# Patient Record
Sex: MALE | Race: WHITE | Employment: OTHER | URBAN - METROPOLITAN AREA
[De-identification: names, ages, dates, MRNs, and addresses within clinical notes are randomized per-mention and may not be internally consistent; named-entity substitution may affect disease eponyms.]

---

## 2017-01-09 RX ORDER — ATENOLOL 25 MG/1
TABLET ORAL
Qty: 90 TABLET | Refills: 3 | Status: SHIPPED | OUTPATIENT
Start: 2017-01-09 | End: 2020-01-15 | Stop reason: SDUPTHER

## 2017-01-12 ENCOUNTER — TELEPHONE (OUTPATIENT)
Dept: PHARMACY | Facility: CLINIC | Age: 57
End: 2017-01-12

## 2017-02-14 ENCOUNTER — OFFICE VISIT (OUTPATIENT)
Dept: FAMILY MEDICINE CLINIC | Age: 57
End: 2017-02-14

## 2017-02-14 ENCOUNTER — HOSPITAL ENCOUNTER (OUTPATIENT)
Dept: OTHER | Age: 57
Discharge: HOME | End: 2017-02-14
Attending: FAMILY MEDICINE | Admitting: FAMILY MEDICINE

## 2017-02-14 VITALS
OXYGEN SATURATION: 97 % | HEART RATE: 84 BPM | SYSTOLIC BLOOD PRESSURE: 130 MMHG | DIASTOLIC BLOOD PRESSURE: 72 MMHG | BODY MASS INDEX: 32.07 KG/M2 | RESPIRATION RATE: 14 BRPM | WEIGHT: 224 LBS | HEIGHT: 70 IN

## 2017-02-14 DIAGNOSIS — Z12.11 SCREEN FOR COLON CANCER: ICD-10-CM

## 2017-02-14 DIAGNOSIS — M54.42 CHRONIC BILATERAL LOW BACK PAIN WITH BILATERAL SCIATICA: ICD-10-CM

## 2017-02-14 DIAGNOSIS — G89.29 CHRONIC BILATERAL LOW BACK PAIN WITH BILATERAL SCIATICA: ICD-10-CM

## 2017-02-14 DIAGNOSIS — M54.41 CHRONIC BILATERAL LOW BACK PAIN WITH BILATERAL SCIATICA: ICD-10-CM

## 2017-02-14 DIAGNOSIS — E11.42 DIABETIC POLYNEUROPATHY ASSOCIATED WITH TYPE 2 DIABETES MELLITUS (HCC): Primary | ICD-10-CM

## 2017-02-14 DIAGNOSIS — I10 ESSENTIAL HYPERTENSION: ICD-10-CM

## 2017-02-14 DIAGNOSIS — Z12.5 PROSTATE CANCER SCREENING: ICD-10-CM

## 2017-02-14 LAB
A/G RATIO: 1.3 (ref 1.1–2.2)
ALBUMIN SERPL-MCNC: 4.3 G/DL (ref 3.4–5)
ALP BLD-CCNC: 96 U/L (ref 40–129)
ALT SERPL-CCNC: 64 U/L (ref 10–40)
ANION GAP SERPL CALCULATED.3IONS-SCNC: 24 MMOL/L (ref 3–16)
AST SERPL-CCNC: 46 U/L (ref 15–37)
BASOPHILS ABSOLUTE: 0.2 K/UL (ref 0–0.2)
BASOPHILS RELATIVE PERCENT: 1.8 %
BILIRUB SERPL-MCNC: 0.4 MG/DL (ref 0–1)
BUN BLDV-MCNC: 14 MG/DL (ref 7–20)
CALCIUM SERPL-MCNC: 9.6 MG/DL (ref 8.3–10.6)
CHLORIDE BLD-SCNC: 93 MMOL/L (ref 99–110)
CHOLESTEROL, TOTAL: 293 MG/DL (ref 0–199)
CO2: 20 MMOL/L (ref 21–32)
CREAT SERPL-MCNC: 0.7 MG/DL (ref 0.9–1.3)
CREATININE URINE POCT: ABNORMAL
EOSINOPHILS ABSOLUTE: 0.4 K/UL (ref 0–0.6)
EOSINOPHILS RELATIVE PERCENT: 4.1 %
GFR AFRICAN AMERICAN: >60
GFR NON-AFRICAN AMERICAN: >60
GLOBULIN: 3.2 G/DL
GLUCOSE BLD-MCNC: 220 MG/DL (ref 70–99)
HCT VFR BLD CALC: 44 % (ref 40.5–52.5)
HDLC SERPL-MCNC: 36 MG/DL (ref 40–60)
HEMOGLOBIN: 15 G/DL (ref 13.5–17.5)
LDL CHOLESTEROL CALCULATED: ABNORMAL MG/DL
LDL CHOLESTEROL DIRECT: 59 MG/DL
LYMPHOCYTES ABSOLUTE: 2 K/UL (ref 1–5.1)
LYMPHOCYTES RELATIVE PERCENT: 22.7 %
MCH RBC QN AUTO: 30.5 PG (ref 26–34)
MCHC RBC AUTO-ENTMCNC: 34.2 G/DL (ref 31–36)
MCV RBC AUTO: 89.2 FL (ref 80–100)
MICROALBUMIN/CREAT 24H UR: ABNORMAL MG/G{CREAT}
MONOCYTES ABSOLUTE: 0.8 K/UL (ref 0–1.3)
MONOCYTES RELATIVE PERCENT: 9.4 %
NEUTROPHILS ABSOLUTE: 5.5 K/UL (ref 1.7–7.7)
NEUTROPHILS RELATIVE PERCENT: 62 %
PDW BLD-RTO: 14 % (ref 12.4–15.4)
PLATELET # BLD: 255 K/UL (ref 135–450)
PMV BLD AUTO: 8.6 FL (ref 5–10.5)
POTASSIUM SERPL-SCNC: 3.9 MMOL/L (ref 3.5–5.1)
PROSTATE SPECIFIC ANTIGEN: 0.76 NG/ML (ref 0–4)
RBC # BLD: 4.93 M/UL (ref 4.2–5.9)
SODIUM BLD-SCNC: 137 MMOL/L (ref 136–145)
TOTAL PROTEIN: 7.5 G/DL (ref 6.4–8.2)
TRIGL SERPL-MCNC: 1418 MG/DL (ref 0–150)
TSH REFLEX: 1.53 UIU/ML (ref 0.27–4.2)
VLDLC SERPL CALC-MCNC: ABNORMAL MG/DL
WBC # BLD: 8.9 K/UL (ref 4–11)

## 2017-02-14 PROCEDURE — G8417 CALC BMI ABV UP PARAM F/U: HCPCS | Performed by: FAMILY MEDICINE

## 2017-02-14 PROCEDURE — G8484 FLU IMMUNIZE NO ADMIN: HCPCS | Performed by: FAMILY MEDICINE

## 2017-02-14 PROCEDURE — 1036F TOBACCO NON-USER: CPT | Performed by: FAMILY MEDICINE

## 2017-02-14 PROCEDURE — 3017F COLORECTAL CA SCREEN DOC REV: CPT | Performed by: FAMILY MEDICINE

## 2017-02-14 PROCEDURE — 3045F PR MOST RECENT HEMOGLOBIN A1C LEVEL 7.0-9.0%: CPT | Performed by: FAMILY MEDICINE

## 2017-02-14 PROCEDURE — 99215 OFFICE O/P EST HI 40 MIN: CPT | Performed by: FAMILY MEDICINE

## 2017-02-14 PROCEDURE — 82044 UR ALBUMIN SEMIQUANTITATIVE: CPT | Performed by: FAMILY MEDICINE

## 2017-02-14 PROCEDURE — G8427 DOCREV CUR MEDS BY ELIG CLIN: HCPCS | Performed by: FAMILY MEDICINE

## 2017-02-14 PROCEDURE — G8598 ASA/ANTIPLAT THER USED: HCPCS | Performed by: FAMILY MEDICINE

## 2017-02-14 ASSESSMENT — PATIENT HEALTH QUESTIONNAIRE - PHQ9
2. FEELING DOWN, DEPRESSED OR HOPELESS: 0
SUM OF ALL RESPONSES TO PHQ QUESTIONS 1-9: 0
SUM OF ALL RESPONSES TO PHQ9 QUESTIONS 1 & 2: 0
1. LITTLE INTEREST OR PLEASURE IN DOING THINGS: 0

## 2017-02-14 ASSESSMENT — ENCOUNTER SYMPTOMS
SHORTNESS OF BREATH: 0
BACK PAIN: 1
BLURRED VISION: 1
VISUAL CHANGE: 1
ORTHOPNEA: 0

## 2017-02-15 LAB
ESTIMATED AVERAGE GLUCOSE: 254.7 MG/DL
HBA1C MFR BLD: 10.5 %

## 2017-02-27 ENCOUNTER — OFFICE VISIT (OUTPATIENT)
Dept: FAMILY MEDICINE CLINIC | Age: 57
End: 2017-02-27

## 2017-02-27 VITALS
SYSTOLIC BLOOD PRESSURE: 110 MMHG | DIASTOLIC BLOOD PRESSURE: 72 MMHG | HEART RATE: 101 BPM | OXYGEN SATURATION: 98 % | HEIGHT: 70 IN | WEIGHT: 236 LBS | BODY MASS INDEX: 33.79 KG/M2

## 2017-02-27 DIAGNOSIS — Z79.4 TYPE 2 DIABETES MELLITUS WITH DIABETIC POLYNEUROPATHY, WITH LONG-TERM CURRENT USE OF INSULIN (HCC): Primary | ICD-10-CM

## 2017-02-27 DIAGNOSIS — E11.42 TYPE 2 DIABETES MELLITUS WITH DIABETIC POLYNEUROPATHY, WITH LONG-TERM CURRENT USE OF INSULIN (HCC): Primary | ICD-10-CM

## 2017-02-27 PROCEDURE — 1036F TOBACCO NON-USER: CPT | Performed by: FAMILY MEDICINE

## 2017-02-27 PROCEDURE — 99999 PR OFFICE/OUTPT VISIT,PROCEDURE ONLY: CPT | Performed by: FAMILY MEDICINE

## 2017-03-06 RX ORDER — LISINOPRIL AND HYDROCHLOROTHIAZIDE 20; 12.5 MG/1; MG/1
TABLET ORAL
Qty: 90 TABLET | Refills: 0 | Status: SHIPPED | OUTPATIENT
Start: 2017-03-06 | End: 2018-12-03 | Stop reason: SDUPTHER

## 2017-04-11 ENCOUNTER — OFFICE VISIT (OUTPATIENT)
Dept: ORTHOPEDIC SURGERY | Age: 57
End: 2017-04-11

## 2017-04-11 VITALS
HEIGHT: 71 IN | BODY MASS INDEX: 31.5 KG/M2 | SYSTOLIC BLOOD PRESSURE: 135 MMHG | DIASTOLIC BLOOD PRESSURE: 82 MMHG | WEIGHT: 225 LBS

## 2017-04-11 DIAGNOSIS — M51.26 DEGENERATION OF LUMBAR INTERVERTEBRAL DISC WITH ACUTE HERNIATION: Primary | ICD-10-CM

## 2017-04-11 DIAGNOSIS — M51.36 DEGENERATION OF LUMBAR INTERVERTEBRAL DISC WITH ACUTE HERNIATION: Primary | ICD-10-CM

## 2017-04-11 DIAGNOSIS — M48.02 CERVICAL SPINAL STENOSIS: ICD-10-CM

## 2017-04-11 DIAGNOSIS — G95.9 LUMBAR MYELOPATHY (HCC): ICD-10-CM

## 2017-04-11 DIAGNOSIS — M54.50 PAIN OF LUMBAR SPINE: ICD-10-CM

## 2017-04-11 PROCEDURE — 3017F COLORECTAL CA SCREEN DOC REV: CPT | Performed by: PHYSICAL MEDICINE & REHABILITATION

## 2017-04-11 PROCEDURE — G8427 DOCREV CUR MEDS BY ELIG CLIN: HCPCS | Performed by: PHYSICAL MEDICINE & REHABILITATION

## 2017-04-11 PROCEDURE — G8599 NO ASA/ANTIPLAT THER USE RNG: HCPCS | Performed by: PHYSICAL MEDICINE & REHABILITATION

## 2017-04-11 PROCEDURE — G8417 CALC BMI ABV UP PARAM F/U: HCPCS | Performed by: PHYSICAL MEDICINE & REHABILITATION

## 2017-04-11 PROCEDURE — 99244 OFF/OP CNSLTJ NEW/EST MOD 40: CPT | Performed by: PHYSICAL MEDICINE & REHABILITATION

## 2017-04-11 PROCEDURE — 1036F TOBACCO NON-USER: CPT | Performed by: PHYSICAL MEDICINE & REHABILITATION

## 2017-04-11 PROCEDURE — 72100 X-RAY EXAM L-S SPINE 2/3 VWS: CPT | Performed by: PHYSICAL MEDICINE & REHABILITATION

## 2017-04-12 ENCOUNTER — CARE COORDINATION (OUTPATIENT)
Dept: FAMILY MEDICINE CLINIC | Age: 57
End: 2017-04-12

## 2017-05-24 LAB
CHOLESTEROL, TOTAL: 289 MG/DL (ref 0–199)
GLUCOSE BLD-MCNC: 360 MG/DL (ref 70–99)
HDLC SERPL-MCNC: 28 MG/DL (ref 40–60)
LDL CHOLESTEROL CALCULATED: ABNORMAL MG/DL
TRIGL SERPL-MCNC: 2496 MG/DL (ref 0–150)

## 2017-05-25 ENCOUNTER — EMPLOYEE WELLNESS (OUTPATIENT)
Dept: OTHER | Age: 57
End: 2017-05-25

## 2017-05-25 LAB
ESTIMATED AVERAGE GLUCOSE: 280.5 MG/DL
HBA1C MFR BLD: 11.4 %
LDL CHOLESTEROL DIRECT: 45 MG/DL

## 2017-07-18 ENCOUNTER — OFFICE VISIT (OUTPATIENT)
Dept: ORTHOPEDIC SURGERY | Age: 57
End: 2017-07-18

## 2017-07-18 VITALS
SYSTOLIC BLOOD PRESSURE: 151 MMHG | HEIGHT: 71 IN | WEIGHT: 225.09 LBS | DIASTOLIC BLOOD PRESSURE: 92 MMHG | HEART RATE: 113 BPM | BODY MASS INDEX: 31.51 KG/M2

## 2017-07-18 DIAGNOSIS — M48.061 LUMBAR STENOSIS: ICD-10-CM

## 2017-07-18 DIAGNOSIS — M48.02 CERVICAL SPINAL STENOSIS: Primary | ICD-10-CM

## 2017-07-18 DIAGNOSIS — M50.30 DDD (DEGENERATIVE DISC DISEASE), CERVICAL: ICD-10-CM

## 2017-07-18 PROCEDURE — G8598 ASA/ANTIPLAT THER USED: HCPCS | Performed by: PHYSICAL MEDICINE & REHABILITATION

## 2017-07-18 PROCEDURE — G8417 CALC BMI ABV UP PARAM F/U: HCPCS | Performed by: PHYSICAL MEDICINE & REHABILITATION

## 2017-07-18 PROCEDURE — G8427 DOCREV CUR MEDS BY ELIG CLIN: HCPCS | Performed by: PHYSICAL MEDICINE & REHABILITATION

## 2017-07-18 PROCEDURE — 1036F TOBACCO NON-USER: CPT | Performed by: PHYSICAL MEDICINE & REHABILITATION

## 2017-07-18 PROCEDURE — 3017F COLORECTAL CA SCREEN DOC REV: CPT | Performed by: PHYSICAL MEDICINE & REHABILITATION

## 2017-07-18 PROCEDURE — 99213 OFFICE O/P EST LOW 20 MIN: CPT | Performed by: PHYSICAL MEDICINE & REHABILITATION

## 2017-07-24 ENCOUNTER — OFFICE VISIT (OUTPATIENT)
Dept: ORTHOPEDIC SURGERY | Age: 57
End: 2017-07-24

## 2017-07-24 VITALS
WEIGHT: 225.09 LBS | BODY MASS INDEX: 31.51 KG/M2 | HEIGHT: 71 IN | SYSTOLIC BLOOD PRESSURE: 156 MMHG | HEART RATE: 118 BPM | DIASTOLIC BLOOD PRESSURE: 91 MMHG

## 2017-07-24 DIAGNOSIS — M47.12 CERVICAL SPONDYLOSIS WITH MYELOPATHY: Primary | ICD-10-CM

## 2017-07-24 PROCEDURE — 1036F TOBACCO NON-USER: CPT | Performed by: ORTHOPAEDIC SURGERY

## 2017-07-24 PROCEDURE — G8427 DOCREV CUR MEDS BY ELIG CLIN: HCPCS | Performed by: ORTHOPAEDIC SURGERY

## 2017-07-24 PROCEDURE — G8598 ASA/ANTIPLAT THER USED: HCPCS | Performed by: ORTHOPAEDIC SURGERY

## 2017-07-24 PROCEDURE — 3017F COLORECTAL CA SCREEN DOC REV: CPT | Performed by: ORTHOPAEDIC SURGERY

## 2017-07-24 PROCEDURE — 99213 OFFICE O/P EST LOW 20 MIN: CPT | Performed by: ORTHOPAEDIC SURGERY

## 2017-07-24 PROCEDURE — G8417 CALC BMI ABV UP PARAM F/U: HCPCS | Performed by: ORTHOPAEDIC SURGERY

## 2017-07-24 RX ORDER — METHYLPREDNISOLONE 16 MG/1
TABLET ORAL
Qty: 12 TABLET | Refills: 0 | Status: ON HOLD | OUTPATIENT
Start: 2017-07-24 | End: 2017-08-02 | Stop reason: HOSPADM

## 2017-07-26 ENCOUNTER — TELEPHONE (OUTPATIENT)
Dept: ORTHOPEDIC SURGERY | Age: 57
End: 2017-07-26

## 2017-07-26 LAB
AVERAGE GLUCOSE: NORMAL
HBA1C MFR BLD: 11 %

## 2017-07-28 ENCOUNTER — TELEPHONE (OUTPATIENT)
Dept: ORTHOPEDIC SURGERY | Age: 57
End: 2017-07-28

## 2017-07-28 ENCOUNTER — HOSPITAL ENCOUNTER (OUTPATIENT)
Dept: CT IMAGING | Age: 57
Discharge: HOME | End: 2017-07-28
Attending: ORTHOPAEDIC SURGERY | Admitting: ORTHOPAEDIC SURGERY

## 2017-07-28 DIAGNOSIS — M47.12 CERVICAL ARTHRITIS WITH MYELOPATHY: ICD-10-CM

## 2017-07-31 ENCOUNTER — TELEPHONE (OUTPATIENT)
Dept: ORTHOPEDIC SURGERY | Age: 57
End: 2017-07-31

## 2017-08-10 ENCOUNTER — TELEPHONE (OUTPATIENT)
Dept: ORTHOPEDIC SURGERY | Age: 57
End: 2017-08-10

## 2017-08-17 ENCOUNTER — OFFICE VISIT (OUTPATIENT)
Dept: ORTHOPEDIC SURGERY | Age: 57
End: 2017-08-17

## 2017-08-17 VITALS
WEIGHT: 220.02 LBS | HEIGHT: 71 IN | SYSTOLIC BLOOD PRESSURE: 128 MMHG | BODY MASS INDEX: 30.8 KG/M2 | DIASTOLIC BLOOD PRESSURE: 77 MMHG | HEART RATE: 88 BPM

## 2017-08-17 DIAGNOSIS — Z98.1 S/P CERVICAL SPINAL FUSION: Primary | ICD-10-CM

## 2017-08-17 PROCEDURE — 72040 X-RAY EXAM NECK SPINE 2-3 VW: CPT | Performed by: ORTHOPAEDIC SURGERY

## 2017-08-17 PROCEDURE — 99024 POSTOP FOLLOW-UP VISIT: CPT | Performed by: ORTHOPAEDIC SURGERY

## 2017-10-11 ENCOUNTER — OFFICE VISIT (OUTPATIENT)
Dept: ORTHOPEDIC SURGERY | Age: 57
End: 2017-10-11

## 2017-10-11 VITALS
WEIGHT: 220.02 LBS | HEART RATE: 89 BPM | SYSTOLIC BLOOD PRESSURE: 145 MMHG | DIASTOLIC BLOOD PRESSURE: 88 MMHG | HEIGHT: 71 IN | BODY MASS INDEX: 30.8 KG/M2

## 2017-10-11 DIAGNOSIS — Z98.1 S/P CERVICAL SPINAL FUSION: Primary | ICD-10-CM

## 2017-10-11 PROCEDURE — 99024 POSTOP FOLLOW-UP VISIT: CPT | Performed by: ORTHOPAEDIC SURGERY

## 2017-10-11 PROCEDURE — 72040 X-RAY EXAM NECK SPINE 2-3 VW: CPT | Performed by: ORTHOPAEDIC SURGERY

## 2017-10-11 NOTE — PROGRESS NOTES
Mr. Maribell Cintron returns today 2.5 months s/p C5-6 and C6/C7 ACDF. He reports marked improvement of his arms symptoms. He no longer has burning in his hands, he has regained the strength of his upper extremity and is fine motor control. His walking has improved but has been much less improvement than with his upper extremities. His incisions show no signs of infection. He has marked improvement of his gait. He has a normal gait and 5/5 strength of his wrist DFs/VFs and biceps/triceps bilaterally. His sensation is intact from C5 to C8 bilaterally. He has dramatically decreased clonus and hyperreflexia    I reviewed AP and LAT x-rays of his cervical spine that were obtained in the office today. They show good position of his plate, screws and bone graft. He will return in 4 weeks for repeat x-rays. We discussed lumbar epidural injection and repeating the MRI of the cervical spine.

## 2017-10-16 ENCOUNTER — TELEPHONE (OUTPATIENT)
Dept: ORTHOPEDIC SURGERY | Age: 57
End: 2017-10-16

## 2017-10-16 DIAGNOSIS — M47.12 CERVICAL SPONDYLOSIS WITH MYELOPATHY: Primary | ICD-10-CM

## 2017-10-16 DIAGNOSIS — Z98.1 S/P CERVICAL SPINAL FUSION: ICD-10-CM

## 2017-10-19 ENCOUNTER — OFFICE VISIT (OUTPATIENT)
Dept: DERMATOLOGY | Age: 57
End: 2017-10-19

## 2017-10-19 DIAGNOSIS — L57.0 ACTINIC KERATOSES: Primary | ICD-10-CM

## 2017-10-19 DIAGNOSIS — K13.0 ANGULAR CHEILITIS: ICD-10-CM

## 2017-10-19 DIAGNOSIS — Z12.83 SCREENING EXAM FOR SKIN CANCER: ICD-10-CM

## 2017-10-19 PROCEDURE — G8417 CALC BMI ABV UP PARAM F/U: HCPCS | Performed by: DERMATOLOGY

## 2017-10-19 PROCEDURE — 1036F TOBACCO NON-USER: CPT | Performed by: DERMATOLOGY

## 2017-10-19 PROCEDURE — G8427 DOCREV CUR MEDS BY ELIG CLIN: HCPCS | Performed by: DERMATOLOGY

## 2017-10-19 PROCEDURE — 3017F COLORECTAL CA SCREEN DOC REV: CPT | Performed by: DERMATOLOGY

## 2017-10-19 PROCEDURE — G8598 ASA/ANTIPLAT THER USED: HCPCS | Performed by: DERMATOLOGY

## 2017-10-19 PROCEDURE — 17003 DESTRUCT PREMALG LES 2-14: CPT | Performed by: DERMATOLOGY

## 2017-10-19 PROCEDURE — G8484 FLU IMMUNIZE NO ADMIN: HCPCS | Performed by: DERMATOLOGY

## 2017-10-19 PROCEDURE — 99214 OFFICE O/P EST MOD 30 MIN: CPT | Performed by: DERMATOLOGY

## 2017-10-19 PROCEDURE — 17000 DESTRUCT PREMALG LESION: CPT | Performed by: DERMATOLOGY

## 2017-10-19 RX ORDER — NYSTATIN 100000 U/G
OINTMENT TOPICAL
Qty: 30 G | Refills: 1 | Status: SHIPPED | OUTPATIENT
Start: 2017-10-19 | End: 2019-03-15 | Stop reason: SDUPTHER

## 2017-10-19 NOTE — PROGRESS NOTES
[facility]) Dermatology  Lyric Bolton  1960    61 y.o. male     Date of Visit: 10/19/2017    Last Visit: 1yr    Chief Complaint: Skin check    History of Present Illness:  1. Here for skin check. History of actinic keratoses s/p cryotherapy. Reports rough lesions on scalp and L cheek  -Avoids sun as much as possible. Wears hat and SPF 30 sunscreen if spending extended time outdoors. 2. New issue. Few week hx mildly tender eruption of corners of mouth. Derm history:  -ET/PP rosacea - pt declined treatment    Review of Systems:  Constitutional: Reports general sense of well-being. Skin: No interval severe sunburns. Allergies: Reviewed and updated. Past Medical History, Surgical History, Medications and Allergies reviewed.      Past Medical History:   Diagnosis Date    Cerebral artery occlusion with cerebral infarction [facility]) 2013     X2 PERSONALITY CHANGE, ANGER OUTBURSTS    Cerebrovascular disease     ED (erectile dysfunction) 1/23/2014    Hypertension     Irritable bowel syndrome     Obstructive sleep apnea (adult) (pediatric) 07/01/2013    NO CPAP, HAD PROBLEMS WITH INSURANCE AND STOPPED USING    Occlusion and stenosis of carotid artery without mention of cerebral infarction 01/13/2014    MINOR    Polyneuropathy in diabetes(357.2) 7/1/2013    PONV (postoperative nausea and vomiting)     Skin abnormality     PRE-CANCEROUS LESIONS REMOVED FROM ARMS AND EARS    Type II or unspecified type diabetes mellitus without mention of complication, not stated as uncontrolled      Past Surgical History:   Procedure Laterality Date    CARPAL TUNNEL RELEASE Left 2008    CERVICAL DISCECTOMY  08/02/2017    ANTERIOR CERVICAL DISCECTOMY AND FUSION C5-6, C6-7 WITH MEDTRONIC PLATES, SCREWS, ALLOGRAFT, WITH EVOKES PARTIAL C6 CORPECTOMY    CHOLECYSTECTOMY  1995    ELBOW SURGERY  2008    ULNAR NERVE TRANSPOSITION, AT SAME TIME AS CTR    HERNIA REPAIR  8020    umbilical    nitrogen x 2 cycles - Vertex scalp 2, L 3 and R 3 helices, nasal root 1, L preauricular 1. Edu re: risk of blister formation, discomfort, scar, hypopigmentation. Discussed wound care. -Reviewed sun protective behavior -- sun avoidance during the peak hours of the day, sun-protective clothing (including hat and sunglasses), sunscreen use (water resistant, broad spectrum, SPF at least 30, need for reapplication every 2 to 3 hours). -Return for full skin exam in 1 year (sooner if indicated)      2.  Angular cheilitis  -Nystatin oint bid

## 2017-10-19 NOTE — PATIENT INSTRUCTIONS
Protecting Yourself From the Sun    · Apply broad spectrum water resistant sunscreen with an SPF of at least 30 to exposed areas of the skin. Dont forget the ears and lips! Remember to reapply sunscreen about every 2 hours and after swimming or sweating. · Wear sun protective clothing. Swim shirts (aka. rash guards) are a great idea and negates the need to reapply sunscreen in those areas. · Seek the shade whenever possible especially between the hours of 10 am and 4 pm when the suns rays are the strongest.     · Avoid tanning beds       Cryosurgery (Freezing) Wound Care Instructions    AFTER THE PROCEDURE:    You will notice swelling and redness around the site. This is normal.    You may experience a sharp or sore feeling for the next several days. For this discomfort, you may take acetaminophen (Tylenol©).  A blister may develop at the treated area, sometimes as soon as by the end of the day. After several days, the blister will subside and a scab will form.  If the area is bumped or traumatized during the first few days following freezing, you may develop bleeding into the blister, forming a blood blister. This is nothing to be alarmed about.  If the blister is tense, uncomfortable, or much larger than the site that was frozen, you may pop the blister along its edge with a sterile needle (boiled, heated under a flame, or cleaned with alcohol) to allow the fluid to drain out. If the blister does not bother you, no treatment is needed.  Do NOT peel off the top of the blister roof. It will act as a dressing on top of your wound. WOUND CARE:    You may shower or bathe as usual, but avoid scrubbing the areas that have been frozen.  Cleanse the site twice a day with mild soapy water, and then apply a thin film of white petrolatum (Vaseline©).  You do not need to cover the area, but can if you prefer.     Do NOT allow the site to become dry or crusted, or attempt to dry it out with

## 2017-10-30 ENCOUNTER — HOSPITAL ENCOUNTER (OUTPATIENT)
Dept: MRI IMAGING | Age: 57
Discharge: HOME | End: 2017-10-30
Attending: ORTHOPAEDIC SURGERY | Admitting: ORTHOPAEDIC SURGERY

## 2017-10-30 ENCOUNTER — TELEPHONE (OUTPATIENT)
Dept: ORTHOPEDIC SURGERY | Age: 57
End: 2017-10-30

## 2017-10-30 DIAGNOSIS — M47.12 OTHER SPONDYLOSIS WITH MYELOPATHY, CERVICAL REGION: ICD-10-CM

## 2017-10-30 DIAGNOSIS — M47.12 CERVICAL SPONDYLOSIS WITH MYELOPATHY: Primary | ICD-10-CM

## 2017-10-30 DIAGNOSIS — Z98.1 S/P CERVICAL SPINAL FUSION: ICD-10-CM

## 2017-10-30 DIAGNOSIS — M47.12 CERVICAL SPONDYLOSIS WITH MYELOPATHY: ICD-10-CM

## 2017-10-30 LAB
CREAT SERPL-MCNC: 0.7 MG/DL (ref 0.9–1.3)
GFR AFRICAN AMERICAN: >60
GFR NON-AFRICAN AMERICAN: >60

## 2017-10-30 NOTE — TELEPHONE ENCOUNTER
----- Message from Marvin Ybarra MD sent at 10/30/2017  3:24 PM EDT -----  Mri significantly better. However some residual congenital stenosis. Could try time or come in to discuss posterior surgery.

## 2017-10-31 ENCOUNTER — TELEPHONE (OUTPATIENT)
Dept: ORTHOPEDIC SURGERY | Age: 57
End: 2017-10-31

## 2017-11-08 ENCOUNTER — OFFICE VISIT (OUTPATIENT)
Dept: ORTHOPEDIC SURGERY | Age: 57
End: 2017-11-08

## 2017-11-08 ENCOUNTER — TELEPHONE (OUTPATIENT)
Dept: ORTHOPEDIC SURGERY | Age: 57
End: 2017-11-08

## 2017-11-08 VITALS
BODY MASS INDEX: 30.8 KG/M2 | HEART RATE: 98 BPM | DIASTOLIC BLOOD PRESSURE: 92 MMHG | WEIGHT: 220.02 LBS | HEIGHT: 71 IN | SYSTOLIC BLOOD PRESSURE: 152 MMHG

## 2017-11-08 DIAGNOSIS — M47.12 CERVICAL SPONDYLOSIS WITH MYELOPATHY: Primary | ICD-10-CM

## 2017-11-08 PROCEDURE — L0172 CERV COL SR FOAM 2PC PRE OTS: HCPCS | Performed by: ORTHOPAEDIC SURGERY

## 2017-11-08 PROCEDURE — 99024 POSTOP FOLLOW-UP VISIT: CPT | Performed by: ORTHOPAEDIC SURGERY

## 2017-11-08 NOTE — PROGRESS NOTES
Mr. Rafael Krishnamurthy returns today 3.5 months s/p C5-6 and C6/C7 ACDF. He reports marked improvement of his arms symptoms. He no longer has burning in his hands, he has regained the strength of his upper extremity and is fine motor control. His walking has not improved over the last month. He continues to note gait disturbance and balance disorder. His incisions show no signs of infection. He walks slowly with a cane  He has a normal gait and 5/5 strength of his wrist DFs/VFs and biceps/triceps bilaterally. His sensation is intact from C5 to C8 bilaterally. He has a few beats of clonus and mild hyperreflexia in his lower extremity    I reviewed MRI images of his cervical spine in the office today. They show moderate to severe central and foraminal stenosis C5 to C7. He has cord changes at C5-C6. We discussed treatment options including observation and cervical laminectomy and fusion. We discussed the risks, benefits, and alternatives to surgery including the risks of nerve, spinal cord, vessel injury, paralysis, spinal blood clot, spinal fluid leak, death, infection, need for additional surgery to remove or reposition rods or screws, adjacent level arthritis failure of surgery to alleviate his symptoms and worse symptoms following surger. He understands these risks and wishes to proceed with surgery. His questions were answered.

## 2017-11-17 ENCOUNTER — TELEPHONE (OUTPATIENT)
Dept: ORTHOPEDIC SURGERY | Age: 57
End: 2017-11-17

## 2017-11-17 NOTE — TELEPHONE ENCOUNTER
CPT   60532.51  [id number]  [id number]  [id number]  [id number]  [id number]  [id number] [facility] TO PRECERT  CERVICAL LAMINECTOMY AND POSTERIOR FUSION  C5 - C7  WITH MEDTRONIC PLATES, [address]

## 2017-11-29 ENCOUNTER — HOSPITAL ENCOUNTER (OUTPATIENT)
Dept: OTHER | Age: 57
Discharge: HOME | End: 2017-11-29
Attending: ORTHOPAEDIC SURGERY | Admitting: ORTHOPAEDIC SURGERY

## 2017-11-29 LAB
ANION GAP SERPL CALCULATED.3IONS-SCNC: 21 MMOL/L (ref 3–16)
BUN BLDV-MCNC: 9 MG/DL (ref 7–20)
CALCIUM SERPL-MCNC: 9.1 MG/DL (ref 8.3–10.6)
CHLORIDE BLD-SCNC: 96 MMOL/L (ref 99–110)
CO2: 23 MMOL/L (ref 21–32)
CREAT SERPL-MCNC: 0.7 MG/DL (ref 0.9–1.3)
EKG ATRIAL RATE: 80 BPM
EKG DIAGNOSIS: NORMAL
EKG P AXIS: 46 DEGREES
EKG P-R INTERVAL: 134 MS
EKG Q-T INTERVAL: 370 MS
EKG QRS DURATION: 82 MS
EKG QTC CALCULATION (BAZETT): 426 MS
EKG R AXIS: 36 DEGREES
EKG T AXIS: 57 DEGREES
EKG VENTRICULAR RATE: 80 BPM
GFR AFRICAN AMERICAN: >60
GFR NON-AFRICAN AMERICAN: >60
GLUCOSE BLD-MCNC: 243 MG/DL (ref 70–99)
POTASSIUM SERPL-SCNC: 4.2 MMOL/L (ref 3.5–5.1)
SODIUM BLD-SCNC: 140 MMOL/L (ref 136–145)

## 2017-11-29 PROCEDURE — 93010 ELECTROCARDIOGRAM REPORT: CPT | Performed by: INTERNAL MEDICINE

## 2017-12-06 PROBLEM — M47.12 OSTEOARTHRITIS OF CERVICAL SPINE WITH MYELOPATHY: Status: ACTIVE | Noted: 2017-12-06

## 2017-12-07 ENCOUNTER — TELEPHONE (OUTPATIENT)
Dept: ORTHOPEDIC SURGERY | Age: 57
End: 2017-12-07

## 2017-12-11 ENCOUNTER — TELEPHONE (OUTPATIENT)
Dept: ORTHOPEDIC SURGERY | Age: 57
End: 2017-12-11

## 2017-12-19 ENCOUNTER — OFFICE VISIT (OUTPATIENT)
Dept: ORTHOPEDIC SURGERY | Age: 57
End: 2017-12-19

## 2017-12-19 VITALS
BODY MASS INDEX: 31.36 KG/M2 | DIASTOLIC BLOOD PRESSURE: 75 MMHG | WEIGHT: 223.99 LBS | HEIGHT: 71 IN | HEART RATE: 80 BPM | SYSTOLIC BLOOD PRESSURE: 123 MMHG

## 2017-12-19 DIAGNOSIS — Z98.1 S/P CERVICAL SPINAL FUSION: Primary | ICD-10-CM

## 2017-12-19 PROCEDURE — 99024 POSTOP FOLLOW-UP VISIT: CPT | Performed by: ORTHOPAEDIC SURGERY

## 2017-12-19 NOTE — PROGRESS NOTES
Mr. Maribell Cintron returns today 2 weeks  s/p cervical laminectomy and fusion. He reports marked improvement of his arm symptoms. He has     His incisions show no signs of infection. He has a normal gait and 5/5 strength of his wrist DFs/VFs and biceps/triceps bilaterally. His sensation is intact from C5 to C8 bilaterally. I reviewed AP and LAT x-rays of his cervical spine that were obtained in the office today. They show good position of his rods, screws and bone graft. He will return in 6 weeks for repeat x-rays.

## 2017-12-20 ENCOUNTER — CLINICAL DOCUMENTATION (OUTPATIENT)
Dept: PHARMACY | Facility: CLINIC | Age: 57
End: 2017-12-20

## 2017-12-20 ENCOUNTER — ENROLLMENT (OUTPATIENT)
Dept: PHARMACY | Facility: CLINIC | Age: 57
End: 2017-12-20

## 2017-12-20 NOTE — PROGRESS NOTES
Pharmacy Pop Care Documentation:      The application for Kojo Salazar for enrollment into the diabetes management program has been reviewed and accepted on 12/20/17.     Lynne La

## 2017-12-22 ENCOUNTER — TELEPHONE (OUTPATIENT)
Dept: ORTHOPEDIC SURGERY | Age: 57
End: 2017-12-22

## 2017-12-26 NOTE — TELEPHONE ENCOUNTER
Spoke with pt's spouse. She states pt is feeling better. She gave him a muscle relaxer and used heat.

## 2018-01-29 ENCOUNTER — OFFICE VISIT (OUTPATIENT)
Dept: ORTHOPEDIC SURGERY | Age: 58
End: 2018-01-29

## 2018-01-29 VITALS
DIASTOLIC BLOOD PRESSURE: 78 MMHG | SYSTOLIC BLOOD PRESSURE: 135 MMHG | HEIGHT: 71 IN | WEIGHT: 223.99 LBS | HEART RATE: 104 BPM | BODY MASS INDEX: 31.36 KG/M2

## 2018-01-29 DIAGNOSIS — Z98.1 S/P CERVICAL SPINAL FUSION: Primary | ICD-10-CM

## 2018-01-29 PROCEDURE — 99024 POSTOP FOLLOW-UP VISIT: CPT | Performed by: ORTHOPAEDIC SURGERY

## 2018-01-29 NOTE — PROGRESS NOTES
Mr. Nesha Bunch returns today 8 weeks  s/p cervical laminectomy and fusion. He reports improvement of his arm and leg symptoms. He is walking much better and using a cane    His incisions show no signs of infection. He has a normal gait and 5/5 strength of his wrist DFs/VFs and biceps/triceps bilaterally. His sensation is intact from C5 to C8 bilaterally. I reviewed AP and LAT x-rays of his cervical spine that were obtained in the office today. They show good position of his rods, screws and bone graft. He will return in 8 weeks for repeat x-rays.

## 2018-01-30 ENCOUNTER — TELEPHONE (OUTPATIENT)
Dept: PHARMACY | Facility: CLINIC | Age: 58
End: 2018-01-30

## 2018-02-20 ENCOUNTER — TELEPHONE (OUTPATIENT)
Dept: PHARMACY | Facility: CLINIC | Age: 58
End: 2018-02-20

## 2018-02-20 NOTE — TELEPHONE ENCOUNTER
Called patient to schedule pharmacist appointment to discuss medications for Diabetes Management Program.   No answer. Left VM on home/cell TAD: Please call back at [phone] Option #7 to retrieve the above message.      **Spouse - Paula Underwood - also enrolled in the DM Program**    Catalina Eric, [id number]   Department, toll free: [phone], option 7

## 2018-02-20 NOTE — TELEPHONE ENCOUNTER
[facility]) Employee Diabetes Program    Two attempts made to reach patient by telephone for pharmacist appointment. Left voice message for patient to return clinician's phone call to [phone].     Jasson Best PharmD, [facility] Specialist  O: [phone]  C: [address] [place] [address]  [phone], Option 7    =======================================================    For Pharmacy Admin Tracking Only  PHSO: Yes  Time Spent (min): 5

## 2018-03-20 VITALS — BODY MASS INDEX: 30.96 KG/M2 | WEIGHT: 222 LBS

## 2018-03-26 ENCOUNTER — OFFICE VISIT (OUTPATIENT)
Dept: ORTHOPEDIC SURGERY | Age: 58
End: 2018-03-26

## 2018-03-26 VITALS — BODY MASS INDEX: 31.36 KG/M2 | WEIGHT: 223.99 LBS | HEIGHT: 71 IN

## 2018-03-26 DIAGNOSIS — Z98.1 STATUS POST CERVICAL SPINAL FUSION: Primary | ICD-10-CM

## 2018-03-26 PROCEDURE — 99212 OFFICE O/P EST SF 10 MIN: CPT | Performed by: ORTHOPAEDIC SURGERY

## 2018-03-26 PROCEDURE — G8427 DOCREV CUR MEDS BY ELIG CLIN: HCPCS | Performed by: ORTHOPAEDIC SURGERY

## 2018-03-26 PROCEDURE — 3017F COLORECTAL CA SCREEN DOC REV: CPT | Performed by: ORTHOPAEDIC SURGERY

## 2018-03-26 PROCEDURE — G8484 FLU IMMUNIZE NO ADMIN: HCPCS | Performed by: ORTHOPAEDIC SURGERY

## 2018-03-26 PROCEDURE — G8417 CALC BMI ABV UP PARAM F/U: HCPCS | Performed by: ORTHOPAEDIC SURGERY

## 2018-03-26 PROCEDURE — 1036F TOBACCO NON-USER: CPT | Performed by: ORTHOPAEDIC SURGERY

## 2018-03-26 PROCEDURE — G8599 NO ASA/ANTIPLAT THER USE RNG: HCPCS | Performed by: ORTHOPAEDIC SURGERY

## 2018-04-02 ENCOUNTER — TELEPHONE (OUTPATIENT)
Dept: INTERNAL MEDICINE CLINIC | Age: 58
End: 2018-04-02

## 2018-04-02 ENCOUNTER — EMPLOYEE WELLNESS (OUTPATIENT)
Dept: OTHER | Age: 58
End: 2018-04-02

## 2018-04-02 LAB
CHOLESTEROL, TOTAL: 291 MG/DL (ref 0–199)
GLUCOSE BLD-MCNC: 465 MG/DL (ref 70–99)
HDLC SERPL-MCNC: 31 MG/DL (ref 40–60)
LDL CHOLESTEROL CALCULATED: ABNORMAL MG/DL
LDL CHOLESTEROL DIRECT: 67 MG/DL
TRIGL SERPL-MCNC: 1600 MG/DL (ref 0–150)

## 2018-04-03 LAB
ESTIMATED AVERAGE GLUCOSE: 286.2 MG/DL
HBA1C MFR BLD: 11.6 %

## 2018-04-10 VITALS — WEIGHT: 220 LBS | BODY MASS INDEX: 30.7 KG/M2

## 2018-06-20 ENCOUNTER — TELEPHONE (OUTPATIENT)
Dept: PHARMACY | Facility: CLINIC | Age: 58
End: 2018-06-20

## 2018-06-21 ENCOUNTER — TELEPHONE (OUTPATIENT)
Dept: PHARMACY | Facility: CLINIC | Age: 58
End: 2018-06-21

## 2018-07-09 ENCOUNTER — TELEPHONE (OUTPATIENT)
Dept: FAMILY MEDICINE CLINIC | Age: 58
End: 2018-07-09

## 2018-07-09 ENCOUNTER — OFFICE VISIT (OUTPATIENT)
Dept: FAMILY MEDICINE CLINIC | Age: 58
End: 2018-07-09

## 2018-07-09 ENCOUNTER — HOSPITAL ENCOUNTER (OUTPATIENT)
Dept: OTHER | Age: 58
Discharge: HOME | End: 2018-07-09
Attending: NURSE PRACTITIONER | Admitting: NURSE PRACTITIONER

## 2018-07-09 VITALS
BODY MASS INDEX: 31.5 KG/M2 | HEIGHT: 70 IN | OXYGEN SATURATION: 95 % | SYSTOLIC BLOOD PRESSURE: 128 MMHG | HEART RATE: 89 BPM | WEIGHT: 220 LBS | DIASTOLIC BLOOD PRESSURE: 66 MMHG

## 2018-07-09 DIAGNOSIS — E78.2 MIXED HYPERLIPIDEMIA: ICD-10-CM

## 2018-07-09 DIAGNOSIS — Z79.4 TYPE 2 DIABETES MELLITUS WITH DIABETIC POLYNEUROPATHY, WITH LONG-TERM CURRENT USE OF INSULIN (HCC): ICD-10-CM

## 2018-07-09 DIAGNOSIS — I10 ESSENTIAL HYPERTENSION: ICD-10-CM

## 2018-07-09 DIAGNOSIS — Z79.4 TYPE 2 DIABETES MELLITUS WITH DIABETIC POLYNEUROPATHY, WITH LONG-TERM CURRENT USE OF INSULIN (HCC): Primary | ICD-10-CM

## 2018-07-09 DIAGNOSIS — E11.42 TYPE 2 DIABETES MELLITUS WITH DIABETIC POLYNEUROPATHY, WITH LONG-TERM CURRENT USE OF INSULIN (HCC): Primary | ICD-10-CM

## 2018-07-09 DIAGNOSIS — Z12.5 PROSTATE CANCER SCREENING: ICD-10-CM

## 2018-07-09 DIAGNOSIS — G47.33 OBSTRUCTIVE SLEEP APNEA: ICD-10-CM

## 2018-07-09 DIAGNOSIS — Z23 IMMUNIZATION DUE: ICD-10-CM

## 2018-07-09 DIAGNOSIS — Z12.11 COLON CANCER SCREENING: ICD-10-CM

## 2018-07-09 DIAGNOSIS — E11.42 TYPE 2 DIABETES MELLITUS WITH DIABETIC POLYNEUROPATHY, WITH LONG-TERM CURRENT USE OF INSULIN (HCC): ICD-10-CM

## 2018-07-09 DIAGNOSIS — Z23 NEED FOR PROPHYLACTIC VACCINATION AGAINST STREPTOCOCCUS PNEUMONIAE (PNEUMOCOCCUS): ICD-10-CM

## 2018-07-09 LAB
A/G RATIO: 1.5 (ref 1.1–2.2)
ALBUMIN SERPL-MCNC: 4.3 G/DL (ref 3.4–5)
ALP BLD-CCNC: 138 U/L (ref 40–129)
ALT SERPL-CCNC: 47 U/L (ref 10–40)
ANION GAP SERPL CALCULATED.3IONS-SCNC: 17 MMOL/L (ref 3–16)
AST SERPL-CCNC: 35 U/L (ref 15–37)
BASOPHILS ABSOLUTE: 0.1 K/UL (ref 0–0.2)
BASOPHILS RELATIVE PERCENT: 1.3 %
BILIRUB SERPL-MCNC: 0.3 MG/DL (ref 0–1)
BUN BLDV-MCNC: 18 MG/DL (ref 7–20)
CALCIUM SERPL-MCNC: 9.5 MG/DL (ref 8.3–10.6)
CHLORIDE BLD-SCNC: 97 MMOL/L (ref 99–110)
CHOLESTEROL, TOTAL: 296 MG/DL (ref 0–199)
CO2: 23 MMOL/L (ref 21–32)
CREAT SERPL-MCNC: 0.8 MG/DL (ref 0.9–1.3)
CREATININE URINE POCT: 100
EOSINOPHILS ABSOLUTE: 0.3 K/UL (ref 0–0.6)
EOSINOPHILS RELATIVE PERCENT: 3.8 %
GFR AFRICAN AMERICAN: >60
GFR NON-AFRICAN AMERICAN: >60
GLOBULIN: 2.9 G/DL
GLUCOSE BLD-MCNC: 262 MG/DL (ref 70–99)
HBA1C MFR BLD: 12.2 %
HCT VFR BLD CALC: 41 % (ref 40.5–52.5)
HDLC SERPL-MCNC: 31 MG/DL (ref 40–60)
HEMOGLOBIN: 14.5 G/DL (ref 13.5–17.5)
LDL CHOLESTEROL CALCULATED: ABNORMAL MG/DL
LDL CHOLESTEROL DIRECT: 70 MG/DL
LYMPHOCYTES ABSOLUTE: 1.7 K/UL (ref 1–5.1)
LYMPHOCYTES RELATIVE PERCENT: 19.4 %
MCH RBC QN AUTO: 31.1 PG (ref 26–34)
MCHC RBC AUTO-ENTMCNC: 35.4 G/DL (ref 31–36)
MCV RBC AUTO: 87.8 FL (ref 80–100)
MICROALBUMIN/CREAT 24H UR: 30 MG/G{CREAT}
MICROALBUMIN/CREAT UR-RTO: <30
MONOCYTES ABSOLUTE: 0.8 K/UL (ref 0–1.3)
MONOCYTES RELATIVE PERCENT: 9.1 %
NEUTROPHILS ABSOLUTE: 5.7 K/UL (ref 1.7–7.7)
NEUTROPHILS RELATIVE PERCENT: 66.4 %
PDW BLD-RTO: 13.8 % (ref 12.4–15.4)
PLATELET # BLD: 266 K/UL (ref 135–450)
PMV BLD AUTO: 8.8 FL (ref 5–10.5)
POTASSIUM SERPL-SCNC: 5 MMOL/L (ref 3.5–5.1)
PROSTATE SPECIFIC ANTIGEN: 0.5 NG/ML (ref 0–4)
RBC # BLD: 4.66 M/UL (ref 4.2–5.9)
SODIUM BLD-SCNC: 137 MMOL/L (ref 136–145)
TOTAL PROTEIN: 7.2 G/DL (ref 6.4–8.2)
TRIGL SERPL-MCNC: 1560 MG/DL (ref 0–150)
VITAMIN D 25-HYDROXY: 13 NG/ML
VLDLC SERPL CALC-MCNC: ABNORMAL MG/DL
WBC # BLD: 8.6 K/UL (ref 4–11)

## 2018-07-09 PROCEDURE — 2022F DILAT RTA XM EVC RTNOPTHY: CPT | Performed by: NURSE PRACTITIONER

## 2018-07-09 PROCEDURE — G8417 CALC BMI ABV UP PARAM F/U: HCPCS | Performed by: NURSE PRACTITIONER

## 2018-07-09 PROCEDURE — 1036F TOBACCO NON-USER: CPT | Performed by: NURSE PRACTITIONER

## 2018-07-09 PROCEDURE — 83036 HEMOGLOBIN GLYCOSYLATED A1C: CPT | Performed by: NURSE PRACTITIONER

## 2018-07-09 PROCEDURE — 82044 UR ALBUMIN SEMIQUANTITATIVE: CPT | Performed by: NURSE PRACTITIONER

## 2018-07-09 PROCEDURE — 99213 OFFICE O/P EST LOW 20 MIN: CPT | Performed by: NURSE PRACTITIONER

## 2018-07-09 PROCEDURE — G8427 DOCREV CUR MEDS BY ELIG CLIN: HCPCS | Performed by: NURSE PRACTITIONER

## 2018-07-09 PROCEDURE — 3046F HEMOGLOBIN A1C LEVEL >9.0%: CPT | Performed by: NURSE PRACTITIONER

## 2018-07-09 PROCEDURE — 3017F COLORECTAL CA SCREEN DOC REV: CPT | Performed by: NURSE PRACTITIONER

## 2018-07-09 PROCEDURE — G8598 ASA/ANTIPLAT THER USED: HCPCS | Performed by: NURSE PRACTITIONER

## 2018-07-09 RX ORDER — ASPIRIN 81 MG/1
81 TABLET ORAL DAILY
Qty: 90 TABLET | Refills: 3 | Status: SHIPPED | OUTPATIENT
Start: 2018-07-09 | End: 2019-07-07 | Stop reason: SDUPTHER

## 2018-07-09 ASSESSMENT — ENCOUNTER SYMPTOMS
NAUSEA: 0
SHORTNESS OF BREATH: 0
DIARRHEA: 0
VOMITING: 0
BACK PAIN: 1
WHEEZING: 0
COUGH: 0
STRIDOR: 0

## 2018-07-09 ASSESSMENT — PATIENT HEALTH QUESTIONNAIRE - PHQ9
SUM OF ALL RESPONSES TO PHQ9 QUESTIONS 1 & 2: 2
2. FEELING DOWN, DEPRESSED OR HOPELESS: 0
SUM OF ALL RESPONSES TO PHQ QUESTIONS 1-9: 2
1. LITTLE INTEREST OR PLEASURE IN DOING THINGS: 2

## 2018-07-09 NOTE — PROGRESS NOTES
HPI: Chris Corrales is a 60 y.o. male who presents for new patient visit to establish care, he was a previous patient of Dr. Meri You. Diabetes mellitus is uncontrolled, hemoglobin A1C is 12.2 today. It was 11.6 in April 2018. Taking humalog 20 units BID and Lantus 30 units twice dialy. Also on metformin 1,000 mg BID. Has never seen an endocrinologist. He has not been checking sugar recently because he needs batteries in his machine. Poor compliance to diet and poor exercise due to recent surgeries. HTN is controlled he is taking Atenolol 25 mg BID and Lisinopril-HCTZ in the mornings. Has had CVA x2 in the past, no physical residual side effects but does not trouble with memory he reports. He is due for pneumonia vaccination, tetanus booster and shingles vaccine which he is refusing. He is also due for colonoscopy.  Lipids were very high in April 2018     Past Medical History:   Diagnosis Date    Cerebral artery occlusion with cerebral infarction [facility]) 2013     X2 PERSONALITY CHANGE, ANGER OUTBURSTS    Cerebrovascular disease     ED (erectile dysfunction) 1/23/2014    Hypertension     Irritable bowel syndrome     Obstructive sleep apnea (adult) (pediatric) 07/01/2013    NO CPAP, HAD PROBLEMS WITH INSURANCE AND STOPPED USING    Occlusion and stenosis of carotid artery without mention of cerebral infarction 01/13/2014    MINOR    Polyneuropathy in diabetes(357.2) 7/1/2013    PONV (postoperative nausea and vomiting)     Skin abnormality     PRE-CANCEROUS LESIONS REMOVED FROM ARMS AND EARS    Type II or unspecified type diabetes mellitus without mention of complication, not stated as uncontrolled        Past Surgical History:   Procedure Laterality Date    CARPAL TUNNEL RELEASE Left 2008    CERVICAL DISCECTOMY  08/02/2017    ANTERIOR CERVICAL DISCECTOMY AND FUSION C5-6, C6-7 WITH MEDTRONIC PLATES, SCREWS, ALLOGRAFT, WITH EVOKES PARTIAL C6 CORPECTOMY    CERVICAL FUSION  12/06/2017    CERVICAL LAMINECTOMY AND visit. Your blood pressure should be below 130/80, and at least below 140/90  To achieve this level, make sure you take your medicine everyday. Cut down on using salt in your diet every mealtime, and we will recheck your blood pressure at the next visit. Exercising more regularly helps with your blood pressure control. Exercising 3 times a week for 15 minutes is a good start. If you smoke: quit smoking: Your immediate action is to try to cut down your number of cigarettes per week in half before your next visit. Go to  [employer] on the internet for more assistence in your effort to quit smoking. Call 1-322-QUIT NOW for a phone resources to help you get started on quitting. Take a baby chewable aspirin a day, 81 mg. Your confidence of starting the above changes is:(select one)    Medium confidence to begin the changes    Monitor your blood sugar at home and record the date with the level. You can bring this in with you on your next diabetic recheck. For more information on diabetes including diet and prevention, go to www. diabetes. org      If you have not had Diabetic Education, call [facility] at [phone] to set up your education. It is covered by insurance for diabetics. Outpatient Encounter Prescriptions as of 7/9/2018   Medication Sig Dispense Refill    aspirin EC 81 MG EC tablet Take 1 tablet by mouth daily 90 tablet 3    insulin glargine (LANTUS SOLOSTAR) 100 UNIT/ML injection pen Inject 30 Units into the skin 2 times daily Diagnosis E11.42 5 pen 2    insulin lispro (HUMALOG KWIKPEN) 200 UNIT/ML SOPN pen Inject 20 Units into the skin 2 times daily (before meals)       nystatin (MYCOSTATIN) 127989 UNIT/GM ointment Apply topically 2 times daily to corners of mouth.  30 g 1    lisinopril-hydrochlorothiazide (PRINZIDE;ZESTORETIC) 20-12.5 MG per tablet TAKE 1 TABLET BY MOUTH ONE TIME DAILY 90 tablet 0    metFORMIN (GLUCOPHAGE) 1000 MG tablet TAKE ONE TABLET BY MOUTH

## 2018-07-09 NOTE — PATIENT INSTRUCTIONS
medicine everyday. Try to cut down on fried foods by half before your next visit. Your blood pressure should be below 130/80, and at least below 140/90  To achieve this level, make sure you take your medicine everyday. Cut down on using salt in your diet every mealtime, and we will recheck your blood pressure at the next visit. Exercising more regularly helps with your blood pressure control. Exercising 3 times a week for 15 minutes is a good start. If you smoke: quit smoking: Your immediate action is to try to cut down your number of cigarettes per week in half before your next visit. Go to  [employer] on the internet for more assistence in your effort to quit smoking. Call 5-800QUIT NOW for a phone resources to help you get started on quitting. Take a baby chewable aspirin a day, 81 mg. Your confidence of starting the above changes is:(select one)    Medium confidence to begin the changes    Monitor your blood sugar at home and record the date with the level. You can bring this in with you on your next diabetic recheck. For more information on diabetes including diet and prevention, go to www. diabetes. org      If you have not had Diabetic Education, call [facility] at [phone] to set up your education. It is covered by insurance for diabetics.

## 2018-07-11 DIAGNOSIS — E55.9 VITAMIN D DEFICIENCY: Primary | ICD-10-CM

## 2018-07-11 DIAGNOSIS — R79.89 ELEVATED LFTS: ICD-10-CM

## 2018-07-11 RX ORDER — MELATONIN
1 DAILY
Qty: 90 TABLET | Refills: 0 | Status: SHIPPED | OUTPATIENT
Start: 2018-07-11 | End: 2018-09-30 | Stop reason: SDUPTHER

## 2018-07-17 DIAGNOSIS — Z12.11 ENCOUNTER FOR SCREENING COLONOSCOPY: Primary | ICD-10-CM

## 2018-07-17 RX ORDER — POLYETHYLENE GLYCOL 3350 17 G/17G
POWDER, FOR SOLUTION ORAL
Qty: 255 G | Refills: 0 | Status: ON HOLD | OUTPATIENT
Start: 2018-07-17 | End: 2018-08-14 | Stop reason: HOSPADM

## 2018-08-02 ENCOUNTER — TELEPHONE (OUTPATIENT)
Dept: GASTROENTEROLOGY | Age: 58
End: 2018-08-02

## 2018-08-10 ENCOUNTER — CARE COORDINATION (OUTPATIENT)
Dept: CARE COORDINATION | Age: 58
End: 2018-08-10

## 2018-08-13 ENCOUNTER — TELEPHONE (OUTPATIENT)
Dept: GASTROENTEROLOGY | Age: 58
End: 2018-08-13

## 2018-08-14 ENCOUNTER — HOSPITAL ENCOUNTER (OUTPATIENT)
Age: 58
Setting detail: OUTPATIENT SURGERY
Discharge: HOME | End: 2018-08-14
Attending: INTERNAL MEDICINE | Admitting: INTERNAL MEDICINE
Payer: COMMERCIAL

## 2018-08-14 VITALS
HEART RATE: 81 BPM | SYSTOLIC BLOOD PRESSURE: 101 MMHG | OXYGEN SATURATION: 92 % | WEIGHT: 220 LBS | DIASTOLIC BLOOD PRESSURE: 57 MMHG | BODY MASS INDEX: 30.8 KG/M2 | TEMPERATURE: 97.3 F | HEIGHT: 71 IN | RESPIRATION RATE: 16 BRPM

## 2018-08-14 PROBLEM — Z12.11 SCREEN FOR COLON CANCER: Status: ACTIVE | Noted: 2018-08-14

## 2018-08-14 PROBLEM — K62.1 POLYP OF RECTUM: Status: ACTIVE | Noted: 2018-08-14

## 2018-08-14 LAB
GLUCOSE BLD-MCNC: 251 MG/DL (ref 70–99)
GLUCOSE BLD-MCNC: 295 MG/DL (ref 70–99)
PERFORMED ON: ABNORMAL
PERFORMED ON: ABNORMAL

## 2018-08-14 PROCEDURE — 3609010600 HC COLONOSCOPY POLYPECTOMY SNARE/COLD BIOPSY: Performed by: INTERNAL MEDICINE

## 2018-08-14 PROCEDURE — 88305 TISSUE EXAM BY PATHOLOGIST: CPT

## 2018-08-14 PROCEDURE — 99153 MOD SED SAME PHYS/QHP EA: CPT | Performed by: INTERNAL MEDICINE

## 2018-08-14 PROCEDURE — 7100000011 HC PHASE II RECOVERY - ADDTL 15 MIN: Performed by: INTERNAL MEDICINE

## 2018-08-14 PROCEDURE — 2580000003 HC RX 258: Performed by: INTERNAL MEDICINE

## 2018-08-14 PROCEDURE — 99152 MOD SED SAME PHYS/QHP 5/>YRS: CPT | Performed by: INTERNAL MEDICINE

## 2018-08-14 PROCEDURE — 2709999900 HC NON-CHARGEABLE SUPPLY: Performed by: INTERNAL MEDICINE

## 2018-08-14 PROCEDURE — 2780000010 HC IMPLANT OTHER: Performed by: INTERNAL MEDICINE

## 2018-08-14 PROCEDURE — 7100000010 HC PHASE II RECOVERY - FIRST 15 MIN: Performed by: INTERNAL MEDICINE

## 2018-08-14 PROCEDURE — 6360000002 HC RX W HCPCS: Performed by: INTERNAL MEDICINE

## 2018-08-14 PROCEDURE — C1773 RET DEV, INSERTABLE: HCPCS | Performed by: INTERNAL MEDICINE

## 2018-08-14 PROCEDURE — 45385 COLONOSCOPY W/LESION REMOVAL: CPT | Performed by: INTERNAL MEDICINE

## 2018-08-14 PROCEDURE — 2720000010 HC SURG SUPPLY STERILE: Performed by: INTERNAL MEDICINE

## 2018-08-14 DEVICE — CLIP LIG L235CM RESOL 360 BX/20: Type: IMPLANTABLE DEVICE | Status: FUNCTIONAL

## 2018-08-14 RX ORDER — SODIUM CHLORIDE 9 MG/ML
INJECTION, SOLUTION INTRAVENOUS CONTINUOUS
Status: DISCONTINUED | OUTPATIENT
Start: 2018-08-14 | End: 2018-08-14 | Stop reason: HOSPADM

## 2018-08-14 RX ORDER — FENTANYL CITRATE 50 UG/ML
INJECTION, SOLUTION INTRAMUSCULAR; INTRAVENOUS PRN
Status: DISCONTINUED | OUTPATIENT
Start: 2018-08-14 | End: 2018-08-14 | Stop reason: HOSPADM

## 2018-08-14 RX ORDER — MIDAZOLAM HYDROCHLORIDE 5 MG/ML
INJECTION INTRAMUSCULAR; INTRAVENOUS PRN
Status: DISCONTINUED | OUTPATIENT
Start: 2018-08-14 | End: 2018-08-14 | Stop reason: HOSPADM

## 2018-08-14 RX ADMIN — SODIUM CHLORIDE: 9 INJECTION, SOLUTION INTRAVENOUS at 07:34

## 2018-08-14 ASSESSMENT — PAIN SCALES - GENERAL
PAINLEVEL_OUTOF10: 0
PAINLEVEL_OUTOF10: 0

## 2018-08-14 ASSESSMENT — PAIN - FUNCTIONAL ASSESSMENT: PAIN_FUNCTIONAL_ASSESSMENT: 0-10

## 2018-09-10 ENCOUNTER — CARE COORDINATION (OUTPATIENT)
Dept: CARE COORDINATION | Age: 58
End: 2018-09-10

## 2018-09-10 ENCOUNTER — CARE COORDINATION (OUTPATIENT)
Dept: FAMILY MEDICINE CLINIC | Age: 58
End: 2018-09-10

## 2018-09-10 NOTE — CARE COORDINATION
Jennifer Filiberto  September 10, 2018    Initial Employee Beneficiary Diabetes Program Assessment    Patient Care Team:  MADISON Bruno - CNP as PCP - [facility] AlexaMADISON Ramos CNP as PCP - [facility] Attributed Provider  George Paige RD, LD as Dietitian  Ismael Oliver, ERIN as Care Coordinator    Patient Active Problem List   Diagnosis    Hypertriglyceridemia    Diabetes mellitus ([facility]    Hyperlipidemia    Hypertension    Testosterone deficiency    Acute, but ill-defined, cerebrovascular disease    Essential hypertension    Type II or unspecified type diabetes mellitus without mention of complication, uncontrolled    Other and unspecified hyperlipidemia    Diabetic polyneuropathy ([facility]    Type II or unspecified type diabetes mellitus with neurological manifestations, uncontrolled(250.62)    Obstructive sleep apnea    Diplopia    Occlusion and stenosis of carotid artery    ED (erectile dysfunction)    Carotid stenosis    Pain in joint, upper arm    Sprain and strain of other specified sites of shoulder and upper arm    Outbursts of anger    Elbow pain, chronic    Osteoarthritis of cervical spine with myelopathy    Vitamin D deficiency    Screen for colon cancer    Polyp of rectum       Current Outpatient Prescriptions   Medication Sig Dispense Refill    Cholecalciferol (VITAMIN D3) 1000 units TABS Take 1 tablet by mouth daily 90 tablet 0    aspirin EC 81 MG EC tablet Take 1 tablet by mouth daily 90 tablet 3    insulin glargine (LANTUS SOLOSTAR) 100 UNIT/ML injection pen Inject 30 Units into the skin 2 times daily Diagnosis E11.42 5 pen 2    insulin lispro (HUMALOG KWIKPEN) 200 UNIT/ML SOPN pen Inject 20 Units into the skin 2 times daily (before meals)       nystatin (MYCOSTATIN) 373686 UNIT/GM ointment Apply topically 2 times daily to corners of mouth.  30 g 1    lisinopril-hydrochlorothiazide (PRINZIDE;ZESTORETIC) 20-12.5 MG per tablet TAKE 1 TABLET BY MOUTH ONE TIME DAILY 90 tablet 0    metFORMIN (GLUCOPHAGE) 1000 MG tablet TAKE ONE TABLET BY MOUTH TWICE A  tablet 3    atenolol (TENORMIN) 25 MG tablet TAKE 1 TABLET BY MOUTH EVERY NIGHT AT BEDTIME 90 tablet 3    AGAMATRIX PRESTO TEST strip USE TWICE A  strip 6    citalopram (CELEXA) 20 MG tablet Take 1 tablet by mouth daily 90 tablet 2    Insulin Pen Needle (EASY TOUCH PEN NEEDLES) 29G X 12MM MISC USE AS DIRECTED E11.9 100 each 6    Blood Glucose Monitoring Suppl (AGAMATRIX PRESTO PRO METER) KYLAH Use BID 1 Device 0    AGAMATRIX ULTRA-THIN LANCETS MISC Use  each 3     No current facility-administered medications for this visit. Nutrition Assessment    Biochemical Data, Medical Tests and Procedures:    Lab Results   Component Value Date    LABA1C 12.2 07/09/2018     Lab Results   Component Value Date    .2 04/02/2018       Lab Results   Component Value Date    CHOL 296 (H) 07/09/2018    CHOL 291 (H) 04/02/2018    CHOL 289 (H) 05/24/2017     Lab Results   Component Value Date    TRIG 1,560 (H) 07/09/2018    TRIG 1,600 (H) 04/02/2018    TRIG 2,496 (H) 05/24/2017     Lab Results   Component Value Date    HDL 31 (L) 07/09/2018    HDL 31 (L) 04/02/2018    HDL 28 (L) 05/24/2017     Lab Results   Component Value Date    LDLCALC see below 07/09/2018    [facility] see below 04/02/2018    [facility] see below 05/24/2017     Lab Results   Component Value Date    LABVLDL see below 07/09/2018    LABVLDL see below 02/14/2017    LABVLDL see below 01/27/2015     No results found for: [facility]    Lab Results   Component Value Date    WBC 8.6 07/09/2018    HGB 14.5 07/09/2018    HCT 41.0 07/09/2018    MCV 87.8 07/09/2018     07/09/2018       Lab Results   Component Value Date    CREATININE 0.8 (L) 07/09/2018    BUN 18 07/09/2018     07/09/2018    K 5.0 07/09/2018    CL 97 (L) 07/09/2018    CO2 23 07/09/2018         Anthropometric Measurements:    Height: 5'11\" (71 in.)  Weight: 220 lbs. discussed. Notes for Future Nutrition Education/Counseling: food recall/bs readings    Nutrition Diagnosis    #1 Problem Altered nutrition-related lab values: A1C       Etiology related to endocrine dysfunction       Signs/Symptoms as evidenced by A1C: 12.1% (7/9/18)    #2 Problem Overweight/Obesity       Etiology related to excess energy intake/decrease energy expenditure        Signs/Symptoms as evidenced by pt report/sedentary/BMI: 30.68    #3 Problem Food and nutrition-related knowledge deficit       Etiology related to lack of prior nutrition related education       Signs/Symptoms as evidenced by pt report/no previous DM nutrition education    Nutrition Intervention    Nutrition Prescription:    diabetic diet providing [phone],[phone] kcals to promote wt reduction ([place]). Estimated daily CHO Needs: 191 g (based on 45% total calorie intake)  Estimated daily Protein Needs: 80 g (based on 0.8 g/kg)  Estimated daily Fluid Needs: 2,500 mL based on 25 mL/kg (83 oz.)    Intervention #1    Nutrition Counseling: Used open-ended questions to assess patients perceived susceptibility and severity of disease state. Discussed potential impact of health threat on patient's lifestyle. Used open-ended questions to assess patient's perception regarding benefits of and barriers to implementation of nutrition therapy. Goal(s): Patient will apply constructs discussed during nutrition counseling to initiate and encourage behavior change to positively affect disease state/nutrition status. Intervention #2    Nutrition Education: Clearly defined the benefits of nutrition therapy. Summarized and affirmed positive aspects of current nutrition patterns. Provided education regarding value of adherence to controlled CHO intake. Discussed ways to establish application of Plate Method meal planning efforts and discussed concepts of alternatives and choices regarding implementation of consistent CHO diet. Explored ideas for small, incremental goals to initiate behavior change. Goal(s) Patient will apply education to define small goals that will help to implement consistent CHO nutrition therapy. Nutrition Monitoring and Evaluation    Indicator/Goal Criteria   #1 CHO Count #1 45-50 g CHO per meal/15 g CHO per snack   #2 Protein Intake #2 Always pair CHO with protein at each meal and snack   #3 PA #3 Increase exercise to 3x weekly for 30 minutes       Follow Up: will call in one month.       Ameya Jo RDN, LD  Registered Dietitian  [phone]

## 2018-09-11 ENCOUNTER — OFFICE VISIT (OUTPATIENT)
Dept: ENDOCRINOLOGY | Age: 58
End: 2018-09-11

## 2018-09-11 VITALS
TEMPERATURE: 98.1 F | SYSTOLIC BLOOD PRESSURE: 138 MMHG | OXYGEN SATURATION: 97 % | WEIGHT: 221.1 LBS | BODY MASS INDEX: 31.65 KG/M2 | HEART RATE: 83 BPM | HEIGHT: 70 IN | DIASTOLIC BLOOD PRESSURE: 84 MMHG | RESPIRATION RATE: 16 BRPM

## 2018-09-11 DIAGNOSIS — E11.65 TYPE 2 DIABETES MELLITUS WITH HYPERGLYCEMIA, WITH LONG-TERM CURRENT USE OF INSULIN (HCC): Primary | ICD-10-CM

## 2018-09-11 DIAGNOSIS — E11.42 TYPE 2 DIABETES MELLITUS WITH DIABETIC POLYNEUROPATHY, WITH LONG-TERM CURRENT USE OF INSULIN (HCC): ICD-10-CM

## 2018-09-11 DIAGNOSIS — Z79.4 TYPE 2 DIABETES MELLITUS WITH HYPERGLYCEMIA, WITH LONG-TERM CURRENT USE OF INSULIN (HCC): Primary | ICD-10-CM

## 2018-09-11 DIAGNOSIS — E78.1 HYPERTRIGLYCERIDEMIA: ICD-10-CM

## 2018-09-11 DIAGNOSIS — Z79.4 TYPE 2 DIABETES MELLITUS WITH DIABETIC POLYNEUROPATHY, WITH LONG-TERM CURRENT USE OF INSULIN (HCC): ICD-10-CM

## 2018-09-11 DIAGNOSIS — I10 ESSENTIAL HYPERTENSION: ICD-10-CM

## 2018-09-11 PROCEDURE — 99244 OFF/OP CNSLTJ NEW/EST MOD 40: CPT | Performed by: INTERNAL MEDICINE

## 2018-09-11 PROCEDURE — 2022F DILAT RTA XM EVC RTNOPTHY: CPT | Performed by: INTERNAL MEDICINE

## 2018-09-11 PROCEDURE — G8427 DOCREV CUR MEDS BY ELIG CLIN: HCPCS | Performed by: INTERNAL MEDICINE

## 2018-09-11 PROCEDURE — 3017F COLORECTAL CA SCREEN DOC REV: CPT | Performed by: INTERNAL MEDICINE

## 2018-09-11 PROCEDURE — G8417 CALC BMI ABV UP PARAM F/U: HCPCS | Performed by: INTERNAL MEDICINE

## 2018-09-11 RX ORDER — OMEGA-3 ACID ETHYL ESTERS 1 G
2 CAPSULE ORAL 2 TIMES DAILY
Qty: 180 CAPSULE | Refills: 3 | Status: SHIPPED | OUTPATIENT
Start: 2018-09-11 | End: 2019-01-01 | Stop reason: SDUPTHER

## 2018-09-11 RX ORDER — FENOFIBRATE 145 MG/1
145 TABLET, COATED ORAL DAILY
Qty: 90 TABLET | Refills: 3 | Status: SHIPPED | OUTPATIENT
Start: 2018-09-11 | End: 2019-09-08 | Stop reason: SDUPTHER

## 2018-09-11 RX ORDER — FLASH GLUCOSE SENSOR
KIT MISCELLANEOUS
Qty: 1 DEVICE | Refills: 0 | Status: SHIPPED | OUTPATIENT
Start: 2018-09-11 | End: 2019-03-15

## 2018-09-11 RX ORDER — FLASH GLUCOSE SENSOR
KIT MISCELLANEOUS
Qty: 9 EACH | Refills: 2 | Status: SHIPPED | OUTPATIENT
Start: 2018-09-11 | End: 2019-03-15

## 2018-09-11 NOTE — PROGRESS NOTES
EGFR, calc. for ages 25 and older using the  MDRD formula (not corrected for weight), is valid for stable  renal function.  GFR  07/09/2018 >60  >60 Final    Comment: Chronic Kidney Disease: less than 60 ml/min/1.73 sq.m. Kidney Failure: less than 15 ml/min/1.73 sq.m. Results valid for patients 18 years and older.  Calcium 07/09/2018 9.5  8.3 - 10.6 mg/dL Final    Total Protein 07/09/2018 7.2  6.4 - 8.2 g/dL Final    Alb 07/09/2018 4.3  3.4 - 5.0 g/dL Final    Albumin/Globulin Ratio 07/09/2018 1.5  1.1 - 2.2 Final    Total Bilirubin 07/09/2018 0.3  0.0 - 1.0 mg/dL Final    Alkaline Phosphatase 07/09/2018 138* 40 - 129 U/L Final    ALT 07/09/2018 47* 10 - 40 U/L Final    AST 07/09/2018 35  15 - 37 U/L Final    Globulin 07/09/2018 2.9  g/dL Final    PSA 07/09/2018 0.50  0.00 - 4.00 ng/mL Final    Vit D, 25-Hydroxy 07/09/2018 13.0* >=30 ng/mL Final    Comment: <=20 ng/mL. ........... [place] Deficient  21-29 ng/mL. ......... [place] Insufficient  >=30 ng/mL. ........ [place] Sufficient      Cholesterol, Total 07/09/2018 296* 0 - 199 mg/dL Final    Triglycerides 07/09/2018 1560* 0 - 150 mg/dL Final    HDL 07/09/2018 31* 40 - 60 mg/dL Final    LDL Calculated 07/09/2018 see below  <100 mg/dL Final    Comment: When the triglyceride is <30 mg/dL or >300 mg/dL the  calculated LDL and  VLDL are not valid and a measured  LDL is performed.  VLDL Cholesterol Calculated 07/09/2018 see below  Not Established mg/dL Final    Comment: When the triglyceride is <30 mg/dL or >300 mg/dL the  calculated LDL and  VLDL are not valid and a measured  LDL is performed.  LDL Direct 07/09/2018 70  <100 mg/dL Final   Office Visit on 07/09/2018   Component Date Value Ref Range Status    Hemoglobin A1C 07/09/2018 12.2  % Final    Microalb, Ur 07/09/2018 30   Final    Creatinine Ur POCT 07/09/2018 100   Final    Microalbumin Creatinine Ratio 07/09/2018 <30   Final           Assessment/Plan      1.  Type 2 DM Mary Kate Doll is a 60 y.o. male has Type 2 DM with obesity and insulin resistance. Uncontrolled. A1c 12.2 %   High risk of complications. Complicated by neuropathy.     -Continue metformin .   -Increase Lantus 40 units BID  -Take humalog 20 units with breakfast, lunch  Take 25 units with dinner.   -Check sugars 3 times a day    -Will also prescribe free style ham sensor system      -Will recommend low carb diet ( 40-45 grams with each meal)      Advised follow-up with the ophthalmologist once BS better. Urine microalbumin/cr ratio normal in 071/8. On ace-inhibitor. Discussed foot care. Has neuropathy on exam.       Pt on ASA. 2. Hypertension. BP at goal    3. Hyperlipidemia. TGD very high  Has h/o pancreatitis  TGD 1560  LDL 71  HDL 31    Will start him on fenofibrate 145 mg daily, fish oil 2 gram BID. Also needs better glycemic control which would help with TGD. 4. Obesity. Advised life style changes.

## 2018-09-13 PROBLEM — Z12.11 SCREEN FOR COLON CANCER: Status: RESOLVED | Noted: 2018-08-14 | Resolved: 2018-09-13

## 2018-09-17 ENCOUNTER — CARE COORDINATION (OUTPATIENT)
Dept: FAMILY MEDICINE CLINIC | Age: 58
End: 2018-09-17

## 2018-09-17 NOTE — CARE COORDINATION
Ambulatory Care Coordination Note  9/17/2018  CM Risk Score: 5  German Mortality Risk Score:      ACC: Tonya Reveles RN    Summary Note: Contacted patient for enrollment into Care Coordination; he is interested in the extra support for his Diabetes; \"I know I need to do better. \" He has already been in contact with ALYSHA Osorio over the phone through the [address] His spouse is an employee. He is retired; had 2 neck surgeries last year per Dr. Savannah Miller for herniated disks. This has severely limited his ability to exercise. He saw Dr. Mickey Manzano as a new patient on 9/11; instructions as follows:  -Continue metformin .   -Increase Lantus 40 units BID  -Take humalog 20 units with breakfast, lunch  Take 25 units with dinner.   -Check sugars 3 times a day   -Will also prescribe free style ham sensor system    -Will recommend low carb diet ( 40-45 grams with each meal)    He states that his FSBS today was 251. He checks his sugars 3x/day but does not record them. Discussed importance of logging his blood sugars in order to identify patterns, etc and to review with providers. Also discussed importance of checking sugars at consistent times in relation to meals. He verbalized understanding and states that he will start writing them down. Discussed CHO intake and importance of limiting, but still having CHO with insulin regimen. Denies any s/s of hypoglycemia. Does endorse s/s of hyperglycemia, such as increased urination. States that it improves \"when I eat what I should. \"  He states that he likes to snack in the evening, which he is trying to work on. Explained that he can still have a snack, but just has to make smarter food choices. \"I know what I need to do, I just don't do it. \" States that he's lost some family members to Diabetes, which has motivated him to improve his self-management behaviors. He is seeing LUIS A Gonzales on 10/9. Will meet in the office at that time.       Lab Results   Component Value Date    LABA1C 12.2 07/09/2018    LABA1C 11.6 04/02/2018    LABA1C 11 07/26/2017     Lab Results   Component Value Date    LABMICR YES 04/11/2017    [facility] see below 07/09/2018    CREATININE 0.8 (L) 07/09/2018       Ambulatory Care Coordination Assessment    Care Coordination Protocol  Program Enrollment:  Rising Risk  Referral from Primary Care Provider:  No  Week 1 - Initial Assessment     Do you have all of your prescriptions and are they filled?:  Yes  Barriers to medication adherence:  None  Are you able to afford your medications?:  Yes  How often do you have trouble taking your medications the way you have been told to take them?:  I always take them as prescribed. Do you have Home O2 Therapy?:  No      Is patient able to live independently?:  Yes     Current Housing:  Private Residence        Per the Fall Risk Screening, did the patient have 2 or more falls or 1 fall with injury in the past year?:  No           Suggested Interventions and Community Resources   Other Services or Interventions:  [facility]   Registered Dietician:  Completed   Specialty Service Referral:  Completed   Zone Management Tools: In Process                  Prior to Admission medications    Medication Sig Start Date End Date Taking? Authorizing Provider   fenofibrate (TRICOR) 145 MG tablet Take 1 tablet by mouth daily 9/11/18   Liberty Carballo MD   Insulin Pen Needle 32G X 4 MM MISC Inject insulin 5 times a day.  9/11/18   Liberty Carballo MD   insulin glargine (LANTUS SOLOSTAR) 100 UNIT/ML injection pen Inject 40 Units into the skin 2 times daily Diagnosis E11.42 9/11/18   Liberty Carballo MD   insulin lispro (HUMALOG KWIKPEN) 200 UNIT/ML SOPN pen Inject 20-30 Units into the skin 3 times daily (before meals) 9/11/18   Liberty Carballo MD   LOVAZA 1 g capsule Take 2 capsules by mouth 2 times daily 9/11/18   Liberty Carballo MD   blood glucose test strips (AGAMATRIX PRESTO TEST) strip Test 3-4

## 2018-10-04 NOTE — PATIENT INSTRUCTIONS
Follow up January or February     Ideally your blood pressure goal should be below 130/80. I usually add or adjust medication if the blood pressure is consistently elevated above 140/90. You can learn more about blood pressure and ways to incorporate a healthy lifestlye to help treat it from the American Heart Association website: www.heart. org under the  Getting Healthy link, and the [address] website: www.nhlbi.nih.gov/hbp    A for Activity  It helps your blood pressure when you exercise regularly. You should try to exercise at least 3 times a week for 20 minutes at a pace that you can comfortably carry on a conversation without being out of breath. B for BMI  The Body Mass Index should be below 30. This is your weight and height measurement. A BMI below 30 is preferred to help lower the risk of Type 2 diabetes, high cholesterol, heart disease, high blood pressure, sleep apnea, gallbladder disease and strokes. C for Control your Salt Intake  Avoid adding salt to your food. Avoid processed food, fast food, and junk food which all has high levels of sodium added. Read labels and get no more than 1500-2300mg of sodium a day. There are 5 goals to be considered under ideal   control as a diabetic:  1 Hemoglobin A1C under 7  2 LDL bad cholesterol under 70 or on a statin  3 Blood Pressure under 130/80  4 Take a baby aspirin a day  5 NOT smoking. Notes for you because of your diabetes: Your goal for your hemoglobin A1C should be below 7. Because you have diabetes you should have a yearly eye exam.  You should check your feet monthly for new sores. Your cholesterol LDL should be less than 100, ideally below 70. To achieve this level, make sure you take your medicine everyday. Try to cut down on fried foods by half before your next visit. Your blood pressure should be below 130/80, and at least below 140/90  To achieve this level, make sure you take your medicine everyday.   Cut

## 2018-10-08 ENCOUNTER — CARE COORDINATION (OUTPATIENT)
Dept: CARE COORDINATION | Age: 58
End: 2018-10-08

## 2018-10-08 NOTE — CARE COORDINATION
Catrina Levy  10/8/2018    Registered Dietitian Progress Note for Employee Beneficiary Diabetes Program    Assessment: Sylvain Gonzales is a 60 y.o. male. This is the 2nd outreach assessment for the patient to meet requirements for the DM program.      Barriers to meeting goals: none    Action:  - Spoke with patient over the phone to accomplish final f/u for DM program outreach. Patient stated that he has been doing really well since I last spoke to him last month. Patient has been watching serving sizes and starting to CHO count. He stated that his blood sugars have been in the 100's mg/dL. He stated that he had a couple 200's mg/dL but he has seen an improvement day to day. He hasn't been exercising other than housework. Discussed ways to incorporate more activity throughout the day. He has an appointment tomorrow where he will meet with the NP and RNCC. Patient expressed no concerns/questions at this time but will contact RD if questions arise. Patient verbalized understanding of all information presented. Nutrition Monitoring and Evaluation  Indicator/Goal Criteria Progress   #1 CHO Count  #1 45-50 g CHO per meal/15 g CHO per snack #1 starting to CHO count but for now just watching portion control    #2  Protein Intake #2 Pair CHO with protein at each meal and snack #2 on track, following MyPlate guidelines   #3  PA #3 Increase exercise to 3x weekly for 30 minutes #3 Not doing, only activity is from housework       Plan of Care:  - incorporate more steps daily to increase activity  - CHO Count   - Increase water intake    Follow Up:    -Follow up is complete. Patient achieved this portion of the requirements for the year.        Janice Avalos, [address] [place] [address]  [phone]

## 2018-10-09 ENCOUNTER — CARE COORDINATION (OUTPATIENT)
Dept: FAMILY MEDICINE CLINIC | Age: 58
End: 2018-10-09

## 2018-10-09 ENCOUNTER — OFFICE VISIT (OUTPATIENT)
Dept: FAMILY MEDICINE CLINIC | Age: 58
End: 2018-10-09
Payer: COMMERCIAL

## 2018-10-09 VITALS
WEIGHT: 218 LBS | OXYGEN SATURATION: 96 % | HEART RATE: 76 BPM | DIASTOLIC BLOOD PRESSURE: 62 MMHG | BODY MASS INDEX: 31.21 KG/M2 | HEIGHT: 70 IN | SYSTOLIC BLOOD PRESSURE: 128 MMHG

## 2018-10-09 DIAGNOSIS — E55.9 VITAMIN D DEFICIENCY: ICD-10-CM

## 2018-10-09 DIAGNOSIS — Z79.4 TYPE 2 DIABETES MELLITUS WITH DIABETIC POLYNEUROPATHY, WITH LONG-TERM CURRENT USE OF INSULIN (HCC): Primary | ICD-10-CM

## 2018-10-09 DIAGNOSIS — E78.1 HYPERTRIGLYCERIDEMIA: ICD-10-CM

## 2018-10-09 DIAGNOSIS — I10 ESSENTIAL HYPERTENSION: ICD-10-CM

## 2018-10-09 DIAGNOSIS — E11.42 DIABETIC POLYNEUROPATHY ASSOCIATED WITH TYPE 2 DIABETES MELLITUS (HCC): ICD-10-CM

## 2018-10-09 DIAGNOSIS — E11.42 TYPE 2 DIABETES MELLITUS WITH DIABETIC POLYNEUROPATHY, WITH LONG-TERM CURRENT USE OF INSULIN (HCC): Primary | ICD-10-CM

## 2018-10-09 LAB — HBA1C MFR BLD: 9.6 %

## 2018-10-09 PROCEDURE — 2022F DILAT RTA XM EVC RTNOPTHY: CPT | Performed by: NURSE PRACTITIONER

## 2018-10-09 PROCEDURE — 3046F HEMOGLOBIN A1C LEVEL >9.0%: CPT | Performed by: NURSE PRACTITIONER

## 2018-10-09 PROCEDURE — G8598 ASA/ANTIPLAT THER USED: HCPCS | Performed by: NURSE PRACTITIONER

## 2018-10-09 PROCEDURE — G8417 CALC BMI ABV UP PARAM F/U: HCPCS | Performed by: NURSE PRACTITIONER

## 2018-10-09 PROCEDURE — 1036F TOBACCO NON-USER: CPT | Performed by: NURSE PRACTITIONER

## 2018-10-09 PROCEDURE — G8427 DOCREV CUR MEDS BY ELIG CLIN: HCPCS | Performed by: NURSE PRACTITIONER

## 2018-10-09 PROCEDURE — 83036 HEMOGLOBIN GLYCOSYLATED A1C: CPT | Performed by: NURSE PRACTITIONER

## 2018-10-09 PROCEDURE — 3017F COLORECTAL CA SCREEN DOC REV: CPT | Performed by: NURSE PRACTITIONER

## 2018-10-09 PROCEDURE — G8484 FLU IMMUNIZE NO ADMIN: HCPCS | Performed by: NURSE PRACTITIONER

## 2018-10-09 PROCEDURE — 99213 OFFICE O/P EST LOW 20 MIN: CPT | Performed by: NURSE PRACTITIONER

## 2018-10-09 ASSESSMENT — ENCOUNTER SYMPTOMS
WHEEZING: 0
DIARRHEA: 0
VOMITING: 0
NAUSEA: 0
COUGH: 0
SHORTNESS OF BREATH: 0
BACK PAIN: 1
STRIDOR: 0

## 2018-10-09 NOTE — CARE COORDINATION
Ambulatory Care Coordination Note  10/9/2018  CM Risk Score: 5  German Mortality Risk Score:      ACC: Ginger Peck RN    Summary Note: RN ACC was unable to meet with patient in the office this morning d/t conflicting meeting. Called to apologize for missing him and to provide positive encouragement on his improved A1c. Discussed how small changes can make a big impact; \"Thank you. I'm pretty proud of myself. \" He is seeing Dr. Wayne Lai on 10/30 and wants to see if he can get his A1c down even further. No needs identified during call; will f/u in 1 month to ensure continued success with DM goals. Lab Results   Component Value Date    LABA1C 9.6 10/09/2018    LABA1C 12.2 07/09/2018    LABA1C 11.6 04/02/2018     Lab Results   Component Value Date    LABMICR YES 04/11/2017    LDLCALC see below 07/09/2018    CREATININE 0.8 (L) 07/09/2018       Care Coordination Interventions    Program Enrollment:  Rising Risk  Referral from Primary Care Provider:  No  Suggested Interventions and Community Resources  Registered Dietician:  Completed  Specialty Services Referral:  Completed (Comment: Dr. Daja Goldberg)  Zone Management Tools: In Process (Comment: DMII)  Other Services or Interventions:  [facility]         Goals Addressed             Most Recent     Self Monitoring   On track (10/9/2018)             Self-Monitored Blood Glucose - I will check my blood sugar Fasting blood sugar and blood sugars ac & HS  I will notify my provider of any trends of increasing or decreasing blood sugars over a 1 month period. I will notify my provider if I have any blood sugar readings less than 70 more than 2 times a month. Patient Reported Blood Glucose No flowsheet data found.     Barriers: lack of motivation  Plan for overcoming my barriers: Writing down in a log book; reviewing with RN [address]  Confidence: 9/10  Anticipated Goal Completion Date: on-going              Prior to Admission medications Medication Sig Start Date End Date Taking? Authorizing Provider   vitamin D (CHOLECALCIFEROL) 1000 UNIT TABS tablet TAKE 1 TABLET BY MOUTH ONE TIME A DAY 10/1/18   MADISON Gaona CNP   fenofibrate (TRICOR) 145 MG tablet Take 1 tablet by mouth daily 9/11/18   Efraín Melgar MD   Insulin Pen Needle 32G X 4 MM MISC Inject insulin 5 times a day. 9/11/18   Efraín Melgar MD   insulin glargine (LANTUS SOLOSTAR) 100 UNIT/ML injection pen Inject 40 Units into the skin 2 times daily Diagnosis E11.42 9/11/18   Efraín Melgar MD   insulin lispro (HUMALOG KWIKPEN) 200 UNIT/ML SOPN pen Inject 20-30 Units into the skin 3 times daily (before meals) 9/11/18   Efraín Melgar MD   LOVAZA 1 g capsule Take 2 capsules by mouth 2 times daily 9/11/18   Efraín Melgar MD   blood glucose test strips (AGAMATRIX PRESTO TEST) strip Test 3-4 times a day.  Uncontrolled DM. 9/11/18   Efraín Melgar MD   Continuous Blood Gluc  (FREESTYLE JOSEPH READER) KYLAH Use to monitor BS. 9/11/18   Efraín Melgar MD   Continuous Blood Gluc Sensor ([address] [place] [address]) MISC Use to monitor BS. 9/11/18   Efraín Melgar MD   aspirin EC 81 MG EC tablet Take 1 tablet by mouth daily 7/9/18   MADISON Gaona CNP   nystatin (MYCOSTATIN) 189904 UNIT/GM ointment Apply topically 2 times daily to corners of mouth. 10/19/17   Rachel Terry MD   lisinopril-hydrochlorothiazide (PRINZIDE;ZESTORETIC) 20-12.5 MG per tablet TAKE 1 TABLET BY MOUTH ONE TIME DAILY 3/6/17   Adrianna Blanchard MD   metFORMIN (GLUCOPHAGE) 1000 MG tablet TAKE ONE TABLET BY MOUTH TWICE A DAY 1/9/17   Adrianna Blanchard MD   atenolol (TENORMIN) 25 MG tablet TAKE 1 TABLET BY MOUTH EVERY NIGHT AT BEDTIME 1/9/17   Adrianna Blanchard MD   citalopram (CELEXA) 20 MG tablet Take 1 tablet by mouth daily 11/14/16   Adrianna Blanchard MD   Blood Glucose Monitoring Suppl (AGAMATRIX PRESTO PRO METER) KYLAH Use BID 4/1/14   MD Oneil Mackenzie ULTRA-THIN LANCETS

## 2018-10-09 NOTE — PROGRESS NOTES
Substance Use Topics    Smoking status: Former Smoker     Packs/day: 1.00     Years: 25.00     Types: Cigarettes     Quit date: 1/11/2000    Smokeless tobacco: Never Used    Alcohol use No       Family History   Problem Relation Age of Onset    Cancer Father         melanoma, face    Cancer Paternal Uncle         melanoma, face    Cancer Brother         skin, NMSC    Depression Maternal Cousin        Review of Systems   Constitutional: Negative for fatigue and fever. Respiratory: Negative for cough, shortness of breath, wheezing and stridor. Cardiovascular: Negative for chest pain and leg swelling. Gastrointestinal: Negative for diarrhea, nausea and vomiting. Genitourinary: Negative for dysuria. Musculoskeletal: Positive for back pain (chronic) and neck pain (chronic). Negative for gait problem, joint swelling and myalgias. Neurological: Negative for dizziness, tremors, syncope, facial asymmetry, weakness and headaches. Neuropathy bilateral feet: described as \"very sensitive\" and numbness is present   Hematological: Negative for adenopathy. Does not bruise/bleed easily. Physical Exam   Constitutional: He is oriented to person, place, and time. He appears well-developed and well-nourished. No distress. HENT:   Head: Normocephalic and atraumatic. Right Ear: External ear normal.   Left Ear: External ear normal.   Mouth/Throat: No oropharyngeal exudate. Eyes: Pupils are equal, round, and reactive to light. Conjunctivae and EOM are normal. Right eye exhibits no discharge. Left eye exhibits no discharge. No scleral icterus. Neck: Normal range of motion. Neck supple. Cardiovascular: Normal rate, regular rhythm and normal heart sounds. Exam reveals no gallop and no friction rub. No murmur heard. Pulmonary/Chest: Effort normal and breath sounds normal. No respiratory distress. He has no wheezes. He has no rales. He exhibits no tenderness.    Lymphadenopathy:     He has no cervical adenopathy. Neurological: He is alert and oriented to person, place, and time. No cranial nerve deficit. Skin: Skin is warm and dry. He is not diaphoretic. Nursing note and vitals reviewed. Vitals:    10/04/18 1644   BP: 128/62   Site: Left Upper Arm   Position: Sitting   Pulse: 76   SpO2: 96%   Weight: 218 lb (98.9 kg)   Height: 5' 10\" (1.778 m)       Assessment/Plan:  1. Type 2 diabetes mellitus with diabetic polyneuropathy, with long-term current use of insulin ([facility]-  Improving, A1C is 9.6. Compliant with medications all of the time. Normal microalbumin. Needs eye exam he  goes to Dr. Randolph Craven. - POCT glycosylated hemoglobin (Hb A1C)    2. Diabetic polyneuropathy associated with type 2 diabetes mellitus ([facility]- mild symptoms, declines  medication at this time. May consider switching Celexa to Cymbalta for symptom control in future? 3. Essential hypertension- Controlled on ACE-I, thiazide and BB    4. Hypertriglyceridemia- Started on Fish oil and Tricor    5. Vitamin D deficiency- supplementing. Patient Instructions   Follow up January or February     Outpatient Encounter Prescriptions as of 10/9/2018   Medication Sig Dispense Refill    vitamin D (CHOLECALCIFEROL) 1000 UNIT TABS tablet TAKE 1 TABLET BY MOUTH ONE TIME A DAY 90 tablet 0    fenofibrate (TRICOR) 145 MG tablet Take 1 tablet by mouth daily 90 tablet 3    Insulin Pen Needle 32G X 4 MM MISC Inject insulin 5 times a day. 450 each 3    insulin glargine (LANTUS SOLOSTAR) 100 UNIT/ML injection pen Inject 40 Units into the skin 2 times daily Diagnosis E11.42 10 pen 3    insulin lispro (HUMALOG KWIKPEN) 200 UNIT/ML SOPN pen Inject 20-30 Units into the skin 3 times daily (before meals) 10 pen 3    LOVAZA 1 g capsule Take 2 capsules by mouth 2 times daily 180 capsule 3    blood glucose test strips (AGAMATRIX PRESTO TEST) strip Test 3-4 times a day. Uncontrolled DM.  400 strip 2    Continuous Blood Gluc  ([facility]

## 2018-10-25 ENCOUNTER — OFFICE VISIT (OUTPATIENT)
Dept: DERMATOLOGY | Age: 58
End: 2018-10-25
Payer: COMMERCIAL

## 2018-10-25 DIAGNOSIS — L57.0 ACTINIC KERATOSES: Primary | ICD-10-CM

## 2018-10-25 DIAGNOSIS — L71.9 ROSACEA: ICD-10-CM

## 2018-10-25 DIAGNOSIS — Z12.83 SCREENING EXAM FOR SKIN CANCER: ICD-10-CM

## 2018-10-25 PROCEDURE — G8484 FLU IMMUNIZE NO ADMIN: HCPCS | Performed by: DERMATOLOGY

## 2018-10-25 PROCEDURE — 17003 DESTRUCT PREMALG LES 2-14: CPT | Performed by: DERMATOLOGY

## 2018-10-25 PROCEDURE — 3017F COLORECTAL CA SCREEN DOC REV: CPT | Performed by: DERMATOLOGY

## 2018-10-25 PROCEDURE — G8427 DOCREV CUR MEDS BY ELIG CLIN: HCPCS | Performed by: DERMATOLOGY

## 2018-10-25 PROCEDURE — G8598 ASA/ANTIPLAT THER USED: HCPCS | Performed by: DERMATOLOGY

## 2018-10-25 PROCEDURE — 17000 DESTRUCT PREMALG LESION: CPT | Performed by: DERMATOLOGY

## 2018-10-25 PROCEDURE — 1036F TOBACCO NON-USER: CPT | Performed by: DERMATOLOGY

## 2018-10-25 PROCEDURE — 99213 OFFICE O/P EST LOW 20 MIN: CPT | Performed by: DERMATOLOGY

## 2018-10-25 PROCEDURE — G8417 CALC BMI ABV UP PARAM F/U: HCPCS | Performed by: DERMATOLOGY

## 2018-10-25 RX ORDER — METRONIDAZOLE 10 MG/G
GEL TOPICAL
Qty: 60 G | Refills: 3 | Status: SHIPPED | OUTPATIENT
Start: 2018-10-25 | End: 2020-07-06 | Stop reason: SDUPTHER

## 2018-10-25 NOTE — PROGRESS NOTES
[facility]) Dermatology  Lyric Sandhu  1960    60 y.o. male     Date of Visit: 10/25/2018    Last Visit: 1yr    Chief Complaint: Skin check    History of Present Illness:  1. Here for skin check. History of actinic keratoses s/p cryotherapy. Unsure of new lesions.   -Avoids sun as much as possible. Wears hat and SPF 30 sunscreen if spending extended time outdoors. 2. New issue. Complains of recurrent pimple-type lesions on nose. Worse w/ heat. Derm history:  -Angular cheilitis -Nystatin oint     Review of Systems:  Constitutional: Reports general sense of well-being. Skin: No interval severe sunburns. Allergies: Reviewed and updated. Past Medical History, Surgical History, Medications and Allergies reviewed.      Past Medical History:   Diagnosis Date    Cerebral artery occlusion with cerebral infarction [facility]) 2013     X2 PERSONALITY CHANGE, ANGER OUTBURSTS    Cerebrovascular disease     ED (erectile dysfunction) 1/23/2014    Hypertension     Irritable bowel syndrome     Obstructive sleep apnea (adult) (pediatric) 07/01/2013    NO CPAP, HAD PROBLEMS WITH INSURANCE AND STOPPED USING    Occlusion and stenosis of carotid artery without mention of cerebral infarction 01/13/2014    MINOR    Polyneuropathy in diabetes(357.2) 7/1/2013    PONV (postoperative nausea and vomiting)     Skin abnormality     PRE-CANCEROUS LESIONS REMOVED FROM ARMS AND EARS    Type II or unspecified type diabetes mellitus without mention of complication, not stated as uncontrolled      Past Surgical History:   Procedure Laterality Date    CARPAL TUNNEL RELEASE Left 2008    CERVICAL DISCECTOMY  08/02/2017    ANTERIOR CERVICAL DISCECTOMY AND FUSION C5-6, C6-7 WITH MEDTRONIC PLATES, SCREWS, ALLOGRAFT, WITH EVOKES PARTIAL C6 CORPECTOMY    CERVICAL FUSION  12/06/2017    CERVICAL LAMINECTOMY AND POSTERIOR FUSION C5-T1 WITH MEDTRONIC RODS AND SCREWS WITH EVOKES AND O-ARM    daily 90 tablet 2    Blood Glucose Monitoring Suppl (AGAMATRIX PRESTO PRO METER) KYLAH Use BID 1 Device 0    AGAMATRIX ULTRA-THIN LANCETS MISC Use  each 3       Physical Examination     The following were examined and determined to be normal: Psych/Neuro, Scalp/hair, Conjunctivae/eyelids, Gums/teeth/lips, Neck, Breast/axilla/chest, Abdomen, Back, RUE, LUE, RLE, LLE, Nails/digits and Genitalia/groin/buttocks. The following were examined and determined to be abnormal: Head/face. -General: Well-appearing, NAD  1. R 1 and L 1 helices, L temple 2 - ill-defined irregularly-shaped roughly-scaling thin pink macule(s)/papule(s)   2. Nose and medial cheeks - bkgd mild erythema w/ scattered telangiectasias and erythematous papulopustules     Assessment and Plan     1. Actinic keratosis(es)  -Edu re: relationship with chronic cumulative sun exposure, low premalignant potential.   -4 lesion(s) treated w/ liquid nitrogen x 2 cycles - R 1 and L 1 helices, L temple 2 . Edu re: risk of blister formation, discomfort, scar, hypopigmentation. Discussed wound care. -Reviewed sun protective behavior -- sun avoidance during the peak hours of the day, sun-protective clothing (including hat and sunglasses), sunscreen use (water resistant, broad spectrum, SPF at least 30, need for reapplication every 2 to 3 hours). -Return for full skin exam in 1 year (sooner if indicated)      2. Rosacea - erythematotelangiectatic + papulopustular - mild  -Metrogel qd.  Edu re: irritation   -Discussed laser, Rhofade, green-tinted makeup for vascular component of rosacea.   -Discussed potential triggers of rosacea - stress, sun, heat, wind, exercise, etoh, spicy foods; importance of sun protection

## 2018-10-26 ENCOUNTER — HOSPITAL ENCOUNTER (OUTPATIENT)
Age: 58
Discharge: HOME | End: 2018-10-26
Payer: COMMERCIAL

## 2018-10-26 DIAGNOSIS — R79.89 ELEVATED LFTS: ICD-10-CM

## 2018-10-26 DIAGNOSIS — E78.1 HYPERTRIGLYCERIDEMIA: ICD-10-CM

## 2018-10-26 DIAGNOSIS — Z79.4 TYPE 2 DIABETES MELLITUS WITH HYPERGLYCEMIA, WITH LONG-TERM CURRENT USE OF INSULIN (HCC): ICD-10-CM

## 2018-10-26 DIAGNOSIS — E55.9 VITAMIN D DEFICIENCY: ICD-10-CM

## 2018-10-26 DIAGNOSIS — E11.65 TYPE 2 DIABETES MELLITUS WITH HYPERGLYCEMIA, WITH LONG-TERM CURRENT USE OF INSULIN (HCC): ICD-10-CM

## 2018-10-26 LAB
A/G RATIO: 1.6 (ref 1.1–2.2)
ALBUMIN SERPL-MCNC: 4.4 G/DL (ref 3.4–5)
ALP BLD-CCNC: 93 U/L (ref 40–129)
ALT SERPL-CCNC: 43 U/L (ref 10–40)
ANION GAP SERPL CALCULATED.3IONS-SCNC: 14 MMOL/L (ref 3–16)
AST SERPL-CCNC: 32 U/L (ref 15–37)
BILIRUB SERPL-MCNC: <0.2 MG/DL (ref 0–1)
BILIRUBIN DIRECT: <0.2 MG/DL (ref 0–0.3)
BILIRUBIN, INDIRECT: ABNORMAL MG/DL (ref 0–1)
BUN BLDV-MCNC: 21 MG/DL (ref 7–20)
CALCIUM SERPL-MCNC: 10.1 MG/DL (ref 8.3–10.6)
CHLORIDE BLD-SCNC: 100 MMOL/L (ref 99–110)
CHOLESTEROL, TOTAL: 188 MG/DL (ref 0–199)
CO2: 26 MMOL/L (ref 21–32)
CREAT SERPL-MCNC: 0.7 MG/DL (ref 0.9–1.3)
ESTIMATED AVERAGE GLUCOSE: 214.5 MG/DL
GFR AFRICAN AMERICAN: >60
GFR NON-AFRICAN AMERICAN: >60
GLOBULIN: 2.8 G/DL
GLUCOSE BLD-MCNC: 226 MG/DL (ref 70–99)
HBA1C MFR BLD: 9.1 %
HDLC SERPL-MCNC: 31 MG/DL (ref 40–60)
LDL CHOLESTEROL CALCULATED: ABNORMAL MG/DL
LDL CHOLESTEROL DIRECT: 112 MG/DL
POTASSIUM SERPL-SCNC: 4.3 MMOL/L (ref 3.5–5.1)
SODIUM BLD-SCNC: 140 MMOL/L (ref 136–145)
TOTAL PROTEIN: 7.2 G/DL (ref 6.4–8.2)
TRIGL SERPL-MCNC: 506 MG/DL (ref 0–150)
VITAMIN D 25-HYDROXY: 28.8 NG/ML
VLDLC SERPL CALC-MCNC: ABNORMAL MG/DL

## 2018-10-26 PROCEDURE — 80053 COMPREHEN METABOLIC PANEL: CPT

## 2018-10-26 PROCEDURE — 83036 HEMOGLOBIN GLYCOSYLATED A1C: CPT

## 2018-10-26 PROCEDURE — 36415 COLL VENOUS BLD VENIPUNCTURE: CPT

## 2018-10-26 PROCEDURE — 82306 VITAMIN D 25 HYDROXY: CPT

## 2018-10-26 PROCEDURE — 82248 BILIRUBIN DIRECT: CPT

## 2018-10-26 PROCEDURE — 83721 ASSAY OF BLOOD LIPOPROTEIN: CPT

## 2018-10-26 PROCEDURE — 80061 LIPID PANEL: CPT

## 2018-10-30 ENCOUNTER — OFFICE VISIT (OUTPATIENT)
Dept: ENDOCRINOLOGY | Age: 58
End: 2018-10-30
Payer: COMMERCIAL

## 2018-10-30 VITALS
RESPIRATION RATE: 16 BRPM | HEART RATE: 88 BPM | DIASTOLIC BLOOD PRESSURE: 74 MMHG | BODY MASS INDEX: 32.13 KG/M2 | WEIGHT: 224.4 LBS | HEIGHT: 70 IN | SYSTOLIC BLOOD PRESSURE: 118 MMHG | OXYGEN SATURATION: 95 %

## 2018-10-30 DIAGNOSIS — E11.42 TYPE 2 DIABETES MELLITUS WITH DIABETIC POLYNEUROPATHY, WITH LONG-TERM CURRENT USE OF INSULIN (HCC): Primary | ICD-10-CM

## 2018-10-30 DIAGNOSIS — I10 ESSENTIAL HYPERTENSION: ICD-10-CM

## 2018-10-30 DIAGNOSIS — E78.2 MIXED HYPERLIPIDEMIA: ICD-10-CM

## 2018-10-30 DIAGNOSIS — E78.1 HYPERTRIGLYCERIDEMIA: ICD-10-CM

## 2018-10-30 DIAGNOSIS — Z79.4 TYPE 2 DIABETES MELLITUS WITH DIABETIC POLYNEUROPATHY, WITH LONG-TERM CURRENT USE OF INSULIN (HCC): Primary | ICD-10-CM

## 2018-10-30 PROCEDURE — 3017F COLORECTAL CA SCREEN DOC REV: CPT | Performed by: INTERNAL MEDICINE

## 2018-10-30 PROCEDURE — 99214 OFFICE O/P EST MOD 30 MIN: CPT | Performed by: INTERNAL MEDICINE

## 2018-10-30 PROCEDURE — 3046F HEMOGLOBIN A1C LEVEL >9.0%: CPT | Performed by: INTERNAL MEDICINE

## 2018-10-30 PROCEDURE — G8598 ASA/ANTIPLAT THER USED: HCPCS | Performed by: INTERNAL MEDICINE

## 2018-10-30 PROCEDURE — G8484 FLU IMMUNIZE NO ADMIN: HCPCS | Performed by: INTERNAL MEDICINE

## 2018-10-30 PROCEDURE — 2022F DILAT RTA XM EVC RTNOPTHY: CPT | Performed by: INTERNAL MEDICINE

## 2018-10-30 PROCEDURE — G8417 CALC BMI ABV UP PARAM F/U: HCPCS | Performed by: INTERNAL MEDICINE

## 2018-10-30 PROCEDURE — 1036F TOBACCO NON-USER: CPT | Performed by: INTERNAL MEDICINE

## 2018-10-30 PROCEDURE — G8427 DOCREV CUR MEDS BY ELIG CLIN: HCPCS | Performed by: INTERNAL MEDICINE

## 2018-10-30 NOTE — PROGRESS NOTES
10/30/18 224 lb 6.4 oz (101.8 kg)   10/04/18 218 lb (98.9 kg)   09/11/18 221 lb 1.6 oz (100.3 kg)     BP Readings from Last 3 Encounters:   10/30/18 118/74   10/04/18 128/62   09/11/18 138/84        Past Medical History:   Diagnosis Date    Cerebral artery occlusion with cerebral infarction [facility]) 2013     X2 PERSONALITY CHANGE, ANGER OUTBURSTS    Cerebrovascular disease     ED (erectile dysfunction) 1/23/2014    Hypertension     Irritable bowel syndrome     Obstructive sleep apnea (adult) (pediatric) 07/01/2013    NO CPAP, HAD PROBLEMS WITH INSURANCE AND STOPPED USING    Occlusion and stenosis of carotid artery without mention of cerebral infarction 01/13/2014    MINOR    Polyneuropathy in diabetes(357.2) 7/1/2013    PONV (postoperative nausea and vomiting)     Skin abnormality     PRE-CANCEROUS LESIONS REMOVED FROM ARMS AND EARS    Type II or unspecified type diabetes mellitus without mention of complication, not stated as uncontrolled      Past Surgical History:   Procedure Laterality Date    CARPAL TUNNEL RELEASE Left 2008    CERVICAL DISCECTOMY  08/02/2017    ANTERIOR CERVICAL DISCECTOMY AND FUSION C5-6, C6-7 WITH MEDTRONIC PLATES, SCREWS, ALLOGRAFT, WITH EVOKES PARTIAL C6 CORPECTOMY    CERVICAL FUSION  12/06/2017    CERVICAL LAMINECTOMY AND POSTERIOR FUSION C5-T1 WITH MEDTRON[employer] RODS AND SCREWS WITH EVOKES AND O-ARM    CHOLECYSTECTOMY  1995    COLONOSCOPY N/A 8/14/2018    COLONOSCOPY POLYPECTOMY SNARE/COLD BIOPSY performed by Silvana Miller MD at [address] [facility]  2008    ULNAR NERVE TRANSPOSITION, AT SAME TIME AS [place]  [address]07    umbilical    PYLOROPLASTY      PYLORIC STENOSIS AS INFANT     Family History   Problem Relation Age of Onset    Cancer Father         melanoma, face    Cancer Paternal Uncle         melanoma, face    Cancer Brother         skin, NMSC    Depression Maternal Cousin      History   Smoking Status    Former Smoker    21* 7 - 20 mg/dL Final    CREATININE 10/26/2018 0.7* 0.9 - 1.3 mg/dL Final    GFR Non- 10/26/2018 >60  >60 Final    Comment: >60 mL/min/1.73m2 EGFR, calc. for ages 25 and older using the  MDRD formula (not corrected for weight), is valid for stable  renal function.  GFR  10/26/2018 >60  >60 Final    Comment: Chronic Kidney Disease: less than 60 ml/min/1.73 sq.m. Kidney Failure: less than 15 ml/min/1.73 sq.m. Results valid for patients 18 years and older.       Calcium 10/26/2018 10.1  8.3 - 10.6 mg/dL Final    Albumin/Globulin Ratio 10/26/2018 1.6  1.1 - 2.2 Final    Globulin 10/26/2018 2.8  g/dL Final    Hemoglobin A1C 10/26/2018 9.1  See comment % Final    Comment: Comment:  Diagnosis of Diabetes: > or = 6.5%  Increased risk of diabetes (Prediabetes): 5.7-6.4%  Glycemic Control: Nonpregnant Adults: <7.0%                    Pregnant: <6.0%        eAG 10/26/2018 214.5  mg/dL Final    LDL Direct 10/26/2018 112* <100 mg/dL Final   Office Visit on 10/09/2018   Component Date Value Ref Range Status    Hemoglobin A1C 10/09/2018 9.6  % Final   Admission on 08/14/2018, Discharged on 08/14/2018   Component Date Value Ref Range Status    POC Glucose 08/14/2018 295* 70 - 99 mg/dl Final    Performed on 08/14/2018 ACCU-CHEK   Final    POC Glucose 08/14/2018 251* 70 - 99 mg/dl Final    Performed on 08/14/2018 [facility] Outpatient Visit on 07/09/2018   Component Date Value Ref Range Status    WBC 07/09/2018 8.6  4.0 - 11.0 K/uL Final    RBC 07/09/2018 4.66  4.20 - 5.90 M/uL Final    Hemoglobin 07/09/2018 14.5  13.5 - 17.5 g/dL Final    Hematocrit 07/09/2018 41.0  40.5 - 52.5 % Final    MCV 07/09/2018 87.8  80.0 - 100.0 fL Final    MCH 07/09/2018 31.1  26.0 - 34.0 pg Final    MCHC 07/09/2018 35.4  31.0 - 36.0 g/dL Final    RDW 07/09/2018 13.8  12.4 - 15.4 % Final    Platelets 01/11/4296 266  135 - 450 K/uL Final    MPV 07/09/2018 8.8  5.0 -

## 2018-11-12 ENCOUNTER — CARE COORDINATION (OUTPATIENT)
Dept: FAMILY MEDICINE CLINIC | Age: 58
End: 2018-11-12

## 2018-11-12 NOTE — CARE COORDINATION
MOUTH TWICE A DAY 1/9/17   Alejandro Joseph MD   atenolol (TENORMIN) 25 MG tablet TAKE 1 TABLET BY MOUTH EVERY NIGHT AT BEDTIME 1/9/17   Alejandro Joseph MD   citalopram (CELEXA) 20 MG tablet Take 1 tablet by mouth daily 11/14/16   Alejandro Joseph MD   Blood Glucose Monitoring Suppl (AGAMATRIX PRESTO PRO METER) KYLAH Use BID 4/1/14   MD Letitia Ireland ULTRA-THIN LANCETS MISC Use BID 4/1/14   Alejandro Joseph MD       Future Appointments  Date Time Provider Avelino Owens   1/15/2019 8:20 AM Becky Win APRN - CNP [facility]   1/30/2019 4:40 PM Helena Spann MD AND ENDO [facility]   10/24/2019 2:45 PM Debo Hernandez MD And Derm [facility]      and   Diabetes Assessment    Medic Alert ID:  No  Meal Planning:  Carb counting, Avoidance of concentrated sweets   How often do you test your blood sugar?:  Daily, Meals   Do you have barriers with adherence to non-pharmacologic self-management interventions?  (Nutrition/Exercise/Self-Monitoring):  No   Have you ever had to go to the ED for symptoms of low blood sugar?:  No       No patient-reported symptoms

## 2018-11-19 ENCOUNTER — TELEPHONE (OUTPATIENT)
Dept: PHARMACY | Facility: CLINIC | Age: 58
End: 2018-11-19

## 2018-11-19 NOTE — TELEPHONE ENCOUNTER
[facility]) Employee Diabetes Program    Rhonda Horner is a 60 y.o. male enrolled in the [facility] Diabetes Program. The goal of this voluntary program is to help [facility]) employees and covered dependents reach their health maintenance goals in regards to their diabetes diagnosis. According to our records, patient is missing the following requirement(s) that must be completed by December 31, 2018 to avoid discharge from the program:     Pneumococcal vaccination   Influenza vaccination for the 7701-0159 flu season    Patients with an A1c of 8% or higher are required to meet in person or via phone with an [address] [place] [address] Coordinator or Diabetes Educator. Patient has completed Diabetes Education requirement after speaking with El Krishnan RD. Attempt made to reach patient by telephone to review above. Left detailed message about the above requirements and asked the patient to return clinician's phone call to [phone], option 7. Will send [employer] message to patient today as well.     Karina Simpson, PharmD, [address]: [phone] C: [phone]  Department, toll free [phone], option 7

## 2018-11-29 ENCOUNTER — NURSE ONLY (OUTPATIENT)
Dept: FAMILY MEDICINE CLINIC | Age: 58
End: 2018-11-29
Payer: COMMERCIAL

## 2018-11-29 DIAGNOSIS — Z23 IMMUNIZATION DUE: Primary | ICD-10-CM

## 2018-11-29 PROCEDURE — 90686 IIV4 VACC NO PRSV 0.5 ML IM: CPT | Performed by: NURSE PRACTITIONER

## 2018-11-29 PROCEDURE — 90472 IMMUNIZATION ADMIN EACH ADD: CPT | Performed by: NURSE PRACTITIONER

## 2018-11-29 PROCEDURE — 90471 IMMUNIZATION ADMIN: CPT | Performed by: NURSE PRACTITIONER

## 2018-11-29 PROCEDURE — 90732 PPSV23 VACC 2 YRS+ SUBQ/IM: CPT | Performed by: NURSE PRACTITIONER

## 2018-12-03 RX ORDER — LISINOPRIL AND HYDROCHLOROTHIAZIDE 20; 12.5 MG/1; MG/1
1 TABLET ORAL DAILY
Qty: 90 TABLET | Refills: 1 | Status: SHIPPED | OUTPATIENT
Start: 2018-12-03 | End: 2019-05-26 | Stop reason: SDUPTHER

## 2018-12-12 ENCOUNTER — CARE COORDINATION (OUTPATIENT)
Dept: CARE COORDINATION | Age: 58
End: 2018-12-12

## 2018-12-12 NOTE — CARE COORDINATION
Medication Sig Start Date End Date Taking? Authorizing Provider   lisinopril-hydrochlorothiazide (PRINZIDE;ZESTORETIC) 20-12.5 MG per tablet Take 1 tablet by mouth daily 12/3/18   MADISON Marques CNP   metroNIDAZOLE (METROGEL) 1 % gel Apply to face daily. 10/25/18   Edy Sanches MD   vitamin D (CHOLECALCIFEROL) 1000 UNIT TABS tablet TAKE 1 TABLET BY MOUTH ONE TIME A DAY 10/1/18   MADISON Marques CNP   fenofibrate (TRICOR) 145 MG tablet Take 1 tablet by mouth daily 9/11/18   Sj Veliz MD   Insulin Pen Needle 32G X 4 MM MISC Inject insulin 5 times a day. 9/11/18   Sj Veliz MD   insulin glargine (LANTUS SOLOSTAR) 100 UNIT/ML injection pen Inject 40 Units into the skin 2 times daily Diagnosis E11.42 9/11/18   Sj Veliz MD   insulin lispro (HUMALOG KWIKPEN) 200 UNIT/ML SOPN pen Inject 20-30 Units into the skin 3 times daily (before meals) 9/11/18   Sj Veliz MD   LOVAZA 1 g capsule Take 2 capsules by mouth 2 times daily 9/11/18   Sj Veliz MD   blood glucose test strips (AGAMATRIX PRESTO TEST) strip Test 3-4 times a day.  Uncontrolled DM. 9/11/18   Sj Veliz MD   Continuous Blood Gluc  (FREESTYLE JOSEPH READER) KYLAH Use to monitor BS. 9/11/18   Sj Veliz MD   Continuous Blood Gluc Sensor ([address] [place] [address]) [facility] Use to monitor BS. 9/11/18   Sj Veliz MD   aspirin EC 81 MG EC tablet Take 1 tablet by mouth daily 7/9/18   MADISON Marques CNP   nystatin (MYCOSTATIN) 151005 UNIT/GM ointment Apply topically 2 times daily to corners of mouth. 10/19/17   Edy Sanches MD   metFORMIN (GLUCOPHAGE) 1000 MG tablet TAKE ONE TABLET BY MOUTH TWICE A DAY 1/9/17   Reddy Eduardo MD   atenolol (TENORMIN) 25 MG tablet TAKE 1 TABLET BY MOUTH EVERY NIGHT AT BEDTIME 1/9/17   Reddy Eduardo MD   citalopram (CELEXA) 20 MG tablet Take 1 tablet by mouth daily 11/14/16   Reddy Eduardo MD   Blood Glucose Monitoring Suppl

## 2019-01-02 RX ORDER — OMEGA-3-ACID ETHYL ESTERS 1 G/1
CAPSULE, LIQUID FILLED ORAL
Qty: 180 CAPSULE | Refills: 3 | Status: SHIPPED | OUTPATIENT
Start: 2019-01-02 | End: 2019-06-23 | Stop reason: SDUPTHER

## 2019-01-15 ENCOUNTER — CARE COORDINATION (OUTPATIENT)
Dept: CARE COORDINATION | Age: 59
End: 2019-01-15

## 2019-01-15 ENCOUNTER — OFFICE VISIT (OUTPATIENT)
Dept: FAMILY MEDICINE CLINIC | Age: 59
End: 2019-01-15
Payer: COMMERCIAL

## 2019-01-15 VITALS
OXYGEN SATURATION: 97 % | DIASTOLIC BLOOD PRESSURE: 78 MMHG | BODY MASS INDEX: 32.5 KG/M2 | HEIGHT: 70 IN | SYSTOLIC BLOOD PRESSURE: 138 MMHG | WEIGHT: 227 LBS | HEART RATE: 68 BPM

## 2019-01-15 DIAGNOSIS — E78.2 MIXED HYPERLIPIDEMIA: ICD-10-CM

## 2019-01-15 DIAGNOSIS — F33.0 MILD EPISODE OF RECURRENT MAJOR DEPRESSIVE DISORDER (HCC): ICD-10-CM

## 2019-01-15 DIAGNOSIS — N52.9 ERECTILE DYSFUNCTION, UNSPECIFIED ERECTILE DYSFUNCTION TYPE: ICD-10-CM

## 2019-01-15 DIAGNOSIS — I10 ESSENTIAL HYPERTENSION: ICD-10-CM

## 2019-01-15 DIAGNOSIS — M54.9 CHRONIC NECK AND BACK PAIN: ICD-10-CM

## 2019-01-15 DIAGNOSIS — M54.2 CHRONIC NECK AND BACK PAIN: ICD-10-CM

## 2019-01-15 DIAGNOSIS — G89.29 CHRONIC NECK AND BACK PAIN: ICD-10-CM

## 2019-01-15 DIAGNOSIS — E11.42 TYPE 2 DIABETES MELLITUS WITH DIABETIC POLYNEUROPATHY, WITH LONG-TERM CURRENT USE OF INSULIN (HCC): Primary | ICD-10-CM

## 2019-01-15 DIAGNOSIS — Z79.4 TYPE 2 DIABETES MELLITUS WITH DIABETIC POLYNEUROPATHY, WITH LONG-TERM CURRENT USE OF INSULIN (HCC): Primary | ICD-10-CM

## 2019-01-15 DIAGNOSIS — Z72.89 OTHER PROBLEMS RELATED TO LIFESTYLE: ICD-10-CM

## 2019-01-15 DIAGNOSIS — E66.09 CLASS 1 OBESITY DUE TO EXCESS CALORIES WITH SERIOUS COMORBIDITY AND BODY MASS INDEX (BMI) OF 32.0 TO 32.9 IN ADULT: ICD-10-CM

## 2019-01-15 DIAGNOSIS — Z11.4 SCREENING FOR HIV WITHOUT PRESENCE OF RISK FACTORS: ICD-10-CM

## 2019-01-15 PROBLEM — E66.811 CLASS 1 OBESITY DUE TO EXCESS CALORIES WITH SERIOUS COMORBIDITY AND BODY MASS INDEX (BMI) OF 32.0 TO 32.9 IN ADULT: Status: ACTIVE | Noted: 2019-01-15

## 2019-01-15 LAB — HBA1C MFR BLD: 9.3 %

## 2019-01-15 PROCEDURE — 2022F DILAT RTA XM EVC RTNOPTHY: CPT | Performed by: NURSE PRACTITIONER

## 2019-01-15 PROCEDURE — 83036 HEMOGLOBIN GLYCOSYLATED A1C: CPT | Performed by: NURSE PRACTITIONER

## 2019-01-15 PROCEDURE — 3017F COLORECTAL CA SCREEN DOC REV: CPT | Performed by: NURSE PRACTITIONER

## 2019-01-15 PROCEDURE — 3046F HEMOGLOBIN A1C LEVEL >9.0%: CPT | Performed by: NURSE PRACTITIONER

## 2019-01-15 PROCEDURE — 1036F TOBACCO NON-USER: CPT | Performed by: NURSE PRACTITIONER

## 2019-01-15 PROCEDURE — G8598 ASA/ANTIPLAT THER USED: HCPCS | Performed by: NURSE PRACTITIONER

## 2019-01-15 PROCEDURE — G8482 FLU IMMUNIZE ORDER/ADMIN: HCPCS | Performed by: NURSE PRACTITIONER

## 2019-01-15 PROCEDURE — 99214 OFFICE O/P EST MOD 30 MIN: CPT | Performed by: NURSE PRACTITIONER

## 2019-01-15 PROCEDURE — G8427 DOCREV CUR MEDS BY ELIG CLIN: HCPCS | Performed by: NURSE PRACTITIONER

## 2019-01-15 PROCEDURE — G8417 CALC BMI ABV UP PARAM F/U: HCPCS | Performed by: NURSE PRACTITIONER

## 2019-01-15 RX ORDER — SILDENAFIL CITRATE 20 MG/1
20 TABLET ORAL DAILY PRN
Qty: 8 TABLET | Refills: 0 | Status: SHIPPED | OUTPATIENT
Start: 2019-01-15 | End: 2019-08-07

## 2019-01-15 ASSESSMENT — ENCOUNTER SYMPTOMS
COUGH: 0
NAUSEA: 0
SHORTNESS OF BREATH: 0
VOMITING: 0
WHEEZING: 0
BACK PAIN: 1
DIARRHEA: 0
STRIDOR: 0

## 2019-01-21 DIAGNOSIS — E11.42 TYPE 2 DIABETES MELLITUS WITH DIABETIC POLYNEUROPATHY, WITH LONG-TERM CURRENT USE OF INSULIN (HCC): ICD-10-CM

## 2019-01-21 DIAGNOSIS — Z79.4 TYPE 2 DIABETES MELLITUS WITH DIABETIC POLYNEUROPATHY, WITH LONG-TERM CURRENT USE OF INSULIN (HCC): ICD-10-CM

## 2019-01-21 RX ORDER — INSULIN GLARGINE 100 [IU]/ML
INJECTION, SOLUTION SUBCUTANEOUS
Qty: 30 ML | Refills: 3 | Status: SHIPPED | OUTPATIENT
Start: 2019-01-21 | End: 2019-06-23 | Stop reason: SDUPTHER

## 2019-02-07 ENCOUNTER — CARE COORDINATION (OUTPATIENT)
Dept: CARE COORDINATION | Age: 59
End: 2019-02-07

## 2019-02-11 ENCOUNTER — CARE COORDINATION (OUTPATIENT)
Dept: CARE COORDINATION | Age: 59
End: 2019-02-11

## 2019-02-12 RX ORDER — LANCETS 33 GAUGE
EACH MISCELLANEOUS
Qty: 300 EACH | Refills: 3 | Status: SHIPPED | OUTPATIENT
Start: 2019-02-12 | End: 2019-03-15

## 2019-02-13 ENCOUNTER — TELEPHONE (OUTPATIENT)
Dept: ENDOCRINOLOGY | Age: 59
End: 2019-02-13

## 2019-02-18 ENCOUNTER — CARE COORDINATION (OUTPATIENT)
Dept: CARE COORDINATION | Age: 59
End: 2019-02-18

## 2019-02-18 ENCOUNTER — TELEPHONE (OUTPATIENT)
Dept: ENDOCRINOLOGY | Age: 59
End: 2019-02-18

## 2019-03-04 ENCOUNTER — CARE COORDINATION (OUTPATIENT)
Dept: CARE COORDINATION | Age: 59
End: 2019-03-04

## 2019-03-05 ENCOUNTER — CARE COORDINATION (OUTPATIENT)
Dept: CARE COORDINATION | Age: 59
End: 2019-03-05

## 2019-03-07 ENCOUNTER — TELEPHONE (OUTPATIENT)
Dept: PHARMACY | Facility: CLINIC | Age: 59
End: 2019-03-07

## 2019-03-14 ENCOUNTER — SCHEDULED TELEPHONE ENCOUNTER (OUTPATIENT)
Dept: PHARMACY | Facility: CLINIC | Age: 59
End: 2019-03-14

## 2019-03-15 ENCOUNTER — TELEPHONE (OUTPATIENT)
Dept: PHARMACY | Facility: CLINIC | Age: 59
End: 2019-03-15

## 2019-03-15 RX ORDER — NYSTATIN 100000 U/G
OINTMENT TOPICAL
Qty: 30 G | Refills: 1 | Status: SHIPPED | OUTPATIENT
Start: 2019-03-15 | End: 2020-10-29 | Stop reason: SDUPTHER

## 2019-03-15 RX ORDER — BLOOD-GLUCOSE CONTROL, LOW
EACH MISCELLANEOUS
Qty: 400 EACH | Refills: 3
Start: 2019-03-15

## 2019-03-19 ENCOUNTER — CARE COORDINATION (OUTPATIENT)
Dept: CARE COORDINATION | Age: 59
End: 2019-03-19

## 2019-03-25 NOTE — TELEPHONE ENCOUNTER
Last ov 01/15/2019   Future Appointments   Date Time Provider Avelino Owens   6/6/2019  8:20 AM [facility]AD[facility]chez - [facility]   10/24/2019  2:45 PM Corey Manuel MD And Jessica REYNOLDS

## 2019-03-27 ENCOUNTER — CARE COORDINATION (OUTPATIENT)
Dept: CARE COORDINATION | Age: 59
End: 2019-03-27

## 2019-04-03 ENCOUNTER — PATIENT MESSAGE (OUTPATIENT)
Dept: PHARMACY | Facility: CLINIC | Age: 59
End: 2019-04-03

## 2019-04-03 NOTE — LETTER
[address] [place] [id number]           07/31/19     Dear Chucho Florez,    Thanks so much for taking the first step towards better health. Congratulations! You have completed the requirements needed to date to maintain enrollment in the [facility]) Diabetes Management program for 2019. Please review the information below for further requirements that need to be completed for the remainder of the year. To ensure participants are taking steps to control their condition ongoing requirements need to be completed to continue to receive benefits, the following actions must be taken:     Requirements to be completed between July 1 and Dec. 31   Visit with your physician (Second yearly visit)   Second A1C   Flu vaccination (once yearly)   Take diabetes medication as prescribed as well as cholesterol (Statin) or high blood pressure (ACE/ARB) medications, if needed. o 70% adherence is required of diabetes medications   o Provide documentation if cholesterol and/or high blood pressure medications are not needed. Lipid panel (once yearly)   Urine albumin (once yearly)   Pneumonia Vaccination (once or as indicated by physician)     Requirements if A1C is greater than 8 percent:   Engage with a [facility]) diabetes educator at one of our hospital locations or an ambulatory care coordinator by phone. If requirements(s) are not met by the date listed above you will be disqualified from the program and the credit valued at $600 towards your diabetic medications and supplies will be revoked. You will be able to reapply the following calendar year. [address] [place] [address],[address] Team   [phone] Option #7   Email: [email or web address]   Fax Number: [phone]

## 2019-04-29 ENCOUNTER — CARE COORDINATION (OUTPATIENT)
Dept: CARE COORDINATION | Age: 59
End: 2019-04-29

## 2019-05-13 ENCOUNTER — EMPLOYEE WELLNESS (OUTPATIENT)
Dept: OTHER | Age: 59
End: 2019-05-13

## 2019-05-13 LAB
CHOLESTEROL, TOTAL: 208 MG/DL (ref 0–199)
GLUCOSE BLD-MCNC: 373 MG/DL (ref 70–99)
HDLC SERPL-MCNC: 31 MG/DL (ref 40–60)
LDL CHOLESTEROL CALCULATED: ABNORMAL MG/DL
LDL CHOLESTEROL DIRECT: 92 MG/DL
TRIGL SERPL-MCNC: 739 MG/DL (ref 0–150)

## 2019-05-14 LAB
ESTIMATED AVERAGE GLUCOSE: 263.3 MG/DL
HBA1C MFR BLD: 10.8 %

## 2019-05-17 ENCOUNTER — OFFICE VISIT (OUTPATIENT)
Dept: ORTHOPEDIC SURGERY | Age: 59
End: 2019-05-17
Payer: COMMERCIAL

## 2019-05-17 VITALS — BODY MASS INDEX: 32.51 KG/M2 | RESPIRATION RATE: 17 BRPM | HEIGHT: 70 IN | WEIGHT: 227.07 LBS

## 2019-05-17 DIAGNOSIS — M25.522 LEFT ELBOW PAIN: Primary | ICD-10-CM

## 2019-05-17 DIAGNOSIS — M65.342 ACQUIRED TRIGGER FINGER OF LEFT RING FINGER: ICD-10-CM

## 2019-05-17 DIAGNOSIS — M77.12 LEFT LATERAL EPICONDYLITIS: ICD-10-CM

## 2019-05-17 PROCEDURE — 99203 OFFICE O/P NEW LOW 30 MIN: CPT | Performed by: ORTHOPAEDIC SURGERY

## 2019-05-17 PROCEDURE — 20550 NJX 1 TENDON SHEATH/LIGAMENT: CPT | Performed by: ORTHOPAEDIC SURGERY

## 2019-05-17 PROCEDURE — 3017F COLORECTAL CA SCREEN DOC REV: CPT | Performed by: ORTHOPAEDIC SURGERY

## 2019-05-17 PROCEDURE — 1036F TOBACCO NON-USER: CPT | Performed by: ORTHOPAEDIC SURGERY

## 2019-05-17 PROCEDURE — G8417 CALC BMI ABV UP PARAM F/U: HCPCS | Performed by: ORTHOPAEDIC SURGERY

## 2019-05-17 PROCEDURE — G8427 DOCREV CUR MEDS BY ELIG CLIN: HCPCS | Performed by: ORTHOPAEDIC SURGERY

## 2019-05-17 PROCEDURE — MISCD86 TENNIS ELBOW STRAP-BREG: Performed by: ORTHOPAEDIC SURGERY

## 2019-05-17 PROCEDURE — G8598 ASA/ANTIPLAT THER USED: HCPCS | Performed by: ORTHOPAEDIC SURGERY

## 2019-05-17 NOTE — PROGRESS NOTES
HISTORY OF PRESENT ILLNESS:  The patient is a 65-year-old left-hand-dominant gentleman who presents today for a new hand surgery and elbow specialty evaluation regarding his left arm. I've not seen him in probably 4 years and in the last couple of years he has been focused on what sounds like cervical myelopathy with 2 separate neck surgeries. He has had some weakness residual into his right arm and also both legs. However in the last month or 6 weeks he has been having some left lateral elbow pain and also some triggering to the left ring finger. In the past we did a combination of left carpal tunnel and left ulnar nerve decompression and he did very well. PAST MEDICAL HISTORY: Patient's medications, allergies, past medical, surgical, social and family histories were reviewed and updated as appropriate. ROS: Pertinent items are noted in HPI. Review of systems reviewed from patient history form dated on 5/17/19 and available in the patient's chart under the media tab. Denies fever, chills, confusion, bowel/bladder active change. PHYSICAL EXAMINATION: Examination reveals a pleasant individual in no acute distress. There appears to be satisfactory pain-free range of motion, strength, and stability of the cervical spine, shoulders, wrists, and hands. Skin is intact without lymphadenopathy, discoloration, or abnormal temperature. There is intact, symmetric circulation in both upper extremities. At the left elbow, there is tenderness at the extremes of maximum flexion and extension and pain with firm pressure at the common extensor origin. There is no dramatic tenderness at the radial tunnel. Pain is reproduced with resisted wrist and long finger extension. There is no elbow instability or mechanical locking. There is good firing of the biceps and triceps. In the left hand his carpal tunnel incision is nicely healed but he does have grade 3 triggering to the left ring finger.   There is no flexion

## 2019-05-19 DIAGNOSIS — E11.42 TYPE 2 DIABETES MELLITUS WITH DIABETIC POLYNEUROPATHY, WITH LONG-TERM CURRENT USE OF INSULIN (HCC): ICD-10-CM

## 2019-05-19 DIAGNOSIS — Z79.4 TYPE 2 DIABETES MELLITUS WITH DIABETIC POLYNEUROPATHY, WITH LONG-TERM CURRENT USE OF INSULIN (HCC): ICD-10-CM

## 2019-05-20 ENCOUNTER — CARE COORDINATION (OUTPATIENT)
Dept: CARE COORDINATION | Age: 59
End: 2019-05-20

## 2019-05-20 RX ORDER — INSULIN GLARGINE 100 [IU]/ML
INJECTION, SOLUTION SUBCUTANEOUS
Qty: 30 ML | Refills: 3 | Status: SHIPPED | OUTPATIENT
Start: 2019-05-20 | End: 2019-12-11

## 2019-05-20 NOTE — CARE COORDINATION
Ambulatory Care Coordination Note  5/20/2019  CM Risk Score: 5  German Mortality Risk Score:      ACC: Jo Cobb RN    Summary Note: Patient states that he had his wellness testing last week and his A1c went back up to 10.8. He sounds very disappointed with himself and states that \"I know what I need to do, I just can't make myself do it. \" He met with ALYSHA Osorio a few months ago. RN ACC offered to have her contact him to schedule another 1:1 and he was agreeable. He states that he is [place] all of the time\" and has not been monitoring his CHO intake. RN ACC asked about aquatic therapy, which he had mentioned to [address] [place] [address]. He states that transportation is an issue during the day. He does like to get outside and walk his dog when the weather is nice. He states that he has a neighborhood that is amenable to walking. His FSBS have been averaging 200 mg/dL. He states that he had a cortisone injection in his left hand last week, and he's noticed a slight increase in his blood sugars. Discussed importance of getting DM under control to prevent long-term complications. He states \"I know, I need to do better. \" RN ACC asked what would motivate him, and he said \"I don't really know. \"  Plan     -RN SHRUTHI will send a staff message to [address] [place] [address], [address]  -Will continue to support patient with his DM self-management behaviors    Lab Results   Component Value Date    LABA1C 10.8 05/13/2019    LABA1C 9.3 01/15/2019    LABA1C 9.1 10/26/2018     Lab Results   Component Value Date    LABMICR YES 04/11/2017    [address] see below 05/13/2019    CREATININE 0.7 (L) 10/26/2018     Wt Readings from Last 3 Encounters:   05/17/19 227 lb 1.2 oz (103 kg)   01/15/19 227 lb (103 kg)   10/30/18 224 lb 6.4 oz (101.8 kg)       Care Coordination Interventions    Program Enrollment:  Rising Risk  Referral from Primary Care Provider:  No  Suggested Interventions and Community Resources  Diabetes Education:  Declined  Registered Dietician: Completed (Comment: referral placed 2/7/19)  Specialty Services Referral:  Completed (Comment: Dr. [place]- Maryruth Castleman)  Zone Management Tools:  Completed (Comment: DMII)  Other Services or Interventions:  [facility]         Goals Addressed                 This Visit's Progress     Nutrition Plan   Worsening     I will decrease CHO content with meals to lower A1c    Barriers: lack of motivation and overwhelmed by complexity of regimen  Plan for overcoming my barriers: Met with ALYSHA Osorio. Support by RN ACC. Confidence: 8/10  Anticipated Goal Completion Date: 5/27/19    3/27/19: \"I'm trying\"            Prior to Admission medications    Medication Sig Start Date End Date Taking? Authorizing Provider   LANTUS SOLOSTAR 100 UNIT/ML injection pen INJECT 40 UNITS UNDER THE SKIN TWICE DAILY 5/20/19   Niraj Dotson MD   vitamin D (CHOLECALCIFEROL) 1000 UNIT TABS tablet TAKE 1 TABLET BY MOUTH ONE TIME A DAY 4/2/19   Miguel Angel Ceja,    PRODIGY LANCETS 28G MISC Use as directed to test up to 3-4 times daily 3/15/19   Niraj Dotson MD   blood glucose test strips (PRODIGY NO CODING BLOOD GLUC) strip Use as directed to test up to 3-4 times daily 3/15/19   Niraj Dotson MD   nystatin (MYCOSTATIN) 326146 UNIT/GM ointment Apply topically 2 times daily to corners of mouth. 3/15/19   MD KESHIA CarrilloUS SOLOSTAR 100 UNIT/ML injection pen INJECT 40 UNITS UNDER THE SKIN TWICE DAILY 1/21/19   Niraj Dotson MD   sildenafil (REVATIO) 20 MG tablet Take 1 tablet by mouth daily as needed (erectile dysfunction) 1/15/19   MADISON Martines - CNP   omega-3 acid ethyl esters (LOVAZA) 1 g capsule TAKE TWO CAPSULES BY MOUTH TWICE A DAY 1/2/19   Niraj Dotson MD   lisinopril-hydrochlorothiazide (PRINZIDE;ZESTORETIC) 20-12.5 MG per tablet Take 1 tablet by mouth daily 12/3/18   MADISON Martines CNP   metroNIDAZOLE (METROGEL) 1 % gel Apply to face daily.  10/25/18   Itzel Tal Monk MD   fenofibrate (TRICOR) 145 MG tablet Take 1 tablet by mouth daily 9/11/18   Jaquelin Portillo MD   Insulin Pen Needle 32G X 4 MM MISC Inject insulin 5 times a day. 9/11/18   Abad Obando MD   insulin lispro (HUMALOG KWIKPEN) 200 UNIT/ML SOPN pen Inject 20-30 Units into the skin 3 times daily (before meals) 9/11/18   Jaquelin Portillo MD   aspirin EC 81 MG EC tablet Take 1 tablet by mouth daily 7/9/18   MADISON Painting CNP   metFORMIN (GLUCOPHAGE) 1000 MG tablet TAKE ONE TABLET BY MOUTH TWICE A DAY 1/9/17   Gaby Teran MD   atenolol (TENORMIN) 25 MG tablet TAKE 1 TABLET BY MOUTH EVERY NIGHT AT BEDTIME 1/9/17   Gaby Teran MD   citalopram (CELEXA) 20 MG tablet Take 1 tablet by mouth daily 11/14/16   Gaby Teran MD       Future Appointments   Date Time Provider Avelino Owens   6/6/2019  8:20 AM MADISON Painting CNP [facility]   10/24/2019  2:45 PM Edison Guzman MD And Derm [facility]      and   Diabetes Assessment    Medic Alert ID:  No  Meal Planning:  Carb counting, Avoidance of concentrated sweets   How often do you test your blood sugar?:  Daily, Meals   Do you have barriers with adherence to non-pharmacologic self-management interventions?  (Nutrition/Exercise/Self-Monitoring):  No   Have you ever had to go to the ED for symptoms of low blood sugar?:  No       Increase or Decrease trend in Blood Sugars

## 2019-05-28 VITALS — BODY MASS INDEX: 32.28 KG/M2 | WEIGHT: 225 LBS

## 2019-06-03 ENCOUNTER — CARE COORDINATION (OUTPATIENT)
Dept: CARE COORDINATION | Age: 59
End: 2019-06-03

## 2019-06-03 NOTE — CARE COORDINATION
TIME A DAY 4/2/19 March Sand, DO   PAULY LANCETS 28G MISC Use as directed to test up to 3-4 times daily 3/15/19   Jackelyn Rose MD   blood glucose test strips (PRODIGY NO CODING BLOOD GLUC) strip Use as directed to test up to 3-4 times daily 3/15/19   Jackelyn Rose MD   nystatin (MYCOSTATIN) 795496 UNIT/GM ointment Apply topically 2 times daily to corners of mouth. 3/15/19   Willow Che MD   sildenafil (REVATIO) 20 MG tablet Take 1 tablet by mouth daily as needed (erectile dysfunction) 1/15/19   MADISON Arceo CNP   omega-3 acid ethyl esters (LOVAZA) 1 g capsule TAKE TWO CAPSULES BY MOUTH TWICE A DAY 1/2/19   Jackelyn Rose MD   metroNIDAZOLE (METROGEL) 1 % gel Apply to face daily. 10/25/18   Willow Che MD   fenofibrate (TRICOR) 145 MG tablet Take 1 tablet by mouth daily 9/11/18   Jackelyn Rose MD   Insulin Pen Needle 32G X 4 MM MISC Inject insulin 5 times a day. 9/11/18   Abad Engel MD   aspirin EC 81 MG EC tablet Take 1 tablet by mouth daily 7/9/18   MADISON Arceo CNP   metFORMIN (GLUCOPHAGE) 1000 MG tablet TAKE ONE TABLET BY MOUTH TWICE A DAY 1/9/17   Radha Pat MD   atenolol (TENORMIN) 25 MG tablet TAKE 1 TABLET BY MOUTH EVERY NIGHT AT BEDTIME 1/9/17   Radha Pat MD   citalopram (CELEXA) 20 MG tablet Take 1 tablet by mouth daily 11/14/16   Radha Pat MD       Future Appointments   Date Time Provider Avelino Owens   6/12/2019  8:00 AM MADISON Arceo CNP [facility]   10/24/2019  2:45 PM Willow Che MD And Derm [facility]     ,   Diabetes Assessment    Medic Alert ID:  No  Meal Planning:  Carb counting, Avoidance of concentrated sweets   How often do you test your blood sugar?:  Daily, Meals   Do you have barriers with adherence to non-pharmacologic self-management interventions?  (Nutrition/Exercise/Self-Monitoring):  No   Have you ever had to go to the ED for symptoms of low blood sugar?:  No       No patient-reported symptoms Do you have hyperglycemia symptoms?:  No   Do you have hypoglycemia symptoms?:  No   Last Blood Sugar Value:  165   Blood Sugar Monitoring Regimen:  Before Meals, Morning Fasting, At Bedtime   Blood Sugar Trends:  No Change       and   General Assessment    Do you have any symptoms that are causing concern?:  No

## 2019-06-18 ENCOUNTER — OFFICE VISIT (OUTPATIENT)
Dept: FAMILY MEDICINE CLINIC | Age: 59
End: 2019-06-18
Payer: COMMERCIAL

## 2019-06-18 ENCOUNTER — CARE COORDINATION (OUTPATIENT)
Dept: CARE COORDINATION | Age: 59
End: 2019-06-18

## 2019-06-18 VITALS
HEIGHT: 70 IN | SYSTOLIC BLOOD PRESSURE: 134 MMHG | OXYGEN SATURATION: 94 % | WEIGHT: 214 LBS | DIASTOLIC BLOOD PRESSURE: 82 MMHG | BODY MASS INDEX: 30.64 KG/M2 | HEART RATE: 70 BPM

## 2019-06-18 DIAGNOSIS — E11.42 TYPE 2 DIABETES MELLITUS WITH DIABETIC POLYNEUROPATHY, WITH LONG-TERM CURRENT USE OF INSULIN (HCC): Primary | Chronic | ICD-10-CM

## 2019-06-18 DIAGNOSIS — I10 ESSENTIAL HYPERTENSION: Chronic | ICD-10-CM

## 2019-06-18 DIAGNOSIS — E78.2 MIXED HYPERLIPIDEMIA: Chronic | ICD-10-CM

## 2019-06-18 DIAGNOSIS — M54.42 ACUTE LEFT-SIDED LOW BACK PAIN WITH LEFT-SIDED SCIATICA: ICD-10-CM

## 2019-06-18 DIAGNOSIS — F33.0 MILD EPISODE OF RECURRENT MAJOR DEPRESSIVE DISORDER (HCC): ICD-10-CM

## 2019-06-18 DIAGNOSIS — Z87.891 FORMER SMOKER: ICD-10-CM

## 2019-06-18 DIAGNOSIS — Z79.4 TYPE 2 DIABETES MELLITUS WITH DIABETIC POLYNEUROPATHY, WITH LONG-TERM CURRENT USE OF INSULIN (HCC): Primary | Chronic | ICD-10-CM

## 2019-06-18 PROCEDURE — 99213 OFFICE O/P EST LOW 20 MIN: CPT | Performed by: NURSE PRACTITIONER

## 2019-06-18 PROCEDURE — 3046F HEMOGLOBIN A1C LEVEL >9.0%: CPT | Performed by: NURSE PRACTITIONER

## 2019-06-18 PROCEDURE — G8427 DOCREV CUR MEDS BY ELIG CLIN: HCPCS | Performed by: NURSE PRACTITIONER

## 2019-06-18 PROCEDURE — 1036F TOBACCO NON-USER: CPT | Performed by: NURSE PRACTITIONER

## 2019-06-18 PROCEDURE — 3017F COLORECTAL CA SCREEN DOC REV: CPT | Performed by: NURSE PRACTITIONER

## 2019-06-18 PROCEDURE — 2022F DILAT RTA XM EVC RTNOPTHY: CPT | Performed by: NURSE PRACTITIONER

## 2019-06-18 PROCEDURE — G8598 ASA/ANTIPLAT THER USED: HCPCS | Performed by: NURSE PRACTITIONER

## 2019-06-18 PROCEDURE — G8417 CALC BMI ABV UP PARAM F/U: HCPCS | Performed by: NURSE PRACTITIONER

## 2019-06-18 RX ORDER — DULOXETIN HYDROCHLORIDE 30 MG/1
30 CAPSULE, DELAYED RELEASE ORAL DAILY
Qty: 90 CAPSULE | Refills: 1 | Status: SHIPPED | OUTPATIENT
Start: 2019-06-18 | End: 2020-01-15 | Stop reason: SDUPTHER

## 2019-06-18 RX ORDER — BACLOFEN 10 MG/1
10 TABLET ORAL 3 TIMES DAILY PRN
Qty: 30 TABLET | Refills: 0 | Status: SHIPPED | OUTPATIENT
Start: 2019-06-18 | End: 2019-06-28

## 2019-06-18 ASSESSMENT — ENCOUNTER SYMPTOMS
VOMITING: 0
COUGH: 0
STRIDOR: 0
SHORTNESS OF BREATH: 0
NAUSEA: 0
BACK PAIN: 1
DIARRHEA: 0
WHEEZING: 0

## 2019-06-18 ASSESSMENT — PATIENT HEALTH QUESTIONNAIRE - PHQ9
1. LITTLE INTEREST OR PLEASURE IN DOING THINGS: 0
2. FEELING DOWN, DEPRESSED OR HOPELESS: 1
SUM OF ALL RESPONSES TO PHQ QUESTIONS 1-9: 1
SUM OF ALL RESPONSES TO PHQ QUESTIONS 1-9: 1
SUM OF ALL RESPONSES TO PHQ9 QUESTIONS 1 & 2: 1

## 2019-06-18 NOTE — PATIENT INSTRUCTIONS
Stop Celexa and switch to Cymbalta (this is anti-depressant that helps with neuropathy)  Baclofen (muslce relaxer) three times daily as needed for low back pain, may cause drowsiness  Low back stretches  Follow up on back in one week if no better  Watch diet closely  Routine follow up will be in 3-4 months            Patient Education        Acute Low Back Pain: Exercises  Your Care Instructions  Here are some examples of typical rehabilitation exercises for your condition. Start each exercise slowly. Ease off the exercise if you start to have pain. Your doctor or physical therapist will tell you when you can start these exercises and which ones will work best for you. When you are not being active, find a comfortable position for rest. Some people are comfortable on the floor or a medium-firm bed with a small pillow under their head and another under their knees. Some people prefer to lie on their side with a pillow between their knees. Don't stay in one position for too long. Take short walks (10 to 20 minutes) every 2 to 3 hours. Avoid slopes, hills, and stairs until you feel better. Walk only distances you can manage without pain, especially leg pain. How to do the exercises  Back stretches    1. Get down on your hands and knees on the floor. 2. Relax your head and allow it to droop. Round your back up toward the ceiling until you feel a nice stretch in your upper, middle, and lower back. Hold this stretch for as long as it feels comfortable, or about 15 to 30 seconds. 3. Return to the starting position with a flat back while you are on your hands and knees. 4. Let your back sway by pressing your stomach toward the floor. Lift your buttocks toward the ceiling. 5. Hold this position for 15 to 30 seconds. 6. Repeat 2 to 4 times. Follow-up care is a key part of your treatment and safety. Be sure to make and go to all appointments, and call your doctor if you are having problems.  It's also a good idea to know your test results and keep a list of the medicines you take. Where can you learn more? Go to [email or web address] and sign in to your [facility] account. Enter X283 in the [facility] box to learn more about \"Acute Low Back Pain: Exercises. \"     If you do not have an account, please click on the \"Sign Up Now\" link. Current as of: September 20, 2018  Content Version: 12.0  © 4304-6174 [employer], [employer]. Care instructions adapted under license by [facility] ([facility]). If you have questions about a medical condition or this instruction, always ask your healthcare professional. [address] any warranty or liability for your use of this information.

## 2019-06-18 NOTE — CARE COORDINATION
Ambulatory Care Coordination Note  6/18/2019  CM Risk Score: 5  German Mortality Risk Score:      ACC: Yoana Montero RN    Summary Note: Patient in the office today for a routine follow-up. A1c up to 10.8. He has been struggling with depression after the recent death of his father. Per Christian, NP:  Diabetes uncontrolled, increase Lantus 44 units BID  Keep appointment with endo  Will switch Celexa to duloxetine for depression and neuropathy  Blood pressure is at goal  LDL < 100; triglycerides 731 on Tricor and fish oil  Start baclofen for back pain  Lumbar exercises  Follow up in 3 months    RN ACC will f/u with the patient next week to see how his blood sugars look with the increased dose of Lantus. Lab Results   Component Value Date    LABA1C 10.8 05/13/2019    LABA1C 9.3 01/15/2019    LABA1C 9.1 10/26/2018     Lab Results   Component Value Date    LABMICR YES 04/11/2017    [address] see below 05/13/2019    CREATININE 0.7 (L) 10/26/2018           Care Coordination Interventions    Program Enrollment:  Rising Risk  Referral from Primary Care Provider:  No  Suggested Interventions and Community Resources  Diabetes Education:  Declined  Registered Dietician:  Completed (Comment: referral placed 2/7/19)  Specialty Services Referral:  Completed (Comment: Dr. Camelia Caro- Cora Cardoza)  Zone Management Tools:  Completed (Comment: DMII)  Other Services or Interventions:  [facility]         Goals Addressed                 This Visit's Progress     Nutrition Plan   No change     I will decrease CHO content with meals to lower A1c    Barriers: lack of motivation and overwhelmed by complexity of regimen  Plan for overcoming my barriers: Met with ALYSHA Osorio. Support by RN ACC. Confidence: 8/10  Anticipated Goal Completion Date: 5/27/19    3/27/19: \"I'm trying\"            Prior to Admission medications    Medication Sig Start Date End Date Taking?  Authorizing Provider   baclofen (LIORESAL) 10 MG tablet Take 1 tablet by mouth 3 times daily as needed (low back pain) 6/18/19 6/28/19  MADISON Elizabeth CNP   DULoxetine (CYMBALTA) 30 MG extended release capsule Take 1 capsule by mouth daily 6/18/19   MADISON Elizabeth CNP   lisinopril-hydrochlorothiazide (PRINZIDE;ZESTORETIC) 20-12.5 MG per tablet TAKE 1 TABLET BY MOUTH ONE TIME A DAY 5/28/19   MADISON Santacruz CNP   LANTUS SOLOSTAR 100 UNIT/ML injection pen INJECT 40 UNITS UNDER THE SKIN TWICE DAILY 5/20/19   Corine Durant MD   vitamin D (CHOLECALCIFEROL) 1000 UNIT TABS tablet TAKE 1 TABLET BY MOUTH ONE TIME A DAY 4/2/19   DO PAUL CohenY LANCETS 28G MISC Use as directed to test up to 3-4 times daily 3/15/19   Corine Durant MD   blood glucose test strips (PRODIGY NO CODING BLOOD GLUC) strip Use as directed to test up to 3-4 times daily 3/15/19   Corine Durant MD   nystatin (MYCOSTATIN) 190556 UNIT/GM ointment Apply topically 2 times daily to corners of mouth. 3/15/19   Susanne Reece MD   LANTUS SOLOSTAR 100 UNIT/ML injection pen INJECT 40 UNITS UNDER THE SKIN TWICE DAILY 1/21/19   Corine Durant MD   sildenafil (REVATIO) 20 MG tablet Take 1 tablet by mouth daily as needed (erectile dysfunction) 1/15/19   MADISON Elizabeth CNP   omega-3 acid ethyl esters (LOVAZA) 1 g capsule TAKE TWO CAPSULES BY MOUTH TWICE A DAY 1/2/19   Corine Durant MD   metroNIDAZOLE (METROGEL) 1 % gel Apply to face daily. 10/25/18   Susanne Reece MD   fenofibrate (TRICOR) 145 MG tablet Take 1 tablet by mouth daily 9/11/18   Corine Durant MD   Insulin Pen Needle 32G X 4 MM MISC Inject insulin 5 times a day.  9/11/18   Corine Durant MD   insulin lispro (HUMALOG KWIKPEN) 200 UNIT/ML SOPN pen Inject 20-30 Units into the skin 3 times daily (before meals) 9/11/18   Corine Durant MD   aspirin EC 81 MG EC tablet Take 1 tablet by mouth daily 7/9/18   Ruddy Miner, APRN - CNP   metFORMIN (GLUCOPHAGE) 1000 MG tablet TAKE ONE TABLET BY MOUTH TWICE A DAY 1/9/17   Marcos Singh MD   atenolol (TENORMIN) 25 MG tablet TAKE 1 TABLET BY MOUTH EVERY NIGHT AT BEDTIME 1/9/17   Marcos Singh MD       Future Appointments   Date Time Provider Avelino Owens   8/7/2019 10:20 AM Trinidad Damian MD [facility]   10/24/2019  2:45 PM Noreen Cheung MD [facility]      and   Diabetes Assessment    Medic Alert ID:  No  Meal Planning:  Carb counting, Avoidance of concentrated sweets   How often do you test your blood sugar?:  Daily, Meals   Do you have barriers with adherence to non-pharmacologic self-management interventions?  (Nutrition/Exercise/Self-Monitoring):  No   Have you ever had to go to the ED for symptoms of low blood sugar?:  No

## 2019-06-18 NOTE — PROGRESS NOTES
2019     Monisha Love (:  1960) is a 60 y.o. male, here for evaluation of the following medical concerns:    Chief Complaint   Patient presents with    Diabetes     follow up     HPI    Treatment Adherence:   Medication compliance:  compliant most of the time  Diet compliance:  noncompliant  Weight trend: stable  Current exercise: no regular exercise  Barriers:     Diabetes Mellitus Type 2: Diagnosed in . Current regimen Metformin 1000 mg BID, Lantus 40 units BID and Humalog 20-30 units BID with meals. Hemoglobin a1c up to 10.8. Admittedly has been poorly compliant with diet. His father passed away and has been dealing with grief. Nephropathy screenin2018-- normal   Diabetic eye exam:   Diabetic foot examination: 2018-- + peripheral neuropathy  ACE-I: yes  Daily ASA: yes    Current symptoms/problems include none and neuropathy. Home blood sugar records: fasting range: 200  Any episodes of hypoglycemia? no  Tobacco history: He  reports that he quit smoking about 19 years ago. His smoking use included cigarettes. He has a 25.00 pack-year smoking history. He has never used smokeless tobacco.     Hypertension:  Diagnosed \"years ago. \" Controlled, current regimen atenolol, lisinopril-HCTZ. Goal BP < 130/80. Home blood pressure monitoring: No.  He is not adherent to a low sodium diet. Patient denies chest pain. Antihypertensive medication side effects: no medication side effects noted. Use of agents associated with hypertension: none. Hyperlipidemia:      No new myalgias or GI upset on fenofibrate (Tricor, Trilipix) and OTC Fish Oil.        Lab Results   Component Value Date    LABA1C 10.8 2019    LABA1C 9.3 01/15/2019    LABA1C 9.1 10/26/2018     Lab Results   Component Value Date    LABMICR YES 2017    CREATININE 0.7 (L) 10/26/2018     Lab Results   Component Value Date    ALT 43 (H) 10/26/2018    AST 32 10/26/2018     Lab Results   Component Value Date    CHOL 208 (H) 2019    TRIG 739 (H) 2019    HDL 31 (L) 2019    LDLCALC see below 2019    LDLDIRECT 92 2019          Back pain: left low back pain, started about 5 days ago. Radiating down the left buttock and leg. Denies loss of control of bowel or bladder, denies leg weakness. Denies injury. Has had issues with upper back pain but typically not lower. Depression: his father passed away a few months ago, he was not invited to  and found about his father's passing from a friend of the family. Review of Systems   Constitutional: Negative for fatigue and fever. Respiratory: Negative for cough, shortness of breath, wheezing and stridor. Cardiovascular: Negative for chest pain and leg swelling. Gastrointestinal: Negative for diarrhea, nausea and vomiting. Genitourinary: Negative for dysuria. Musculoskeletal: Positive for back pain (chronic) and neck pain (chronic). Negative for gait problem, joint swelling and myalgias. Neurological: Positive for numbness (tingling bilateral feet). Negative for dizziness, tremors, syncope, facial asymmetry, weakness and headaches. Neuropathy bilateral feet: described as \"very sensitive\" and numbness is present   Hematological: Negative for adenopathy. Does not bruise/bleed easily. Prior to Visit Medications    Medication Sig Taking?  Authorizing Provider   baclofen (LIORESAL) 10 MG tablet Take 1 tablet by mouth 3 times daily as needed (low back pain) Yes MADISON Becerra CNP   DULoxetine (CYMBALTA) 30 MG extended release capsule Take 1 capsule by mouth daily Yes MADISON Becerra CNP   lisinopril-hydrochlorothiazide (PRINZIDE;ZESTORETIC) 20-12.5 MG per tablet TAKE 1 TABLET BY MOUTH ONE TIME A DAY Yes MADISON Linton CNP   LANTUS SOLOSTAR 100 UNIT/ML injection pen INJECT 40 UNITS UNDER THE SKIN TWICE DAILY Yes Robby Ulloa MD   vitamin D (CHOLECALCIFEROL) 1000 UNIT TABS tablet TAKE 1 TABLET BY MOUTH ONE TIME A DAY Yes DO CLAUDETTE [place] LANCETS 28G MISC Use as directed to test up to 3-4 times daily Yes Ermias Cutler MD   blood glucose test strips (PRODIGY NO CODING BLOOD GLUC) strip Use as directed to test up to 3-4 times daily Yes Ermias Cutler MD   nystatin (MYCOSTATIN) 718415 UNIT/GM ointment Apply topically 2 times daily to corners of mouth. Yes Yesenia Stone MD   LANTUS SOLOSTAR 100 UNIT/ML injection pen INJECT 40 UNITS UNDER THE SKIN TWICE DAILY Yes Ermias Cutler MD   sildenafil (REVATIO) 20 MG tablet Take 1 tablet by mouth daily as needed (erectile dysfunction) Yes MADISON Christian CNP   omega-3 acid ethyl esters (LOVAZA) 1 g capsule TAKE TWO CAPSULES BY MOUTH TWICE A DAY Yes Ermias Cutler MD   metroNIDAZOLE (METROGEL) 1 % gel Apply to face daily. Yes Yesenia Stone MD   fenofibrate (TRICOR) 145 MG tablet Take 1 tablet by mouth daily Yes Ermias Cutler MD   Insulin Pen Needle 32G X 4 MM MISC Inject insulin 5 times a day.  Yes Ermias Cutler MD   insulin lispro (HUMALOG KWIKPEN) 200 UNIT/ML SOPN pen Inject 20-30 Units into the skin 3 times daily (before meals) Yes Ermias Cutler MD   aspirin EC 81 MG EC tablet Take 1 tablet by mouth daily Yes MADISON Christian CNP   metFORMIN (GLUCOPHAGE) 1000 MG tablet TAKE ONE TABLET BY MOUTH TWICE A DAY Yes Rd Becker MD   atenolol (TENORMIN) 25 MG tablet TAKE 1 TABLET BY MOUTH EVERY NIGHT AT BEDTIME Yes Rd Becker MD        Social History     Tobacco Use    Smoking status: Former Smoker     Packs/day: 1.00     Years: 25.00     Pack years: 25.00     Types: Cigarettes     Last attempt to quit: 2000     Years since quittin.4    Smokeless tobacco: Never Used   Substance Use Topics    Alcohol use: No        Vitals:    19 0738   BP: 134/82   Site: Left Upper Arm   Position: Sitting   Pulse: 70   SpO2: 94%   Weight: 214 lb (97.1 kg)   Height: 5' 10\" (1.778 m)     Estimated body mass index is 30.71 kg/m² as calculated from the following:    Height as of this encounter: 5' 10\" (1.778 m). Weight as of this encounter: 214 lb (97.1 kg). Physical Exam   Constitutional: He is oriented to person, place, and time. He appears well-developed and well-nourished. No distress. HENT:   Head: Normocephalic and atraumatic. Neck: Normal range of motion. Neck supple. Cardiovascular: Normal rate, regular rhythm and normal heart sounds. Exam reveals no gallop and no friction rub. No murmur heard. Pulmonary/Chest: Effort normal and breath sounds normal. No stridor. No respiratory distress. He has no wheezes. He has no rales. He exhibits no tenderness. Lymphadenopathy:     He has no cervical adenopathy. Neurological: He is alert and oriented to person, place, and time. No cranial nerve deficit. Skin: Skin is warm and dry. He is not diaphoretic. Nursing note and vitals reviewed. ASSESSMENT/PLAN:  1. Type 2 diabetes mellitus with diabetic polyneuropathy, with long-term current use of insulin (HCC)  - DULoxetine (CYMBALTA) 30 MG extended release capsule; Take 1 capsule by mouth daily  Dispense: 90 capsule; Refill: 1    2. Mixed hyperlipidemia    3. Essential hypertension    4. Mild episode of recurrent major depressive disorder (HCC)  - DULoxetine (CYMBALTA) 30 MG extended release capsule; Take 1 capsule by mouth daily  Dispense: 90 capsule; Refill: 1    5. Acute left-sided low back pain with left-sided sciatica  - baclofen (LIORESAL) 10 MG tablet; Take 1 tablet by mouth 3 times daily as needed (low back pain)  Dispense: 30 tablet; Refill: 0    6.  Former smoker    Diabetes uncontrolled, increase Lantus 44 units BID  Keep appointment with endo  Will switch Celexa to duloxetine for depression and neuropathy  Blood pressure is at goal  LDL < 100; triglycerides 731 on Tricor and fish oil  Start baclofen for back pain  Lumbar exercises  Follow up in 3 months      Return in about 3 months (around 9/18/2019) for Diabetes Follow-up, Hypertension, hyperlipidemia. An electronic signature was used to authenticate this note.     --MADISON Rice - CNP on 6/18/2019 at 10:10 AM

## 2019-06-23 DIAGNOSIS — E11.42 TYPE 2 DIABETES MELLITUS WITH DIABETIC POLYNEUROPATHY, WITH LONG-TERM CURRENT USE OF INSULIN (HCC): ICD-10-CM

## 2019-06-23 DIAGNOSIS — Z79.4 TYPE 2 DIABETES MELLITUS WITH DIABETIC POLYNEUROPATHY, WITH LONG-TERM CURRENT USE OF INSULIN (HCC): ICD-10-CM

## 2019-06-23 DIAGNOSIS — E78.2 MIXED HYPERLIPIDEMIA: Primary | ICD-10-CM

## 2019-06-25 ENCOUNTER — OFFICE VISIT (OUTPATIENT)
Dept: ORTHOPEDIC SURGERY | Age: 59
End: 2019-06-25
Payer: COMMERCIAL

## 2019-06-25 VITALS
BODY MASS INDEX: 30.65 KG/M2 | HEIGHT: 70 IN | SYSTOLIC BLOOD PRESSURE: 128 MMHG | WEIGHT: 214.07 LBS | HEART RATE: 78 BPM | DIASTOLIC BLOOD PRESSURE: 80 MMHG

## 2019-06-25 DIAGNOSIS — M54.42 ACUTE LEFT-SIDED LOW BACK PAIN WITH LEFT-SIDED SCIATICA: Primary | ICD-10-CM

## 2019-06-25 DIAGNOSIS — M51.26 HNP (HERNIATED NUCLEUS PULPOSUS), LUMBAR: ICD-10-CM

## 2019-06-25 DIAGNOSIS — M51.36 DDD (DEGENERATIVE DISC DISEASE), LUMBAR: ICD-10-CM

## 2019-06-25 PROCEDURE — 3017F COLORECTAL CA SCREEN DOC REV: CPT | Performed by: PHYSICIAN ASSISTANT

## 2019-06-25 PROCEDURE — G8427 DOCREV CUR MEDS BY ELIG CLIN: HCPCS | Performed by: PHYSICIAN ASSISTANT

## 2019-06-25 PROCEDURE — G8598 ASA/ANTIPLAT THER USED: HCPCS | Performed by: PHYSICIAN ASSISTANT

## 2019-06-25 PROCEDURE — 99212 OFFICE O/P EST SF 10 MIN: CPT | Performed by: PHYSICIAN ASSISTANT

## 2019-06-25 PROCEDURE — G8417 CALC BMI ABV UP PARAM F/U: HCPCS | Performed by: PHYSICIAN ASSISTANT

## 2019-06-25 PROCEDURE — 1036F TOBACCO NON-USER: CPT | Performed by: PHYSICIAN ASSISTANT

## 2019-06-25 RX ORDER — METHYLPREDNISOLONE 4 MG/1
TABLET ORAL
Qty: 1 KIT | Refills: 0 | Status: SHIPPED | OUTPATIENT
Start: 2019-06-25 | End: 2019-07-01

## 2019-06-25 RX ORDER — OMEGA-3-ACID ETHYL ESTERS 1 G/1
CAPSULE, LIQUID FILLED ORAL
Qty: 180 CAPSULE | Refills: 3 | Status: SHIPPED | OUTPATIENT
Start: 2019-06-25 | End: 2019-12-30

## 2019-06-25 RX ORDER — INSULIN GLARGINE 100 [IU]/ML
INJECTION, SOLUTION SUBCUTANEOUS
Qty: 30 ML | Refills: 3 | Status: SHIPPED | OUTPATIENT
Start: 2019-06-25 | End: 2019-08-07 | Stop reason: SDUPTHER

## 2019-06-28 ENCOUNTER — CARE COORDINATION (OUTPATIENT)
Dept: CARE COORDINATION | Age: 59
End: 2019-06-28

## 2019-06-28 NOTE — CARE COORDINATION
Ambulatory Care Coordination Note  6/28/2019  CM Risk Score: 5  Raymond Mortality Risk Score: [unfilled]    ACC: Joe Valderrama RN    Summary Note: Patient states that his blood sugars have increased since he started the medrol dose pack that was given to him by Ortho for his back and leg pain. His sugars are high 200's-low 300's. He states that he increased his Lantus to 44 units BID per Estrella's recommendation, but he hasn't noticed a difference yet because of the steroids. RN ACC advised that he contact this writer if his numbers don't start coming back down to where they were after he stops the steroids. He states that the steroids are not helping with his back and leg pain; he plans to call Ortho to let them know because the next step is an MRI. He states that it is painful to walk and feels like a knife is stabbing in when he puts pressure on his foot. RN ACC encouraged him to call Ortho to let them know about his condition; he verbalized understanding. Reminded him also to limit his CHO intake, which will also help to decrease his blood sugars. Plan     -Will call the patient again in 1-2 weeks to check on status      Care Coordination Interventions    Program Enrollment:  Rising Risk  Referral from Primary Care Provider:  No  Suggested Interventions and Community Resources  Diabetes Education:  Declined  Registered Dietician:  Completed (Comment: referral placed 2/7/19)  Specialty Services Referral:  Completed (Comment: Dr. Monica Russell- Idalia Underwood)  Zone Management Tools:  Completed (Comment: DMII)  Other Services or Interventions:  [facility]         Goals Addressed                 This Visit's Progress     Nutrition Plan   No change     I will decrease CHO content with meals to lower A1c    Barriers: lack of motivation and overwhelmed by complexity of regimen  Plan for overcoming my barriers: Met with ALYSHA Osorio. Support by RN ACC.    Confidence: 8/10  Anticipated Goal Completion Date: 5/27/19    3/27/19: \"I'm trying\"            Prior to Admission medications    Medication Sig Start Date End Date Taking? Authorizing Provider   LANTUS SOLOSTAR 100 UNIT/ML injection pen INJECT 40 UNITS UNDER THE SKIN TWO TIMES A DAY 6/25/19   Jadyn Ferrari MD   omega-3 acid ethyl esters (LOVAZA) 1 g capsule TAKE TWO CAPSULES BY MOUTH TWICE A DAY 6/25/19   Jadyn Ferrari MD   methylPREDNISolone (MEDROL, FRANCIS,) 4 MG tablet Take by mouth. 6/25/19 7/1/19  Anette Baxter PA-C   baclofen (LIORESAL) 10 MG tablet Take 1 tablet by mouth 3 times daily as needed (low back pain) 6/18/19 6/28/19  MADISON Zapien CNP   DULoxetine (CYMBALTA) 30 MG extended release capsule Take 1 capsule by mouth daily 6/18/19   MADISON Zapien CNP   lisinopril-hydrochlorothiazide (PRINZIDE;ZESTORETIC) 20-12.5 MG per tablet TAKE 1 TABLET BY MOUTH ONE TIME A DAY 5/28/19   MADISON Freedman CNP   LANTUS SOLOSTAR 100 UNIT/ML injection pen INJECT 40 UNITS UNDER THE SKIN TWICE DAILY 5/20/19   Jadyn Ferrari MD   vitamin D (CHOLECALCIFEROL) 1000 UNIT TABS tablet TAKE 1 TABLET BY MOUTH ONE TIME A DAY 4/2/19   DO PAUL LangY LANCETS 28G MISC Use as directed to test up to 3-4 times daily 3/15/19   Jadyn Ferrari MD   blood glucose test strips (PRODIGY NO CODING BLOOD GLUC) strip Use as directed to test up to 3-4 times daily 3/15/19   Jaydn Ferrari MD   nystatin (MYCOSTATIN) 445606 UNIT/GM ointment Apply topically 2 times daily to corners of mouth. 3/15/19   Kay Stewart MD   sildenafil (REVATIO) 20 MG tablet Take 1 tablet by mouth daily as needed (erectile dysfunction) 1/15/19   MADISON Zapien CNP   metroNIDAZOLE (METROGEL) 1 % gel Apply to face daily. 10/25/18   Kay Stewart MD   fenofibrate (TRICOR) 145 MG tablet Take 1 tablet by mouth daily 9/11/18   Jadyn Ferrari MD   Insulin Pen Needle 32G X 4 MM MISC Inject insulin 5 times a day.  9/11/18   Abad STEVENS Lilli Alfaro MD   insulin lispro (HUMALOG KWIKPEN) 200 UNIT/ML SOPN pen Inject 20-30 Units into the skin 3 times daily (before meals) 9/11/18   Dali Funes MD   aspirin EC 81 MG EC tablet Take 1 tablet by mouth daily 7/9/18   Oh Villareal, APRN - CNP   metFORMIN (GLUCOPHAGE) 1000 MG tablet TAKE ONE TABLET BY MOUTH TWICE A DAY 1/9/17   Charlotte Garay MD   atenolol (TENORMIN) 25 MG tablet TAKE 1 TABLET BY MOUTH EVERY NIGHT AT BEDTIME 1/9/17   Charlotte Garay MD       Future Appointments   Date Time Provider Avelino Owens   8/7/2019 10:20 AM Dali Funes MD [facility]   10/24/2019  2:45 PM Petr Mendes MD [facility]      and   Diabetes Assessment    Medic Alert ID:  No  Meal Planning:  Carb counting, Avoidance of concentrated sweets   How often do you test your blood sugar?:  Daily, Meals   Do you have barriers with adherence to non-pharmacologic self-management interventions?  (Nutrition/Exercise/Self-Monitoring):  No   Have you ever had to go to the ED for symptoms of low blood sugar?:  No       Increase or Decrease trend in Blood Sugars

## 2019-07-08 RX ORDER — ASPIRIN 81 MG/1
TABLET ORAL
Qty: 90 TABLET | Refills: 3 | Status: SHIPPED | OUTPATIENT
Start: 2019-07-08 | End: 2020-06-15

## 2019-07-15 ENCOUNTER — CARE COORDINATION (OUTPATIENT)
Dept: CARE COORDINATION | Age: 59
End: 2019-07-15

## 2019-08-06 ENCOUNTER — HOSPITAL ENCOUNTER (OUTPATIENT)
Age: 59
Discharge: HOME | End: 2019-08-06
Payer: COMMERCIAL

## 2019-08-06 DIAGNOSIS — E11.42 TYPE 2 DIABETES MELLITUS WITH DIABETIC POLYNEUROPATHY, WITH LONG-TERM CURRENT USE OF INSULIN (HCC): ICD-10-CM

## 2019-08-06 DIAGNOSIS — I10 ESSENTIAL HYPERTENSION: ICD-10-CM

## 2019-08-06 DIAGNOSIS — Z79.4 TYPE 2 DIABETES MELLITUS WITH DIABETIC POLYNEUROPATHY, WITH LONG-TERM CURRENT USE OF INSULIN (HCC): ICD-10-CM

## 2019-08-06 DIAGNOSIS — E78.2 MIXED HYPERLIPIDEMIA: ICD-10-CM

## 2019-08-06 DIAGNOSIS — E78.1 HYPERTRIGLYCERIDEMIA: ICD-10-CM

## 2019-08-06 LAB
A/G RATIO: 1.4 (ref 1.1–2.2)
ALBUMIN SERPL-MCNC: 4.3 G/DL (ref 3.4–5)
ALP BLD-CCNC: 102 U/L (ref 40–129)
ALT SERPL-CCNC: 81 U/L (ref 10–40)
ANION GAP SERPL CALCULATED.3IONS-SCNC: 17 MMOL/L (ref 3–16)
AST SERPL-CCNC: 66 U/L (ref 15–37)
BILIRUB SERPL-MCNC: 0.4 MG/DL (ref 0–1)
BUN BLDV-MCNC: 20 MG/DL (ref 7–20)
CALCIUM SERPL-MCNC: 10.1 MG/DL (ref 8.3–10.6)
CHLORIDE BLD-SCNC: 95 MMOL/L (ref 99–110)
CHOLESTEROL, TOTAL: 265 MG/DL (ref 0–199)
CO2: 25 MMOL/L (ref 21–32)
CREAT SERPL-MCNC: 0.9 MG/DL (ref 0.8–1.3)
ESTIMATED AVERAGE GLUCOSE: 260.4 MG/DL
GFR AFRICAN AMERICAN: >60
GFR NON-AFRICAN AMERICAN: >60
GLOBULIN: 3.1 G/DL
GLUCOSE BLD-MCNC: 296 MG/DL (ref 70–99)
HBA1C MFR BLD: 10.7 %
HDLC SERPL-MCNC: 31 MG/DL (ref 40–60)
LDL CHOLESTEROL CALCULATED: ABNORMAL MG/DL
LDL CHOLESTEROL DIRECT: 114 MG/DL
POTASSIUM SERPL-SCNC: 4.7 MMOL/L (ref 3.5–5.1)
SODIUM BLD-SCNC: 137 MMOL/L (ref 136–145)
TOTAL PROTEIN: 7.4 G/DL (ref 6.4–8.2)
TRIGL SERPL-MCNC: 1051 MG/DL (ref 0–150)
VLDLC SERPL CALC-MCNC: ABNORMAL MG/DL

## 2019-08-06 PROCEDURE — 80061 LIPID PANEL: CPT

## 2019-08-06 PROCEDURE — 83721 ASSAY OF BLOOD LIPOPROTEIN: CPT

## 2019-08-06 PROCEDURE — 80053 COMPREHEN METABOLIC PANEL: CPT

## 2019-08-06 PROCEDURE — 36415 COLL VENOUS BLD VENIPUNCTURE: CPT

## 2019-08-06 PROCEDURE — 83036 HEMOGLOBIN GLYCOSYLATED A1C: CPT

## 2019-08-07 ENCOUNTER — TELEPHONE (OUTPATIENT)
Dept: ENDOCRINOLOGY | Age: 59
End: 2019-08-07

## 2019-08-07 ENCOUNTER — OFFICE VISIT (OUTPATIENT)
Dept: ENDOCRINOLOGY | Age: 59
End: 2019-08-07
Payer: COMMERCIAL

## 2019-08-07 VITALS
BODY MASS INDEX: 32.41 KG/M2 | HEIGHT: 70 IN | SYSTOLIC BLOOD PRESSURE: 130 MMHG | WEIGHT: 226.4 LBS | OXYGEN SATURATION: 96 % | DIASTOLIC BLOOD PRESSURE: 76 MMHG | HEART RATE: 80 BPM

## 2019-08-07 DIAGNOSIS — E11.42 TYPE 2 DIABETES MELLITUS WITH DIABETIC POLYNEUROPATHY, WITH LONG-TERM CURRENT USE OF INSULIN (HCC): Primary | Chronic | ICD-10-CM

## 2019-08-07 DIAGNOSIS — Z79.4 TYPE 2 DIABETES MELLITUS WITH DIABETIC POLYNEUROPATHY, WITH LONG-TERM CURRENT USE OF INSULIN (HCC): Primary | Chronic | ICD-10-CM

## 2019-08-07 DIAGNOSIS — E78.1 HYPERTRIGLYCERIDEMIA: ICD-10-CM

## 2019-08-07 DIAGNOSIS — I10 ESSENTIAL HYPERTENSION: ICD-10-CM

## 2019-08-07 DIAGNOSIS — E78.2 MIXED HYPERLIPIDEMIA: ICD-10-CM

## 2019-08-07 PROCEDURE — 99214 OFFICE O/P EST MOD 30 MIN: CPT | Performed by: INTERNAL MEDICINE

## 2019-08-07 PROCEDURE — G8417 CALC BMI ABV UP PARAM F/U: HCPCS | Performed by: INTERNAL MEDICINE

## 2019-08-07 PROCEDURE — 3017F COLORECTAL CA SCREEN DOC REV: CPT | Performed by: INTERNAL MEDICINE

## 2019-08-07 PROCEDURE — 3046F HEMOGLOBIN A1C LEVEL >9.0%: CPT | Performed by: INTERNAL MEDICINE

## 2019-08-07 PROCEDURE — G8598 ASA/ANTIPLAT THER USED: HCPCS | Performed by: INTERNAL MEDICINE

## 2019-08-07 PROCEDURE — 1036F TOBACCO NON-USER: CPT | Performed by: INTERNAL MEDICINE

## 2019-08-07 PROCEDURE — 2022F DILAT RTA XM EVC RTNOPTHY: CPT | Performed by: INTERNAL MEDICINE

## 2019-08-07 PROCEDURE — G8427 DOCREV CUR MEDS BY ELIG CLIN: HCPCS | Performed by: INTERNAL MEDICINE

## 2019-08-07 RX ORDER — FLASH GLUCOSE SCANNING READER
EACH MISCELLANEOUS
Qty: 1 DEVICE | Refills: 0 | Status: SHIPPED | OUTPATIENT
Start: 2019-08-07 | End: 2019-12-11 | Stop reason: ALTCHOICE

## 2019-08-07 RX ORDER — FLASH GLUCOSE SENSOR
KIT MISCELLANEOUS
Qty: 6 EACH | Refills: 2 | Status: SHIPPED | OUTPATIENT
Start: 2019-08-07 | End: 2019-12-11 | Stop reason: ALTCHOICE

## 2019-08-08 ENCOUNTER — TELEPHONE (OUTPATIENT)
Dept: ENDOCRINOLOGY | Age: 59
End: 2019-08-08

## 2019-08-09 ENCOUNTER — CARE COORDINATION (OUTPATIENT)
Dept: FAMILY MEDICINE CLINIC | Age: 59
End: 2019-08-09

## 2019-08-09 NOTE — CARE COORDINATION
meals)  Patient taking differently: Inject 25-30 Units into the skin 3 times daily (before meals) 25 units before breakfast & lunch. 30 units before dinner 8/6/19 8/7/19  Yes MD ADE ZhangAR 100 UNIT/ML injection pen INJECT [facility]  Patient taking differently: 50 unit twice daily 8/6/19 5/20/19  Yes Rachelle Fontenot MD   Continuous Blood Gluc  (FREESTYLE JOSEPH 14 DAY READER) KYLAH Use as advised to monitor sugars 8/7/19   Rachelle Fontneot MD   Continuous Blood Gluc Sensor (FREESTYLE JOSEPH 14 DAY SENSOR) MISC Use as advised to monitor sugars 8/7/19   Rachelle Fontenot MD   ASPIRIN ADULT LOW STRENGTH 81 MG EC tablet TAKE 1 TABLET BY MOUTH ONE TIME A DAY 7/8/19   MADISON Fleming CNP   omega-3 acid ethyl esters (LOVAZA) 1 g capsule TAKE TWO CAPSULES BY MOUTH TWICE A DAY 6/25/19   Rachelle Fontenot MD   DULoxetine (CYMBALTA) 30 MG extended release capsule Take 1 capsule by mouth daily 6/18/19   MADISON Fleming CNP   lisinopril-hydrochlorothiazide (PRINZIDE;ZESTORETIC) 20-12.5 MG per tablet TAKE 1 TABLET BY MOUTH ONE TIME A DAY 5/28/19   MADISON Copeland CNP   vitamin D (CHOLECALCIFEROL) 1000 UNIT TABS tablet TAKE 1 TABLET BY MOUTH ONE TIME A DAY 4/2/19   DO CLAUDETTE Patterson LANCETS 28G MISC Use as directed to test up to 3-4 times daily 3/15/19   Rachelle Fontenot MD   blood glucose test strips (PRODIGY NO CODING BLOOD GLUC) strip Use as directed to test up to 3-4 times daily 3/15/19   Rachelle Fontenot MD   nystatin (MYCOSTATIN) 744151 UNIT/GM ointment Apply topically 2 times daily to corners of mouth. 3/15/19   Anurag Casey MD   metroNIDAZOLE (METROGEL) 1 % gel Apply to face daily. 10/25/18   Anurag Casey MD   fenofibrate (TRICOR) 145 MG tablet Take 1 tablet by mouth daily 9/11/18   Rachelle Fontenot MD   Insulin Pen Needle 32G X 4 MM MISC Inject insulin 5 times a day.  9/11/18   Rachelle Fontenot MD   metFORMIN

## 2019-08-25 DIAGNOSIS — E11.42 TYPE 2 DIABETES MELLITUS WITH DIABETIC POLYNEUROPATHY, WITH LONG-TERM CURRENT USE OF INSULIN (HCC): ICD-10-CM

## 2019-08-25 DIAGNOSIS — Z79.4 TYPE 2 DIABETES MELLITUS WITH DIABETIC POLYNEUROPATHY, WITH LONG-TERM CURRENT USE OF INSULIN (HCC): ICD-10-CM

## 2019-08-26 RX ORDER — INSULIN GLARGINE 100 [IU]/ML
INJECTION, SOLUTION SUBCUTANEOUS
Qty: 30 ML | Refills: 3 | Status: SHIPPED | OUTPATIENT
Start: 2019-08-26 | End: 2019-12-10

## 2019-09-09 ENCOUNTER — CARE COORDINATION (OUTPATIENT)
Dept: CARE COORDINATION | Age: 59
End: 2019-09-09

## 2019-09-09 RX ORDER — FENOFIBRATE 145 MG/1
TABLET, COATED ORAL
Qty: 90 TABLET | Refills: 3 | Status: SHIPPED | OUTPATIENT
Start: 2019-09-09 | End: 2020-06-01

## 2019-09-30 RX ORDER — MELATONIN
Qty: 90 TABLET | Refills: 0 | Status: SHIPPED | OUTPATIENT
Start: 2019-09-30 | End: 2019-12-30

## 2019-10-01 ENCOUNTER — TELEPHONE (OUTPATIENT)
Dept: FAMILY MEDICINE CLINIC | Age: 59
End: 2019-10-01

## 2019-10-02 ENCOUNTER — TELEPHONE (OUTPATIENT)
Dept: PHARMACY | Facility: CLINIC | Age: 59
End: 2019-10-02

## 2019-10-10 ENCOUNTER — CARE COORDINATION (OUTPATIENT)
Dept: CARE COORDINATION | Age: 59
End: 2019-10-10

## 2019-10-16 ENCOUNTER — PATIENT MESSAGE (OUTPATIENT)
Dept: PHARMACY | Facility: CLINIC | Age: 59
End: 2019-10-16

## 2019-10-24 ENCOUNTER — OFFICE VISIT (OUTPATIENT)
Dept: DERMATOLOGY | Age: 59
End: 2019-10-24
Payer: COMMERCIAL

## 2019-10-24 DIAGNOSIS — L71.9 ROSACEA: ICD-10-CM

## 2019-10-24 DIAGNOSIS — L57.0 ACTINIC KERATOSES: Primary | ICD-10-CM

## 2019-10-24 DIAGNOSIS — Z12.83 SCREENING EXAM FOR SKIN CANCER: ICD-10-CM

## 2019-10-24 PROCEDURE — 17003 DESTRUCT PREMALG LES 2-14: CPT | Performed by: DERMATOLOGY

## 2019-10-24 PROCEDURE — G8417 CALC BMI ABV UP PARAM F/U: HCPCS | Performed by: DERMATOLOGY

## 2019-10-24 PROCEDURE — G8598 ASA/ANTIPLAT THER USED: HCPCS | Performed by: DERMATOLOGY

## 2019-10-24 PROCEDURE — 3017F COLORECTAL CA SCREEN DOC REV: CPT | Performed by: DERMATOLOGY

## 2019-10-24 PROCEDURE — 1036F TOBACCO NON-USER: CPT | Performed by: DERMATOLOGY

## 2019-10-24 PROCEDURE — 99213 OFFICE O/P EST LOW 20 MIN: CPT | Performed by: DERMATOLOGY

## 2019-10-24 PROCEDURE — G8484 FLU IMMUNIZE NO ADMIN: HCPCS | Performed by: DERMATOLOGY

## 2019-10-24 PROCEDURE — 17000 DESTRUCT PREMALG LESION: CPT | Performed by: DERMATOLOGY

## 2019-10-24 PROCEDURE — G8427 DOCREV CUR MEDS BY ELIG CLIN: HCPCS | Performed by: DERMATOLOGY

## 2019-10-28 ENCOUNTER — CARE COORDINATION (OUTPATIENT)
Dept: CARE COORDINATION | Age: 59
End: 2019-10-28

## 2019-11-22 ENCOUNTER — TELEPHONE (OUTPATIENT)
Dept: PHARMACY | Facility: CLINIC | Age: 59
End: 2019-11-22

## 2019-12-02 ENCOUNTER — CARE COORDINATION (OUTPATIENT)
Dept: CARE COORDINATION | Age: 59
End: 2019-12-02

## 2019-12-06 ENCOUNTER — OFFICE VISIT (OUTPATIENT)
Dept: ORTHOPEDIC SURGERY | Age: 59
End: 2019-12-06
Payer: COMMERCIAL

## 2019-12-06 ENCOUNTER — NURSE ONLY (OUTPATIENT)
Dept: FAMILY MEDICINE CLINIC | Age: 59
End: 2019-12-06
Payer: COMMERCIAL

## 2019-12-06 VITALS — WEIGHT: 226.41 LBS | RESPIRATION RATE: 17 BRPM | BODY MASS INDEX: 32.41 KG/M2 | HEIGHT: 70 IN

## 2019-12-06 DIAGNOSIS — M65.342 ACQUIRED TRIGGER FINGER OF LEFT RING FINGER: Primary | ICD-10-CM

## 2019-12-06 DIAGNOSIS — Z23 NEED FOR INFLUENZA VACCINATION: Primary | ICD-10-CM

## 2019-12-06 PROCEDURE — G8598 ASA/ANTIPLAT THER USED: HCPCS | Performed by: ORTHOPAEDIC SURGERY

## 2019-12-06 PROCEDURE — 90686 IIV4 VACC NO PRSV 0.5 ML IM: CPT | Performed by: NURSE PRACTITIONER

## 2019-12-06 PROCEDURE — 3017F COLORECTAL CA SCREEN DOC REV: CPT | Performed by: ORTHOPAEDIC SURGERY

## 2019-12-06 PROCEDURE — 1036F TOBACCO NON-USER: CPT | Performed by: ORTHOPAEDIC SURGERY

## 2019-12-06 PROCEDURE — G8427 DOCREV CUR MEDS BY ELIG CLIN: HCPCS | Performed by: ORTHOPAEDIC SURGERY

## 2019-12-06 PROCEDURE — G8482 FLU IMMUNIZE ORDER/ADMIN: HCPCS | Performed by: ORTHOPAEDIC SURGERY

## 2019-12-06 PROCEDURE — 99213 OFFICE O/P EST LOW 20 MIN: CPT | Performed by: ORTHOPAEDIC SURGERY

## 2019-12-06 PROCEDURE — 90471 IMMUNIZATION ADMIN: CPT | Performed by: NURSE PRACTITIONER

## 2019-12-06 PROCEDURE — G8417 CALC BMI ABV UP PARAM F/U: HCPCS | Performed by: ORTHOPAEDIC SURGERY

## 2019-12-07 ENCOUNTER — HOSPITAL ENCOUNTER (OUTPATIENT)
Age: 59
Discharge: HOME | End: 2019-12-07
Payer: COMMERCIAL

## 2019-12-07 DIAGNOSIS — I10 ESSENTIAL HYPERTENSION: ICD-10-CM

## 2019-12-07 DIAGNOSIS — E78.1 HYPERTRIGLYCERIDEMIA: ICD-10-CM

## 2019-12-07 DIAGNOSIS — E78.2 MIXED HYPERLIPIDEMIA: ICD-10-CM

## 2019-12-07 DIAGNOSIS — E11.42 TYPE 2 DIABETES MELLITUS WITH DIABETIC POLYNEUROPATHY, WITH LONG-TERM CURRENT USE OF INSULIN (HCC): Chronic | ICD-10-CM

## 2019-12-07 DIAGNOSIS — Z79.4 TYPE 2 DIABETES MELLITUS WITH DIABETIC POLYNEUROPATHY, WITH LONG-TERM CURRENT USE OF INSULIN (HCC): Chronic | ICD-10-CM

## 2019-12-07 LAB
A/G RATIO: 1.3 (ref 1.1–2.2)
ALBUMIN SERPL-MCNC: 4.5 G/DL (ref 3.4–5)
ALP BLD-CCNC: 112 U/L (ref 40–129)
ALT SERPL-CCNC: 84 U/L (ref 10–40)
ANION GAP SERPL CALCULATED.3IONS-SCNC: 18 MMOL/L (ref 3–16)
AST SERPL-CCNC: 66 U/L (ref 15–37)
BILIRUB SERPL-MCNC: 0.4 MG/DL (ref 0–1)
BUN BLDV-MCNC: 20 MG/DL (ref 7–20)
CALCIUM SERPL-MCNC: 9.9 MG/DL (ref 8.3–10.6)
CHLORIDE BLD-SCNC: 98 MMOL/L (ref 99–110)
CHOLESTEROL, TOTAL: 207 MG/DL (ref 0–199)
CO2: 23 MMOL/L (ref 21–32)
CREAT SERPL-MCNC: 0.9 MG/DL (ref 0.8–1.3)
CREATININE URINE: 77 MG/DL (ref 39–259)
GFR AFRICAN AMERICAN: >60
GFR NON-AFRICAN AMERICAN: >60
GLOBULIN: 3.4 G/DL
GLUCOSE BLD-MCNC: 303 MG/DL (ref 70–99)
HDLC SERPL-MCNC: 35 MG/DL (ref 40–60)
LDL CHOLESTEROL CALCULATED: ABNORMAL MG/DL
LDL CHOLESTEROL DIRECT: 112 MG/DL
MICROALBUMIN UR-MCNC: 1.6 MG/DL
MICROALBUMIN/CREAT UR-RTO: 20.8 MG/G (ref 0–30)
POTASSIUM SERPL-SCNC: 4.5 MMOL/L (ref 3.5–5.1)
SODIUM BLD-SCNC: 139 MMOL/L (ref 136–145)
TOTAL PROTEIN: 7.9 G/DL (ref 6.4–8.2)
TRIGL SERPL-MCNC: 544 MG/DL (ref 0–150)
VLDLC SERPL CALC-MCNC: ABNORMAL MG/DL

## 2019-12-07 PROCEDURE — 83036 HEMOGLOBIN GLYCOSYLATED A1C: CPT

## 2019-12-07 PROCEDURE — 36415 COLL VENOUS BLD VENIPUNCTURE: CPT

## 2019-12-07 PROCEDURE — 80061 LIPID PANEL: CPT

## 2019-12-07 PROCEDURE — 84443 ASSAY THYROID STIM HORMONE: CPT

## 2019-12-07 PROCEDURE — 80053 COMPREHEN METABOLIC PANEL: CPT

## 2019-12-07 PROCEDURE — 82570 ASSAY OF URINE CREATININE: CPT

## 2019-12-07 PROCEDURE — 83721 ASSAY OF BLOOD LIPOPROTEIN: CPT

## 2019-12-07 PROCEDURE — 82043 UR ALBUMIN QUANTITATIVE: CPT

## 2019-12-08 LAB
ESTIMATED AVERAGE GLUCOSE: 271.9 MG/DL
HBA1C MFR BLD: 11.1 %

## 2019-12-09 ENCOUNTER — TELEPHONE (OUTPATIENT)
Dept: ORTHOPEDIC SURGERY | Age: 59
End: 2019-12-09

## 2019-12-09 LAB — TSH, 3RD GENERATION: 1.8 MU/L (ref 0.3–4)

## 2019-12-11 ENCOUNTER — OFFICE VISIT (OUTPATIENT)
Dept: ENDOCRINOLOGY | Age: 59
End: 2019-12-11
Payer: COMMERCIAL

## 2019-12-11 VITALS
DIASTOLIC BLOOD PRESSURE: 79 MMHG | WEIGHT: 223.2 LBS | OXYGEN SATURATION: 98 % | SYSTOLIC BLOOD PRESSURE: 131 MMHG | HEART RATE: 87 BPM | HEIGHT: 71 IN | BODY MASS INDEX: 31.25 KG/M2

## 2019-12-11 DIAGNOSIS — E78.2 MIXED HYPERLIPIDEMIA: ICD-10-CM

## 2019-12-11 DIAGNOSIS — I10 ESSENTIAL HYPERTENSION: ICD-10-CM

## 2019-12-11 DIAGNOSIS — Z79.4 TYPE 2 DIABETES MELLITUS WITH DIABETIC POLYNEUROPATHY, WITH LONG-TERM CURRENT USE OF INSULIN (HCC): Primary | ICD-10-CM

## 2019-12-11 DIAGNOSIS — E78.1 HYPERTRIGLYCERIDEMIA: ICD-10-CM

## 2019-12-11 DIAGNOSIS — E11.42 TYPE 2 DIABETES MELLITUS WITH DIABETIC POLYNEUROPATHY, WITH LONG-TERM CURRENT USE OF INSULIN (HCC): Primary | ICD-10-CM

## 2019-12-11 PROCEDURE — G8482 FLU IMMUNIZE ORDER/ADMIN: HCPCS | Performed by: INTERNAL MEDICINE

## 2019-12-11 PROCEDURE — G8427 DOCREV CUR MEDS BY ELIG CLIN: HCPCS | Performed by: INTERNAL MEDICINE

## 2019-12-11 PROCEDURE — 99214 OFFICE O/P EST MOD 30 MIN: CPT | Performed by: INTERNAL MEDICINE

## 2019-12-11 PROCEDURE — G8598 ASA/ANTIPLAT THER USED: HCPCS | Performed by: INTERNAL MEDICINE

## 2019-12-11 PROCEDURE — 1036F TOBACCO NON-USER: CPT | Performed by: INTERNAL MEDICINE

## 2019-12-11 PROCEDURE — 3017F COLORECTAL CA SCREEN DOC REV: CPT | Performed by: INTERNAL MEDICINE

## 2019-12-11 PROCEDURE — 3046F HEMOGLOBIN A1C LEVEL >9.0%: CPT | Performed by: INTERNAL MEDICINE

## 2019-12-11 PROCEDURE — G8417 CALC BMI ABV UP PARAM F/U: HCPCS | Performed by: INTERNAL MEDICINE

## 2019-12-11 PROCEDURE — 2022F DILAT RTA XM EVC RTNOPTHY: CPT | Performed by: INTERNAL MEDICINE

## 2019-12-11 ASSESSMENT — ENCOUNTER SYMPTOMS
DOUBLE VISION: 0
HEMOPTYSIS: 0
ORTHOPNEA: 0
COUGH: 0
PHOTOPHOBIA: 0
BACK PAIN: 0
BLURRED VISION: 0

## 2019-12-12 ENCOUNTER — HOSPITAL ENCOUNTER (OUTPATIENT)
Age: 59
Setting detail: OUTPATIENT SURGERY
Discharge: HOME | End: 2019-12-12
Attending: ORTHOPAEDIC SURGERY | Admitting: ORTHOPAEDIC SURGERY
Payer: COMMERCIAL

## 2019-12-12 VITALS
TEMPERATURE: 96.8 F | DIASTOLIC BLOOD PRESSURE: 50 MMHG | HEART RATE: 80 BPM | HEIGHT: 70 IN | WEIGHT: 226 LBS | SYSTOLIC BLOOD PRESSURE: 128 MMHG | OXYGEN SATURATION: 97 % | RESPIRATION RATE: 15 BRPM | BODY MASS INDEX: 32.35 KG/M2

## 2019-12-12 DIAGNOSIS — M65.342 ACQUIRED TRIGGER FINGER OF LEFT RING FINGER: Primary | ICD-10-CM

## 2019-12-12 PROCEDURE — 2580000003 HC RX 258: Performed by: ORTHOPAEDIC SURGERY

## 2019-12-12 PROCEDURE — 3600000003 HC SURGERY LEVEL 3 BASE: Performed by: ORTHOPAEDIC SURGERY

## 2019-12-12 PROCEDURE — 7100000010 HC PHASE II RECOVERY - FIRST 15 MIN: Performed by: ORTHOPAEDIC SURGERY

## 2019-12-12 PROCEDURE — 7100000011 HC PHASE II RECOVERY - ADDTL 15 MIN: Performed by: ORTHOPAEDIC SURGERY

## 2019-12-12 PROCEDURE — 2500000003 HC RX 250 WO HCPCS: Performed by: ORTHOPAEDIC SURGERY

## 2019-12-12 PROCEDURE — 2709999900 HC NON-CHARGEABLE SUPPLY: Performed by: ORTHOPAEDIC SURGERY

## 2019-12-12 PROCEDURE — 3600000013 HC SURGERY LEVEL 3 ADDTL 15MIN: Performed by: ORTHOPAEDIC SURGERY

## 2019-12-12 RX ORDER — MAGNESIUM HYDROXIDE 1200 MG/15ML
LIQUID ORAL CONTINUOUS PRN
Status: COMPLETED | OUTPATIENT
Start: 2019-12-12 | End: 2019-12-12

## 2019-12-12 RX ORDER — HYDROCODONE BITARTRATE AND ACETAMINOPHEN 5; 325 MG/1; MG/1
1 TABLET ORAL EVERY 6 HOURS PRN
Qty: 10 TABLET | Refills: 0 | Status: SHIPPED | OUTPATIENT
Start: 2019-12-12 | End: 2019-12-19

## 2019-12-12 RX ORDER — IBUPROFEN 600 MG/1
600 TABLET ORAL EVERY 6 HOURS PRN
Qty: 60 TABLET | Refills: 1 | Status: ON HOLD | OUTPATIENT
Start: 2019-12-12 | End: 2020-12-31 | Stop reason: HOSPADM

## 2019-12-12 ASSESSMENT — PAIN SCALES - GENERAL: PAINLEVEL_OUTOF10: 0

## 2019-12-18 ENCOUNTER — CARE COORDINATION (OUTPATIENT)
Dept: CARE COORDINATION | Age: 59
End: 2019-12-18

## 2019-12-26 ENCOUNTER — OFFICE VISIT (OUTPATIENT)
Dept: ORTHOPEDIC SURGERY | Age: 59
End: 2019-12-26

## 2019-12-26 VITALS — WEIGHT: 223.11 LBS | HEIGHT: 71 IN | RESPIRATION RATE: 18 BRPM | BODY MASS INDEX: 31.23 KG/M2

## 2019-12-26 DIAGNOSIS — M65.342 ACQUIRED TRIGGER FINGER OF LEFT RING FINGER: Primary | ICD-10-CM

## 2019-12-26 PROCEDURE — 99024 POSTOP FOLLOW-UP VISIT: CPT | Performed by: ORTHOPAEDIC SURGERY

## 2019-12-29 DIAGNOSIS — E78.2 MIXED HYPERLIPIDEMIA: ICD-10-CM

## 2019-12-30 RX ORDER — OMEGA-3-ACID ETHYL ESTERS 1 G/1
CAPSULE, LIQUID FILLED ORAL
Qty: 180 CAPSULE | Refills: 3 | Status: SHIPPED | OUTPATIENT
Start: 2019-12-30 | End: 2020-06-22

## 2019-12-30 RX ORDER — VITAMIN B COMPLEX
TABLET ORAL
Qty: 90 TABLET | Refills: 0 | Status: SHIPPED | OUTPATIENT
Start: 2019-12-30 | End: 2020-03-16

## 2020-01-08 ENCOUNTER — TELEPHONE (OUTPATIENT)
Dept: PHARMACY | Facility: CLINIC | Age: 60
End: 2020-01-08

## 2020-01-08 NOTE — LETTER
[address] [facility] [address]  [facility] [address]           01/09/20     Dear Helen Valles! You have completed the 2019 requirements for the [address] will automatically be re-enrolled into the [facility]) Diabetes Management Program for 2020. One of the requirements to participate in the [facility] Diabetes Management Program is to complete a Clinical Pharmacist Telephone appointment yearly. We would like to work with you and your doctor to:  - Review your medications, including over-the-counter and herbal medications  - Answer questions about your medications and how to get the most benefit from them  - Identify potential drug interactions or side effects and help fix them  - Identify preferred medications that are equally effective, but available at a lower cost to you  - Help you reach the necessary requirements to remain enrolled in the Diabetes Management Program offered by [facility]     Please call [phone] and select option #7 to schedule this appointment to take advantage of this service. Telephone appointments are available Monday thru Friday from 7:30 AM till 5:30 PM.     This is a courtesy reminder. If you have this appointment already scheduled for your 2020 enrollment in the program, please disregard this message. If you have not scheduled this appointment yet, please contact us at the above number to schedule.      Sincerely,      [address]  Phone: [phone], option 7

## 2020-01-10 ENCOUNTER — CARE COORDINATION (OUTPATIENT)
Dept: CARE COORDINATION | Age: 60
End: 2020-01-10

## 2020-01-10 NOTE — CARE COORDINATION
Ambulatory Care Coordination Note  1/10/2020  CM Risk Score: 5  Charlson 10 Year Mortality Risk Score: 98%     ACC: Richard Lewis, RN    Summary Note: ACM asked how his sugars have been with his updated insulin regimen. He didn't answer directly, but stated that he \"just started my new year's resolution to eat better. \" He states that he is \"trying to eat less. \" He did not seem eager to provide specifics. ACM again offered another referral to [facility], [address]. He states that Dr. Raúl Dugan has his dietician, Caitlin Umanzor that he needs to see. Care Coordination Interventions    Program Enrollment:  Rising Risk  Referral from Primary Care Provider:  No  Suggested Interventions and Community Resources  Diabetes Education: In Process (Comment: Shaunna w/ Jessica Sahu)  Registered Dietician: In Process (Comment: Meeting with Jessica Sahu 8/30/19)  Specialty Services Referral:  Completed (Comment: Dr. Leslie Myers)  Zone Management Tools:  Completed (Comment: DMII)  Other Services or Interventions:  [facility]         Goals Addressed                 This Visit's Progress     Nutrition Plan   Improving     I will decrease CHO content with meals to lower A1c    Barriers: lack of motivation and overwhelmed by complexity of regimen  Plan for overcoming my barriers: Met with ALYSHA Osorio. Support by RN ACC. Confidence: 8/10  Anticipated Goal Completion Date: 5/27/19    3/27/19: \"I'm trying\"            Prior to Admission medications    Medication Sig Start Date End Date Taking?  Authorizing Provider   omega-3 acid ethyl esters (LOVAZA) 1 g capsule TAKE TWO CAPSULES BY MOUTH TWICE A DAY 12/30/19   Yuki Lee MD   Vitamin D (CHOLECALCIFEROL) 25 MCG (1000 UT) TABS tablet TAKE 1 TABLET BY MOUTH ONE TIME A DAY 12/30/19   Hiren Lomax, APRN - CNP   ibuprofen (ADVIL;MOTRIN) 600 MG tablet Take 1 tablet by mouth every 6 hours as needed for Pain 12/12/19   French Cogan, MD   Insulin Pen Needle (Nutrition/Exercise/Self-Monitoring):  Yes   Have you ever had to go to the ED for symptoms of low blood sugar?:  No       No patient-reported symptoms

## 2020-01-15 ENCOUNTER — CARE COORDINATION (OUTPATIENT)
Dept: CARE COORDINATION | Age: 60
End: 2020-01-15

## 2020-01-15 ENCOUNTER — OFFICE VISIT (OUTPATIENT)
Dept: FAMILY MEDICINE CLINIC | Age: 60
End: 2020-01-15
Payer: COMMERCIAL

## 2020-01-15 VITALS
OXYGEN SATURATION: 96 % | DIASTOLIC BLOOD PRESSURE: 70 MMHG | HEIGHT: 70 IN | WEIGHT: 222 LBS | SYSTOLIC BLOOD PRESSURE: 128 MMHG | BODY MASS INDEX: 31.78 KG/M2 | HEART RATE: 84 BPM

## 2020-01-15 LAB
CREATININE URINE POCT: 100
HBA1C MFR BLD: 11.2 %
MICROALBUMIN/CREAT 24H UR: 30 MG/G{CREAT}
MICROALBUMIN/CREAT UR-RTO: 30

## 2020-01-15 PROCEDURE — 82044 UR ALBUMIN SEMIQUANTITATIVE: CPT | Performed by: NURSE PRACTITIONER

## 2020-01-15 PROCEDURE — G8482 FLU IMMUNIZE ORDER/ADMIN: HCPCS | Performed by: NURSE PRACTITIONER

## 2020-01-15 PROCEDURE — 1036F TOBACCO NON-USER: CPT | Performed by: NURSE PRACTITIONER

## 2020-01-15 PROCEDURE — 2022F DILAT RTA XM EVC RTNOPTHY: CPT | Performed by: NURSE PRACTITIONER

## 2020-01-15 PROCEDURE — 3017F COLORECTAL CA SCREEN DOC REV: CPT | Performed by: NURSE PRACTITIONER

## 2020-01-15 PROCEDURE — 83036 HEMOGLOBIN GLYCOSYLATED A1C: CPT | Performed by: NURSE PRACTITIONER

## 2020-01-15 PROCEDURE — G8427 DOCREV CUR MEDS BY ELIG CLIN: HCPCS | Performed by: NURSE PRACTITIONER

## 2020-01-15 PROCEDURE — 99214 OFFICE O/P EST MOD 30 MIN: CPT | Performed by: NURSE PRACTITIONER

## 2020-01-15 PROCEDURE — G8417 CALC BMI ABV UP PARAM F/U: HCPCS | Performed by: NURSE PRACTITIONER

## 2020-01-15 PROCEDURE — 3046F HEMOGLOBIN A1C LEVEL >9.0%: CPT | Performed by: NURSE PRACTITIONER

## 2020-01-15 RX ORDER — ATENOLOL 25 MG/1
TABLET ORAL
Qty: 90 TABLET | Refills: 3 | Status: SHIPPED | OUTPATIENT
Start: 2020-01-15 | Stop reason: SDUPTHER

## 2020-01-15 RX ORDER — DULOXETIN HYDROCHLORIDE 60 MG/1
60 CAPSULE, DELAYED RELEASE ORAL 2 TIMES DAILY
Qty: 180 CAPSULE | Refills: 2 | Status: SHIPPED | OUTPATIENT
Start: 2020-01-15 | End: 2020-12-21

## 2020-01-15 ASSESSMENT — ENCOUNTER SYMPTOMS
DIARRHEA: 0
NAUSEA: 0
WHEEZING: 0
BACK PAIN: 1
SHORTNESS OF BREATH: 0
VOMITING: 0
STRIDOR: 0
COUGH: 0

## 2020-01-15 NOTE — CARE COORDINATION
Ambulatory Care Coordination Note  1/15/2020  CM Risk Score: 5  Charlson 10 Year Mortality Risk Score: 98%     ACC: Sera De Jesus RN    Summary Note: Spoke with the patient in the office following his PCP appointment. A1c still at 11.2. Again offered him a referral to [facility] [address] [place]. He states that he \"knows what I need to do, I just need to do it. \" He states that he's the only one that can change his behavior. His anti-depressant was increased today. ACM will f/u with him in a few weeks. Encouraged him to call with any needs. Will continue to support him in his DM goals. He states that he likes the accountability. Lab Results   Component Value Date    LABA1C 11.2 01/15/2020    LABA1C 11.1 12/07/2019    LABA1C 10.7 08/06/2019     Lab Results   Component Value Date    LABMICR 1.60 12/07/2019    LDLCALC see below 12/07/2019    CREATININE 0.9 12/07/2019       Care Coordination Interventions    Program Enrollment:  Rising Risk  Referral from Primary Care Provider:  No  Suggested Interventions and Community Resources  Diabetes Education: In Process (Comment: Shaunna w/ Araseli Patino)  Registered Dietician: In Process (Comment: Meeting with Araseli Patino 8/30/19)  Specialty Services Referral:  Completed (Comment: Dr. Pankaj Murdock- [facility])  Zone Management Tools:  Completed (Comment: DMII)  Other Services or Interventions:  [facility]         Goals Addressed                 This Visit's Progress     Nutrition Plan   Worsening     I will decrease CHO content with meals to lower A1c    Barriers: lack of motivation and overwhelmed by complexity of regimen  Plan for overcoming my barriers: Met with ALYSHA Osorio. Support by RN ACC. Confidence: 8/10  Anticipated Goal Completion Date: 5/27/19    3/27/19: \"I'm trying\"            Prior to Admission medications    Medication Sig Start Date End Date Taking?  Authorizing Provider   DULoxetine (CYMBALTA) 60 MG extended release capsule Take 1 capsule by mouth 2

## 2020-02-10 ENCOUNTER — CARE COORDINATION (OUTPATIENT)
Dept: CARE COORDINATION | Age: 60
End: 2020-02-10

## 2020-02-10 NOTE — CARE COORDINATION
Attempted outreach call; left a VM with ACM call-back information. Future Appointments   Date Time Provider Avelino Owens   4/15/2020  9:40 AM Ge Pozo APRN - CNP [facility]   4/17/2020 11:00 AM Jose G Ron MD AND DAYANARA REYNOLDS   10/29/2020  1:45 PM Avis Cook MD And Jessica Stern MSN, RN  Ambulatory Care Manager  [phone]  [email or web address]

## 2020-02-26 ENCOUNTER — CARE COORDINATION (OUTPATIENT)
Dept: CARE COORDINATION | Age: 60
End: 2020-02-26

## 2020-03-11 ENCOUNTER — CARE COORDINATION (OUTPATIENT)
Dept: CARE COORDINATION | Age: 60
End: 2020-03-11

## 2020-03-11 NOTE — CARE COORDINATION
Patient Excluded from Care Coordination? Yes     The patient will be excluded from Care Coordination for the following reason: Patient not engaged; has not responded to multiple VM's. ACM signing off. Future Appointments   Date Time Provider Avelino Owens   4/15/2020  9:40 AM MADISON Rouse - CNP [facility]   4/17/2020 11:00 AM Jake Mcardle, MD AND ENDO [facility]   10/29/2020  1:45 PM Haven Garcia MD And Derm [facility]       Benita Cedillo MSN, RN  Ambulatory Care Manager  [phone]  [email or web address]

## 2020-03-15 NOTE — TELEPHONE ENCOUNTER
Future Appointments   Date Time Provider Avelino Owens   4/15/2020  9:40 AM Ngozi Poster, APRN - CNP [facility]   4/17/2020 11:00 AM John Newman MD AND [facility]   10/29/2020  1:45 PM Valentino Girt, MD And Derm [facility]

## 2020-03-16 RX ORDER — VITAMIN B COMPLEX
TABLET ORAL
Qty: 90 TABLET | Refills: 0 | Status: SHIPPED | OUTPATIENT
Start: 2020-03-16 | End: 2020-06-15

## 2020-04-15 ENCOUNTER — TELEMEDICINE (OUTPATIENT)
Dept: FAMILY MEDICINE CLINIC | Age: 60
End: 2020-04-15
Payer: COMMERCIAL

## 2020-04-15 PROBLEM — F34.1 DYSTHYMIA: Status: ACTIVE | Noted: 2020-04-15

## 2020-04-15 PROCEDURE — 3017F COLORECTAL CA SCREEN DOC REV: CPT | Performed by: NURSE PRACTITIONER

## 2020-04-15 PROCEDURE — G8417 CALC BMI ABV UP PARAM F/U: HCPCS | Performed by: NURSE PRACTITIONER

## 2020-04-15 PROCEDURE — G8428 CUR MEDS NOT DOCUMENT: HCPCS | Performed by: NURSE PRACTITIONER

## 2020-04-15 PROCEDURE — 99214 OFFICE O/P EST MOD 30 MIN: CPT | Performed by: NURSE PRACTITIONER

## 2020-04-15 PROCEDURE — 2022F DILAT RTA XM EVC RTNOPTHY: CPT | Performed by: NURSE PRACTITIONER

## 2020-04-15 PROCEDURE — 3046F HEMOGLOBIN A1C LEVEL >9.0%: CPT | Performed by: NURSE PRACTITIONER

## 2020-04-15 PROCEDURE — 1036F TOBACCO NON-USER: CPT | Performed by: NURSE PRACTITIONER

## 2020-04-15 RX ORDER — BUPROPION HYDROCHLORIDE 150 MG/1
150 TABLET ORAL EVERY MORNING
Qty: 90 TABLET | Refills: 3 | Status: SHIPPED | OUTPATIENT
Start: 2020-04-15 | End: 2021-03-23

## 2020-04-15 ASSESSMENT — ENCOUNTER SYMPTOMS
BACK PAIN: 1
SHORTNESS OF BREATH: 0
COUGH: 0
WHEEZING: 0
NAUSEA: 0
STRIDOR: 0
VOMITING: 0
DIARRHEA: 0

## 2020-04-15 NOTE — PROGRESS NOTES
Indicates a positive item  \"[]\" Indicates a negative item  -- DELETE ALL ITEMS NOT EXAMINED]  Vital Signs: (As obtained by patient/caregiver or practitioner observation)    Blood pressure-  Heart rate-    Respiratory rate-    Temperature-  Pulse oximetry-     Constitutional: [x] Appears well-developed and well-nourished [x] No apparent distress      [] Abnormal-   Mental status  [x] Alert and awake  [x] Oriented to person/place/time [x]Able to follow commands      Eyes:  EOM    [x]  Normal  [] Abnormal-  Sclera  [x]  Normal  [] Abnormal -         Discharge []  None visible  [] Abnormal -    HENT:   [x] Normocephalic, atraumatic. [] Abnormal   [] Mouth/Throat: Mucous membranes are moist.     External Ears [] Normal  [] Abnormal-     Neck: [] No visualized mass     Pulmonary/Chest: [x] Respiratory effort normal.  [] No visualized signs of difficulty breathing or respiratory distress        [] Abnormal-      Musculoskeletal:   [] Normal gait with no signs of ataxia         [] Normal range of motion of neck        [] Abnormal-       Neurological:        [] No Facial Asymmetry (Cranial nerve 7 motor function) (limited exam to video visit)          [] No gaze palsy        [] Abnormal-         Skin:        [] No significant exanthematous lesions or discoloration noted on facial skin         [] Abnormal-            Psychiatric:       [x] Flat Affect [x] No Hallucinations        [] Abnormal-     Other pertinent observable physical exam findings-     ASSESSMENT/PLAN:  1. Type 2 diabetes mellitus with diabetic polyneuropathy, with long-term current use of insulin ([address]- managed by endo, has an appointment on Friday 04/17/2020    2. Essential hypertension- not monitoring at home, previously controlled at last OV. Asked him to check at home and call if >150/90    3. Mixed hyperlipidemia- on Lovaza and fenoribrate, hx of pancreatitis. Trig improved down to 544 in Dec 2019.     4.  Dysthymia- uncontrolled, continue Cymbalta

## 2020-04-17 ENCOUNTER — VIRTUAL VISIT (OUTPATIENT)
Dept: ENDOCRINOLOGY | Age: 60
End: 2020-04-17
Payer: COMMERCIAL

## 2020-04-17 PROCEDURE — 3046F HEMOGLOBIN A1C LEVEL >9.0%: CPT | Performed by: INTERNAL MEDICINE

## 2020-04-17 PROCEDURE — G8427 DOCREV CUR MEDS BY ELIG CLIN: HCPCS | Performed by: INTERNAL MEDICINE

## 2020-04-17 PROCEDURE — 2022F DILAT RTA XM EVC RTNOPTHY: CPT | Performed by: INTERNAL MEDICINE

## 2020-04-17 PROCEDURE — 99214 OFFICE O/P EST MOD 30 MIN: CPT | Performed by: INTERNAL MEDICINE

## 2020-04-17 PROCEDURE — 3017F COLORECTAL CA SCREEN DOC REV: CPT | Performed by: INTERNAL MEDICINE

## 2020-04-17 ASSESSMENT — ENCOUNTER SYMPTOMS
PHOTOPHOBIA: 0
BLURRED VISION: 0
ORTHOPNEA: 0
BACK PAIN: 0
DOUBLE VISION: 0
COUGH: 0
HEMOPTYSIS: 0

## 2020-04-17 NOTE — PROGRESS NOTES
Endocrinology  Sherin Arellano M.D. Phone: [phone]   FAX: [phone]       Veda Arguello   YOB: 1960    Date of Visit:  4/17/2020    No Known Allergies  Outpatient Medications Marked as Taking for the 4/17/20 encounter (Virtual Visit) with Erick Buitrago MD   Medication Sig Dispense Refill    buPROPion (WELLBUTRIN XL) 150 MG extended release tablet Take 1 tablet by mouth every morning 90 tablet 3    Vitamin D (CHOLECALCIFEROL) 25 MCG (1000 UT) TABS tablet TAKE 1 TABLET BY MOUTH ONE TIME A DAY 90 tablet 0    metFORMIN (GLUCOPHAGE) 1000 MG tablet TAKE ONE TABLET BY MOUTH TWICE A  tablet 3    DULoxetine (CYMBALTA) 60 MG extended release capsule Take 1 capsule by mouth 2 times daily 180 capsule 2    atenolol (TENORMIN) 25 MG tablet TAKE 1 TABLET BY MOUTH EVERY NIGHT AT BEDTIME 90 tablet 3    omega-3 acid ethyl esters (LOVAZA) 1 g capsule TAKE TWO CAPSULES BY MOUTH TWICE A  capsule 3    ibuprofen (ADVIL;MOTRIN) 600 MG tablet Take 1 tablet by mouth every 6 hours as needed for Pain 60 tablet 1    Insulin Pen Needle (B-D UF III MINI PEN NEEDLES) 31G X 5 MM MISC 1 each by Does not apply route 3 times daily 300 each 6    insulin regular human (HUMULIN R U-500 KWIKPEN) 500 UNIT/ML SOPN concentrated injection pen Inject 60 units before breakfast, 40 units before lunch and 40 units before dinner.  10 pen 2    fenofibrate (TRICOR) 145 MG tablet TAKE 1 TABLET BY MOUTH ONE TIME A DAY 90 tablet 3    lisinopril-hydrochlorothiazide (PRINZIDE;ZESTORETIC) 20-12.5 MG per tablet TAKE 1 TABLET BY MOUTH ONE TIME A DAY 90 tablet 3    ASPIRIN ADULT LOW STRENGTH 81 MG EC tablet TAKE 1 TABLET BY MOUTH ONE TIME A DAY 90 tablet 3    PRODIGY LANCETS 28G MISC Use as directed to test up to 3-4 times daily 400 each 3    blood glucose test strips (PRODIGY NO CODING BLOOD GLUC) strip Use as directed to test up to 3-4 times daily 400 each 3    nystatin (MYCOSTATIN) 147315 UNIT/GM ointment neuropathy: Some numbness and tingling in feet. Home regimen:   Metformin 1000 mg BID     Humulin R U-500 60 units before breakfast , 40 units before lunch and dinner      Previous Meds  Lantus 50  units BID  humalog 25  25 30      Blood glucose trend  -300  Lunch 200-300  Dinner 200-300        Diet: Eats 3 meals/day   Has been non-compliant with diet  Nutrition education:Yes  Exercise:      Hypertension:  He has essentail HTN for many yrs. On Lisinopril/HCTZ/Atenolol. No dizziness, SOB , chest pain. Hypertriglyceridemia : has h/o high TGD  Has  h/o pancreatitis many yrs back. on fenofibrate 145 mg daily, fish oil 2 gram BID. Review of Systems   Constitutional: Positive for malaise/fatigue. Negative for chills, diaphoresis, fever and weight loss. Eyes: Negative for blurred vision, double vision and photophobia. Respiratory: Negative for cough and hemoptysis. Cardiovascular: Negative for chest pain, palpitations and orthopnea. Genitourinary: Negative for dysuria, flank pain, frequency, hematuria and urgency. Musculoskeletal: Positive for myalgias. Negative for back pain, falls, joint pain and neck pain. Skin: Negative for itching and rash. Neurological: Positive for headaches. Negative for dizziness, tingling, tremors, sensory change, speech change, focal weakness, seizures and loss of consciousness. Endo/Heme/Allergies: Negative for environmental allergies and polydipsia. Does not bruise/bleed easily. Psychiatric/Behavioral: Positive for depression. Negative for hallucinations, memory loss, substance abuse and suicidal ideas. The patient is not nervous/anxious and does not have insomnia.           Lab Results   Component Value Date    LABA1C 11.2 01/15/2020     Office Visit on 01/15/2020   Component Date Value Ref Range Status    Hemoglobin A1C 01/15/2020 11.2  % Final    Microalb, Ur 01/15/2020 30   Final    Creatinine Ur POCT 01/15/2020 100   Final    Microalbumin

## 2020-04-21 ENCOUNTER — TELEPHONE (OUTPATIENT)
Dept: PHARMACY | Facility: CLINIC | Age: 60
End: 2020-04-21

## 2020-05-22 ENCOUNTER — TELEPHONE (OUTPATIENT)
Dept: PHARMACY | Facility: CLINIC | Age: 60
End: 2020-05-22

## 2020-05-27 ENCOUNTER — SCHEDULED TELEPHONE ENCOUNTER (OUTPATIENT)
Dept: PHARMACY | Facility: CLINIC | Age: 60
End: 2020-05-27

## 2020-05-27 NOTE — TELEPHONE ENCOUNTER
[facility] Employee Diabetes Program  =================================================================  Satish Chapman is a 63 y.o. male enrolled in the [facility] Employee Diabetes Program. Patient provided Liset Sparks with verbal consent to remain in the program for this year. Medications:  Medication Sig    buPROPion (WELLBUTRIN XL) 150 MG extended release tablet Take 1 tablet by mouth every morning    Vitamin D (CHOLECALCIFEROL) 25 MCG (1000 UT) TABS tablet TAKE 1 TABLET BY MOUTH ONE TIME A DAY    metFORMIN (GLUCOPHAGE) 1000 MG tablet TAKE ONE TABLET BY MOUTH TWICE A DAY    DULoxetine (CYMBALTA) 60 MG extended release capsule Take 1 capsule by mouth 2 times daily    atenolol (TENORMIN) 25 MG tablet TAKE 1 TABLET BY MOUTH EVERY NIGHT AT BEDTIME    omega-3 acid ethyl esters (LOVAZA) 1 g capsule TAKE TWO CAPSULES BY MOUTH TWICE A DAY    ibuprofen (ADVIL;MOTRIN) 600 MG tablet Take 1 tablet by mouth every 6 hours as needed for Pain    Insulin Pen Needle (B-D UF III MINI PEN NEEDLES) 31G X 5 MM MISC 1 each by Does not apply route 3 times daily    insulin regular human (HUMULIN R U-500 KWIKPEN) 500 UNIT/ML SOPN concentrated injection pen Inject 60 units before breakfast, 40 units before lunch and 40 units before dinner.  fenofibrate (TRICOR) 145 MG tablet TAKE 1 TABLET BY MOUTH ONE TIME A DAY    lisinopril-hydrochlorothiazide (PRINZIDE;ZESTORETIC) 20-12.5 MG per tablet TAKE 1 TABLET BY MOUTH ONE TIME A DAY    ASPIRIN ADULT LOW STRENGTH 81 MG EC tablet TAKE 1 TABLET BY MOUTH ONE TIME A DAY    PRODIGY LANCETS 28G MISC Use as directed to test up to 3-4 times daily    blood glucose test strips (PRODIGY NO CODING BLOOD GLUC) strip Use as directed to test up to 3-4 times daily    nystatin (MYCOSTATIN) 374816 UNIT/GM ointment Apply topically 2 times daily to corners of mouth.  metroNIDAZOLE (METROGEL) 1 % gel Apply to face daily.       Current Pharmacy: [facility]  Current testing copay waiver, up to $600, through [address] [place]:  - aspirin (with prescription), Humulin U500, lisinopril-hydrochlorothiazide, metformin  - generic \"statin\" if prescribed by provider  - Generic [facility] pharmacy-stocked) insulin syringes and pen needles  - Agamatrix or Pro[employer] meter and supplies  - Dexcom G6 supplies if desired     Diabetes Care:   - Glycemic Goal: <7.0%. Is not at blood glucose goal. Current regimen metformin 1000 mg BID, Humulin U500 pen 70-50-50 (increased from 60-40-40 4/17/20 by endo). H/o pancreatitis - avoid GLP-1. Per report blood sugars likely running on higher end. Lifestyle seems to be contributing. Pt states has h/o stroke and memory not very good, his wife helps him with his medications. Other Considerations:  - Blood Pressure Goal: BP less than 140/90 mmHg due to history of DM: Is at blood pressure goal. Current regimen lisinopril-HCTZ 20-12.5 mg daily, atenolol 25 mg daily. eGFR and UACR WNL. - Lipids: Patient has a 10-yr ASCVD risk of greater than 20% with DM and is therefore a candidate for high-intensity statin therapy based on updated guidelines (pt also self reported he has history of stroke). Is not on statin regimen. Last ALT elevated but less than 3 x ULN. Is currently on fenofibrate and fish oil for hypertriglyceridemia. Recent endo OV 4/17/20 indicates for pt to continue current regimen.  Refill history of current meds:  · Fenofibrate: 9/9/19, 11/24/19, 3/1/20, 5/24/20 x 90 ds - appears adherent  · Omega-3 ethyl esters: 6/25/19, 9/29/19, 12/30/19, 3/22/20 x 90 ds - appears adherent  - Smoking status: former smoker    PLAN:  - Consideration(s) for provider:   · Consider high intensity statin such as atorvastatin 40 mg daily or rosuvastatin 20 mg daily (pt would like PharmD to send note to Dr. Amy Padron) - see telephone encounter  · Consider CGM if pt agreeable to costs (discussed with him) - please feel free to contact us with any questions  - DM program gaps identified:   · Initial requirements: Requirements met   · Ongoing requirements: ACC/diabetes educator visit (if A1c over 8%), A1c (2nd), Lipid panel, Influenza vaccination for [id number] and statin therapy or contraindication/documentation from provider   - Education to patient: as above and:   · Benefit/indication of statin therapy  · Continue to f/w endocrinology closely for optimal BG control. · Will call back after talking with wife about Freestyle and Dexcom. Also encouraged to discuss with Dr. Prosper Fried  · Encouraged optimal lifestyle modifications. May need more protein intake & balanced/regular meals. Highly recommended he follow up with [address] [place] [address] for dietary modification as referred by Dr. Prosper Fried.  After discussion pt seemed agreeable and was planning to call to schedule  - Follow up: PCP/specialist for identified gaps or as scheduled below and Diabetes Educator or Ambulatory Care Coordinator for identified gaps or as scheduled below  - Upcoming appointments:   Future Appointments   Date Time Provider Avelino Owens   5/27/2020 11:30 AM SCHEDULE, [facility] None   8/19/2020 11:20 AM Ana Pelaez MD AND ENDO [facility]   10/29/2020  1:45 PM Nelda Paulino MD And Derm [facility], PharmD, [address] Pharmacist  Dept: [phone] (toll free [phone], option 7)

## 2020-05-28 ENCOUNTER — TELEPHONE (OUTPATIENT)
Dept: ENDOCRINOLOGY | Age: 60
End: 2020-05-28

## 2020-06-01 RX ORDER — LISINOPRIL AND HYDROCHLOROTHIAZIDE 20; 12.5 MG/1; MG/1
TABLET ORAL
Qty: 90 TABLET | Refills: 3 | Status: SHIPPED | OUTPATIENT
Start: 2020-06-01 | End: 2022-07-07

## 2020-06-01 RX ORDER — FENOFIBRATE 145 MG/1
TABLET, COATED ORAL
Qty: 90 TABLET | Refills: 3 | Status: SHIPPED | OUTPATIENT
Start: 2020-06-01 | End: 2021-08-25

## 2020-06-22 RX ORDER — OMEGA-3-ACID ETHYL ESTERS 1 G/1
CAPSULE, LIQUID FILLED ORAL
Qty: 180 CAPSULE | Refills: 3 | Status: SHIPPED | OUTPATIENT
Start: 2020-06-22 | End: 2020-12-31

## 2020-07-06 RX ORDER — METRONIDAZOLE 10 MG/G
GEL TOPICAL
Qty: 60 G | Refills: 3 | Status: SHIPPED | OUTPATIENT
Start: 2020-07-06 | End: 2021-10-28 | Stop reason: SDUPTHER

## 2020-07-06 NOTE — TELEPHONE ENCOUNTER
Pt wife lm  Pt wife Mikey Goldberg c/b [phone]  Pt wife states:   - needs refill  Medication Metronidazole  Peter Barrow   [phone]  Please call to discuss thanks

## 2020-07-14 ENCOUNTER — PATIENT MESSAGE (OUTPATIENT)
Dept: FAMILY MEDICINE CLINIC | Age: 60
End: 2020-07-14

## 2020-07-14 NOTE — LETTER
[facility], [address] [place] [address]  Phone: [phone]  Fax: [phone]    MADISON Garza CNP        July 15, 2020     Patient: Myra Phalen   YOB: 1960   Date of Visit: 7/14/2020       To Whom It May Concern: It is my medical opinion that Myra Phalen requires a disability parking placard for the following reasons:  He has limited walking ability due to an orthopedic condition. Duration of need: 5 years    If you have any questions or concerns, please don't hesitate to call.     Sincerely,        MADISON Garza CNP

## 2020-07-14 NOTE — TELEPHONE ENCOUNTER
From: Isela Maradiaga  To: MADISON Waldrop - CNP  Sent: 7/14/2020 10:50 AM EDT  Subject: Prescription Question    Rebel De La Rosa,I need a renewal of my handicap placards. .I need 2 of them,I can pick it up or have it mailed. I would appreciate this. .Thank you

## 2020-08-19 ENCOUNTER — VIRTUAL VISIT (OUTPATIENT)
Dept: ENDOCRINOLOGY | Age: 60
End: 2020-08-19
Payer: COMMERCIAL

## 2020-08-19 PROCEDURE — 3046F HEMOGLOBIN A1C LEVEL >9.0%: CPT | Performed by: INTERNAL MEDICINE

## 2020-08-19 PROCEDURE — 3017F COLORECTAL CA SCREEN DOC REV: CPT | Performed by: INTERNAL MEDICINE

## 2020-08-19 PROCEDURE — 2022F DILAT RTA XM EVC RTNOPTHY: CPT | Performed by: INTERNAL MEDICINE

## 2020-08-19 PROCEDURE — 99214 OFFICE O/P EST MOD 30 MIN: CPT | Performed by: INTERNAL MEDICINE

## 2020-08-19 PROCEDURE — G8427 DOCREV CUR MEDS BY ELIG CLIN: HCPCS | Performed by: INTERNAL MEDICINE

## 2020-08-19 ASSESSMENT — ENCOUNTER SYMPTOMS
HEMOPTYSIS: 0
ORTHOPNEA: 0
BLURRED VISION: 0
COUGH: 0
PHOTOPHOBIA: 0
DOUBLE VISION: 0
BACK PAIN: 0

## 2020-08-19 NOTE — PROGRESS NOTES
Endocrinology  [facility] Mary SANCHEZ Phone: [phone]   FAX: [phone]       Aidan Campbell   YOB: 1960    Date of Visit:  8/19/2020    No Known Allergies  Outpatient Medications Marked as Taking for the 8/19/20 encounter (Virtual Visit) with Manuelito Carson MD   Medication Sig Dispense Refill    metroNIDAZOLE (METROGEL) 1 % gel Apply to face daily. 60 g 3    omega-3 acid ethyl esters (LOVAZA) 1 g capsule TAKE TWO CAPSULES BY MOUTH TWICE A  capsule 3    Vitamin D (CHOLECALCIFEROL) 25 MCG (1000 UT) TABS tablet TAKE 1 TABLET BY MOUTH ONE TIME A DAY 90 tablet 3    ASPIRIN ADULT LOW STRENGTH 81 MG EC tablet TAKE 1 TABLET BY MOUTH ONE TIME A DAY 90 tablet 3    lisinopril-hydroCHLOROthiazide (PRINZIDE;ZESTORETIC) 20-12.5 MG per tablet TAKE 1 TABLET BY MOUTH ONE TIME A DAY 90 tablet 3    fenofibrate (TRICOR) 145 MG tablet TAKE 1 TABLET BY MOUTH ONE TIME A DAY 90 tablet 3    buPROPion (WELLBUTRIN XL) 150 MG extended release tablet Take 1 tablet by mouth every morning 90 tablet 3    metFORMIN (GLUCOPHAGE) 1000 MG tablet TAKE ONE TABLET BY MOUTH TWICE A  tablet 3    DULoxetine (CYMBALTA) 60 MG extended release capsule Take 1 capsule by mouth 2 times daily 180 capsule 2    atenolol (TENORMIN) 25 MG tablet TAKE 1 TABLET BY MOUTH EVERY NIGHT AT BEDTIME 90 tablet 3    ibuprofen (ADVIL;MOTRIN) 600 MG tablet Take 1 tablet by mouth every 6 hours as needed for Pain 60 tablet 1    Insulin Pen Needle (B-D UF III MINI PEN NEEDLES) 31G X 5 MM MISC 1 each by Does not apply route 3 times daily 300 each 6    insulin regular human (HUMULIN R U-500 KWIKPEN) 500 UNIT/ML SOPN concentrated injection pen Inject 60 units before breakfast, 40 units before lunch and 40 units before dinner.  (Patient taking differently: Inject 70 units before breakfast, 50 units before lunch and 50 units before dinner.) 10 pen 2    PRODIGY LANCETS 28G MISC Use as directed to test up to 3-4 times daily 400 each 3    blood glucose test strips (PRODIGY NO CODING BLOOD GLUC) strip Use as directed to test up to 3-4 times daily 400 each 3    nystatin (MYCOSTATIN) 069789 UNIT/GM ointment Apply topically 2 times daily to corners of mouth. 30 g 1         There were no vitals filed for this visit. There is no height or weight on file to calculate BMI.      Wt Readings from Last 3 Encounters:   01/15/20 222 lb (100.7 kg)   12/26/19 223 lb 1.7 oz (101.2 kg)   12/10/19 226 lb (102.5 kg)     BP Readings from Last 3 Encounters:   01/15/20 128/70   12/12/19 (!) 128/50   12/11/19 131/79        Past Medical History:   Diagnosis Date    Acute, but ill-defined, cerebrovascular disease 5/31/2013    Cerebral artery occlusion with cerebral infarction [facility]) 2013     X2 PERSONALITY CHANGE, ANGER OUTBURSTS    Cerebrovascular disease     Diplopia 7/11/2013    ED (erectile dysfunction) 1/23/2014    Elbow pain, chronic 12/16/2015    Hyperlipidemia     Hypertension     Irritable bowel syndrome     Obstructive sleep apnea (adult) (pediatric) 07/01/2013    NO CPAP, HAD PROBLEMS WITH INSURANCE AND STOPPED USING    Occlusion and stenosis of carotid artery without mention of cerebral infarction 01/13/2014    MINOR    Pain in joint, upper arm 5/14/2015    Polyneuropathy in diabetes(357.2) 7/1/2013    PONV (postoperative nausea and vomiting)     Skin abnormality     PRE-CANCEROUS LESIONS REMOVED FROM ARMS AND EARS    Type 2 diabetes mellitus with diabetic polyneuropathy, with long-term current use of insulin ([facility] [address] 7/1/2013    Type II or unspecified type diabetes mellitus without mention of complication, not stated as uncontrolled      Past Surgical History:   Procedure Laterality Date    CARPAL TUNNEL RELEASE Left 2008    CERVICAL DISCECTOMY  08/02/2017    ANTERIOR CERVICAL DISCECTOMY AND FUSION C5-6, C6-7 WITH MEDTRONIC PLATES, SCREWS, ALLOGRAFT, WITH EVOKES PARTIAL C6 CORPECTOMY    CERVICAL FUSION  12/06/2017    CERVICAL LAMINECTOMY AND POSTERIOR FUSION C5-T1 WITH MEDTRONIC RODS AND SCREWS WITH EVOKES AND O-ARM    CHOLECYSTECTOMY      COLONOSCOPY N/A 2018    COLONOSCOPY POLYPECTOMY SNARE/COLD BIOPSY performed by Otis Chapa MD at [address] [facility] Left     ULNAR NERVE TRANSPOSITION, AT SAME TIME AS CTR    FINGER TRIGGER RELEASE Left 2019    LEFT RING TRIGGER FINGER RELEASE performed by Nilson Galdamez MD at [address] [place] [address]    umbilical    PYLOROPLASTY      PYLORIC STENOSIS AS INFANT     Family History   Problem Relation Age of Onset    Cancer Father         melanoma, face    Cancer Paternal Uncle         melanoma, face    Cancer Brother         skin, NMSC    Depression Maternal Cousin     No Known Problems Mother      Social History     Tobacco Use   Smoking Status Former Smoker    Packs/day: 1.00    Years: 25.00    Pack years: 25.00    Types: Cigarettes    Last attempt to quit: 2000    Years since quittin.6   Smokeless Tobacco Never Used      Social History     Substance and Sexual Activity   Alcohol Use No       HPI      Daquan Rosario is a 63 y.o. male who was seen in a virtual visit for management of DM. Due to the COVID-19 restrictions on close contact interactions the patient's visit was conducted via video link  ( doxy. me) in lieu of a face to face visit. Location for patient : home  Physician : home    Pursuant to the emergency declaration under the [facility], [address] [place] [address] authority and the [employer] Act, this Virtual  Visit was conducted, with patient's consent, to reduce the patient's risk of exposure to COVID-19 and provide care. Patient has a PMH of Type 2 DM, hypertension, hyperlipidemia, obesity , Spinal cord compression in cervical spine. Diagnosed with Diabetes Mellitus type 2 in .    Course has been variable .  Microvascular complications: No known retinopathy (Last eye exam: 2016)   No known Nephropathy :  Has Peripheral neuropathy: Some numbness and tingling in feet. Home regimen:   Metformin 1000 mg BID     Humulin R U-500 70 units before breakfast , 50 units before lunch and dinner      Previous Meds  Lantus 50  units BID  humalog 25  25 30      Blood glucose trend  -250  Lunch 200-250  Dinner 200-250        Diet: Eats 3 meals/day   Has been non-compliant with diet  Nutrition education:Yes  Exercise:      Hypertension:  He has essentail HTN for many yrs. On Lisinopril/HCTZ/Atenolol. No dizziness, SOB , chest pain. Hypertriglyceridemia : has h/o high TGD  Has  h/o pancreatitis many yrs back. on fenofibrate 145 mg daily, fish oil 2 gram BID. Review of Systems   Constitutional: Positive for malaise/fatigue. Negative for chills, diaphoresis, fever and weight loss. Eyes: Negative for blurred vision, double vision and photophobia. Respiratory: Negative for cough and hemoptysis. Cardiovascular: Negative for chest pain, palpitations and orthopnea. Genitourinary: Negative for dysuria, flank pain, frequency, hematuria and urgency. Musculoskeletal: Negative for back pain, falls, joint pain, myalgias and neck pain. Skin: Negative for itching and rash. Neurological: Negative for dizziness, tingling, tremors, sensory change, speech change, focal weakness, seizures, loss of consciousness and headaches. Endo/Heme/Allergies: Negative for environmental allergies and polydipsia. Does not bruise/bleed easily. Psychiatric/Behavioral: Negative for depression, hallucinations, memory loss, substance abuse and suicidal ideas. The patient is not nervous/anxious and does not have insomnia. Lab Results   Component Value Date    LABA1C 11.2 01/15/2020     No visits with results within 4 Month(s) from this visit.    Latest known visit with results is:   Office Visit on 01/15/2020   Component Date Value Ref Range Status    Hemoglobin A1C 01/15/2020 11.2  % Final    Microalb, Ur 01/15/2020 30   Final    Creatinine Ur POCT 01/15/2020 100   Final    Microalbumin Creatinine Ratio 01/15/2020 30   Final           Assessment/Plan      1. Type 2 DM     Jacy Wise is a 63 y.o. male has Type 2 DM with obesity and insulin resistance. Poorly controlled DM. A1c 12.2 % ---> 9.1 % ---> 10.7 % ---> 58.3 %     Complicated by neuropathy. BS have been high    - Increase Humulin R U-500 80 units before breakfast , 60 units before lunch and dinner    -Will recommend low carb diet ( 40-45 grams with each meal)    Referred to [address] [place] [address] for dietary modification. Avoid GLP-1 given high TGD and h/o pancreatitis. Advised follow-up with the ophthalmologist once BS better. Urine microalbumin/cr ratio normal in 071/8, 12/19 On ace-inhibitor. Discussed foot care. Has neuropathy on exam.       Pt on ASA. Normal TSH in 12/19       2. Hypertension. Continue same medications    3. Hyperlipidemia. TGD have improved. Has h/o pancreatitis   on fenofibrate 145 mg daily, fish oil 2 gram BID. TGD 1560 ---> 506---> 1051--> 544  LDL 71---> 112---> 114---> 112  HDL 31---> 31    Continue same regimen. Will consider adding statins if labs are stable. 4. Obesity. Advised life style changes.

## 2020-08-24 RX ORDER — INSULIN HUMAN 500 [IU]/ML
INJECTION, SOLUTION SUBCUTANEOUS
Qty: 15 PEN | Refills: 2 | Status: SHIPPED | OUTPATIENT
Start: 2020-08-24 | End: 2021-07-29

## 2020-08-24 NOTE — TELEPHONE ENCOUNTER
LOV: 8/19/2020  NOV: 12/18/2020    DOSE: 80 60 60    Frequency: TID    Pharmacy as Pended:     Per LOV Note: Increase Humulin R U-500 80 units before breakfast , 60 units before lunch and dinner       Per Last PHONE NOTE:     Lab Results   Component Value Date    LABA1C 11.2 01/15/2020

## 2020-10-28 NOTE — PROGRESS NOTES
[facility]) Dermatology  [address]      David Potter  1960    63 y.o. male     Date of Visit: 10/29/2020    Last Visit: 1yr    Chief Complaint: Skin check    History of Present Illness:  1. Here for skin check. History of actinic keratoses s/p cryotherapy.   -Hardly spends time in sun. If outdoors, uses hats and shirts   -Reports a rough lesion on L temple     Derm history:  -Angular cheilitis -Nystatin oint  -Mild ET/PP rosacea - metrogel    Review of Systems:  Constitutional: Reports general sense of well-being. Skin: No interval severe sunburns. Allergies: Reviewed and updated. Past Medical History, Surgical History, Medications and Allergies reviewed.      Past Medical History:   Diagnosis Date    Acute, but ill-defined, cerebrovascular disease 5/31/2013    Cerebral artery occlusion with cerebral infarction [facility]) 2013     X2 PERSONALITY CHANGE, ANGER OUTBURSTS    Cerebrovascular disease     Diplopia 7/11/2013    ED (erectile dysfunction) 1/23/2014    Elbow pain, chronic 12/16/2015    Hyperlipidemia     Hypertension     Irritable bowel syndrome     Obstructive sleep apnea (adult) (pediatric) 07/01/2013    NO CPAP, HAD PROBLEMS WITH INSURANCE AND STOPPED USING    Occlusion and stenosis of carotid artery without mention of cerebral infarction 01/13/2014    MINOR    Pain in joint, upper arm 5/14/2015    Polyneuropathy in diabetes(357.2) 7/1/2013    PONV (postoperative nausea and vomiting)     Skin abnormality     PRE-CANCEROUS LESIONS REMOVED FROM ARMS AND EARS    Type 2 diabetes mellitus with diabetic polyneuropathy, with long-term current use of insulin ([facility] [address] 7/1/2013    Type II or unspecified type diabetes mellitus without mention of complication, not stated as uncontrolled      Past Surgical History:   Procedure Laterality Date    CARPAL TUNNEL RELEASE Left 2008    CERVICAL DISCECTOMY  08/02/2017    ANTERIOR CERVICAL DISCECTOMY AND FUSION C5-6, C6-7 WITH MEDTRONIC PLATES, SCREWS, ALLOGRAFT, WITH EVOKES PARTIAL C6 CORPECTOMY    CERVICAL FUSION  12/06/2017    CERVICAL LAMINECTOMY AND POSTERIOR FUSION C5-T1 WITH MEDTRONIC RODS AND SCREWS WITH EVOKES AND O-ARM    CHOLECYSTECTOMY  1995    COLONOSCOPY N/A 8/14/2018    COLONOSCOPY POLYPECTOMY SNARE/COLD BIOPSY performed by Estefanía Ray MD at [address] Left 2008    ULNAR NERVE TRANSPOSITION, AT SAME TIME AS CTR    FINGER TRIGGER RELEASE Left 12/12/2019    LEFT RING TRIGGER FINGER RELEASE performed by Linda Kendrick MD at [address] [place] [address]    umbilical    PYLOROPLASTY      PYLORIC STENOSIS AS INFANT       No Known Allergies  Outpatient Medications Marked as Taking for the 10/29/20 encounter (Office Visit) with Willam Mcguire MD   Medication Sig Dispense Refill    insulin regular human (HUMULIN R U-500 KWIKPEN) 500 UNIT/ML SOPN concentrated injection pen INJECT 80 UNITS BEFORE BREAKFAST, INJECT 60 UNITS BEFORE LUNCH AND INJECT 60 UNITS BEFORE DINNER 15 pen 2    metroNIDAZOLE (METROGEL) 1 % gel Apply to face daily.  60 g 3    omega-3 acid ethyl esters (LOVAZA) 1 g capsule TAKE TWO CAPSULES BY MOUTH TWICE A  capsule 3    Vitamin D (CHOLECALCIFEROL) 25 MCG (1000 UT) TABS tablet TAKE 1 TABLET BY MOUTH ONE TIME A DAY 90 tablet 3    ASPIRIN ADULT LOW STRENGTH 81 MG EC tablet TAKE 1 TABLET BY MOUTH ONE TIME A DAY 90 tablet 3    lisinopril-hydroCHLOROthiazide (PRINZIDE;ZESTORETIC) 20-12.5 MG per tablet TAKE 1 TABLET BY MOUTH ONE TIME A DAY 90 tablet 3    fenofibrate (TRICOR) 145 MG tablet TAKE 1 TABLET BY MOUTH ONE TIME A DAY 90 tablet 3    buPROPion (WELLBUTRIN XL) 150 MG extended release tablet Take 1 tablet by mouth every morning 90 tablet 3    metFORMIN (GLUCOPHAGE) 1000 MG tablet TAKE ONE TABLET BY MOUTH TWICE A  tablet 3    DULoxetine (CYMBALTA) 60 MG extended release capsule Take 1 capsule by mouth 2 times daily 180 capsule 2    atenolol

## 2020-10-29 ENCOUNTER — OFFICE VISIT (OUTPATIENT)
Dept: DERMATOLOGY | Age: 60
End: 2020-10-29
Payer: COMMERCIAL

## 2020-10-29 VITALS — TEMPERATURE: 97.9 F

## 2020-10-29 PROCEDURE — 99213 OFFICE O/P EST LOW 20 MIN: CPT | Performed by: DERMATOLOGY

## 2020-10-29 PROCEDURE — 17000 DESTRUCT PREMALG LESION: CPT | Performed by: DERMATOLOGY

## 2020-10-29 PROCEDURE — G8417 CALC BMI ABV UP PARAM F/U: HCPCS | Performed by: DERMATOLOGY

## 2020-10-29 PROCEDURE — 3017F COLORECTAL CA SCREEN DOC REV: CPT | Performed by: DERMATOLOGY

## 2020-10-29 PROCEDURE — G8427 DOCREV CUR MEDS BY ELIG CLIN: HCPCS | Performed by: DERMATOLOGY

## 2020-10-29 PROCEDURE — G8484 FLU IMMUNIZE NO ADMIN: HCPCS | Performed by: DERMATOLOGY

## 2020-10-29 PROCEDURE — 1036F TOBACCO NON-USER: CPT | Performed by: DERMATOLOGY

## 2020-10-29 PROCEDURE — 17003 DESTRUCT PREMALG LES 2-14: CPT | Performed by: DERMATOLOGY

## 2020-10-29 RX ORDER — NYSTATIN 100000 U/G
OINTMENT TOPICAL
Qty: 30 G | Refills: 1 | Status: SHIPPED | OUTPATIENT
Start: 2020-10-29 | End: 2022-01-12 | Stop reason: SDUPTHER

## 2020-11-02 ENCOUNTER — OFFICE VISIT (OUTPATIENT)
Dept: ORTHOPEDIC SURGERY | Age: 60
End: 2020-11-02
Payer: COMMERCIAL

## 2020-11-02 VITALS — WEIGHT: 222 LBS | HEIGHT: 70 IN | BODY MASS INDEX: 31.78 KG/M2

## 2020-11-02 PROBLEM — M65.341 TRIGGER FINGER, RIGHT RING FINGER: Status: ACTIVE | Noted: 2020-11-02

## 2020-11-02 PROBLEM — M65.331 TRIGGER FINGER, RIGHT MIDDLE FINGER: Status: ACTIVE | Noted: 2020-11-02

## 2020-11-02 PROCEDURE — 1036F TOBACCO NON-USER: CPT | Performed by: ORTHOPAEDIC SURGERY

## 2020-11-02 PROCEDURE — G8484 FLU IMMUNIZE NO ADMIN: HCPCS | Performed by: ORTHOPAEDIC SURGERY

## 2020-11-02 PROCEDURE — G8427 DOCREV CUR MEDS BY ELIG CLIN: HCPCS | Performed by: ORTHOPAEDIC SURGERY

## 2020-11-02 PROCEDURE — 99214 OFFICE O/P EST MOD 30 MIN: CPT | Performed by: ORTHOPAEDIC SURGERY

## 2020-11-02 PROCEDURE — 3017F COLORECTAL CA SCREEN DOC REV: CPT | Performed by: ORTHOPAEDIC SURGERY

## 2020-11-02 PROCEDURE — G8417 CALC BMI ABV UP PARAM F/U: HCPCS | Performed by: ORTHOPAEDIC SURGERY

## 2020-11-02 NOTE — PROGRESS NOTES
Chief Complaint:  New Patient (Right Middle finger: TF )      History of Present of Illness: The patient returns and reports that ultimately he did very well after trigger finger release on his opposite left hand. He has now been noticing 2 months of increasing triggering to the right ring finger and now more recently the right middle finger. He has been awakening at nighttime with his fingers locked down. He does not report any significant numbness and tingling on the right hand. Review of Systems  Pertinent items are noted in HPI  Denies fever, chills, confusion, bowel/bladder active change. Review of systems reviewed from Patient History Form dated on 5/17/2019 and available in the patient's chart under the Media tab. Examination:  On exam today he demonstrates significant stiffness to the right ring finger with grade 2 triggering to the ring and long fingers. He is able to make a full fist but he has an early flexion contracture to the ring finger PIP joint. Fingers are perfused and sensate and provocative testing for carpal tunnel syndrome does not readily reproduce any numbness in the hand on the right. Radiology:     X-rays obtained and reviewed in office:  Views    Location    Impression      No orders of the defined types were placed in this encounter. Impression:  Encounter Diagnosis   Name Primary?  Trigger finger, right middle finger          Treatment Plan:  I discussed with him the options regarding his right middle and ring fingers. He would like to move forward with definitive outpatient surgery with right middle and ring trigger releases using local anesthetic with sedation. If necessary we can use outpatient postop therapy given his history of some postop stiffness in his experience on the opposite left hand. We discussed the risks, benefits, limitations, alternatives, and postop recovery following the proposed procedure.  We will begin the process of scheduling surgery if there are no other preoperative medical contraindications. Please note that this transcription was created using voice recognition software. Any errors are unintentional and may be due to voice recognition transcription.

## 2020-11-05 ENCOUNTER — TELEPHONE (OUTPATIENT)
Dept: PHARMACY | Facility: CLINIC | Age: 60
End: 2020-11-05

## 2020-11-11 ENCOUNTER — TELEPHONE (OUTPATIENT)
Dept: ORTHOPEDIC SURGERY | Age: 60
End: 2020-11-11

## 2020-11-12 NOTE — PROGRESS NOTES
Marguerite Pyle    Age 63 y.o.    male    1960    [facility] [id number]    11/23/2020  Arrival Time_____________  OR Time____________30 [address]     Procedure(s):  RIGHT LONG AND RING TRIGGER FINGER RELEASES                      Monitor Anesthesia Care    Surgeon(s):  Bridget Ford, MD       Phone [phone] (home) [phone] (work)    [address] [place] [address]  Cell Work  _________________________________________________________________  _________________________________________________________________  _________________________________________________________________  _________________________________________________________________  _________________________________________________________________      PCP _____________________________ Phone_________________       H&P__________________Bringing    Chart            Epic  DOS     Called_______  EKG__________________Bringing    Chart            Epic  DOS     Called_______  LAB__________________ Bringing    Chart            Epic  DOS     Called_______  Cardiac Clearance_______Bringing    Chart            Epic      DOS       Called_______    Cardiologist________________________ Phone___________________________      ? Baptism concerns / Waiver on Chart            PAT Communications_____________  ? Pre-op Instructions Given [place]          ______________________________  ? Directions to Surgery Center                          ______________________________  ? Transportation Home_______________      _______________________________  ?  Crutches/Walker__________________        _______________________________      ________Pre-op Orders   _______Transcribed    _______Wt.  ________Pharmacy          _______SCD  ______VTE     ______Beta Blocker  ________Consent             ________TED Scherry Bull

## 2020-11-16 ENCOUNTER — OFFICE VISIT (OUTPATIENT)
Dept: FAMILY MEDICINE CLINIC | Age: 60
End: 2020-11-16
Payer: COMMERCIAL

## 2020-11-16 VITALS
HEIGHT: 70 IN | HEART RATE: 72 BPM | DIASTOLIC BLOOD PRESSURE: 74 MMHG | SYSTOLIC BLOOD PRESSURE: 122 MMHG | BODY MASS INDEX: 31.35 KG/M2 | WEIGHT: 219 LBS | TEMPERATURE: 97.5 F | OXYGEN SATURATION: 96 %

## 2020-11-16 LAB
CREATININE URINE POCT: 100
HBA1C MFR BLD: 10.6 %
HCT VFR BLD CALC: 41.5 % (ref 40.5–52.5)
HEMOGLOBIN: 14 G/DL (ref 13.5–17.5)
MCH RBC QN AUTO: 30.3 PG (ref 26–34)
MCHC RBC AUTO-ENTMCNC: 33.7 G/DL (ref 31–36)
MCV RBC AUTO: 89.8 FL (ref 80–100)
MICROALBUMIN/CREAT 24H UR: 10 MG/G{CREAT}
MICROALBUMIN/CREAT UR-RTO: 30
PDW BLD-RTO: 13.7 % (ref 12.4–15.4)
PLATELET # BLD: 334 K/UL (ref 135–450)
PMV BLD AUTO: 8.7 FL (ref 5–10.5)
RBC # BLD: 4.62 M/UL (ref 4.2–5.9)
WBC # BLD: 8.1 K/UL (ref 4–11)

## 2020-11-16 PROCEDURE — 1036F TOBACCO NON-USER: CPT | Performed by: NURSE PRACTITIONER

## 2020-11-16 PROCEDURE — G8417 CALC BMI ABV UP PARAM F/U: HCPCS | Performed by: NURSE PRACTITIONER

## 2020-11-16 PROCEDURE — 3046F HEMOGLOBIN A1C LEVEL >9.0%: CPT | Performed by: NURSE PRACTITIONER

## 2020-11-16 PROCEDURE — 2022F DILAT RTA XM EVC RTNOPTHY: CPT | Performed by: NURSE PRACTITIONER

## 2020-11-16 PROCEDURE — 3017F COLORECTAL CA SCREEN DOC REV: CPT | Performed by: NURSE PRACTITIONER

## 2020-11-16 PROCEDURE — 83036 HEMOGLOBIN GLYCOSYLATED A1C: CPT | Performed by: NURSE PRACTITIONER

## 2020-11-16 PROCEDURE — 99214 OFFICE O/P EST MOD 30 MIN: CPT | Performed by: NURSE PRACTITIONER

## 2020-11-16 PROCEDURE — G8427 DOCREV CUR MEDS BY ELIG CLIN: HCPCS | Performed by: NURSE PRACTITIONER

## 2020-11-16 PROCEDURE — G8482 FLU IMMUNIZE ORDER/ADMIN: HCPCS | Performed by: NURSE PRACTITIONER

## 2020-11-16 PROCEDURE — 36415 COLL VENOUS BLD VENIPUNCTURE: CPT | Performed by: NURSE PRACTITIONER

## 2020-11-16 PROCEDURE — 82044 UR ALBUMIN SEMIQUANTITATIVE: CPT | Performed by: NURSE PRACTITIONER

## 2020-11-16 PROCEDURE — 90686 IIV4 VACC NO PRSV 0.5 ML IM: CPT | Performed by: NURSE PRACTITIONER

## 2020-11-16 PROCEDURE — 90471 IMMUNIZATION ADMIN: CPT | Performed by: NURSE PRACTITIONER

## 2020-11-16 PROCEDURE — 93000 ELECTROCARDIOGRAM COMPLETE: CPT | Performed by: NURSE PRACTITIONER

## 2020-11-16 ASSESSMENT — ENCOUNTER SYMPTOMS
SHORTNESS OF BREATH: 0
VOMITING: 0
COUGH: 0
NAUSEA: 0
DIARRHEA: 0
BACK PAIN: 0

## 2020-11-16 NOTE — PATIENT INSTRUCTIONS
Stop Aspirin one week before surgery    Eat and drink nothing after midnight the night before the planned procedure. Stop all aspirin, ibuprofen, aleve (tylenol/acetaminophen is ok) for seven days prior to the procedure.     Appointment with cardiology this Friday for clearance due to abnormal EKG

## 2020-11-16 NOTE — PROGRESS NOTES
[facility]   Telephone: [phone]  Fax: [phone]  Preoperative History & Physical        DIAGNOSIS:  Trigger finger    PROCEDURE:  Right long and ring finger trigger release      History Obtained From:  patient    HISTORY OF PRESENT ILLNESS:    The patient is a 63 y.o. male with significant past medical history of CVA, HLD, HTN, DARRICK not on CPAP, peripheral neuropathy, diabetes mellitus, obesity who presents for preoperative examination for above procedure. Surgery is scheduled for 11/23/2020 with Dr. Sulma Armando at [facility]. COVID test scheduled for tomorrow morning. HTN: well controlled    DM: poorly controlled, hemoglobin a1c 10. 6. managed by endocrinologist (Dr. Usman Dozier).         Past Medical History:   Diagnosis Date    Acute, but ill-defined, cerebrovascular disease 5/31/2013    Cerebral artery occlusion with cerebral infarction [facility]) 2013     X2 PERSONALITY CHANGE, ANGER OUTBURSTS    Cerebrovascular disease     Diplopia 7/11/2013    ED (erectile dysfunction) 1/23/2014    Elbow pain, chronic 12/16/2015    Hyperlipidemia     Hypertension     Irritable bowel syndrome     Obstructive sleep apnea (adult) (pediatric) 07/01/2013    NO CPAP, HAD PROBLEMS WITH INSURANCE AND STOPPED USING    Occlusion and stenosis of carotid artery without mention of cerebral infarction 01/13/2014    MINOR    Pain in joint, upper arm 5/14/2015    Polyneuropathy in diabetes(357.2) 7/1/2013    PONV (postoperative nausea and vomiting)     Skin abnormality     PRE-CANCEROUS LESIONS REMOVED FROM ARMS AND EARS    Type 2 diabetes mellitus with diabetic polyneuropathy, with long-term current use of insulin ([facility] [address] 7/1/2013    Type II or unspecified type diabetes mellitus without mention of complication, not stated as uncontrolled      Past Surgical History:   Procedure Laterality Date    CARPAL TUNNEL RELEASE Left 2008    CERVICAL DISCECTOMY  08/02/2017    ANTERIOR CERVICAL DISCECTOMY AND FUSION C5-6, C6-7 WITH MEDTRONIC PLATES, SCREWS, ALLOGRAFT, WITH EVOKES PARTIAL C6 CORPECTOMY    CERVICAL FUSION  12/06/2017    CERVICAL LAMINECTOMY AND POSTERIOR FUSION C5-T1 WITH MEDTRONIC RODS AND SCREWS WITH EVOKES AND O-ARM    CHOLECYSTECTOMY  1995    COLONOSCOPY N/A 8/14/2018    COLONOSCOPY POLYPECTOMY SNARE/COLD BIOPSY performed by David Dwyer MD at [address] Left 2008    ULNAR NERVE TRANSPOSITION, AT SAME TIME AS CTR    FINGER TRIGGER RELEASE Left 12/12/2019    LEFT RING TRIGGER FINGER RELEASE performed by Ramo Alvarez MD at [address] [place] [address]    umbilical    PYLOROPLASTY      PYLORIC STENOSIS AS INFANT     Current Outpatient Medications   Medication Sig Dispense Refill    nystatin (MYCOSTATIN) 901304 UNIT/GM ointment Apply topically 2 times daily to corners of mouth. 30 g 1    insulin regular human (HUMULIN R U-500 KWIKPEN) 500 UNIT/ML SOPN concentrated injection pen INJECT 80 UNITS BEFORE BREAKFAST, INJECT 60 UNITS BEFORE LUNCH AND INJECT 60 UNITS BEFORE DINNER 15 pen 2    metroNIDAZOLE (METROGEL) 1 % gel Apply to face daily.  60 g 3    omega-3 acid ethyl esters (LOVAZA) 1 g capsule TAKE TWO CAPSULES BY MOUTH TWICE A  capsule 3    Vitamin D (CHOLECALCIFEROL) 25 MCG (1000 UT) TABS tablet TAKE 1 TABLET BY MOUTH ONE TIME A DAY 90 tablet 3    ASPIRIN ADULT LOW STRENGTH 81 MG EC tablet TAKE 1 TABLET BY MOUTH ONE TIME A DAY 90 tablet 3    lisinopril-hydroCHLOROthiazide (PRINZIDE;ZESTORETIC) 20-12.5 MG per tablet TAKE 1 TABLET BY MOUTH ONE TIME A DAY 90 tablet 3    fenofibrate (TRICOR) 145 MG tablet TAKE 1 TABLET BY MOUTH ONE TIME A DAY 90 tablet 3    buPROPion (WELLBUTRIN XL) 150 MG extended release tablet Take 1 tablet by mouth every morning 90 tablet 3    metFORMIN (GLUCOPHAGE) 1000 MG tablet TAKE ONE TABLET BY MOUTH TWICE A  tablet 3    DULoxetine (CYMBALTA) 60 MG extended release capsule Take 1 capsule by mouth 2 times daily 180 capsule 2    atenolol (TENORMIN) 25 MG tablet TAKE 1 TABLET BY MOUTH EVERY NIGHT AT BEDTIME 90 tablet 3    ibuprofen (ADVIL;MOTRIN) 600 MG tablet Take 1 tablet by mouth every 6 hours as needed for Pain 60 tablet 1    Insulin Pen Needle (B-D UF III MINI PEN NEEDLES) 31G X 5 MM MISC 1 each by Does not apply route 3 times daily 300 each 6    PRODIGY LANCETS 28G MISC Use as directed to test up to 3-4 times daily 400 each 3    blood glucose test strips (PRODIGY NO CODING BLOOD GLUC) strip Use as directed to test up to 3-4 times daily 400 each 3     No current facility-administered medications for this visit. Allergies:  Patient has no known allergies. History of allergic reaction to anesthesia:  No  Planned anesthesia: General   Known anesthesia problems:PONV  Bleeding risk: No recent or remote history of abnormal bleeding  Personal or FH of DVT/PE: No    Patient objection to receiving blood products: No    Social History     Tobacco Use   Smoking Status Former Smoker    Packs/day: 1.00    Years: 25.00    Pack years: 25.00    Types: Cigarettes    Last attempt to quit: 2000    Years since quittin.8   Smokeless Tobacco Never Used       Family History   Problem Relation Age of Onset    Cancer Father         melanoma, face    Cancer Paternal Uncle         melanoma, face    Cancer Brother         skin, NMSC    Depression Maternal Cousin     No Known Problems Mother        REVIEW OF SYSTEMS:    Review of Systems   Constitutional: Negative for fatigue and fever. Respiratory: Negative for cough and shortness of breath. Cardiovascular: Negative for chest pain and leg swelling. Gastrointestinal: Negative for diarrhea, nausea and vomiting. Musculoskeletal: Positive for arthralgias. Negative for back pain and gait problem. Neurological: Negative for dizziness and headaches.      PHYSICAL EXAM:    /74 (Site: Left Upper Arm, Position: Sitting, Cuff Size: Medium Adult)   Pulse 72 Temp 97.5 °F (36.4 °C)   Ht 5' 10\" (1.778 m)   Wt 219 lb (99.3 kg)   SpO2 96%   BMI 31.42 kg/m²   Physical Exam   Constitutional: He is oriented to person, place, and time. He appears well-developed and well-nourished. No distress. BMI 31.42   HENT:   Head: Normocephalic. Cardiovascular: Normal rate, regular rhythm and normal heart sounds. Exam reveals no gallop and no friction rub. No murmur heard. Pulmonary/Chest: Effort normal and breath sounds normal. No respiratory distress. He has no wheezes. He has no rales. Musculoskeletal:        Hands:    Neurological: He is alert and oriented to person, place, and time. No cranial nerve deficit. Skin: Skin is warm and dry. He is not diaphoretic. Nursing note and vitals reviewed. DATA:  EKG:  Date:  11/16/2020  I have reviewed EKG with the following interpretation:  Impression:  Sinus tachycardia frequent ventricular ectopic beats      ASSESSMENT AND PLAN:  1. Trigger ring finger of right hand  2. Trigger middle finger of right hand  3. Type 2 diabetes mellitus with diabetic polyneuropathy, with long-term current use of insulin (HCC)  - POCT microalbumin  - POCT glycosylated hemoglobin (Hb A1C)  4. Essential hypertension  - CBC  - Comprehensive Metabolic Panel  - TSH without Reflex  5. Mixed hyperlipidemia  - Lipid Panel  6. Dysthymia  7. Class 1 obesity due to excess calories with serious comorbidity and body mass index (BMI) of 32.0 to 32.9 in adult  8. Prostate cancer screening  - PSA  9. Other problems related to lifestyle  - HIV Screen  - Hepatitis C Antibody  10. Immunization due  - INFLUENZA, QUADV, 3 YRS AND OLDER, IM PF, PREFILL SYR OR SDV, 0.5ML (AFLURIA QUADV, PF)  11. Preop examination  - EKG 12 Lead  - [facility]  12. Abnormal EKG  - [facility]      1. Preoperative workup as follows: ECG  2.  Change in medication regimen before surgery: Discontinue ASA 7 days before surgery  3. Prophylaxis for cardiac events with perioperative beta-blockers: Currently taking  atenolol  ACC/AHA indications for pre-operative beta-blocker use:    · Vascular surgery with history of postitive stress test  · Intermediate or high risk surgery with history of CAD   · Intermediate or high risk surgery with multiple clinical predictors of CAD- 2 of the following: history of compensated or prior heart failure, history of cerebrovascular disease, DM, or renal insufficiency    Routine administration of higher-dose, long-acting metoprolol in beta-blocker-naïve patients on the day of surgery, and in the absence of dose titration is associated with an overall increase in mortality. Beta-blockers should be started days to weeks prior to surgery and titrated to pulse < 70.  4. Deep vein thrombosis prophylaxis: regimen to be chosen by surgical team  5. Referred to cardiology for abnormal EKG      Blanca Mccurdy CNP    [address]  [phone]

## 2020-11-17 ENCOUNTER — OFFICE VISIT (OUTPATIENT)
Dept: PRIMARY CARE CLINIC | Age: 60
End: 2020-11-17
Payer: COMMERCIAL

## 2020-11-17 LAB
A/G RATIO: 1.6 (ref 1.1–2.2)
ALBUMIN SERPL-MCNC: 4.4 G/DL (ref 3.4–5)
ALP BLD-CCNC: 141 U/L (ref 40–129)
ALT SERPL-CCNC: 74 U/L (ref 10–40)
ANION GAP SERPL CALCULATED.3IONS-SCNC: 13 MMOL/L (ref 3–16)
AST SERPL-CCNC: 49 U/L (ref 15–37)
BILIRUB SERPL-MCNC: <0.2 MG/DL (ref 0–1)
BUN BLDV-MCNC: 18 MG/DL (ref 7–20)
CALCIUM SERPL-MCNC: 10 MG/DL (ref 8.3–10.6)
CHLORIDE BLD-SCNC: 99 MMOL/L (ref 99–110)
CHOLESTEROL, TOTAL: 263 MG/DL (ref 0–199)
CO2: 26 MMOL/L (ref 21–32)
CREAT SERPL-MCNC: 0.9 MG/DL (ref 0.8–1.3)
GFR AFRICAN AMERICAN: >60
GFR NON-AFRICAN AMERICAN: >60
GLOBULIN: 2.7 G/DL
GLUCOSE BLD-MCNC: 398 MG/DL (ref 70–99)
HDLC SERPL-MCNC: 40 MG/DL (ref 40–60)
HEPATITIS C ANTIBODY INTERPRETATION: NORMAL
HIV AG/AB: NORMAL
HIV ANTIGEN: NORMAL
HIV-1 ANTIBODY: NORMAL
HIV-2 AB: NORMAL
LDL CHOLESTEROL CALCULATED: ABNORMAL MG/DL
LDL CHOLESTEROL DIRECT: 148 MG/DL
POTASSIUM SERPL-SCNC: 5.1 MMOL/L (ref 3.5–5.1)
PROSTATE SPECIFIC ANTIGEN: 0.56 NG/ML (ref 0–4)
SODIUM BLD-SCNC: 138 MMOL/L (ref 136–145)
TOTAL PROTEIN: 7.1 G/DL (ref 6.4–8.2)
TRIGL SERPL-MCNC: 524 MG/DL (ref 0–150)
TSH SERPL DL<=0.05 MIU/L-ACNC: 1.54 UIU/ML (ref 0.27–4.2)
VLDLC SERPL CALC-MCNC: ABNORMAL MG/DL

## 2020-11-17 PROCEDURE — 99211 OFF/OP EST MAY X REQ PHY/QHP: CPT | Performed by: NURSE PRACTITIONER

## 2020-11-17 NOTE — PATIENT INSTRUCTIONS

## 2020-11-18 LAB — SARS-COV-2: NOT DETECTED

## 2020-11-19 NOTE — PROGRESS NOTES
[facility]   Cardiac Consultation    Referring Provider:  MADISON Levin CNP     Chief Complaint   Patient presents with    New Patient    Pre-op Exam    Hypertension    Hyperlipidemia    Other     abnormal EKG        History of Present Illness:   Charan Barker is a 63 y.o. male who is here today as a NEW patient consult for cardiology, referred by MADISON Levin CNP for an abnormal EKG and preoperative cardiac evaluation for trigger finger surgery this coming Monday. Family history of heart diease in father. Today he states he feels well overall. No chest pain or SOB. He had been walking for exercise prior to [facility]. He avoids taking stairs due his his history of neck surgeries with associated leg weakness. He is able to walk on a small hill in his yard with no issues. He has some dizziness at times when he stands too fast. Patient currently denies any weight gain, edema, palpitations, chest pain, shortness of breath, and syncope. Past Medical History:   has a past medical history of Acute, but ill-defined, cerebrovascular disease, Cerebral artery occlusion with cerebral infarction [facility]), Cerebrovascular disease, Diplopia, ED (erectile dysfunction), Elbow pain, chronic, Hyperlipidemia, Hypertension, Irritable bowel syndrome, Obstructive sleep apnea (adult) (pediatric), Occlusion and stenosis of carotid artery without mention of cerebral infarction, Pain in joint, upper arm, Polyneuropathy in diabetes(357.2), PONV (postoperative nausea and vomiting), Skin abnormality, Type 2 diabetes mellitus with diabetic polyneuropathy, with long-term current use of insulin ([facility] [address] and Type II or unspecified type diabetes mellitus without mention of complication, not stated as uncontrolled. Surgical History:   has a past surgical history that includes Cholecystectomy (1995); Carpal tunnel release (Left, 2008); Elbow surgery (Left, 2008); hernia repair (2009);  Pyloroplasty; Cervical ONE TABLET BY MOUTH TWICE A DAY 2/6/20  Yes Jonathan Campos MD   DULoxetine (CYMBALTA) 60 MG extended release capsule Take 1 capsule by mouth 2 times daily 1/15/20  Yes MADISON Robles - CNP   atenolol (TENORMIN) 25 MG tablet TAKE 1 TABLET BY MOUTH EVERY NIGHT AT BEDTIME 1/15/20  Yes [facility] Frank, APRN - CNP   ibuprofen (ADVIL;MOTRIN) 600 MG tablet Take 1 tablet by mouth every 6 hours as needed for Pain 12/12/19  Yes Susanne Schmid MD   Insulin Pen Needle (B-D UF III MINI PEN NEEDLES) 31G X 5 MM MISC 1 each by Does not apply route 3 times daily 12/11/19  Yes Jonathan Campos MD   PRODIGY LANCETS 28G MISC Use as directed to test up to 3-4 times daily 3/15/19  Yes Jonathan Campos MD   blood glucose test strips (PRODIGY NO CODING BLOOD GLUC) strip Use as directed to test up to 3-4 times daily 3/15/19  Yes Jonathan Campos MD        Allergies:  Patient has no known allergies. Review of Systems:   · Constitutional: there has been no unanticipated weight loss. There's been no change in energy level, sleep pattern, or activity level. · Eyes: No visual changes or diplopia. No scleral icterus. · ENT: No Headaches, hearing loss or vertigo. No mouth sores or sore throat. · Cardiovascular: Reviewed in HPI  · Respiratory: No cough or wheezing, no sputum production. No hematemesis. · Gastrointestinal: No abdominal pain, appetite loss, blood in stools. No change in bowel or bladder habits. · Genitourinary: No dysuria, trouble voiding, or hematuria. · Musculoskeletal:  No gait disturbance, weakness or joint complaints. · Integumentary: No rash or pruritis. · Neurological: No headache, diplopia, change in muscle strength, numbness or tingling. No change in gait, balance, coordination, mood, affect, memory, mentation, behavior. · Psychiatric: No anxiety, no depression. · Endocrine: No malaise, fatigue or temperature intolerance. No excessive thirst, fluid intake, or urination.  No tremor. · Hematologic/Lymphatic: No abnormal bruising or bleeding, blood clots or swollen lymph nodes. · Allergic/Immunologic: No nasal congestion or hives. Physical Examination:    Vitals:    11/20/20 0757   BP: 135/82   Pulse: 99   SpO2: 99%        Constitutional and General Appearance: NAD   Respiratory:  · Normal excursion and expansion without use of accessory muscles  · Resp Auscultation: Normal breath sounds without dullness  Cardiovascular:  · The apical impulses not displaced  · Heart tones are crisp and normal  · Cervical veins are not engorged  · The carotid upstroke is normal in amplitude and contour without delay or bruit  · Normal S1S2, No S3, No Murmur  · Peripheral pulses are symmetrical and full  · There is no clubbing, cyanosis of the extremities. · No edema  · Femoral Arteries: 2+ and equal  · Pedal Pulses: 2+ and equal   Abdomen:  · No masses or tenderness  · Liver/Spleen: No Abnormalities Noted  Neurological/Psychiatric:  · Alert and oriented in all spheres  · Moves all extremities well  · Exhibits normal gait balance and coordination  · No abnormalities of mood, affect, memory, mentation, or behavior are noted    Carotid dopplers 6/4/13  IMPRESSION:   1. Mild bilateral internal carotid artery stenosis, greatest at the right internal carotid artery, estimated 40 to 59%. 2. Patent bilateral vertebral arteries. CTA neck 2014  IMPRESSION CTA NECK:   1. Mild bilateral proximal internal carotid artery plaque, without evidence of hemodynamically significant stenosis. 2. Patent bilateral vertebral arteries.      EKG 11/16/2020  Sinus  Tachycardia  - frequent ectopic ventricular beat s   # VECs = 3  -RSR(V1) -nondiagnostic    Assessment:   Preoperative cardiac evaluation for evaluation for trigger finger surgery - low risk  Carotid artery diease - no follow up since 2013  Abnormal EKG - reviewed w/ patient  Hypertension - controlled  Hyperlipidemia - poor control  Diabetes Mellitus   Sleep apnea   History of CVA 2013  Former smoker - 20 years ago   Family history of heart disease in father   History of neck pain and surgery     Plan:  [address] [address] for surgery   Stress test- GXT Myoview (multiple risk factors, abnormal EKG, peripheral artery disease)   Echocardiogram   Carotid dopplers   Start Lipitor 40 mg daily   Fasting lipids in 2 months after starting Lipitor   Continue other medications   Check blood pressure at home weekly  Regular exercise and following a healthy diet encouraged   Follow up with me in 2 months     Scribe's attestation: This note was scribed in the presence of Dr. Gisel Damon M.D. By Kathy Bucio RN    The scribes documentation has been prepared under my direction and personally reviewed by me in its entirety. I confirm that the note above accurately reflects all work, treatment, procedures, and medical decision making performed by me. Dr. Gisel Damon MD    Thank you for allowing me to participate in the care of this individual.      Sienna Rueda.  Wiliam Phillips M.D., [facility]

## 2020-11-20 ENCOUNTER — OFFICE VISIT (OUTPATIENT)
Dept: CARDIOLOGY CLINIC | Age: 60
End: 2020-11-20
Payer: COMMERCIAL

## 2020-11-20 VITALS
DIASTOLIC BLOOD PRESSURE: 82 MMHG | WEIGHT: 215 LBS | HEART RATE: 99 BPM | SYSTOLIC BLOOD PRESSURE: 135 MMHG | OXYGEN SATURATION: 99 % | HEIGHT: 70 IN | BODY MASS INDEX: 30.78 KG/M2

## 2020-11-20 PROBLEM — I65.23 BILATERAL CAROTID ARTERY STENOSIS: Status: ACTIVE | Noted: 2020-11-20

## 2020-11-20 PROCEDURE — G8427 DOCREV CUR MEDS BY ELIG CLIN: HCPCS | Performed by: INTERNAL MEDICINE

## 2020-11-20 PROCEDURE — G8417 CALC BMI ABV UP PARAM F/U: HCPCS | Performed by: INTERNAL MEDICINE

## 2020-11-20 PROCEDURE — 99244 OFF/OP CNSLTJ NEW/EST MOD 40: CPT | Performed by: INTERNAL MEDICINE

## 2020-11-20 PROCEDURE — G8482 FLU IMMUNIZE ORDER/ADMIN: HCPCS | Performed by: INTERNAL MEDICINE

## 2020-11-20 PROCEDURE — 93000 ELECTROCARDIOGRAM COMPLETE: CPT | Performed by: INTERNAL MEDICINE

## 2020-11-20 RX ORDER — ATORVASTATIN CALCIUM 40 MG/1
40 TABLET, FILM COATED ORAL DAILY
Qty: 30 TABLET | Refills: 11 | Status: SHIPPED | OUTPATIENT
Start: 2020-11-20 | End: 2020-12-29 | Stop reason: SDUPTHER

## 2020-11-20 NOTE — PATIENT INSTRUCTIONS
Plan:  [address] [address] for surgery   Stress test- GXT Myoview   Echocardiogram   Carotid dopplers   Start Lipitor 40 mg daily   Fasting lipids in 2 months after starting Lipitor   Continue other medications   Check blood pressure at home weekly  Regular exercise and following a healthy diet encouraged   Follow up with me in 2 months      Your provider has ordered testing for further evaluation. An order/prescription has been included in your paper work.  To schedule outpatient testing, contact Central Scheduling by calling [employer] ([phone]).

## 2020-11-20 NOTE — LETTER
[address]  [place] [address] [place] [address]  Phone: [phone]  Fax: [phone]    Yenifer Zhu MD        November 20, 2020     Sonia Calderon  [address] [address]  1960      To whom it may concern,           Sonia Calderon has no cardiac contraindications to surgery. If you have any questions or concerns, please don't hesitate to call.     Sincerely,        Yenifer Zhu MD

## 2020-11-21 ENCOUNTER — ANESTHESIA EVENT (OUTPATIENT)
Dept: OPERATING ROOM | Age: 60
End: 2020-11-21
Payer: COMMERCIAL

## 2020-11-22 NOTE — H&P
I have reviewed the H&P and examined the patient and find no relevant changes. I have reviewed with the patient and/or family the risks, benefits, and alternatives to the procedure(s).     Past Medical History:   Diagnosis Date    Acute, but ill-defined, cerebrovascular disease 5/31/2013    Cerebral artery occlusion with cerebral infarction [facility]) 2013     X2 PERSONALITY CHANGE, ANGER OUTBURSTS    Cerebrovascular disease     Diplopia 7/11/2013    ED (erectile dysfunction) 1/23/2014    Elbow pain, chronic 12/16/2015    Hyperlipidemia     Hypertension     Irritable bowel syndrome     Obstructive sleep apnea (adult) (pediatric) 07/01/2013    NO CPAP, HAD PROBLEMS WITH INSURANCE AND STOPPED USING    Occlusion and stenosis of carotid artery without mention of cerebral infarction 01/13/2014    MINOR    Pain in joint, upper arm 5/14/2015    Polyneuropathy in diabetes(357.2) 7/1/2013    PONV (postoperative nausea and vomiting)     Skin abnormality     PRE-CANCEROUS LESIONS REMOVED FROM ARMS AND EARS    Type 2 diabetes mellitus with diabetic polyneuropathy, with long-term current use of insulin ([facility] [address] 7/1/2013    Type II or unspecified type diabetes mellitus without mention of complication, not stated as uncontrolled      Past Surgical History:   Procedure Laterality Date    CARPAL TUNNEL RELEASE Left 2008    CERVICAL DISCECTOMY  08/02/2017    ANTERIOR CERVICAL DISCECTOMY AND FUSION C5-6, C6-7 WITH MEDTRONIC PLATES, SCREWS, ALLOGRAFT, WITH EVOKES PARTIAL C6 CORPECTOMY    CERVICAL FUSION  12/06/2017    CERVICAL LAMINECTOMY AND POSTERIOR FUSION C5-T1 WITH MEDTRONIC RODS AND SCREWS WITH EVOKES AND O-ARM    CHOLECYSTECTOMY  1995    COLONOSCOPY N/A 8/14/2018    COLONOSCOPY POLYPECTOMY SNARE/COLD BIOPSY performed by Estelita Lucero MD at [address] [facility] Left 2008    ULNAR NERVE TRANSPOSITION, AT SAME TIME AS CTR    FINGER TRIGGER RELEASE Left 12/12/2019    LEFT RING TRIGGER FINGER RELEASE performed by Carlos Fitzpatrick MD at [address] [place] [address]    umbilical    PYLOROPLASTY      PYLORIC STENOSIS AS INFANT     No current facility-administered medications on file prior to encounter. Current Outpatient Medications on File Prior to Encounter   Medication Sig Dispense Refill    nystatin (MYCOSTATIN) 245047 UNIT/GM ointment Apply topically 2 times daily to corners of mouth. 30 g 1    insulin regular human (HUMULIN R U-500 KWIKPEN) 500 UNIT/ML SOPN concentrated injection pen INJECT 80 UNITS BEFORE BREAKFAST, INJECT 60 UNITS BEFORE LUNCH AND INJECT 60 UNITS BEFORE DINNER 15 pen 2    metroNIDAZOLE (METROGEL) 1 % gel Apply to face daily.  60 g 3    omega-3 acid ethyl esters (LOVAZA) 1 g capsule TAKE TWO CAPSULES BY MOUTH TWICE A  capsule 3    Vitamin D (CHOLECALCIFEROL) 25 MCG (1000 UT) TABS tablet TAKE 1 TABLET BY MOUTH ONE TIME A DAY 90 tablet 3    ASPIRIN ADULT LOW STRENGTH 81 MG EC tablet TAKE 1 TABLET BY MOUTH ONE TIME A DAY 90 tablet 3    lisinopril-hydroCHLOROthiazide (PRINZIDE;ZESTORETIC) 20-12.5 MG per tablet TAKE 1 TABLET BY MOUTH ONE TIME A DAY 90 tablet 3    fenofibrate (TRICOR) 145 MG tablet TAKE 1 TABLET BY MOUTH ONE TIME A DAY 90 tablet 3    buPROPion (WELLBUTRIN XL) 150 MG extended release tablet Take 1 tablet by mouth every morning 90 tablet 3    metFORMIN (GLUCOPHAGE) 1000 MG tablet TAKE ONE TABLET BY MOUTH TWICE A  tablet 3    DULoxetine (CYMBALTA) 60 MG extended release capsule Take 1 capsule by mouth 2 times daily 180 capsule 2    atenolol (TENORMIN) 25 MG tablet TAKE 1 TABLET BY MOUTH EVERY NIGHT AT BEDTIME 90 tablet 3    ibuprofen (ADVIL;MOTRIN) 600 MG tablet Take 1 tablet by mouth every 6 hours as needed for Pain 60 tablet 1    Insulin Pen Needle (B-D UF III MINI PEN NEEDLES) 31G X 5 MM MISC 1 each by Does not apply route 3 times daily 300 each 6    PRODIGY LANCETS 28G MISC Use as directed to test up to 3-4 times daily 400 each 3    blood glucose test strips (PRODIGY NO CODING BLOOD GLUC) strip Use as directed to test up to 3-4 times daily 400 each 3       The patient was counseled at length about the risks of brenda Covid-19 during their perioperative period and any recovery window from their procedure. The patient was made aware that brenda Covid-19  may worsen their prognosis for recovering from their procedure  and lend to a higher morbidity and/or mortality risk. All material risks, benefits, and reasonable alternatives including postponing the procedure were discussed. The patient does wish to proceed with the procedure at this time.           Shelbie Cleveland 134

## 2020-11-22 NOTE — OP NOTE
[address] [facility]  Procedure Note  [address] [facility]  11/23/2020    PREOPERATIVE DIAGNOSIS(ES)   Right Middle Finger and Ring Finger trigger digit     POSTOPERATIVE DIAGNOSIS(ES)   Same    PROCEDURE(S)     1. Right Middle Finger trigger release   2. Right Ring Finger trigger release    [address] with MAC    EBL: less than 69YS    COMPLICATIONS None    INDICATIONS FOR PROCEDURE The patient has clinical evidence of triggering of the affected digit(s) and has failed appropriate conservative management. The patient understands the relevant risks, benefits, limitations, and healing process of the procedure. PROCEDURE The patient was brought to the operating room and anesthetized with local anesthetic and MAC sedation. The identified and marked extremity was then prepped and draped in a standard fashion. A well-padded tourniquet was applied to the upper arm. The arm was exsanguinated and the tourniquet elevated to 250 mmHg. A standard transverse incision was made at the A-1 pulley level of the affected digit(s). Dissection was carried down carefully through the skin and subcutaneous tissue. Care was taken to protect the digital neurovascular bundles on both sides of the identified A-1 pulley(s). In each affected digit the pulley was incised longitudinally under direct visualization. Complete proximal and distal release was completed. The flexor tendon structures were withdrawn with a retractor and demonstrated full passive excursion. The tourniquet was released and the wound(s) irrigated with sterile saline. Skin was closed with Nylon suture. A soft, bulky dressing was applied and the patient transported to the recovery area in stable condition with good perfusion to all fingertips and no active triggering.         Shelbie Cleveland 134

## 2020-11-23 ENCOUNTER — ANESTHESIA (OUTPATIENT)
Dept: OPERATING ROOM | Age: 60
End: 2020-11-23
Payer: COMMERCIAL

## 2020-11-23 ENCOUNTER — HOSPITAL ENCOUNTER (OUTPATIENT)
Age: 60
Setting detail: OUTPATIENT SURGERY
Discharge: HOME | End: 2020-11-23
Attending: ORTHOPAEDIC SURGERY | Admitting: ORTHOPAEDIC SURGERY
Payer: COMMERCIAL

## 2020-11-23 VITALS
SYSTOLIC BLOOD PRESSURE: 167 MMHG | RESPIRATION RATE: 19 BRPM | OXYGEN SATURATION: 92 % | DIASTOLIC BLOOD PRESSURE: 84 MMHG

## 2020-11-23 VITALS
OXYGEN SATURATION: 98 % | BODY MASS INDEX: 31.78 KG/M2 | HEART RATE: 66 BPM | RESPIRATION RATE: 16 BRPM | SYSTOLIC BLOOD PRESSURE: 143 MMHG | DIASTOLIC BLOOD PRESSURE: 72 MMHG | TEMPERATURE: 98 F | HEIGHT: 70 IN | WEIGHT: 222 LBS

## 2020-11-23 LAB
GLUCOSE BLD-MCNC: 235 MG/DL (ref 70–99)
PERFORMED ON: ABNORMAL

## 2020-11-23 PROCEDURE — 6360000002 HC RX W HCPCS

## 2020-11-23 PROCEDURE — 3600000012 HC SURGERY LEVEL 2 ADDTL 15MIN: Performed by: ORTHOPAEDIC SURGERY

## 2020-11-23 PROCEDURE — 2709999900 HC NON-CHARGEABLE SUPPLY: Performed by: ORTHOPAEDIC SURGERY

## 2020-11-23 PROCEDURE — 7100000011 HC PHASE II RECOVERY - ADDTL 15 MIN: Performed by: ORTHOPAEDIC SURGERY

## 2020-11-23 PROCEDURE — 3700000001 HC ADD 15 MINUTES (ANESTHESIA): Performed by: ORTHOPAEDIC SURGERY

## 2020-11-23 PROCEDURE — 7100000010 HC PHASE II RECOVERY - FIRST 15 MIN: Performed by: ORTHOPAEDIC SURGERY

## 2020-11-23 PROCEDURE — 3700000000 HC ANESTHESIA ATTENDED CARE: Performed by: ORTHOPAEDIC SURGERY

## 2020-11-23 PROCEDURE — 3600000002 HC SURGERY LEVEL 2 BASE: Performed by: ORTHOPAEDIC SURGERY

## 2020-11-23 PROCEDURE — 7100000000 HC PACU RECOVERY - FIRST 15 MIN: Performed by: ORTHOPAEDIC SURGERY

## 2020-11-23 PROCEDURE — 2500000003 HC RX 250 WO HCPCS

## 2020-11-23 PROCEDURE — 2500000003 HC RX 250 WO HCPCS: Performed by: ORTHOPAEDIC SURGERY

## 2020-11-23 PROCEDURE — 7100000001 HC PACU RECOVERY - ADDTL 15 MIN: Performed by: ORTHOPAEDIC SURGERY

## 2020-11-23 PROCEDURE — 2580000003 HC RX 258: Performed by: ANESTHESIOLOGY

## 2020-11-23 PROCEDURE — 2580000003 HC RX 258: Performed by: ORTHOPAEDIC SURGERY

## 2020-11-23 RX ORDER — BUPIVACAINE HYDROCHLORIDE 5 MG/ML
INJECTION, SOLUTION EPIDURAL; INTRACAUDAL PRN
Status: DISCONTINUED | OUTPATIENT
Start: 2020-11-23 | End: 2020-11-23 | Stop reason: ALTCHOICE

## 2020-11-23 RX ORDER — LIDOCAINE HYDROCHLORIDE 10 MG/ML
1 INJECTION, SOLUTION EPIDURAL; INFILTRATION; INTRACAUDAL; PERINEURAL
Status: DISCONTINUED | OUTPATIENT
Start: 2020-11-23 | End: 2020-11-23 | Stop reason: HOSPADM

## 2020-11-23 RX ORDER — PROPOFOL 10 MG/ML
INJECTION, EMULSION INTRAVENOUS PRN
Status: DISCONTINUED | OUTPATIENT
Start: 2020-11-23 | End: 2020-11-23 | Stop reason: SDUPTHER

## 2020-11-23 RX ORDER — PROMETHAZINE HYDROCHLORIDE 25 MG/ML
6.25 INJECTION, SOLUTION INTRAMUSCULAR; INTRAVENOUS
Status: DISCONTINUED | OUTPATIENT
Start: 2020-11-23 | End: 2020-11-23 | Stop reason: HOSPADM

## 2020-11-23 RX ORDER — SODIUM CHLORIDE 0.9 % (FLUSH) 0.9 %
10 SYRINGE (ML) INJECTION EVERY 12 HOURS SCHEDULED
Status: DISCONTINUED | OUTPATIENT
Start: 2020-11-23 | End: 2020-11-23 | Stop reason: HOSPADM

## 2020-11-23 RX ORDER — METOCLOPRAMIDE HYDROCHLORIDE 5 MG/ML
10 INJECTION INTRAMUSCULAR; INTRAVENOUS
Status: DISCONTINUED | OUTPATIENT
Start: 2020-11-23 | End: 2020-11-23 | Stop reason: HOSPADM

## 2020-11-23 RX ORDER — MAGNESIUM HYDROXIDE 1200 MG/15ML
LIQUID ORAL CONTINUOUS PRN
Status: COMPLETED | OUTPATIENT
Start: 2020-11-23 | End: 2020-11-23

## 2020-11-23 RX ORDER — LIDOCAINE HYDROCHLORIDE 20 MG/ML
INJECTION, SOLUTION INFILTRATION; PERINEURAL PRN
Status: DISCONTINUED | OUTPATIENT
Start: 2020-11-23 | End: 2020-11-23 | Stop reason: SDUPTHER

## 2020-11-23 RX ORDER — SODIUM CHLORIDE 0.9 % (FLUSH) 0.9 %
10 SYRINGE (ML) INJECTION PRN
Status: DISCONTINUED | OUTPATIENT
Start: 2020-11-23 | End: 2020-11-23 | Stop reason: HOSPADM

## 2020-11-23 RX ORDER — OXYCODONE HYDROCHLORIDE 5 MG/1
5 TABLET ORAL PRN
Status: DISCONTINUED | OUTPATIENT
Start: 2020-11-23 | End: 2020-11-23 | Stop reason: HOSPADM

## 2020-11-23 RX ORDER — OXYCODONE HYDROCHLORIDE 5 MG/1
10 TABLET ORAL PRN
Status: DISCONTINUED | OUTPATIENT
Start: 2020-11-23 | End: 2020-11-23 | Stop reason: HOSPADM

## 2020-11-23 RX ORDER — LABETALOL HYDROCHLORIDE 5 MG/ML
5 INJECTION, SOLUTION INTRAVENOUS EVERY 10 MIN PRN
Status: DISCONTINUED | OUTPATIENT
Start: 2020-11-23 | End: 2020-11-23 | Stop reason: HOSPADM

## 2020-11-23 RX ORDER — SODIUM CHLORIDE, SODIUM LACTATE, POTASSIUM CHLORIDE, CALCIUM CHLORIDE 600; 310; 30; 20 MG/100ML; MG/100ML; MG/100ML; MG/100ML
INJECTION, SOLUTION INTRAVENOUS CONTINUOUS
Status: DISCONTINUED | OUTPATIENT
Start: 2020-11-23 | End: 2020-11-23 | Stop reason: HOSPADM

## 2020-11-23 RX ORDER — HYDROCODONE BITARTRATE AND ACETAMINOPHEN 5; 325 MG/1; MG/1
1 TABLET ORAL EVERY 6 HOURS PRN
Qty: 10 TABLET | Refills: 0 | Status: SHIPPED | OUTPATIENT
Start: 2020-11-23 | End: 2020-11-30

## 2020-11-23 RX ORDER — MEPERIDINE HYDROCHLORIDE 50 MG/ML
12.5 INJECTION INTRAMUSCULAR; INTRAVENOUS; SUBCUTANEOUS EVERY 5 MIN PRN
Status: DISCONTINUED | OUTPATIENT
Start: 2020-11-23 | End: 2020-11-23 | Stop reason: HOSPADM

## 2020-11-23 RX ORDER — MORPHINE SULFATE 2 MG/ML
1 INJECTION, SOLUTION INTRAMUSCULAR; INTRAVENOUS EVERY 5 MIN PRN
Status: DISCONTINUED | OUTPATIENT
Start: 2020-11-23 | End: 2020-11-23 | Stop reason: HOSPADM

## 2020-11-23 RX ORDER — DIPHENHYDRAMINE HYDROCHLORIDE 50 MG/ML
12.5 INJECTION INTRAMUSCULAR; INTRAVENOUS
Status: DISCONTINUED | OUTPATIENT
Start: 2020-11-23 | End: 2020-11-23 | Stop reason: HOSPADM

## 2020-11-23 RX ORDER — HYDRALAZINE HYDROCHLORIDE 20 MG/ML
5 INJECTION INTRAMUSCULAR; INTRAVENOUS EVERY 10 MIN PRN
Status: DISCONTINUED | OUTPATIENT
Start: 2020-11-23 | End: 2020-11-23 | Stop reason: HOSPADM

## 2020-11-23 RX ADMIN — PROPOFOL 300 MG: 10 INJECTION, EMULSION INTRAVENOUS at 10:10

## 2020-11-23 RX ADMIN — PROPOFOL 3 MG: 10 INJECTION, EMULSION INTRAVENOUS at 10:15

## 2020-11-23 RX ADMIN — SODIUM CHLORIDE, POTASSIUM CHLORIDE, SODIUM LACTATE AND CALCIUM CHLORIDE: 600; 310; 30; 20 INJECTION, SOLUTION INTRAVENOUS at 08:52

## 2020-11-23 RX ADMIN — LIDOCAINE HYDROCHLORIDE 100 MG: 20 INJECTION, SOLUTION INFILTRATION; PERINEURAL at 10:10

## 2020-11-23 ASSESSMENT — PULMONARY FUNCTION TESTS
PIF_VALUE: 1

## 2020-11-23 ASSESSMENT — PAIN SCALES - GENERAL
PAINLEVEL_OUTOF10: 0

## 2020-11-23 ASSESSMENT — PAIN - FUNCTIONAL ASSESSMENT: PAIN_FUNCTIONAL_ASSESSMENT: 0-10

## 2020-11-23 NOTE — PROGRESS NOTES
Discharge instructions reviewed with patient/responsible adult and understanding verbalized. Discharge instructions signed and copies given. Patient discharged home with belongings. No pain. No n/v. Assist of  one to car.

## 2020-11-23 NOTE — PROGRESS NOTES
POCT      Blood Glucose: 235      (Normal Range 70-99)    PT:      (Normal Range 9.8 - 13)    INR:      (Normal Range 0.86-1.14)

## 2020-11-23 NOTE — ANESTHESIA PRE PROCEDURE
Department of Anesthesiology  Preprocedure Note       Name:  Sheela Frost   Age:  63 y.o.  :  1960                                          MRN:  [id number]         Date:  2020      Surgeon: Denita Gonsales):  Bandar Alex MD    Procedure: Procedure(s):  RIGHT LONG AND RING TRIGGER FINGER RELEASES -SLEEP APNEA-    Medications prior to admission:   Prior to Admission medications    Medication Sig Start Date End Date Taking? Authorizing Provider   atorvastatin (LIPITOR) 40 MG tablet Take 1 tablet by mouth daily 20   Nila Perry MD   nystatin (MYCOSTATIN) 607405 UNIT/GM ointment Apply topically 2 times daily to corners of mouth. 10/29/20   Alex Calixto MD   insulin regular human (HUMULIN R U-500 KWIKPEN) 500 UNIT/ML SOPN concentrated injection pen INJECT 80 UNITS BEFORE BREAKFAST, INJECT 60 UNITS BEFORE LUNCH AND INJECT 60 UNITS BEFORE DINNER 20   Joanna Awad MD   metroNIDAZOLE (METROGEL) 1 % gel Apply to face daily.  20   Alex Calixto MD   omega-3 acid ethyl esters (LOVAZA) 1 g capsule TAKE TWO CAPSULES BY MOUTH TWICE A DAY 20   Abad Lancaster MD   Vitamin D (CHOLECALCIFEROL) 25 MCG (1000 UT) TABS tablet TAKE 1 TABLET BY MOUTH ONE TIME A DAY 6/15/20   MADISON Wyatt - CNP   ASPIRIN ADULT LOW STRENGTH 81 MG EC tablet TAKE 1 TABLET BY MOUTH ONE TIME A DAY 6/15/20   MADISON Wyatt CNP   lisinopril-hydroCHLOROthiazide (PRINZIDE;ZESTORETIC) 20-12.5 MG per tablet TAKE 1 TABLET BY MOUTH ONE TIME A DAY 20   MADISON Wyatt - CNP   fenofibrate (TRICOR) 145 MG tablet TAKE 1 TABLET BY MOUTH ONE TIME A DAY 20   Joanna Awad MD   buPROPion (WELLBUTRIN XL) 150 MG extended release tablet Take 1 tablet by mouth every morning 4/15/20   MADISON Wyatt CNP   metFORMIN (GLUCOPHAGE) 1000 MG tablet TAKE ONE TABLET BY MOUTH TWICE A DAY 20   Joanna Awad MD   DULoxetine (CYMBALTA) 60 MG extended release capsule Take 1 capsule by mouth 2 times daily 1/15/20   MADISON Saeed - CNP   atenolol (TENORMIN) 25 MG tablet TAKE 1 TABLET BY MOUTH EVERY NIGHT AT BEDTIME 1/15/20   MADISON Saeed - CNP   ibuprofen (ADVIL;MOTRIN) 600 MG tablet Take 1 tablet by mouth every 6 hours as needed for Pain 12/12/19   Margarita Dutton MD   Insulin Pen Needle (B-D UF III MINI PEN NEEDLES) 31G X 5 MM MISC 1 each by Does not apply route 3 times daily 12/11/19   MD CLAUDETTE Harkins LANCETS 28G MISC Use as directed to test up to 3-4 times daily 3/15/19   Bridgette Herzog MD   blood glucose test strips (PRODIGY NO CODING BLOOD GLUC) strip Use as directed to test up to 3-4 times daily 3/15/19   Bridgette Herzog MD       Current medications:    No current facility-administered medications for this encounter. Current Outpatient Medications   Medication Sig Dispense Refill    atorvastatin (LIPITOR) 40 MG tablet Take 1 tablet by mouth daily 30 tablet 11    nystatin (MYCOSTATIN) 573766 UNIT/GM ointment Apply topically 2 times daily to corners of mouth. 30 g 1    insulin regular human (HUMULIN R U-500 KWIKPEN) 500 UNIT/ML SOPN concentrated injection pen INJECT 80 UNITS BEFORE BREAKFAST, INJECT 60 UNITS BEFORE LUNCH AND INJECT 60 UNITS BEFORE DINNER 15 pen 2    metroNIDAZOLE (METROGEL) 1 % gel Apply to face daily.  60 g 3    omega-3 acid ethyl esters (LOVAZA) 1 g capsule TAKE TWO CAPSULES BY MOUTH TWICE A  capsule 3    Vitamin D (CHOLECALCIFEROL) 25 MCG (1000 UT) TABS tablet TAKE 1 TABLET BY MOUTH ONE TIME A DAY 90 tablet 3    ASPIRIN ADULT LOW STRENGTH 81 MG EC tablet TAKE 1 TABLET BY MOUTH ONE TIME A DAY 90 tablet 3    lisinopril-hydroCHLOROthiazide (PRINZIDE;ZESTORETIC) 20-12.5 MG per tablet TAKE 1 TABLET BY MOUTH ONE TIME A DAY 90 tablet 3    fenofibrate (TRICOR) 145 MG tablet TAKE 1 TABLET BY MOUTH ONE TIME A DAY 90 tablet 3    buPROPion (WELLBUTRIN XL) 150 MG extended release tablet Take 1 tablet by mouth every morning 90 tablet 3    metFORMIN (GLUCOPHAGE) 1000 MG tablet TAKE ONE TABLET BY MOUTH TWICE A  tablet 3    DULoxetine (CYMBALTA) 60 MG extended release capsule Take 1 capsule by mouth 2 times daily 180 capsule 2    atenolol (TENORMIN) 25 MG tablet TAKE 1 TABLET BY MOUTH EVERY NIGHT AT BEDTIME 90 tablet 3    ibuprofen (ADVIL;MOTRIN) 600 MG tablet Take 1 tablet by mouth every 6 hours as needed for Pain 60 tablet 1    Insulin Pen Needle (B-D UF III MINI PEN NEEDLES) 31G X 5 MM MISC 1 each by Does not apply route 3 times daily 300 each 6    PRODIGY LANCETS 28G MISC Use as directed to test up to 3-4 times daily 400 each 3    blood glucose test strips (PRODIGY NO CODING BLOOD GLUC) strip Use as directed to test up to 3-4 times daily 400 each 3       Allergies:  No Known Allergies    Problem List:    Patient Active Problem List   Diagnosis Code    Hypertriglyceridemia E78.1    Hyperlipidemia E78.5    Hypertension I10    Testosterone deficiency E34.9    Diabetic polyneuropathy (HCC) E11.42    Type 2 diabetes mellitus with diabetic polyneuropathy, with long-term current use of insulin (HCC) E11.42, Z79.4    Obstructive sleep apnea G47.33    Occlusion and stenosis of carotid artery I65.29    ED (erectile dysfunction) N52.9    Osteoarthritis of cervical spine with myelopathy M47.12    Vitamin D deficiency E55.9    Polyp of rectum K62.1    Chronic neck and back pain M54.2, M54.9, G89.29    Class 1 obesity due to excess calories with serious comorbidity and body mass index (BMI) of 32.0 to 32.9 in adult E66.09, Z68.32    Left lateral epicondylitis M77.12    Acquired trigger finger of left ring finger M65.342    Former smoker Z87.891    Dysthymia F34.1    Trigger finger, right middle finger M65.331    Trigger finger, right ring finger M65.341    Bilateral carotid artery stenosis I65.23       Past Medical History:        Diagnosis Date    Acute, but ill-defined, cerebrovascular disease 5/31/2013    Cerebral artery occlusion with cerebral infarction [facility]) 2013     X2 PERSONALITY CHANGE, ANGER OUTBURSTS    Cerebrovascular disease     Diplopia 7/11/2013    ED (erectile dysfunction) 1/23/2014    Elbow pain, chronic 12/16/2015    Hyperlipidemia     Hypertension     Irritable bowel syndrome     Obstructive sleep apnea (adult) (pediatric) 07/01/2013    NO CPAP, HAD PROBLEMS WITH INSURANCE AND STOPPED USING    Occlusion and stenosis of carotid artery without mention of cerebral infarction 01/13/2014    MINOR    Pain in joint, upper arm 5/14/2015    Polyneuropathy in diabetes(357.2) 7/1/2013    PONV (postoperative nausea and vomiting)     Skin abnormality     PRE-CANCEROUS LESIONS REMOVED FROM ARMS AND EARS    Type 2 diabetes mellitus with diabetic polyneuropathy, with long-term current use of insulin ([facility] [address] 7/1/2013    Type II or unspecified type diabetes mellitus without mention of complication, not stated as uncontrolled        Past Surgical History:        Procedure Laterality Date    CARPAL TUNNEL RELEASE Left 2008    CERVICAL DISCECTOMY  08/02/2017    ANTERIOR CERVICAL DISCECTOMY AND FUSION C5-6, C6-7 WITH [employer] PLATES, SCREWS, ALLOGRAFT, WITH EVOKES PARTIAL C6 CORPECTOMY    CERVICAL FUSION  12/06/2017    CERVICAL LAMINECTOMY AND POSTERIOR FUSION C5-T1 WITH [employer] RODS AND SCREWS WITH EVOKES AND O-ARM    CHOLECYSTECTOMY  1995    COLONOSCOPY N/A 8/14/2018    COLONOSCOPY POLYPECTOMY SNARE/COLD BIOPSY performed by Mirna Vasquez MD at [address] [facility] Left 2008    ULNAR NERVE TRANSPOSITION, AT SAME TIME AS CTR    FINGER TRIGGER RELEASE Left 12/12/2019    LEFT RING TRIGGER FINGER RELEASE performed by Gladys Durán MD at [address] [place] [address]    umbilical    PYLOROPLASTY      PYLORIC STENOSIS AS INFANT       Social History:    Social History     Tobacco Use    Smoking status: Former Smoker     Packs/day: 1.00     Years: Applicable):   Lab Results   Component Value Date    COVID19 Not Detected 11/17/2020         Anesthesia Evaluation  Patient summary reviewed and Nursing notes reviewed   history of anesthetic complications: PONV. Airway: Mallampati: II  TM distance: >3 FB   Neck ROM: full  Mouth opening: > = 3 FB Dental:          Pulmonary:   (+) sleep apnea: on CPAP,                             Cardiovascular:    (+) hyperlipidemia                  Neuro/Psych:               GI/Hepatic/Renal:             Endo/Other:    (+) DiabetesType II DM, , .                 Abdominal:           Vascular:                                        Anesthesia Plan      general     ASA 1    (68-year-old male presents for RIGHT LONG AND RING TRIGGER FINGER RELEASES. Plan general anesthesia with ASA standard monitors. Questions answered. Patient agreeable with anesthetic plan.  )  Induction: intravenous. Anesthetic plan and risks discussed with patient. Plan discussed with CRNA.     Attending anesthesiologist reviewed and agrees with Pre Eval content        Leena Brasher MD   11/22/2020

## 2020-11-23 NOTE — ANESTHESIA POSTPROCEDURE EVALUATION
Department of Anesthesiology  Postprocedure Note    Patient: Bk Doran  MRN: [id number]  YOB: 1960  Date of evaluation: 11/23/2020  Time:  1:12 PM     Procedure Summary     Date:  11/23/20 Room / Location:  [address]    Anesthesia Start:  1005 Anesthesia Stop:  8151    Procedure:  RIGHT LONG AND RING TRIGGER FINGER RELEASES -SLEEP APNEA- (Right ) Diagnosis:       Trigger middle finger of right hand      Trigger ring finger of right hand      (RIGHT LONG AND RING TRIGGER FINGERS)    Surgeon:  Jeremy Slater MD Responsible Provider:  Pearl Rush MD    Anesthesia Type:  general ASA Status:  3          Anesthesia Type: general    Christina Phase I: Christina Score: 9    Christina Phase II: Christina Score: 10    Last vitals: Reviewed and per EMR flowsheets.        Anesthesia Post Evaluation    Patient location during evaluation: PACU  Level of consciousness: awake  Airway patency: patent  Nausea & Vomiting: no nausea  Complications: no  Cardiovascular status: blood pressure returned to baseline  Respiratory status: acceptable  Hydration status: euvolemic

## 2020-11-30 ENCOUNTER — HOSPITAL ENCOUNTER (OUTPATIENT)
Dept: ULTRASOUND IMAGING | Age: 60
Discharge: HOME | End: 2020-11-30
Payer: COMMERCIAL

## 2020-11-30 PROCEDURE — 76705 ECHO EXAM OF ABDOMEN: CPT

## 2020-12-03 NOTE — TELEPHONE ENCOUNTER
talked with dietician this year - will ask patient and/or RD about. Attempt made to reach patient by telephone to review above. Left voice message for patient to return clinician's phone call to [phone] and/or [phone] option 7. Will continue to attempt to reach as appropriate and send MyChart message.      Jose Alfredo Coronel, PharmD, [facility], [facility]  Ambulatory Clinical Care Pharmacist   [address]  [phone], Option 7

## 2020-12-04 ENCOUNTER — OFFICE VISIT (OUTPATIENT)
Dept: ORTHOPEDIC SURGERY | Age: 60
End: 2020-12-04

## 2020-12-04 PROCEDURE — 99024 POSTOP FOLLOW-UP VISIT: CPT | Performed by: ORTHOPAEDIC SURGERY

## 2020-12-08 ENCOUNTER — CARE COORDINATION (OUTPATIENT)
Dept: OTHER | Facility: CLINIC | Age: 60
End: 2020-12-08

## 2020-12-08 NOTE — CARE COORDINATION
Ambulatory Care Coordination Note  12/8/2020  CM Risk Score: 5  Charlson 10 Year Mortality Risk Score: 98%     ACC: Joe Dugan, RN    ACM attempted to reach patient for follow up call regarding Be Well with Diabetes. HIPAA compliant message left requesting a return phone call at patients convenience. Will continue to follow. CISCO Chavarria, RN  Associate Care Manager   Cell: [phone]  [email or web address]

## 2020-12-15 ENCOUNTER — CARE COORDINATION (OUTPATIENT)
Dept: OTHER | Facility: CLINIC | Age: 60
End: 2020-12-15

## 2020-12-15 NOTE — CARE COORDINATION
Ambulatory Care Coordination Note  12/15/2020  CM Risk Score: 5  Charlson 10 Year Mortality Risk Score: 98%     ACC: Namita Greco, RN    Ambulatory Care Manager [facility]) contacted the patient by telephone to introduced the Associate Care Management Program r/t Be Well with Diabetes. Verified name and  with patient as identifiers. Patient was agreeable to enroll; ACM reviewed and updated CC Protocol, medications, goals, education and disease specific assessment. Provided Education:  Discussed red flags and appropriate site of care based on symptoms and resources available to patient including: PCP, Specialist, Benefits related nurse triage line, Urgent care clinics, [facility], When to call 911 and [employer]. Importance and benefits of: Follow up with PCP and specialist, medication adherence, self monitoring and reporting of symptoms. Patient reports that he is not the best at checking BS levels but does check them when he feels his blood sugar may be high. Patient would like to lose about 17 lb's at first and get to 200 lbs, eventually patient would like to lose a total of 50 lb's. Patient plans to do this with increasing activity as tolerated and by reducing CHO and concentrated sweets. Patient does have some physical impairment: leg cramps. Plan:  Continue bi-weekly outreaches to provide telephonic support, education and resources as needed. Discuss / follow up on: Goal progress and follow-up on weight-loss success. Pt verbalized understanding and is agreeable to follow up contact. Diabetes Assessment    Medic Alert ID: No  Meal Planning: Carb counting, Avoidance of concentrated sweets   How often do you test your blood sugar?: Daily, Meals   Do you have barriers with adherence to non-pharmacologic self-management interventions?  (Nutrition/Exercise/Self-Monitoring): Yes   Have you ever had to go to the ED for symptoms of low blood sugar?: No              Ambulatory Care buPROPion (WELLBUTRIN XL) 150 MG extended release tablet Take 1 tablet by mouth every morning 4/15/20   MADISON Nunez CNP   metFORMIN (GLUCOPHAGE) 1000 MG tablet TAKE ONE TABLET BY MOUTH TWICE A DAY 2/6/20   Mike Dickey MD   DULoxetine (CYMBALTA) 60 MG extended release capsule Take 1 capsule by mouth 2 times daily 1/15/20   MADISON Nunez CNP   atenolol (TENORMIN) 25 MG tablet TAKE 1 TABLET BY MOUTH EVERY NIGHT AT BEDTIME 1/15/20   MADISON Nunez CNP   ibuprofen (ADVIL;MOTRIN) 600 MG tablet Take 1 tablet by mouth every 6 hours as needed for Pain 12/12/19   Vinicius Velez MD   Insulin Pen Needle (B-D UF III MINI PEN NEEDLES) 31G X 5 MM MISC 1 each by Does not apply route 3 times daily 12/11/19   Mike Dickey MD   PRODIGSAMMIE LANCETS 28G MISC Use as directed to test up to 3-4 times daily 3/15/19   Mike Dickey MD   blood glucose test strips (PRODIGY NO CODING BLOOD GLUC) strip Use as directed to test up to 3-4 times daily 3/15/19   Mike Dickey MD       Future Appointments   Date Time Provider Avelino Owens   12/18/2020  9:00 AM [facility] VASCULAR, VASCULAR LAB ROOM 1 [facility]   12/18/2020 10:00 AM [facility] ECHO ROOM [facility] AND CAR [facility]   12/18/2020 10:30 AM Tierra Redman RD, LD AND ENDO [facility]   12/18/2020 11:00 AM Mike Dickey MD AND ENDO [facility]   12/18/2020 12:45 PM [address] [place] [address] AND CAR Javier Mow   1/15/2021  8:15 AM Dany Cardoza MD [facility]     CISCO Perry, RN  Associate Care Manager   Cell: [phone]  [email or web address]

## 2020-12-18 ENCOUNTER — HOSPITAL ENCOUNTER (OUTPATIENT)
Dept: CARDIOLOGY | Age: 60
Discharge: HOME | End: 2020-12-18
Payer: COMMERCIAL

## 2020-12-18 ENCOUNTER — HOSPITAL ENCOUNTER (OUTPATIENT)
Dept: VASCULAR LAB | Age: 60
Discharge: HOME | End: 2020-12-18
Payer: COMMERCIAL

## 2020-12-18 ENCOUNTER — TELEPHONE (OUTPATIENT)
Dept: CARDIOLOGY CLINIC | Age: 60
End: 2020-12-18

## 2020-12-18 LAB
LV EF: 60 %
LV EF: 63 %
LVEF MODALITY: NORMAL
LVEF MODALITY: NORMAL

## 2020-12-18 PROCEDURE — 93880 EXTRACRANIAL BILAT STUDY: CPT

## 2020-12-18 PROCEDURE — 78452 HT MUSCLE IMAGE SPECT MULT: CPT

## 2020-12-18 PROCEDURE — 3430000000 HC RX DIAGNOSTIC RADIOPHARMACEUTICAL: Performed by: INTERNAL MEDICINE

## 2020-12-18 PROCEDURE — A9502 TC99M TETROFOSMIN: HCPCS | Performed by: INTERNAL MEDICINE

## 2020-12-18 PROCEDURE — 93017 CV STRESS TEST TRACING ONLY: CPT

## 2020-12-18 PROCEDURE — 93306 TTE W/DOPPLER COMPLETE: CPT

## 2020-12-18 RX ADMIN — TETROFOSMIN 34.8 MILLICURIE: 1.38 INJECTION, POWDER, LYOPHILIZED, FOR SOLUTION INTRAVENOUS at 11:05

## 2020-12-18 RX ADMIN — TETROFOSMIN 11.3 MILLICURIE: 1.38 INJECTION, POWDER, LYOPHILIZED, FOR SOLUTION INTRAVENOUS at 09:45

## 2020-12-18 NOTE — TELEPHONE ENCOUNTER
----- Message from Ben Akins MD sent at 12/18/2020 11:41 AM EST -----  Let patient know their carotid ultrasound shows mild plaque, continue current meds, no new orders or changes at this time. Thanks.

## 2020-12-21 ENCOUNTER — TELEPHONE (OUTPATIENT)
Dept: CARDIOLOGY CLINIC | Age: 60
End: 2020-12-21

## 2020-12-21 NOTE — TELEPHONE ENCOUNTER
Spoke with Keaton Rocha, relayed results per [address]. Pt would like Chelsie to contact him to set up cardiac cath.

## 2020-12-21 NOTE — TELEPHONE ENCOUNTER
Spoke with patient. Patient is scheduled with Dr. Prince Barajas for Left Heart Cath on 12/30/20 at [facility], arrival time of 7:30am to the Cath Lab. Please have patient arrive to the main entrance of [facility] and check in with the registration desk. Please call patient regarding medication instructions. Remind patient to be NPO after midnight (8 hours prior). Do not apply lotions/creams on skin the day of procedure. COVID testing 12/24.

## 2020-12-24 ENCOUNTER — OFFICE VISIT (OUTPATIENT)
Dept: PRIMARY CARE CLINIC | Age: 60
End: 2020-12-24
Payer: COMMERCIAL

## 2020-12-24 PROCEDURE — G8417 CALC BMI ABV UP PARAM F/U: HCPCS | Performed by: NURSE PRACTITIONER

## 2020-12-24 PROCEDURE — 99211 OFF/OP EST MAY X REQ PHY/QHP: CPT | Performed by: NURSE PRACTITIONER

## 2020-12-24 PROCEDURE — G8428 CUR MEDS NOT DOCUMENT: HCPCS | Performed by: NURSE PRACTITIONER

## 2020-12-24 NOTE — PROGRESS NOTES
Real Vargas received a viral test for COVID-19. They were educated on isolation and quarantine as appropriate. For any symptoms, they were directed to seek care from their PCP, given contact information to establish with a doctor, directed to an urgent care or the emergency room.

## 2020-12-25 LAB — SARS-COV-2, NAA: NOT DETECTED

## 2020-12-28 RX ORDER — OMEGA-3-ACID ETHYL ESTERS 1 G/1
CAPSULE, LIQUID FILLED ORAL
Qty: 180 CAPSULE | Refills: 3 | OUTPATIENT
Start: 2020-12-28

## 2020-12-28 NOTE — TELEPHONE ENCOUNTER
Medication:   Requested Prescriptions     Pending Prescriptions Disp Refills    omega-3 acid ethyl esters (LOVAZA) 1 g capsule [Pharmacy Med Name: OMEGA-3-ACID ETHYL ESTERS 1GM CAPS] 180 capsule 3     Sig: TAKE TWO CAPSULES BY MOUTH TWICE A DAY       Last Filled:      Patient Phone Number: [phone] (home) [phone] (work)    Last appt: 9/11/2018   Next appt: Visit date not found    Last Labs DM:   Lab Results   Component Value Date    LABA1C 10.6 11/16/2020     Last Lipid:   Lab Results   Component Value Date    CHOL 263 11/16/2020    TRIG 524 11/16/2020    HDL 40 11/16/2020    HDL 43 11/03/2011    [address] see below 11/16/2020     Last PSA:   Lab Results   Component Value Date    PSA 0.56 11/16/2020     Last Thyroid:   Lab Results   Component Value Date    TSH 1.54 11/16/2020

## 2020-12-29 RX ORDER — ATORVASTATIN CALCIUM 40 MG/1
40 TABLET, FILM COATED ORAL DAILY
Qty: 90 TABLET | Refills: 0 | Status: ON HOLD | OUTPATIENT
Start: 2020-12-29 | End: 2020-12-31 | Stop reason: SDUPTHER

## 2020-12-29 NOTE — TELEPHONE ENCOUNTER
MIR with Dr. Parth Ramires (OOT) on 11/20/2020. Patient is to get FLP in January.  I pended lipitor for 90 days

## 2020-12-29 NOTE — TELEPHONE ENCOUNTER
Pt would like a 90 day supply of atorvastatin (LIPITOR) 40 MG tablet sent to   [facility], [address] [address]   Phone:  [phone]  Fax:  [phone]

## 2020-12-30 ENCOUNTER — HOSPITAL ENCOUNTER (OUTPATIENT)
Dept: CARDIAC CATH/INVASIVE PROCEDURES | Age: 60
Setting detail: OBSERVATION
Discharge: HOME | End: 2020-12-31
Attending: INTERNAL MEDICINE | Admitting: INTERNAL MEDICINE
Payer: COMMERCIAL

## 2020-12-30 PROBLEM — I25.10 CAD IN NATIVE ARTERY: Status: ACTIVE | Noted: 2020-12-30

## 2020-12-30 PROBLEM — I25.10 CAD S/P PERCUTANEOUS CORONARY ANGIOPLASTY: Status: ACTIVE | Noted: 2020-12-30

## 2020-12-30 PROBLEM — Z98.61 CAD S/P PERCUTANEOUS CORONARY ANGIOPLASTY: Status: ACTIVE | Noted: 2020-12-30

## 2020-12-30 LAB
ANION GAP SERPL CALCULATED.3IONS-SCNC: 14 MMOL/L (ref 3–16)
BUN BLDV-MCNC: 32 MG/DL (ref 7–20)
CALCIUM SERPL-MCNC: 9.8 MG/DL (ref 8.3–10.6)
CHLORIDE BLD-SCNC: 97 MMOL/L (ref 99–110)
CHOLESTEROL, TOTAL: 205 MG/DL (ref 0–199)
CO2: 23 MMOL/L (ref 21–32)
CREAT SERPL-MCNC: 1.1 MG/DL (ref 0.8–1.3)
EKG ATRIAL RATE: 86 BPM
EKG DIAGNOSIS: NORMAL
EKG P AXIS: 61 DEGREES
EKG P-R INTERVAL: 130 MS
EKG Q-T INTERVAL: 372 MS
EKG QRS DURATION: 86 MS
EKG QTC CALCULATION (BAZETT): 445 MS
EKG R AXIS: 65 DEGREES
EKG T AXIS: 69 DEGREES
EKG VENTRICULAR RATE: 86 BPM
GFR AFRICAN AMERICAN: >60
GFR NON-AFRICAN AMERICAN: >60
GLUCOSE BLD-MCNC: 296 MG/DL (ref 70–99)
GLUCOSE BLD-MCNC: 301 MG/DL (ref 70–99)
GLUCOSE BLD-MCNC: 353 MG/DL (ref 70–99)
GLUCOSE BLD-MCNC: 455 MG/DL (ref 70–99)
HCT VFR BLD CALC: 43.4 % (ref 40.5–52.5)
HDLC SERPL-MCNC: 35 MG/DL (ref 40–60)
HEMOGLOBIN: 14.6 G/DL (ref 13.5–17.5)
INR BLD: 1.03 (ref 0.86–1.14)
LDL CHOLESTEROL CALCULATED: ABNORMAL MG/DL
LDL CHOLESTEROL DIRECT: 103 MG/DL
MCH RBC QN AUTO: 30 PG (ref 26–34)
MCHC RBC AUTO-ENTMCNC: 33.7 G/DL (ref 31–36)
MCV RBC AUTO: 89 FL (ref 80–100)
PDW BLD-RTO: 13.7 % (ref 12.4–15.4)
PERFORMED ON: ABNORMAL
PLATELET # BLD: 330 K/UL (ref 135–450)
PMV BLD AUTO: 8 FL (ref 5–10.5)
POC ACT LR: 251 SEC
POC ACT LR: 257 SEC
POC ACT LR: 278 SEC
POTASSIUM REFLEX MAGNESIUM: 4.7 MMOL/L (ref 3.5–5.1)
PROTHROMBIN TIME: 12 SEC (ref 10–13.2)
RBC # BLD: 4.87 M/UL (ref 4.2–5.9)
SODIUM BLD-SCNC: 134 MMOL/L (ref 136–145)
TRIGL SERPL-MCNC: 481 MG/DL (ref 0–150)
VLDLC SERPL CALC-MCNC: ABNORMAL MG/DL
WBC # BLD: 9 K/UL (ref 4–11)

## 2020-12-30 PROCEDURE — C1874 STENT, COATED/COV W/DEL SYS: HCPCS

## 2020-12-30 PROCEDURE — 85027 COMPLETE CBC AUTOMATED: CPT

## 2020-12-30 PROCEDURE — C1887 CATHETER, GUIDING: HCPCS

## 2020-12-30 PROCEDURE — C1725 CATH, TRANSLUMIN NON-LASER: HCPCS

## 2020-12-30 PROCEDURE — G0378 HOSPITAL OBSERVATION PER HR: HCPCS

## 2020-12-30 PROCEDURE — 2500000003 HC RX 250 WO HCPCS

## 2020-12-30 PROCEDURE — 6360000002 HC RX W HCPCS

## 2020-12-30 PROCEDURE — 93458 L HRT ARTERY/VENTRICLE ANGIO: CPT | Performed by: INTERNAL MEDICINE

## 2020-12-30 PROCEDURE — 6370000000 HC RX 637 (ALT 250 FOR IP): Performed by: INTERNAL MEDICINE

## 2020-12-30 PROCEDURE — 92978 ENDOLUMINL IVUS OCT C 1ST: CPT

## 2020-12-30 PROCEDURE — C1753 CATH, INTRAVAS ULTRASOUND: HCPCS

## 2020-12-30 PROCEDURE — 80061 LIPID PANEL: CPT

## 2020-12-30 PROCEDURE — 85610 PROTHROMBIN TIME: CPT

## 2020-12-30 PROCEDURE — 92928 PRQ TCAT PLMT NTRAC ST 1 LES: CPT | Performed by: INTERNAL MEDICINE

## 2020-12-30 PROCEDURE — 92978 ENDOLUMINL IVUS OCT C 1ST: CPT | Performed by: INTERNAL MEDICINE

## 2020-12-30 PROCEDURE — 93010 ELECTROCARDIOGRAM REPORT: CPT | Performed by: INTERNAL MEDICINE

## 2020-12-30 PROCEDURE — 80048 BASIC METABOLIC PNL TOTAL CA: CPT

## 2020-12-30 PROCEDURE — 99152 MOD SED SAME PHYS/QHP 5/>YRS: CPT | Performed by: INTERNAL MEDICINE

## 2020-12-30 PROCEDURE — 2580000003 HC RX 258: Performed by: INTERNAL MEDICINE

## 2020-12-30 PROCEDURE — 85347 COAGULATION TIME ACTIVATED: CPT

## 2020-12-30 PROCEDURE — 92928 PRQ TCAT PLMT NTRAC ST 1 LES: CPT

## 2020-12-30 PROCEDURE — C1894 INTRO/SHEATH, NON-LASER: HCPCS

## 2020-12-30 PROCEDURE — 93458 L HRT ARTERY/VENTRICLE ANGIO: CPT

## 2020-12-30 PROCEDURE — 6360000004 HC RX CONTRAST MEDICATION

## 2020-12-30 PROCEDURE — C1769 GUIDE WIRE: HCPCS

## 2020-12-30 PROCEDURE — 2709999900 HC NON-CHARGEABLE SUPPLY

## 2020-12-30 PROCEDURE — 6370000000 HC RX 637 (ALT 250 FOR IP)

## 2020-12-30 PROCEDURE — 83721 ASSAY OF BLOOD LIPOPROTEIN: CPT

## 2020-12-30 PROCEDURE — 93005 ELECTROCARDIOGRAM TRACING: CPT | Performed by: INTERNAL MEDICINE

## 2020-12-30 RX ORDER — DEXTROSE MONOHYDRATE 25 G/50ML
12.5 INJECTION, SOLUTION INTRAVENOUS PRN
Status: DISCONTINUED | OUTPATIENT
Start: 2020-12-30 | End: 2020-12-31 | Stop reason: HOSPADM

## 2020-12-30 RX ORDER — MIDAZOLAM HYDROCHLORIDE 1 MG/ML
INJECTION INTRAMUSCULAR; INTRAVENOUS
Status: COMPLETED | OUTPATIENT
Start: 2020-12-30 | End: 2020-12-30

## 2020-12-30 RX ORDER — ALPRAZOLAM 0.25 MG/1
0.5 TABLET ORAL EVERY 6 HOURS PRN
Status: DISCONTINUED | OUTPATIENT
Start: 2020-12-30 | End: 2020-12-31 | Stop reason: HOSPADM

## 2020-12-30 RX ORDER — LISINOPRIL 20 MG/1
20 TABLET ORAL DAILY
Status: DISCONTINUED | OUTPATIENT
Start: 2020-12-31 | End: 2020-12-31 | Stop reason: HOSPADM

## 2020-12-30 RX ORDER — BUPROPION HYDROCHLORIDE 150 MG/1
150 TABLET ORAL EVERY MORNING
Status: DISCONTINUED | OUTPATIENT
Start: 2020-12-31 | End: 2020-12-31 | Stop reason: HOSPADM

## 2020-12-30 RX ORDER — DULOXETIN HYDROCHLORIDE 60 MG/1
60 CAPSULE, DELAYED RELEASE ORAL 2 TIMES DAILY
Status: DISCONTINUED | OUTPATIENT
Start: 2020-12-30 | End: 2020-12-31 | Stop reason: HOSPADM

## 2020-12-30 RX ORDER — HEPARIN SODIUM 1000 [USP'U]/ML
INJECTION, SOLUTION INTRAVENOUS; SUBCUTANEOUS
Status: COMPLETED | OUTPATIENT
Start: 2020-12-30 | End: 2020-12-30

## 2020-12-30 RX ORDER — ASPIRIN 81 MG/1
81 TABLET, CHEWABLE ORAL ONCE
Status: COMPLETED | OUTPATIENT
Start: 2020-12-30 | End: 2020-12-30

## 2020-12-30 RX ORDER — ASPIRIN 81 MG/1
81 TABLET, CHEWABLE ORAL DAILY
Status: DISCONTINUED | OUTPATIENT
Start: 2020-12-31 | End: 2020-12-31 | Stop reason: HOSPADM

## 2020-12-30 RX ORDER — ATENOLOL 25 MG/1
25 TABLET ORAL NIGHTLY
Status: DISCONTINUED | OUTPATIENT
Start: 2020-12-30 | End: 2020-12-31 | Stop reason: HOSPADM

## 2020-12-30 RX ORDER — SODIUM CHLORIDE 0.9 % (FLUSH) 0.9 %
10 SYRINGE (ML) INJECTION EVERY 12 HOURS SCHEDULED
Status: DISCONTINUED | OUTPATIENT
Start: 2020-12-30 | End: 2020-12-31 | Stop reason: HOSPADM

## 2020-12-30 RX ORDER — ATORVASTATIN CALCIUM 80 MG/1
80 TABLET, FILM COATED ORAL DAILY
Status: DISCONTINUED | OUTPATIENT
Start: 2020-12-31 | End: 2020-12-31 | Stop reason: HOSPADM

## 2020-12-30 RX ORDER — HYDROCHLOROTHIAZIDE 25 MG/1
12.5 TABLET ORAL DAILY
Status: DISCONTINUED | OUTPATIENT
Start: 2020-12-31 | End: 2020-12-31 | Stop reason: HOSPADM

## 2020-12-30 RX ORDER — LISINOPRIL AND HYDROCHLOROTHIAZIDE 20; 12.5 MG/1; MG/1
1 TABLET ORAL DAILY
Status: DISCONTINUED | OUTPATIENT
Start: 2020-12-31 | End: 2020-12-30

## 2020-12-30 RX ORDER — DEXTROSE MONOHYDRATE 50 MG/ML
100 INJECTION, SOLUTION INTRAVENOUS PRN
Status: DISCONTINUED | OUTPATIENT
Start: 2020-12-30 | End: 2020-12-31 | Stop reason: HOSPADM

## 2020-12-30 RX ORDER — CLOPIDOGREL BISULFATE 75 MG/1
75 TABLET ORAL DAILY
Status: DISCONTINUED | OUTPATIENT
Start: 2020-12-31 | End: 2020-12-31 | Stop reason: HOSPADM

## 2020-12-30 RX ORDER — FENTANYL CITRATE 50 UG/ML
INJECTION, SOLUTION INTRAMUSCULAR; INTRAVENOUS
Status: COMPLETED | OUTPATIENT
Start: 2020-12-30 | End: 2020-12-30

## 2020-12-30 RX ORDER — NICOTINE POLACRILEX 4 MG
15 LOZENGE BUCCAL PRN
Status: DISCONTINUED | OUTPATIENT
Start: 2020-12-30 | End: 2020-12-31 | Stop reason: HOSPADM

## 2020-12-30 RX ORDER — CLOPIDOGREL 300 MG/1
TABLET, FILM COATED ORAL
Status: COMPLETED | OUTPATIENT
Start: 2020-12-30 | End: 2020-12-30

## 2020-12-30 RX ORDER — SODIUM CHLORIDE 0.9 % (FLUSH) 0.9 %
10 SYRINGE (ML) INJECTION PRN
Status: DISCONTINUED | OUTPATIENT
Start: 2020-12-30 | End: 2020-12-31 | Stop reason: HOSPADM

## 2020-12-30 RX ADMIN — INSULIN LISPRO 8 UNITS: 100 INJECTION, SOLUTION INTRAVENOUS; SUBCUTANEOUS at 17:27

## 2020-12-30 RX ADMIN — INSULIN LISPRO 6 UNITS: 100 INJECTION, SOLUTION INTRAVENOUS; SUBCUTANEOUS at 13:26

## 2020-12-30 RX ADMIN — ALPRAZOLAM 0.5 MG: 0.25 TABLET ORAL at 08:07

## 2020-12-30 RX ADMIN — HEPARIN SODIUM 5000 UNITS: 1000 INJECTION, SOLUTION INTRAVENOUS; SUBCUTANEOUS at 09:35

## 2020-12-30 RX ADMIN — MIDAZOLAM HYDROCHLORIDE 2 MG: 1 INJECTION INTRAMUSCULAR; INTRAVENOUS at 09:22

## 2020-12-30 RX ADMIN — INSULIN HUMAN 10 UNITS: 100 INJECTION, SOLUTION PARENTERAL at 09:11

## 2020-12-30 RX ADMIN — DULOXETINE HYDROCHLORIDE 60 MG: 60 CAPSULE, DELAYED RELEASE ORAL at 21:19

## 2020-12-30 RX ADMIN — INSULIN LISPRO 5 UNITS: 100 INJECTION, SOLUTION INTRAVENOUS; SUBCUTANEOUS at 21:19

## 2020-12-30 RX ADMIN — HEPARIN SODIUM 3000 UNITS: 1000 INJECTION, SOLUTION INTRAVENOUS; SUBCUTANEOUS at 10:48

## 2020-12-30 RX ADMIN — FENTANYL CITRATE 50 MCG: 50 INJECTION, SOLUTION INTRAMUSCULAR; INTRAVENOUS at 09:22

## 2020-12-30 RX ADMIN — ASPIRIN 81 MG: 81 TABLET, CHEWABLE ORAL at 08:08

## 2020-12-30 RX ADMIN — ATENOLOL 25 MG: 25 TABLET ORAL at 21:19

## 2020-12-30 RX ADMIN — HEPARIN SODIUM 4000 UNITS: 1000 INJECTION, SOLUTION INTRAVENOUS; SUBCUTANEOUS at 09:47

## 2020-12-30 RX ADMIN — INSULIN HUMAN 40 UNITS: 500 INJECTION, SOLUTION SUBCUTANEOUS at 17:27

## 2020-12-30 RX ADMIN — SODIUM CHLORIDE, PRESERVATIVE FREE 10 ML: 5 INJECTION INTRAVENOUS at 21:20

## 2020-12-30 RX ADMIN — FENTANYL CITRATE 50 MCG: 50 INJECTION, SOLUTION INTRAMUSCULAR; INTRAVENOUS at 09:35

## 2020-12-30 RX ADMIN — CLOPIDOGREL 600 MG: 300 TABLET, FILM COATED ORAL at 09:49

## 2020-12-30 RX ADMIN — HEPARIN SODIUM 2000 UNITS: 1000 INJECTION, SOLUTION INTRAVENOUS; SUBCUTANEOUS at 09:56

## 2020-12-30 RX ADMIN — MIDAZOLAM HYDROCHLORIDE 2 MG: 1 INJECTION INTRAMUSCULAR; INTRAVENOUS at 10:38

## 2020-12-30 ASSESSMENT — PAIN SCALES - GENERAL: PAINLEVEL_OUTOF10: 0

## 2020-12-30 NOTE — H&P
Brief Pre-Op Note/Sedation Assessment      Wolf Alyx  1960  Cath Pool Rm/NONE      [id number]  9:07 AM    Planned Procedure: Cardiac Catheterization Procedure    Post Procedure Plan: Return to same level of care    Consent: I have discussed with the patient and/or the patient representative the indication, alternatives, and the possible risks and/or complications of the planned procedure and the anesthesia methods. The patient and/or patient representative appear to understand and agree to proceed.     Chief Complaint: Pre-operative cardiac evaluation, abnormal nuclear SPECT stress test      Indications for Cath Procedure:  Suspected CAD and Pre-Operative Evaluation - Surgery Type: Non-Cardiac Surgery, Functional Capacity: >= 4 METS without symptoms, Surgical Risk: Intermediate, Solid Organ Transplant Surgery:  No  Anginal Classification within 2 weeks:  No symptoms  NYHA Heart Failure Class within 2 weeks: No symptoms  Is Cath Lab Visit Valve-related?: No  Surgical Risk: Intermediate  Functional Type: >= 4 METS without symptoms    Anti- Anginal Meds within 2 weeks:   Yes: Beta Blockers, Aspirin and Statin (Any)    Stress or Imaging Studies Performed (within 6 months):  Stress Test with SPECT Result: Positive:  inferior Risk/Extent of Ischemia:  Intermediate     Vital Signs:  Ht 5' 10\" (1.778 m)   Wt 211 lb (95.7 kg)   BMI 30.28 kg/m²     Allergies:  No Known Allergies    Past Medical History:  Past Medical History:   Diagnosis Date    Acute, but ill-defined, cerebrovascular disease 5/31/2013    Cerebral artery occlusion with cerebral infarction [facility]) 2013     X2 PERSONALITY CHANGE, ANGER OUTBURSTS    Cerebrovascular disease     Diplopia 7/11/2013    ED (erectile dysfunction) 1/23/2014    Elbow pain, chronic 12/16/2015    Hyperlipidemia     Hypertension     Irritable bowel syndrome     Obstructive sleep apnea (adult) (pediatric) 07/01/2013  insulin regular (HUMULIN R;NOVOLIN R) injection 10 Units  10 Units Subcutaneous Once Neptali Velasquez DO               Pre-Sedation:    Pre-Sedation Documentation and Exam:  I have personally completed a history, physical exam & review of systems for this patient (see notes). Prior History of Anesthesia Complications:   none    Modified Mallampati:  III (soft palate, base of uvula visible)    ASA Classification:  Class 3 - A patient with severe systemic disease that limits activity but is not incapacitating      Christina Scale: Activity:  2 - Able to move 4 extremities voluntarily on command  Respiration:  2 - Able to breathe deeply and cough freely  Circulation:  2 - BP+/- 20mmHg of normal  Consciousness:  2 - Fully awake  Oxygen Saturation (color):  2 - Able to maintain oxygen saturation >92% on room air    Sedation/Anesthesia Plan:  Guard the patient's safety and welfare. Minimize physical discomfort and pain. Minimize negative psychological responses to treatment by providing sedation and analgesia and maximize the potential amnesia. Patient to meet pre-procedure discharge plan.     Medication Planned:  midazolam intravenously and fentanyl intravenously    Patient is an appropriate candidate for plan of sedation: yes      Electronically signed by Netta Velasquez DO on 12/30/2020 at 9:07 AM

## 2020-12-30 NOTE — PROCEDURES
CARDIAC CATHETERIZATION REPORT    Date of Procedure: 12/30/2020  : Hever Hay DO  Primary Indication: Abnormal nuclear SPECT stress test    Procedures Performed:  1. Coronary angiography  2. Left heart catheterization  3. PCI of proximal to mid-LAD with 2 overlapping MING  4. IVUS of proximal to mid-LAD  5. Moderate conscious sedation    Procedural Details:  1. Access: Local anesthetic was given and access was obtained in the right radial artery using a micropuncture technique and a 6F Terumo Slender Sheath was placed without difficulty. 2. Diagnostic: A 5F TIG catheter was used to perform selective right and left coronary angiography. A 5F pigtail catheter was used to perform the left heart catheterization. No significant gradient was observed on pull-back of the catheter across the aortic valve. 3. Intervention (PCI of proximal to mid-LAD): Therapeutic ACT was achieved with the administration of IV heparin. Using a 6F XB3.5 guide catheter, a 0.014 [place] wire was advanced into the distal LAD. The mid and proximal LAD were pre-dilated with a non-compliant 2.5 x 20 mm balloon and then the first half of the mid-LAD and proximal LAD were dilated again with a non-compliant 3.0 x 20 mm balloon. The proximal to mid-LAD was then stented with overlapping Xience Mariluz 3.0 x 28 mm and 3.5 x 23 mm drug-eluting stents. The more distal stent was post-dilated with a non-compliant 3.0 x 20 mm balloon at 18 hema and the proximal stent and overlap region were post-dilated with a non-compliant 3.5 x 20 mm balloon at 16 hema. A 6F [facility] IVUS catheter was then advanced over the wire and pull-back IVUS demonstrated slight stent under-expansion in the proximal LAD and also in the mid-LAD near the bifurcation of the first diagonal artery. The proximal stent was thus dilated again with a non-compliant 3.5 x 15 mm balloon at 24 hema and the mid-LAD was dilated again with a non-compliant 3.0 x 20 mm balloon at 24 atrm. Repeat IVUS demonstrated good stent apposition and sizing. Final angiography showed <10% residual stenosis with FELICITAS 3 flow and no evidence of dissection. The first diagonal was jailed by the stent segment, but still had FELICITAS 3 flow at the end of the procedure. 4. Hemostasis: At the end of the procedure, the radial sheath was removed and a hemoband was placed over the arteriotomy site and filled with air to maintain hemostasis. Findings:  1. Hemodynamics:     A. Opening arterial pressure: 116/82 (93) mmHg      B. LVEDP: 7 mmHg    2. Coronary anatomy:  A. Left main artery: The left main artery bifurcates into the left anterior descending artery and left circumflex artery. The left main artery has mild diffuse calcification with a distal 10-20% stenosis. B. Left anterior descending artery: Transapical vessel which gives rise to 3 diagonal arteries. The proximal LAD has a calcified 95% stenosis. The mid-LAD has a calcified 90% followed by another 40-50% stenosis. The distal LAD has minor luminal irregularities. The first diagonal artery is a small-medium caliber vessel with a 30% ostial stenosis and 40% proximal stenosis. The second and third diagonal arteries are very small. C. Left circumflex artery: Non-dominant vessel that gives rise to 2 obtuse marginal arteries. The LCx has a 30% proximal stenosis. The OM1 is a very small vessel with mild disease. The OM2 is a large branching vessel with a 30% proximal stenosis. D. Right coronary artery: Dominant vessel that gives rise to the posterior descending artery and 3 posterolateral branches. The RCA has a 20% proximal stenosis. The posterior descending artery has minor luminal irregularities  The posterolateral branches have minor luminal irregularities. Results of Intervention (PCI of proximal to mid-LAD):  Pre - 95% stenosis, FELICITAS 2 flow  Post - <10% stenosis, FELICITAS 3 flow    Technical Factors:  Complications:  None. Estimated blood loss: Minimal.  Radiation: Air kerma 2,917 mGy and 27.5 minutes of fluoroscopy  Sedation: Moderate conscious sedation was administered by qaulified nursing personnel under continuous hemodynamic monitoring, starting at 9:36 AM and ending at 11:09 AM.  Medications: 2.5 mg IA verapamil, 4 mg IV Versed, 100 mcg IV Fentanyl, 16,000 unit IV heparin, 400 mcg IC nitroglycerin  Contrast: 275 cc of Optiray    Impression:  1. Severe single-vessel coronary artery disease. 2. Successful IVUS-guided PCI of proximal to mid-LAD with overlapping Xience Mariluz 3.0 x 28 mm and 3.5 x 23 mm MING. 3. Normal LVEDP. Plan:  1. Monitor overnight. 2. Plavix 75 mg daily for at least one year. 3. Aspirin 81 mg daily indefinitely. 4. Increase atorvastatin to 80 mg daily. 5. Continue atenolol 25 mg qHS and lisinopril-HCTZ 20-12.5 mg daily. 6. Cardiac rehab.  7. Follow-up with Laura Schafer in 2 weeks.       Kya Villegas, [address] [place] [address]

## 2020-12-31 VITALS
HEIGHT: 70 IN | DIASTOLIC BLOOD PRESSURE: 77 MMHG | SYSTOLIC BLOOD PRESSURE: 150 MMHG | OXYGEN SATURATION: 94 % | TEMPERATURE: 98 F | WEIGHT: 211 LBS | HEART RATE: 69 BPM | RESPIRATION RATE: 18 BRPM | BODY MASS INDEX: 30.21 KG/M2

## 2020-12-31 LAB
ANION GAP SERPL CALCULATED.3IONS-SCNC: 11 MMOL/L (ref 3–16)
BUN BLDV-MCNC: 24 MG/DL (ref 7–20)
CALCIUM SERPL-MCNC: 9.4 MG/DL (ref 8.3–10.6)
CHLORIDE BLD-SCNC: 99 MMOL/L (ref 99–110)
CHOLESTEROL, TOTAL: 189 MG/DL (ref 0–199)
CO2: 27 MMOL/L (ref 21–32)
CREAT SERPL-MCNC: 1 MG/DL (ref 0.8–1.3)
EKG ATRIAL RATE: 65 BPM
EKG DIAGNOSIS: NORMAL
EKG P AXIS: 53 DEGREES
EKG P-R INTERVAL: 140 MS
EKG Q-T INTERVAL: 416 MS
EKG QRS DURATION: 90 MS
EKG QTC CALCULATION (BAZETT): 432 MS
EKG R AXIS: 34 DEGREES
EKG T AXIS: 60 DEGREES
EKG VENTRICULAR RATE: 65 BPM
GFR AFRICAN AMERICAN: >60
GFR NON-AFRICAN AMERICAN: >60
GLUCOSE BLD-MCNC: 257 MG/DL (ref 70–99)
GLUCOSE BLD-MCNC: 257 MG/DL (ref 70–99)
HCT VFR BLD CALC: 42 % (ref 40.5–52.5)
HDLC SERPL-MCNC: 32 MG/DL (ref 40–60)
HEMOGLOBIN: 14.2 G/DL (ref 13.5–17.5)
LDL CHOLESTEROL CALCULATED: ABNORMAL MG/DL
LDL CHOLESTEROL DIRECT: 100 MG/DL
MCH RBC QN AUTO: 30.6 PG (ref 26–34)
MCHC RBC AUTO-ENTMCNC: 33.9 G/DL (ref 31–36)
MCV RBC AUTO: 90.2 FL (ref 80–100)
PDW BLD-RTO: 13.8 % (ref 12.4–15.4)
PERFORMED ON: ABNORMAL
PLATELET # BLD: 285 K/UL (ref 135–450)
PMV BLD AUTO: 7.6 FL (ref 5–10.5)
POC ACT LR: >400 SEC
POTASSIUM REFLEX MAGNESIUM: 4.3 MMOL/L (ref 3.5–5.1)
RBC # BLD: 4.65 M/UL (ref 4.2–5.9)
SODIUM BLD-SCNC: 137 MMOL/L (ref 136–145)
TRIGL SERPL-MCNC: 394 MG/DL (ref 0–150)
VLDLC SERPL CALC-MCNC: ABNORMAL MG/DL
WBC # BLD: 10.2 K/UL (ref 4–11)

## 2020-12-31 PROCEDURE — 93010 ELECTROCARDIOGRAM REPORT: CPT | Performed by: INTERNAL MEDICINE

## 2020-12-31 PROCEDURE — 6370000000 HC RX 637 (ALT 250 FOR IP): Performed by: INTERNAL MEDICINE

## 2020-12-31 PROCEDURE — G0378 HOSPITAL OBSERVATION PER HR: HCPCS

## 2020-12-31 PROCEDURE — 80048 BASIC METABOLIC PNL TOTAL CA: CPT

## 2020-12-31 PROCEDURE — 93005 ELECTROCARDIOGRAM TRACING: CPT | Performed by: INTERNAL MEDICINE

## 2020-12-31 PROCEDURE — 80061 LIPID PANEL: CPT

## 2020-12-31 PROCEDURE — 36415 COLL VENOUS BLD VENIPUNCTURE: CPT

## 2020-12-31 PROCEDURE — 85027 COMPLETE CBC AUTOMATED: CPT

## 2020-12-31 PROCEDURE — 99217 PR OBSERVATION CARE DISCHARGE MANAGEMENT: CPT | Performed by: INTERNAL MEDICINE

## 2020-12-31 PROCEDURE — 2580000003 HC RX 258: Performed by: INTERNAL MEDICINE

## 2020-12-31 RX ORDER — OMEGA-3-ACID ETHYL ESTERS 1 G/1
CAPSULE, LIQUID FILLED ORAL
Qty: 180 CAPSULE | Refills: 3 | Status: SHIPPED | OUTPATIENT
Start: 2020-12-31 | End: 2021-01-21 | Stop reason: SDUPTHER

## 2020-12-31 RX ORDER — CLOPIDOGREL BISULFATE 75 MG/1
75 TABLET ORAL DAILY
Qty: 90 TABLET | Refills: 3 | Status: SHIPPED | OUTPATIENT
Start: 2020-12-31 | End: 2021-02-02 | Stop reason: SDUPTHER

## 2020-12-31 RX ORDER — ATORVASTATIN CALCIUM 80 MG/1
80 TABLET, FILM COATED ORAL DAILY
Qty: 90 TABLET | Refills: 3 | Status: SHIPPED | OUTPATIENT
Start: 2020-12-31 | End: 2021-02-02 | Stop reason: SDUPTHER

## 2020-12-31 RX ADMIN — LISINOPRIL 20 MG: 20 TABLET ORAL at 09:46

## 2020-12-31 RX ADMIN — INSULIN HUMAN 40 UNITS: 500 INJECTION, SOLUTION SUBCUTANEOUS at 10:17

## 2020-12-31 RX ADMIN — ASPIRIN 81 MG: 81 TABLET, CHEWABLE ORAL at 09:46

## 2020-12-31 RX ADMIN — INSULIN LISPRO 6 UNITS: 100 INJECTION, SOLUTION INTRAVENOUS; SUBCUTANEOUS at 10:16

## 2020-12-31 RX ADMIN — ATORVASTATIN CALCIUM 80 MG: 80 TABLET, FILM COATED ORAL at 09:47

## 2020-12-31 RX ADMIN — BUPROPION HYDROCHLORIDE 150 MG: 150 TABLET, EXTENDED RELEASE ORAL at 09:46

## 2020-12-31 RX ADMIN — HYDROCHLOROTHIAZIDE 12.5 MG: 25 TABLET ORAL at 09:47

## 2020-12-31 RX ADMIN — CLOPIDOGREL BISULFATE 75 MG: 75 TABLET ORAL at 09:47

## 2020-12-31 RX ADMIN — DULOXETINE HYDROCHLORIDE 60 MG: 60 CAPSULE, DELAYED RELEASE ORAL at 09:46

## 2020-12-31 RX ADMIN — SODIUM CHLORIDE, PRESERVATIVE FREE 10 ML: 5 INJECTION INTRAVENOUS at 09:48

## 2020-12-31 NOTE — PROGRESS NOTES
Discharge order in, discharge instruction explained to pt. Pt verbalized understanding and denies any pain or need at this time. IV taken out, catheter intact, no bleeding witnessed and dressing applied. Pt educated on how to care for his right radial site. Pt taken out of a wheel chair to the main entrance.

## 2020-12-31 NOTE — DISCHARGE SUMMARY
Patient ID:  Shaan Linares  [id number]  63 y.o.  1960    Admit date: 12/30/2020    Discharge date and time: 12/31/2020    Admitting Physician: Sydney Yeh DO     Discharging Physician: Sydney Yeh DO     Admission Diagnoses: Abnormal result of other cardiovascular function study [R94.39]  CAD in native artery [I25.10]  CAD S/P percutaneous coronary angioplasty [I25.10, Z98.61]    Discharge Diagnoses: SAME    Admission Condition: Fair    Discharged Condition: [address] Course: Shaan Linares is a 68-year-old male with a history of CVA, carotid artery disease, essential hypertension, type II DM, hyperlipidemia, DARRICK, and recent abnormal nuclear SPECT stress test admitted for overnight observation following elective invasive coronary artery and PCI of his proximal to mid-LAD with 2 overlapping MING. Overall, the patient did well overnight and did not have any angina or shortness of breath. Right radial artery access site was soft, non-tender, and without evidence of bleeding or hematoma prior to discharge. Consults: None. ASSESSMENT:   1. CAD - S/p IVUS-guided PCI of proximal to mid-LAD with overlapping Xience Mariluz 3.0 x 28 mm and 3.5 x 23 mm MING. 2. Essential hypertension  3. Hyperlipidemia  4. Type II DM  5. H/o CVA  6. Carotid artery disease  7. DARRICK  8. Previous tobacco use    PLAN:   1. Continue dual antiplatelet therapy with aspirin 81 mg daily and Plavix 75 mg daily. Plavix should be continued throughout at least 12/30/21.   2. Increased atorvastatin to 80 mg daily. 3. Continue atenolol 25 mg qHS and lisinopril-HCTZ 20-12.5 mg daily. 4. Referral placed for cardiac rehab.  5. Follow-up with [facility] in 2 weeks.       Post procedure instructions reviewed  Follow-up with Dr. Remberto Ortega on 1/15/21 at 8:15 AM.    Future Appointments   Date Time Provider Avelino Owens   1/15/2021  8:15 AM Sanchez Moy MD [facility] 3/31/2021  3:10 PM MD Dwayne Andrea [facility]       Discharge Exam:  BP (!) 150/77   Pulse 69   Temp 98 °F (36.7 °C) (Oral)   Resp 18   Ht 5' 10\" (1.778 m)   Wt 211 lb (95.7 kg)   SpO2 94%   BMI 30.28 kg/m²     General: Adult male resting in bed in no acute distress. Pleasant and interactive on exam.  HEENT: Normocephalic, atraumatic, non-icteric, hearing intact, nares normal, mucous membranes moist.  Neck: No JVD. Heart: Regular rate and rhythm. Normal S1 and S2. No murmurs, gallops or rubs. Lungs: Normal respiratory effort. Clear to auscultation bilaterally. No wheezes, rales, or rhonchi. Abdomen: Soft, non-tender. Normoactive bowel sounds. No masses or organomegaly. Skin: No rashes, wounds, or lesions. Pulses: 2+ and symmetric. Extremities: Right radial artery access site is soft, non-tender, and without evidence of bleeding or hematoma. Right radial artery pulse 2+. No clubbing, cyanosis, or edema. Psych: Normal mood and affect. Neuro: Alert and oriented to person, place, and time. No focal deficits noted. Disposition: Home      Patient Instructions:   Current Discharge Medication List      START taking these medications    Details   clopidogrel (PLAVIX) 75 MG tablet Take 1 tablet by mouth daily  Qty: 90 tablet, Refills: 3         CONTINUE these medications which have CHANGED    Details   atorvastatin (LIPITOR) 80 MG tablet Take 1 tablet by mouth daily  Qty: 90 tablet, Refills: 3         CONTINUE these medications which have NOT CHANGED    Details   DULoxetine (CYMBALTA) 60 MG extended release capsule TAKE 1 CAPSULE BY MOUTH 2 TIMES A DAY  Qty: 180 capsule, Refills: 0    Comments: Last refill. Patient needs office visit before future refills. Associated Diagnoses: Type 2 diabetes mellitus with diabetic polyneuropathy, with long-term [facility]  Mild episode of recurrent major depressive disorder ([facility] nystatin (MYCOSTATIN) 883748 UNIT/GM ointment Apply topically 2 times daily to corners of mouth. Qty: 30 g, Refills: 1      insulin regular human (HUMULIN R U-500 KWIKPEN) 500 UNIT/ML SOPN concentrated injection pen INJECT 80 UNITS BEFORE BREAKFAST, INJECT 60 UNITS BEFORE LUNCH AND INJECT 60 UNITS BEFORE DINNER  Qty: 15 pen, Refills: 2    Associated Diagnoses: Type 2 diabetes mellitus with diabetic polyneuropathy, with long-term current use of insulin ([facility])      metroNIDAZOLE (METROGEL) 1 % gel Apply to face daily.   Qty: 60 g, Refills: 3    Associated Diagnoses: Rosacea      omega-3 acid ethyl esters (LOVAZA) 1 g capsule TAKE TWO CAPSULES BY MOUTH TWICE A DAY  Qty: 180 capsule, Refills: 3    Associated Diagnoses: Mixed hyperlipidemia      Vitamin D (CHOLECALCIFEROL) 25 MCG (1000 UT) TABS tablet TAKE 1 TABLET BY MOUTH ONE TIME A DAY  Qty: 90 tablet, Refills: 3      ASPIRIN ADULT LOW STRENGTH 81 MG EC tablet TAKE 1 TABLET BY MOUTH ONE TIME A DAY  Qty: 90 tablet, Refills: 3      lisinopril-hydroCHLOROthiazide (PRINZIDE;ZESTORETIC) 20-12.5 MG per tablet TAKE 1 TABLET BY MOUTH ONE TIME A DAY  Qty: 90 tablet, Refills: 3      fenofibrate (TRICOR) 145 MG tablet TAKE 1 TABLET BY MOUTH ONE TIME A DAY  Qty: 90 tablet, Refills: 3      buPROPion (WELLBUTRIN XL) 150 MG extended release tablet Take 1 tablet by mouth every morning  Qty: 90 tablet, Refills: 3    Associated Diagnoses: Dysthymia      metFORMIN (GLUCOPHAGE) 1000 MG tablet TAKE ONE TABLET BY MOUTH TWICE A DAY  Qty: 180 tablet, Refills: 3      atenolol (TENORMIN) 25 MG tablet TAKE 1 TABLET BY MOUTH EVERY NIGHT AT BEDTIME  Qty: 90 tablet, Refills: 3      Insulin Pen Needle (B-D UF III MINI PEN NEEDLES) 31G X 5 MM MISC 1 each by Does not apply route 3 times daily  Qty: 300 each, Refills: 6      PRODIGY LANCETS 28G MISC Use as directed to test up to 3-4 times daily  Qty: 400 each, Refills: 3 blood glucose test strips (PRODIGY NO CODING BLOOD GLUC) strip Use as directed to test up to 3-4 times daily  Qty: 400 each, Refills: 3         STOP taking these medications       ibuprofen (ADVIL;MOTRIN) 600 MG tablet Comments:   Reason for Stopping:                Activity: as tolerated  Diet: cardiac diet    The patient is on a beta-blocker  The patient is not on an ace-i/ARB (LVEF is preserved)  The patient is on a statin  The patient is on antiplatelet therapy  The patient does not have history of AF, and is not on anticoagulation        Justin Callahan, [address] [place] [address]  [phone]

## 2021-01-04 ENCOUNTER — CARE COORDINATION (OUTPATIENT)
Dept: OTHER | Facility: CLINIC | Age: 61
End: 2021-01-04

## 2021-01-04 RX ORDER — ATENOLOL 25 MG/1
TABLET ORAL
Qty: 90 TABLET | Refills: 3 | Status: SHIPPED | OUTPATIENT
Start: 2021-01-04 | End: 2022-02-08

## 2021-01-04 NOTE — TELEPHONE ENCOUNTER
Last ov 11/16/2020   Future Appointments   Date Time Provider Avelino Owens   1/15/2021  8:15 AM Avery Mccullough MD [facility]   3/31/2021  3:10 PM MD Crissy Soriano [facility]

## 2021-01-05 ENCOUNTER — CARE COORDINATION (OUTPATIENT)
Dept: OTHER | Facility: CLINIC | Age: 61
End: 2021-01-05

## 2021-01-05 NOTE — CARE COORDINATION
[facility] Transitions Initial Follow Up Call     Call within 2 business days of discharge: Yes     Patient: Grisel Ochoa            Patient : 1960   MRN: [id number]             Reason for Admission: heart cath  Discharge Date: 20     RARS: No data recorded    [facility] Transitions Initial Follow Up Call    Call within 2 business days of discharge: Yes    Patient: Grisel Ochoa Patient : 1960   MRN: [id number]  Reason for Admission: heart cath  Discharge Date: 20 RARS: No data recorded       Grisel Ochoa is a 63 y.o. male recently discharged 2020 from hospital (facility) with a reported diagnosis of CAD. The patient was contacted post discharge and the answers were provided by . Patient/CG had  understanding of reason for admission. Medications were reviewed and the patient had changes to medications. Coaching for symptoms related to disease demonstrates  and demonstrates  self-management skills. The patient was coached to monitor incision site on wrist and to continue medications as prescribed. The patient will receive a follow up call to monitor adherence and understanding as part of Transitions of Care. Nancy Remy  2021      Goals      Blood Pressure < 140/90      Ideally your blood pressure goal should be below 130/80. You can learn more about blood pressure and ways to incorporate a healthy lifestlye to help treat it from the American Heart Association website: www.heart. org under the  Getting Healthy link, and the [address] website: www.nhlbi.nih.gov/hbp    A for Activity  It helps your blood pressure when you exercise regularly. You should try to exercise at least 3 times a week for 20 minutes at a pace that you can comfortably carry on a conversation without being out of breath. B for BMI  The Body Mass Index should be below 30. This is your weight and height measurement.  A BMI below 30 is preferred to help lower the risk of Type 2 diabetes, high cholesterol, heart disease, high blood pressure, sleep apnea, gallbladder disease and strokes. C for Control your Salt Intake  Avoid adding salt to your food. Avoid processed food, fast food, and junk food which all has high levels of sodium added. Read labels and get no more than 1500-2300mg of sodium a day. How confident do you feel that you will be able to begin working on your goals before next visit? :     *Some confidence       Please record your blood pressure once every two weeks below:  Date  Blood pressure           HDL > 40      HEMOGLOBIN A1C < 7.0      There are 5 goals to be considered under ideal   control as a diabetic:  1 Hemoglobin A1C under 7  2 LDL bad cholesterol under 70  3 Blood Pressure under 130/80  4 Take a baby aspirin a day  5 NOT smoking. Notes for you because of your diabetes: Your goal for your hemoglobin A1C should be below 7. Because you have diabetes you should have a yearly eye exam.  You should check your feet monthly for new sores. Your cholesterol LDL should be less than 100, ideally below 70. To achieve this level, make sure you take your medicine everyday. Try to cut down on fried foods by half before your next visit. Your blood pressure should be below 130/80, and at least below 140/90  To achieve this level, make sure you take your medicine everyday. Cut down on using salt in your diet every mealtime, and we will recheck your blood pressure at the next visit. Exercising more regularly helps with your blood pressure control. Exercising 3 times a week for 15 minutes is a good start. If you smoke: quit smoking: Your immediate action is to try to cut down your number of cigarettes per week in half before your next visit. Go to  [employer] on the internet for more assistence in your effort to quit smoking. Call -800-QUIT NOW for a phone resources to help you get started on quitting.     Take a baby chewable aspirin a

## 2021-01-14 NOTE — PROGRESS NOTES
[address]   Cardiac Consultation    Referring Provider:  MADISON Allen CNP     Chief Complaint   Patient presents with    Follow-up    Coronary Artery Disease    Hyperlipidemia        History of Present Illness:   Clinton Zavala is a 63 y.o. male who is here today for [facility] follow up. Initially seen as a new patient on 11/20/2020 for an abnormal EKG carotid artery disease, and preoperative cardiac evaluation for trigger finger surgery, and a family history of heart diease. He had an echocardiogram on 12/18/2020 which showed an EF of 60%, carotid dopplers less than 50% bilaterally. He had an abnormal stress test and underwent a left heart cath with Dr. Isela Dowling on 12/30/2020- Successful IVUS-guided PCI of proximal to mid-LAD with overlapping Xience Mariluz 3.0 x 28 mm and 3.5 x 23 mm MING. Today he states he feels well overall. He has chest pain that feels like he has pulled a muscle. Seems related to position changes. Pain does not radiate or seem related to activity. Last for a few minutes and resolves. No assoc sob. He is tolerating his medications. Blood pressure at home is stable. Patient currently denies any weight gain, edema, palpitations shortness of breath, dizziness, and syncope.              Past Medical History:   has a past medical history of Acute, but ill-defined, cerebrovascular disease, CAD in native artery, Cerebral artery occlusion with cerebral infarction [facility]), Cerebrovascular disease, Diplopia, ED (erectile dysfunction), Elbow pain, chronic, Hyperlipidemia, Hypertension, Irritable bowel syndrome, Obstructive sleep apnea (adult) (pediatric), Occlusion and stenosis of carotid artery without mention of cerebral infarction, Pain in joint, upper arm, Polyneuropathy in diabetes(357.2), PONV (postoperative nausea and vomiting), Skin abnormality, Type 2 diabetes mellitus with diabetic polyneuropathy, with long-term current use of insulin ([facility] [address] and Type II or unspecified type diabetes mellitus without mention of complication, not stated as uncontrolled. Surgical History:   has a past surgical history that includes Cholecystectomy (1995); Carpal tunnel release (Left, 2008); Elbow surgery (Left, 2008); hernia repair (2009); Pyloroplasty; Cervical discectomy (08/02/2017); cervical fusion (12/06/2017); Colonoscopy (N/A, 8/14/2018); Finger trigger release (Left, 12/12/2019); and Finger trigger release (Right, 11/23/2020). Social History:   reports that he quit smoking about 21 years ago. His smoking use included cigarettes. He has a 25.00 pack-year smoking history. He has never used smokeless tobacco. He reports that he does not drink alcohol or use drugs. Family History:  family history includes Cancer in his brother, father, and paternal uncle; Depression in his maternal cousin; No Known Problems in his mother. Home Medications:  Prior to Admission medications    Medication Sig Start Date End Date Taking? Authorizing Provider   atenolol (TENORMIN) 25 MG tablet TAKE 1 TABLET BY MOUTH EVERY NIGHT AT BEDTIME 1/4/21  Yes Janelle Mota, DO   omega-3 acid ethyl esters (LOVAZA) 1 g capsule TAKE TWO CAPSULES BY MOUTH TWICE A DAY 12/31/20  Yes Daniela Adames MD   atorvastatin (LIPITOR) 80 MG tablet Take 1 tablet by mouth daily 12/31/20  Yes Neptali Velasquez DO   clopidogrel (PLAVIX) 75 MG tablet Take 1 tablet by mouth daily 12/31/20  Yes Neptali Velasquez, DO   DULoxetine (CYMBALTA) 60 MG extended release capsule TAKE 1 CAPSULE BY MOUTH 2 TIMES A DAY 12/21/20  Yes Fly Lewis, DO   nystatin (MYCOSTATIN) 066248 UNIT/GM ointment Apply topically 2 times daily to corners of mouth. 10/29/20  Yes Parul Kaur MD   insulin regular human (HUMULIN R U-500 KWIKPEN) 500 UNIT/ML SOPN concentrated injection pen INJECT 80 UNITS BEFORE BREAKFAST, INJECT 60 UNITS BEFORE LUNCH AND INJECT 60 UNITS BEFORE DINNER 8/24/20  Yes Vilma Pham MD   metroNIDAZOLE (METROGEL) 1 % gel Apply to face daily.  7/6/20 Yes Thang Coelho MD   Vitamin D (CHOLECALCIFEROL) 25 MCG (1000 UT) TABS tablet TAKE 1 TABLET BY MOUTH ONE TIME A DAY 6/15/20  Yes MADISON Murcia CNP   ASPIRIN ADULT LOW STRENGTH 81 MG EC tablet TAKE 1 TABLET BY MOUTH ONE TIME A DAY 6/15/20  Yes MADISON Murcia CNP   lisinopril-hydroCHLOROthiazide (PRINZIDE;ZESTORETIC) 20-12.5 MG per tablet TAKE 1 TABLET BY MOUTH ONE TIME A DAY 6/1/20  Yes MADISON Murcia CNP   fenofibrate (TRICOR) 145 MG tablet TAKE 1 TABLET BY MOUTH ONE TIME A DAY 6/1/20  Yes Yolanda Portillo MD   buPROPion (WELLBUTRIN XL) 150 MG extended release tablet Take 1 tablet by mouth every morning 4/15/20  Yes MADISON Murcia CNP   metFORMIN (GLUCOPHAGE) 1000 MG tablet TAKE ONE TABLET BY MOUTH TWICE A DAY 2/6/20  Yes Yolanda Portillo MD   Insulin Pen Needle (B-D UF III MINI PEN NEEDLES) 31G X 5 MM MISC 1 each by Does not apply route 3 times daily 12/11/19  Yes MD CLAUDETTE Duvall LANCETS 28G MISC Use as directed to test up to 3-4 times daily 3/15/19  Yes Yolanda Portillo MD   blood glucose test strips (PRODIGY NO CODING BLOOD GLUC) strip Use as directed to test up to 3-4 times daily 3/15/19  Yes Yolanda Portillo MD   atenolol (TENORMIN) 25 MG tablet TAKE 1 TABLET BY MOUTH EVERY NIGHT AT BEDTIME 1/15/20   MADISON Murcia CNP        Allergies:  Patient has no known allergies. Review of Systems:   · Constitutional: there has been no unanticipated weight loss. There's been no change in energy level, sleep pattern, or activity level. · Eyes: No visual changes or diplopia. No scleral icterus. · ENT: No Headaches, hearing loss or vertigo. No mouth sores or sore throat. · Cardiovascular: Reviewed in HPI  · Respiratory: No cough or wheezing, no sputum production. No hematemesis. · Gastrointestinal: No abdominal pain, appetite loss, blood in stools. No change in bowel or bladder habits.   · Genitourinary: No dysuria, trouble voiding, or hematuria. · Musculoskeletal:  No gait disturbance, weakness or joint complaints. · Integumentary: No rash or pruritis. · Neurological: No headache, diplopia, change in muscle strength, numbness or tingling. No change in gait, balance, coordination, mood, affect, memory, mentation, behavior. · Psychiatric: No anxiety, no depression. · Endocrine: No malaise, fatigue or temperature intolerance. No excessive thirst, fluid intake, or urination. No tremor. · Hematologic/Lymphatic: No abnormal bruising or bleeding, blood clots or swollen lymph nodes. · Allergic/Immunologic: No nasal congestion or hives. Physical Examination:    Vitals:    01/15/21 0816   BP: 122/74   Pulse: 82   SpO2: 98%        Constitutional and General Appearance: NAD   Respiratory:  · Normal excursion and expansion without use of accessory muscles  · Resp Auscultation: Normal breath sounds without dullness  Cardiovascular:  · The apical impulses not displaced  · Heart tones are crisp and normal  · Cervical veins are not engorged  · The carotid upstroke is normal in amplitude and contour without delay or bruit  · Normal S1S2, No S3, No Murmur  · Peripheral pulses are symmetrical and full  · There is no clubbing, cyanosis of the extremities. · No edema  · Femoral Arteries: 2+ and equal  · Pedal Pulses: 2+ and equal   Abdomen:  · No masses or tenderness  · Liver/Spleen: No Abnormalities Noted  Neurological/Psychiatric:  · Alert and oriented in all spheres  · Moves all extremities well  · Exhibits normal gait balance and coordination  · No abnormalities of mood, affect, memory, mentation, or behavior are noted    Carotid dopplers 6/4/13  IMPRESSION:   1. Mild bilateral internal carotid artery stenosis, greatest at the right internal carotid artery, estimated 40 to 59%. 2. Patent bilateral vertebral arteries. CTA neck 2014  IMPRESSION CTA NECK:   1.  Mild bilateral proximal internal carotid artery plaque, without evidence of hemodynamically significant stenosis. 2. Patent bilateral vertebral arteries. EKG 11/16/2020  Sinus  Tachycardia  - frequent ectopic ventricular beat s   # VECs = 3  -RSR(V1) -nondiagnostic    Stress test 12/18/2020  Summary  There is some degree of diaphragmatic attenuation noted. However, a moderate  sized area of severely decreased tracer uptake of the inferior/inferolateral  wall with stress compared with rest is present. FIndings consistent with  inducible ischemia. Normal post-stress LVEF 63%. Abnormal study. Overall  findings represent a intermediate risk scan. Carotid dopplers 12/18/2020  Summary    <50% stenosis is noted in the right internal carotid artery. <50% stenosis is noted in the left internal carotid artery. The bilateral vertebral arteries have normal antegrade flow. Echocardiogram 12/18/2020  Summary   Normal left ventricle size, wall thickness and systolic function with an   estimated ejection fraction of 60%. No regional wall motion abnormalities are seen. Grade I diastolic dysfunction with normal filing pressure. Aortic valve appears sclerotic but opens adequately. Mild aortic regurgitation. Inadequate tricuspid regurgitation to estimate systolic pulmonary artery   pressure. Left heart cath Dr. Charline White 12/30/2020  Impression:  1. Severe single-vessel coronary artery disease. 2. Successful IVUS-guided PCI of proximal to mid-LAD with overlapping Xience Mariluz 3.0 x 28 mm and 3.5 x 23 mm MING. 3. Normal LVEDP    Assessment:   Chest pain - atypical, no c/w cardiac  Coronary artery disease- Stable.  successful IVUS-guided PCI of proximal to mid-LAD with overlapping Xience Mariluz 3.0 x 28 mm and 3.5 x 23 mm MING 12/30/2020   Carotid artery diease - less than 50% 12/18/2020  Abnormal EKG   Hypertension - controlled  Hyperlipidemia - suboptimal. Statin increased  Diabetes Mellitus   Sleep apnea   History of CVA 2013  Former smoker - 20 years ago   Family history of heart disease in father   History of neck pain and surgery     Plan:  Fasting lipids one month   Continue current medications   Check blood pressure at home weekly  Regular exercise and following a healthy diet encouraged   Follow up with me in 6 months     Scribe's attestation: This note was scribed in the presence of Dr. Liz Damon M.D. By Stephenie Fajardo RN    The scribes documentation has been prepared under my direction and personally reviewed by me in its entirety. I confirm that the note above accurately reflects all work, treatment, procedures, and medical decision making performed by me. Dr. Liz Damon MD      Thank you for allowing me to participate in the care of this individual.      Stewart Cobb.  Shanae Sebastian M.D., Nimco Coleman

## 2021-01-15 ENCOUNTER — OFFICE VISIT (OUTPATIENT)
Dept: CARDIOLOGY CLINIC | Age: 61
End: 2021-01-15
Payer: COMMERCIAL

## 2021-01-15 VITALS
SYSTOLIC BLOOD PRESSURE: 122 MMHG | OXYGEN SATURATION: 98 % | BODY MASS INDEX: 30.49 KG/M2 | WEIGHT: 213 LBS | DIASTOLIC BLOOD PRESSURE: 74 MMHG | HEART RATE: 82 BPM | HEIGHT: 70 IN

## 2021-01-15 DIAGNOSIS — Z98.61 CAD S/P PERCUTANEOUS CORONARY ANGIOPLASTY: Primary | ICD-10-CM

## 2021-01-15 DIAGNOSIS — I10 ESSENTIAL HYPERTENSION: Chronic | ICD-10-CM

## 2021-01-15 DIAGNOSIS — I25.10 CAD S/P PERCUTANEOUS CORONARY ANGIOPLASTY: Primary | ICD-10-CM

## 2021-01-15 DIAGNOSIS — E78.2 MIXED HYPERLIPIDEMIA: Chronic | ICD-10-CM

## 2021-01-15 PROCEDURE — G8427 DOCREV CUR MEDS BY ELIG CLIN: HCPCS | Performed by: INTERNAL MEDICINE

## 2021-01-15 PROCEDURE — G8417 CALC BMI ABV UP PARAM F/U: HCPCS | Performed by: INTERNAL MEDICINE

## 2021-01-15 PROCEDURE — 99214 OFFICE O/P EST MOD 30 MIN: CPT | Performed by: INTERNAL MEDICINE

## 2021-01-15 PROCEDURE — G8482 FLU IMMUNIZE ORDER/ADMIN: HCPCS | Performed by: INTERNAL MEDICINE

## 2021-01-15 PROCEDURE — 1036F TOBACCO NON-USER: CPT | Performed by: INTERNAL MEDICINE

## 2021-01-15 PROCEDURE — 3017F COLORECTAL CA SCREEN DOC REV: CPT | Performed by: INTERNAL MEDICINE

## 2021-01-15 NOTE — PATIENT INSTRUCTIONS
Plan:  Fasting lipids one month   Continue current medications   Check blood pressure at home weekly  Regular exercise and following a healthy diet encouraged   Follow up with me in 6 months

## 2021-01-15 NOTE — LETTER
[facility]   Cardiac Consultation    Referring Provider:  MADISON Brito CNP     Chief Complaint   Patient presents with    Follow-up    Coronary Artery Disease    Hyperlipidemia        History of Present Illness:   Micheal Ford is a 63 y.o. male who is here today for [facility] follow up. Initially seen as a new patient on 11/20/2020 for an abnormal EKG carotid artery disease, and preoperative cardiac evaluation for trigger finger surgery, and a family history of heart diease. He had an echocardiogram on 12/18/2020 which showed an EF of 60%, carotid dopplers less than 50% bilaterally. He had an abnormal stress test and underwent a left heart cath with Dr. Adarsh Verma on 12/30/2020- Successful IVUS-guided PCI of proximal to mid-LAD with overlapping Xience Mariluz 3.0 x 28 mm and 3.5 x 23 mm MING. Today he states he feels well overall. He has chest pain that feels like he has pulled a muscle. Seems related to position changes. Pain does not radiate or seem related to activity. Last for a few minutes and resolves. No assoc sob. He is tolerating his medications. Blood pressure at home is stable. Patient currently denies any weight gain, edema, palpitations shortness of breath, dizziness, and syncope.              Past Medical History: has a past medical history of Acute, but ill-defined, cerebrovascular disease, CAD in native artery, Cerebral artery occlusion with cerebral infarction [facility]), Cerebrovascular disease, Diplopia, ED (erectile dysfunction), Elbow pain, chronic, Hyperlipidemia, Hypertension, Irritable bowel syndrome, Obstructive sleep apnea (adult) (pediatric), Occlusion and stenosis of carotid artery without mention of cerebral infarction, Pain in joint, upper arm, Polyneuropathy in diabetes(357.2), PONV (postoperative nausea and vomiting), Skin abnormality, Type 2 diabetes mellitus with diabetic polyneuropathy, with long-term current use of insulin ([facility] [address] and Type II or unspecified type diabetes mellitus without mention of complication, not stated as uncontrolled. Surgical History:   has a past surgical history that includes Cholecystectomy (1995); Carpal tunnel release (Left, 2008); Elbow surgery (Left, 2008); hernia repair (2009); Pyloroplasty; Cervical discectomy (08/02/2017); cervical fusion (12/06/2017); Colonoscopy (N/A, 8/14/2018); Finger trigger release (Left, 12/12/2019); and Finger trigger release (Right, 11/23/2020). Social History:   reports that he quit smoking about 21 years ago. His smoking use included cigarettes. He has a 25.00 pack-year smoking history. He has never used smokeless tobacco. He reports that he does not drink alcohol or use drugs. Family History:  family history includes Cancer in his brother, father, and paternal uncle; Depression in his maternal cousin; No Known Problems in his mother. Home Medications:  Prior to Admission medications    Medication Sig Start Date End Date Taking?  Authorizing Provider   atenolol (TENORMIN) 25 MG tablet TAKE 1 TABLET BY MOUTH EVERY NIGHT AT BEDTIME 1/4/21  Yes Ant Pete,    omega-3 acid ethyl esters (LOVAZA) 1 g capsule TAKE TWO CAPSULES BY MOUTH TWICE A DAY 12/31/20  Yes Zee Zaldivar MD atorvastatin (LIPITOR) 80 MG tablet Take 1 tablet by mouth daily 12/31/20  Yes Neptali Velasquez, DO   clopidogrel (PLAVIX) 75 MG tablet Take 1 tablet by mouth daily 12/31/20  Yes Neptali Velasquez, DO   DULoxetine (CYMBALTA) 60 MG extended release capsule TAKE 1 CAPSULE BY MOUTH 2 TIMES A DAY 12/21/20  Yes Fly Lewis, DO   nystatin (MYCOSTATIN) 684342 UNIT/GM ointment Apply topically 2 times daily to corners of mouth. 10/29/20  Yes Aniyah Lo MD   insulin regular human (HUMULIN R U-500 KWIKPEN) 500 UNIT/ML SOPN concentrated injection pen INJECT 80 UNITS BEFORE BREAKFAST, INJECT 60 UNITS BEFORE LUNCH AND INJECT 60 UNITS BEFORE DINNER 8/24/20  Yes Sophia Obrien MD   metroNIDAZOLE (METROGEL) 1 % gel Apply to face daily.  7/6/20  Yes Aniyah Lo MD   Vitamin D (CHOLECALCIFEROL) 25 MCG (1000 UT) TABS tablet TAKE 1 TABLET BY MOUTH ONE TIME A DAY 6/15/20  Yes MADISON Taylor CNP   ASPIRIN ADULT LOW STRENGTH 81 MG EC tablet TAKE 1 TABLET BY MOUTH ONE TIME A DAY 6/15/20  Yes MADISON Taylor CNP   lisinopril-hydroCHLOROthiazide (PRINZIDE;ZESTORETIC) 20-12.5 MG per tablet TAKE 1 TABLET BY MOUTH ONE TIME A DAY 6/1/20  Yes MADISON Taylor CNP   fenofibrate (TRICOR) 145 MG tablet TAKE 1 TABLET BY MOUTH ONE TIME A DAY 6/1/20  Yes Sophia Obrien MD   buPROPion (WELLBUTRIN XL) 150 MG extended release tablet Take 1 tablet by mouth every morning 4/15/20  Yes MADISON Taylor CNP   metFORMIN (GLUCOPHAGE) 1000 MG tablet TAKE ONE TABLET BY MOUTH TWICE A DAY 2/6/20  Yes Sophia Obiren MD   Insulin Pen Needle (B-D UF III MINI PEN NEEDLES) 31G X 5 MM MISC 1 each by Does not apply route 3 times daily 12/11/19  Yes Sophia Obrien MD   PRODIGY LANCETS 28G MISC Use as directed to test up to 3-4 times daily 3/15/19  Yes Sophia Obrien MD   blood glucose test strips (PRODIGY NO CODING BLOOD GLUC) strip Use as directed to test up to 3-4 times daily 3/15/19  Yes Sophia Obrien MD · There is no clubbing, cyanosis of the extremities. · No edema  · Femoral Arteries: 2+ and equal  · Pedal Pulses: 2+ and equal   Abdomen:  · No masses or tenderness  · Liver/Spleen: No Abnormalities Noted  Neurological/Psychiatric:  · Alert and oriented in all spheres  · Moves all extremities well  · Exhibits normal gait balance and coordination  · No abnormalities of mood, affect, memory, mentation, or behavior are noted    Carotid dopplers 6/4/13  IMPRESSION:   1. Mild bilateral internal carotid artery stenosis, greatest at the right internal carotid artery, estimated 40 to 59%. 2. Patent bilateral vertebral arteries. CTA neck 2014  IMPRESSION CTA NECK:   1. Mild bilateral proximal internal carotid artery plaque, without evidence of hemodynamically significant stenosis. 2. Patent bilateral vertebral arteries. EKG 11/16/2020  Sinus  Tachycardia  - frequent ectopic ventricular beat s   # VECs = 3  -RSR(V1) -nondiagnostic    Stress test 12/18/2020  Summary  There is some degree of diaphragmatic attenuation noted. However, a moderate  sized area of severely decreased tracer uptake of the inferior/inferolateral  wall with stress compared with rest is present. FIndings consistent with  inducible ischemia. Normal post-stress LVEF 63%. Abnormal study. Overall  findings represent a intermediate risk scan. Carotid dopplers 12/18/2020  Summary    <50% stenosis is noted in the right internal carotid artery. <50% stenosis is noted in the left internal carotid artery. The bilateral vertebral arteries have normal antegrade flow. Echocardiogram 12/18/2020  Summary   Normal left ventricle size, wall thickness and systolic function with an   estimated ejection fraction of 60%. No regional wall motion abnormalities are seen. Grade I diastolic dysfunction with normal filing pressure. Aortic valve appears sclerotic but opens adequately. Mild aortic regurgitation. Inadequate tricuspid regurgitation to estimate systolic pulmonary artery   pressure. Left heart cath Dr. Mckayla Rudolph 12/30/2020  Impression:  1. Severe single-vessel coronary artery disease. 2. Successful IVUS-guided PCI of proximal to mid-LAD with overlapping Xience Mariluz 3.0 x 28 mm and 3.5 x 23 mm MING. 3. Normal LVEDP    Assessment:   Chest pain - atypical, no c/w cardiac  Coronary artery disease- Stable. successful IVUS-guided PCI of proximal to mid-LAD with overlapping Xience Mariluz 3.0 x 28 mm and 3.5 x 23 mm MING 12/30/2020   Carotid artery diease - less than 50% 12/18/2020  Abnormal EKG   Hypertension - controlled  Hyperlipidemia - suboptimal. Statin increased  Diabetes Mellitus   Sleep apnea   History of CVA 2013  Former smoker - 20 years ago   Family history of heart disease in father   History of neck pain and surgery     Plan:  Fasting lipids one month   Continue current medications   Check blood pressure at home weekly  Regular exercise and following a healthy diet encouraged   Follow up with me in 6 months     Scribe's attestation: This note was scribed in the presence of Dr. Reilly Smith M.D. By [facility] RN    The scribes documentation has been prepared under my direction and personally reviewed by me in its entirety. I confirm that the note above accurately reflects all work, treatment, procedures, and medical decision making performed by me. Dr. Reilly Smith MD      Thank you for allowing me to participate in the care of this individual.      Phu Olguin.  Sgaar Castro M.D., Parvin Fabian

## 2021-01-18 ENCOUNTER — TELEPHONE (OUTPATIENT)
Dept: PHARMACY | Facility: CLINIC | Age: 61
End: 2021-01-18

## 2021-01-18 NOTE — TELEPHONE ENCOUNTER
Called patient to schedule 2021 yearly pharmacist appointment to discuss medications for Diabetes Management Program.    No answer. Left VM on home/cell TAD: Please call back at [phone] Option #7.      Zion Serna, [employer]   Department, toll free: [phone], option 7

## 2021-01-20 ENCOUNTER — CARE COORDINATION (OUTPATIENT)
Dept: OTHER | Facility: CLINIC | Age: 61
End: 2021-01-20

## 2021-01-20 NOTE — CARE COORDINATION
ACM attempted to reach patient for follow up call regarding CT follow-up outreach. HIPAA compliant message left requesting a return phone call at patients convenience. Will continue to follow. CISCO Colunga, RN  Associate Care Manager   Cell: [phone]  [email or web address]

## 2021-01-21 DIAGNOSIS — E78.2 MIXED HYPERLIPIDEMIA: ICD-10-CM

## 2021-01-21 RX ORDER — OMEGA-3-ACID ETHYL ESTERS 1 G/1
CAPSULE, LIQUID FILLED ORAL
Qty: 180 CAPSULE | Refills: 3 | Status: SHIPPED | OUTPATIENT
Start: 2021-01-21 | End: 2021-06-22 | Stop reason: SDUPTHER

## 2021-01-21 NOTE — TELEPHONE ENCOUNTER
Requested Prescriptions     Pending Prescriptions Disp Refills    omega-3 acid ethyl esters (LOVAZA) 1 g capsule 180 capsule 3       LAST REFILL: 12/31/2020  NEXT OV: 3/3/2021    LVM for patient to call office concerning this refill. Patient should have refills available. Informed to call office at [phone].

## 2021-01-25 NOTE — TELEPHONE ENCOUNTER
Spoke to patients spouse Garland Salazar - and appointment scheduled for 2/01/21 at [address] [place] [address], [email or web address] [facility]   Department, toll free: [phone], option 7

## 2021-01-27 ENCOUNTER — CARE COORDINATION (OUTPATIENT)
Dept: OTHER | Facility: CLINIC | Age: 61
End: 2021-01-27

## 2021-01-27 NOTE — CARE COORDINATION
ACM attempted 2nd outreach to reach patient for CT follow-up outreach HIPAA compliant message left requesting a return phone call at patients convenience. Unable to reach letter sent to patient via [address] Will continue to outreach patient. CISCO Jewell, RN  Associate Care Manager   Cell: [phone]  [email or web address]

## 2021-02-01 ENCOUNTER — SCHEDULED TELEPHONE ENCOUNTER (OUTPATIENT)
Dept: PHARMACY | Facility: CLINIC | Age: 61
End: 2021-02-01

## 2021-02-01 RX ORDER — ACETAMINOPHEN 160 MG
1 TABLET,DISINTEGRATING ORAL DAILY
Status: ON HOLD | COMMUNITY
End: 2022-08-15 | Stop reason: HOSPADM

## 2021-02-01 NOTE — TELEPHONE ENCOUNTER
[facility] CLINICAL PHARMACY REVIEW - Be Well with Diabetes    Marvin Orellana is a 63 y.o. male enrolled in the [address] [place] [address],[address] Employee Diabetes Program. Patient's wife, Larry Carreno, provided writer with verbal consent for patient christopher in the program for this year. Medications:  Current Outpatient Medications   Medication Sig Dispense Refill    Cholecalciferol (VITAMIN D3) 50 MCG (2000 UT) CAPS Take 1 capsule by mouth daily      omega-3 acid ethyl esters (LOVAZA) 1 g capsule TAKE TWO CAPSULES BY MOUTH TWICE A  capsule 3    atenolol (TENORMIN) 25 MG tablet TAKE 1 TABLET BY MOUTH EVERY NIGHT AT BEDTIME 90 tablet 3    atorvastatin (LIPITOR) 80 MG tablet Take 1 tablet by mouth daily 90 tablet 3    clopidogrel (PLAVIX) 75 MG tablet Take 1 tablet by mouth daily 90 tablet 3    DULoxetine (CYMBALTA) 60 MG extended release capsule TAKE 1 CAPSULE BY MOUTH 2 TIMES A  capsule 0    nystatin (MYCOSTATIN) 829420 UNIT/GM ointment Apply topically 2 times daily to corners of mouth. 30 g 1    insulin regular human (HUMULIN R U-500 KWIKPEN) 500 UNIT/ML SOPN concentrated injection pen INJECT 80 UNITS BEFORE BREAKFAST, INJECT 60 UNITS BEFORE LUNCH AND INJECT 60 UNITS BEFORE DINNER 15 pen 2    metroNIDAZOLE (METROGEL) 1 % gel Apply to face daily.  60 g 3    Vitamin D (CHOLECALCIFEROL) 25 MCG (1000 UT) TABS tablet TAKE 1 TABLET BY MOUTH ONE TIME A DAY 90 tablet 3    ASPIRIN ADULT LOW STRENGTH 81 MG EC tablet TAKE 1 TABLET BY MOUTH ONE TIME A DAY 90 tablet 3    lisinopril-hydroCHLOROthiazide (PRINZIDE;ZESTORETIC) 20-12.5 MG per tablet TAKE 1 TABLET BY MOUTH ONE TIME A DAY 90 tablet 3    fenofibrate (TRICOR) 145 MG tablet TAKE 1 TABLET BY MOUTH ONE TIME A DAY 90 tablet 3    buPROPion (WELLBUTRIN XL) 150 MG extended release tablet Take 1 tablet by mouth every morning 90 tablet 3    metFORMIN (GLUCOPHAGE) 1000 MG tablet TAKE ONE TABLET BY MOUTH TWICE A  tablet 3    Insulin Pen Needle (B-D UF III MINI PEN NEEDLES) 31G X 5 MM MISC 1 each by Does not apply route 3 times daily 300 each 6    PRODIGY LANCETS 28G MISC Use as directed to test up to 3-4 times daily 400 each 3    blood glucose test strips (PRODIGY NO CODING BLOOD GLUC) strip Use as directed to test up to 3-4 times daily 400 each 3     No current facility-administered medications for this visit. Current Pharmacy: [facility] [place]  Current testing supplies/frequency: [employer]   Pen needles/syringes: generic    Allergies:  No Known Allergies     Vitals/Labs:  BP Readings from Last 3 Encounters:   01/15/21 122/74   12/31/20 (!) 150/77   11/23/20 (!) 167/84      Ref.  Range 12/7/2019 10:36 1/15/2020 11:31 11/16/2020 15:47   Creatinine, Ur Latest Ref Range: 39.0 - 259.0 mg/dL 77.0     Microalbumin Creatinine Ratio Unknown 20.8 30 30   Microalb, Ur Unknown  30 10   Microalbumin, Random Urine Latest Ref Range: <2.0 mg/dL 1.60     Creatinine Ur POCT Unknown  100 100     Hemoglobin A1c   Component Value Date    LABA1C 10.6 11/16/2020    LABA1C 11.2 01/15/2020    LABA1C 11.1 12/07/2019     Lipids   Component Value Date    CHOL 189 12/31/2020    TRIG 394 (H) 12/31/2020    HDL 32 (L) 12/31/2020    LDLDIRECT 100 (H) 12/31/2020     ALT   Date Value Ref Range Status   11/16/2020 74 (H) 10 - 40 U/L Final     AST   Date Value Ref Range Status   11/16/2020 49 (H) 15 - 37 U/L Final     The 10-year ASCVD risk score (Gemma Ocampo et al., 2013) is: 23.4%    Values used to calculate the score:      Age: 63 years      Sex: Male      Is Non- : No      Diabetic: Yes      Tobacco smoker: No      Systolic Blood Pressure: 360 mmHg      Is BP treated: Yes      HDL Cholesterol: 32 mg/dL      Total Cholesterol: 189 mg/dL     Renal Function   Component Value Date    CREATININE 1.0 12/31/2020   CrCL ~90 mL/min (calculated using sCr 1 mg/dL, Ht 1.778 m, Adj.BW 82.4 kg, using C-G equation)   eGFR: >60 mL/min/1.73 m^2    Immunizations:  Administered Date(s) Administered    Influenza, Quadv, IM, PF (6 mo and older Fluzone, Flulaval, Fluarix, and 3 yrs and older Afluria) 2018, 2019, 2020    Pneumococcal Polysaccharide ([id number]) 2018      Social History:  Tobacco Use    Smoking status: Former Smoker     Packs/day: 1.00     Years: 25.00     Pack years: 25.00     Types: Cigarettes     Quit date: 2000     Years since quittin.0    Smokeless tobacco: Never Used   Substance Use Topics    Alcohol use: No     ASSESSMENT:  Initial Program Requirements (Y indicates has completed for the year, N indicates needs to be completed by 2021): No - Provider Visit for DM (1st)  No - A1c (1st)     Ongoing Program Requirements (Y indicates has completed for the year, N indicates needs to be completed by 2021): No - Provider Visit for DM (2nd)  No - ACC/diabetes educator visit (if A1c over 8%) - ACC Cathi Bottom attempting to reach  No - A1c (2nd)  No - Lipid panel  No - Urine microalbumin  Yes - Pneumococcal vaccination: Up-to-date, not needed again until age 72  No - Influenza vaccination for 2021  No - Medication adherence over 70%  Yes - On statin or contraindication(s) - atorvastatin  Yes - On ACEi/ARB or contraindication(s) - lisinopril-hctz     Current medications eligible for copay waiver, up to $600, through [employer] [place]:  - Humulin R U500, metformin, lisinopril-hctz, atorvastatin, aspirin   - generic pen needles  - Prodigy glucometer and testing supplies     Diabetes Care:   - Glycemic Goal: <7.0%. Is not at blood glucose goal but is on combination therapy. Type 2 DM under inadequate control as evidenced by A1c = 10.6%. - Current symptoms/problems include none  - Daily aspirin?  yes  - Adherent to ACEi/ARB and/or statin: per [employer]  - Medication compliance: compliant most of the time - wife helps patinet    Other Considerations:  - Blood Pressure

## 2021-02-02 RX ORDER — ATORVASTATIN CALCIUM 80 MG/1
80 TABLET, FILM COATED ORAL DAILY
Qty: 90 TABLET | Refills: 3 | Status: SHIPPED | OUTPATIENT
Start: 2021-02-02 | End: 2021-12-03 | Stop reason: SDUPTHER

## 2021-02-02 RX ORDER — CLOPIDOGREL BISULFATE 75 MG/1
75 TABLET ORAL DAILY
Qty: 90 TABLET | Refills: 3 | Status: SHIPPED | OUTPATIENT
Start: 2021-02-02 | End: 2022-02-08 | Stop reason: SDUPTHER

## 2021-02-02 NOTE — TELEPHONE ENCOUNTER
Medication Refill    Medication needing refilled:  clopidogrel (PLAVIX) 75 MG tablet  atorvastatin (LIPITOR) 80 MG tablet    Dosage of the medication:    How are you taking this medication (QD, BID, TID, QID, PRN):    30 or 90 day supply called in:  90  When will you run out of your medication:    Which Pharmacy are we sending the medication to?:  [address] [facility] [address] [phone] - F [phone]   [address] [place] [address], [address] [address]   Phone:  [phone]  Fax:  [phone]

## 2021-02-03 ENCOUNTER — CARE COORDINATION (OUTPATIENT)
Dept: OTHER | Facility: CLINIC | Age: 61
End: 2021-02-03

## 2021-02-03 NOTE — CARE COORDINATION
ACM attempted to reach patient for follow up call regarding CT follow-up outreach. HIPAA compliant message left requesting a return phone call at patients convenience. Will continue to follow. CISCO Iniguez, RN  Associate Care Manager   Cell: [phone]  [email or web address]

## 2021-02-04 NOTE — TELEPHONE ENCOUNTER
Requested Prescriptions     Pending Prescriptions Disp Refills    metFORMIN (GLUCOPHAGE) 1000 MG tablet 180 tablet 3     Sig: TAKE ONE TABLET BY MOUTH TWICE A DAY         LAST OV:  8/19/2020 ([facility])  NEXT OV: 3/31/2021

## 2021-02-09 ENCOUNTER — CARE COORDINATION (OUTPATIENT)
Dept: OTHER | Facility: CLINIC | Age: 61
End: 2021-02-09

## 2021-02-09 NOTE — CARE COORDINATION
Needs to be reviewed by the provider   Additional needs identified to be addressed with provider No  none  Discussed COVID-19 related testing which was N/A at this time. Test results were not done. Patient informed of results, if available? N/A         Method of communication with provider : none    Care Transition Nurse (CTN) contacted the patient by telephone to follow up after admission on 20. Verified name and  with patient as identifiers. Addressed changes since last contact: symptom management-patient has no ongoing symptoms at this time, self management-patient to continue heart healthy diet and follow provider recommendations and follow up appointment-patient has attended all follow-up appointments. Discharged needs reviewed: none  Follow up appointment completed? Yes    Advance Care Planning:   Does patient have an Advance Directive:  reviewed and current. CTN reviewed discharge instructions, medical action plan and red flags with patient and discussed any barriers to care and/or understanding of plan of care after discharge. Discussed appropriate site of care based on symptoms and resources available to patient including: PCP, Specialist, Benefits related nurse triage line, Urgent care clinics, [facility], When to call [employer] and [employer]. The patient agrees to contact the PCP office for questions related to their healthcare. Patients top risk factors for readmission: none at this time  Interventions to address risk factors: N/A    Discussed follow-up appointments. If no appointment was previously scheduled, appointment scheduling offered: all appointments completed at this time. Is follow up appointment scheduled within 7 days of discharge? Yes  Non-[facility] follow up appointment(s): n/a    No further outreach is indicated at this time based on severity of symptoms and risk factors. Plan for next call: No further outreach scheduled.    CTN provided contact information

## 2021-02-25 ENCOUNTER — IMMUNIZATION (OUTPATIENT)
Dept: PRIMARY CARE CLINIC | Age: 61
End: 2021-02-25
Payer: COMMERCIAL

## 2021-02-25 PROCEDURE — 0001A PR IMM ADMN SARSCOV2 30MCG/0.3ML DIL RECON 1ST DOSE: CPT | Performed by: FAMILY MEDICINE

## 2021-02-25 PROCEDURE — 91300 COVID-19, PFIZER VACCINE 30MCG/0.3ML DOSE: CPT | Performed by: FAMILY MEDICINE

## 2021-03-18 ENCOUNTER — IMMUNIZATION (OUTPATIENT)
Dept: PRIMARY CARE CLINIC | Age: 61
End: 2021-03-18
Payer: COMMERCIAL

## 2021-03-18 PROCEDURE — 91300 COVID-19, PFIZER VACCINE 30MCG/0.3ML DOSE: CPT | Performed by: FAMILY MEDICINE

## 2021-03-18 PROCEDURE — 0002A PR IMM ADMN SARSCOV2 30MCG/0.3ML DIL RECON 2ND DOSE: CPT | Performed by: FAMILY MEDICINE

## 2021-03-21 DIAGNOSIS — Z79.4 TYPE 2 DIABETES MELLITUS WITH DIABETIC POLYNEUROPATHY, WITH LONG-TERM CURRENT USE OF INSULIN (HCC): ICD-10-CM

## 2021-03-21 DIAGNOSIS — F34.1 DYSTHYMIA: ICD-10-CM

## 2021-03-21 DIAGNOSIS — F33.0 MILD EPISODE OF RECURRENT MAJOR DEPRESSIVE DISORDER (HCC): ICD-10-CM

## 2021-03-21 DIAGNOSIS — E11.42 TYPE 2 DIABETES MELLITUS WITH DIABETIC POLYNEUROPATHY, WITH LONG-TERM CURRENT USE OF INSULIN (HCC): ICD-10-CM

## 2021-03-22 RX ORDER — DULOXETIN HYDROCHLORIDE 60 MG/1
CAPSULE, DELAYED RELEASE ORAL
Qty: 180 CAPSULE | Refills: 0 | Status: SHIPPED | OUTPATIENT
Start: 2021-03-22 | End: 2021-06-22

## 2021-03-22 NOTE — TELEPHONE ENCOUNTER
11/16/2020  Future Appointments   Date Time Provider Avelino Graciela   3/31/2021  3:10 PM MD Dolly Brady

## 2021-03-22 NOTE — TELEPHONE ENCOUNTER
Future Appointments   Date Time Provider Avelino Owens   3/29/2021 10:20 AM MADISON Avalos - CNP [facility]   3/31/2021  3:10 PM MD Mili Meeks [facility]

## 2021-03-23 RX ORDER — BUPROPION HYDROCHLORIDE 150 MG/1
TABLET ORAL
Qty: 90 TABLET | Refills: 3 | Status: SHIPPED | OUTPATIENT
Start: 2021-03-23 | End: 2022-03-29

## 2021-03-29 ENCOUNTER — OFFICE VISIT (OUTPATIENT)
Dept: FAMILY MEDICINE CLINIC | Age: 61
End: 2021-03-29
Payer: COMMERCIAL

## 2021-03-29 VITALS
TEMPERATURE: 97.3 F | SYSTOLIC BLOOD PRESSURE: 122 MMHG | DIASTOLIC BLOOD PRESSURE: 66 MMHG | HEIGHT: 70 IN | WEIGHT: 218 LBS | OXYGEN SATURATION: 99 % | BODY MASS INDEX: 31.21 KG/M2 | HEART RATE: 87 BPM

## 2021-03-29 DIAGNOSIS — I25.10 CAD S/P PERCUTANEOUS CORONARY ANGIOPLASTY: ICD-10-CM

## 2021-03-29 DIAGNOSIS — E78.2 MIXED HYPERLIPIDEMIA: Chronic | ICD-10-CM

## 2021-03-29 DIAGNOSIS — E11.42 DIABETIC POLYNEUROPATHY ASSOCIATED WITH TYPE 2 DIABETES MELLITUS (HCC): Chronic | ICD-10-CM

## 2021-03-29 DIAGNOSIS — Z98.61 CAD S/P PERCUTANEOUS CORONARY ANGIOPLASTY: ICD-10-CM

## 2021-03-29 DIAGNOSIS — Z79.4 TYPE 2 DIABETES MELLITUS WITH DIABETIC POLYNEUROPATHY, WITH LONG-TERM CURRENT USE OF INSULIN (HCC): Primary | ICD-10-CM

## 2021-03-29 DIAGNOSIS — F34.1 DYSTHYMIA: ICD-10-CM

## 2021-03-29 DIAGNOSIS — Z87.891 FORMER SMOKER: ICD-10-CM

## 2021-03-29 DIAGNOSIS — G47.33 OBSTRUCTIVE SLEEP APNEA: ICD-10-CM

## 2021-03-29 DIAGNOSIS — E11.42 TYPE 2 DIABETES MELLITUS WITH DIABETIC POLYNEUROPATHY, WITH LONG-TERM CURRENT USE OF INSULIN (HCC): Primary | ICD-10-CM

## 2021-03-29 DIAGNOSIS — I10 ESSENTIAL HYPERTENSION: Chronic | ICD-10-CM

## 2021-03-29 DIAGNOSIS — I65.23 BILATERAL CAROTID ARTERY STENOSIS: ICD-10-CM

## 2021-03-29 LAB — HBA1C MFR BLD: 10.5 %

## 2021-03-29 PROCEDURE — G8417 CALC BMI ABV UP PARAM F/U: HCPCS | Performed by: NURSE PRACTITIONER

## 2021-03-29 PROCEDURE — G8427 DOCREV CUR MEDS BY ELIG CLIN: HCPCS | Performed by: NURSE PRACTITIONER

## 2021-03-29 PROCEDURE — 3046F HEMOGLOBIN A1C LEVEL >9.0%: CPT | Performed by: NURSE PRACTITIONER

## 2021-03-29 PROCEDURE — 1036F TOBACCO NON-USER: CPT | Performed by: NURSE PRACTITIONER

## 2021-03-29 PROCEDURE — 3017F COLORECTAL CA SCREEN DOC REV: CPT | Performed by: NURSE PRACTITIONER

## 2021-03-29 PROCEDURE — 83036 HEMOGLOBIN GLYCOSYLATED A1C: CPT | Performed by: NURSE PRACTITIONER

## 2021-03-29 PROCEDURE — G8482 FLU IMMUNIZE ORDER/ADMIN: HCPCS | Performed by: NURSE PRACTITIONER

## 2021-03-29 PROCEDURE — 2022F DILAT RTA XM EVC RTNOPTHY: CPT | Performed by: NURSE PRACTITIONER

## 2021-03-29 PROCEDURE — 99214 OFFICE O/P EST MOD 30 MIN: CPT | Performed by: NURSE PRACTITIONER

## 2021-03-29 ASSESSMENT — ENCOUNTER SYMPTOMS
COUGH: 0
NAUSEA: 0
BACK PAIN: 1
SHORTNESS OF BREATH: 0
VOMITING: 0
DIARRHEA: 0

## 2021-03-29 NOTE — PROGRESS NOTES
bad days, was seeing a therapist and that was really helpful but his insurance will not cover any more sessions. Taking Cymbalta 60 mg BID and Wellbutrin. Feels irritable, has \"hissy fits\" where is lashes out verbally. Denies SI/HI. He is retired and spends most of his day alone at home. has not noticed much improvement in depression symptoms. He struggles with marital problems, lack on intimacy with  His wife and that is really bothersome for him. Review of Systems   Constitutional: Negative for fatigue and fever. Respiratory: Negative for cough and shortness of breath. Cardiovascular: Negative for chest pain and leg swelling. Gastrointestinal: Negative for diarrhea, nausea and vomiting. Musculoskeletal: Positive for arthralgias and back pain (chronic). Negative for gait problem. Neurological: Positive for numbness (tingling- neuroathy bilateral feet). Negative for dizziness and headaches. Psychiatric/Behavioral: Positive for dysphoric mood. Negative for self-injury, sleep disturbance and suicidal ideas. The patient is not nervous/anxious. Prior to Visit Medications    Medication Sig Taking?  Authorizing Provider   buPROPion (WELLBUTRIN XL) 150 MG extended release tablet TAKE ONE TABLET BY MOUTH EVERY MORNING Yes MADISON Booth CNP   DULoxetine (CYMBALTA) 60 MG extended release capsule TAKE 1 CAPSULE BY MOUTH 2 TIMES A DAY LAST REFILL PATIENT NEEDS OFFICE VISIT BEFORE FUTURE REFILLS Yes MADISON Booth CNP   metFORMIN (GLUCOPHAGE) 1000 MG tablet TAKE ONE TABLET BY MOUTH TWICE A DAY Yes Layla Gautam MD   clopidogrel (PLAVIX) 75 MG tablet Take 1 tablet by mouth daily Yes Humberto Kehr, MD   atorvastatin (LIPITOR) 80 MG tablet Take 1 tablet by mouth daily Yes Humberto Kehr, MD   Cholecalciferol (VITAMIN D3) 50 MCG (2000 UT) CAPS Take 1 capsule by mouth daily Yes Historical Provider, MD   omega-3 acid ethyl esters (LOVAZA) 1 g capsule TAKE TWO CAPSULES BY Pulse: 87   Temp: 97.3 °F (36.3 °C)   SpO2: 99%   Weight: 218 lb (98.9 kg)   Height: 5' 10\" (1.778 m)     Estimated body mass index is 31.28 kg/m² as calculated from the following:    Height as of this encounter: 5' 10\" (1.778 m). Weight as of this encounter: 218 lb (98.9 kg). Physical Exam  Vitals signs and nursing note reviewed. Constitutional:       General: He is not in acute distress. Appearance: Normal appearance. He is well-developed. He is obese. He is not ill-appearing, toxic-appearing or diaphoretic. HENT:      Head: Normocephalic and atraumatic. Eyes:      Extraocular Movements: Extraocular movements intact. Conjunctiva/sclera: Conjunctivae normal.      Pupils: Pupils are equal, round, and reactive to light. Cardiovascular:      Rate and Rhythm: Normal rate and regular rhythm. Heart sounds: Normal heart sounds. No murmur. No friction rub. No gallop. Pulmonary:      Effort: Pulmonary effort is normal. No respiratory distress. Breath sounds: Normal breath sounds. No stridor. No wheezing, rhonchi or rales. Neurological:      General: No focal deficit present. Mental Status: He is alert and oriented to person, place, and time. Mental status is at baseline. Cranial Nerves: No cranial nerve deficit. ASSESSMENT/PLAN:  1. Type 2 diabetes mellitus with diabetic polyneuropathy, with long-term current use of insulin ([facility])  - POCT glycosylated hemoglobin (Hb A1C)  - Diabetic Foot Exam    2. Diabetic polyneuropathy associated with type 2 diabetes mellitus ([facility] [address]    3. Essential hypertension    4. Mixed hyperlipidemia    5. Dysthymia    6. CAD S/P percutaneous coronary angioplasty    7. Bilateral carotid artery stenosis    8. Obstructive sleep apnea    9.  Former smoker      Diabetes is poorly controlled, has f/u with endocrinology in two days  Discussed options for treating neuropathy symptoms- declines currently  Blood pressure is controlled continue

## 2021-04-01 ENCOUNTER — TELEPHONE (OUTPATIENT)
Dept: PHARMACY | Facility: CLINIC | Age: 61
End: 2021-04-01

## 2021-04-01 NOTE — TELEPHONE ENCOUNTER
POPULATION HEALTH CLINICAL PHARMACY REVIEW - BE WELL WITH DIABETES: HIGH A1C  =============================================  Socrates Lopez is a 57 y.o. male enrolled in the [facility] Be Well with Diabetes program.    Identified care gap(s): A1c > 10%    DM Program Prescriptions:  Current Outpatient Medications   Medication Sig Dispense Refill    buPROPion (WELLBUTRIN XL) 150 MG extended release tablet TAKE ONE TABLET BY MOUTH EVERY MORNING 90 tablet 3    DULoxetine (CYMBALTA) 60 MG extended release capsule TAKE 1 CAPSULE BY MOUTH 2 TIMES A DAY LAST REFILL PATIENT NEEDS OFFICE VISIT BEFORE FUTURE REFILLS 180 capsule 0    metFORMIN (GLUCOPHAGE) 1000 MG tablet TAKE ONE TABLET BY MOUTH TWICE A  tablet 0    clopidogrel (PLAVIX) 75 MG tablet Take 1 tablet by mouth daily 90 tablet 3    atorvastatin (LIPITOR) 80 MG tablet Take 1 tablet by mouth daily 90 tablet 3    Cholecalciferol (VITAMIN D3) 50 MCG (2000 UT) CAPS Take 1 capsule by mouth daily      omega-3 acid ethyl esters (LOVAZA) 1 g capsule TAKE TWO CAPSULES BY MOUTH TWICE A  capsule 3    atenolol (TENORMIN) 25 MG tablet TAKE 1 TABLET BY MOUTH EVERY NIGHT AT BEDTIME 90 tablet 3    nystatin (MYCOSTATIN) 574353 UNIT/GM ointment Apply topically 2 times daily to corners of mouth. 30 g 1    insulin regular human (HUMULIN R U-500 KWIKPEN) 500 UNIT/ML SOPN concentrated injection pen INJECT 80 UNITS BEFORE BREAKFAST, INJECT 60 UNITS BEFORE LUNCH AND INJECT 60 UNITS BEFORE DINNER 15 pen 2    metroNIDAZOLE (METROGEL) 1 % gel Apply to face daily.  60 g 3    Vitamin D (CHOLECALCIFEROL) 25 MCG (1000 UT) TABS tablet TAKE 1 TABLET BY MOUTH ONE TIME A DAY 90 tablet 3    ASPIRIN ADULT LOW STRENGTH 81 MG EC tablet TAKE 1 TABLET BY MOUTH ONE TIME A DAY 90 tablet 3    lisinopril-hydroCHLOROthiazide (PRINZIDE;ZESTORETIC) 20-12.5 MG per tablet TAKE 1 TABLET BY MOUTH ONE TIME A DAY 90 tablet 3    fenofibrate (TRICOR) 145 MG tablet TAKE 1 TABLET BY MOUTH ONE TIME A DAY 90 tablet 3    Insulin Pen Needle (B-D UF III MINI PEN NEEDLES) 31G X 5 MM MISC 1 each by Does not apply route 3 times daily 300 each 6    PRODIGY LANCETS 28G MISC Use as directed to test up to 3-4 times daily 400 each 3    blood glucose test strips (PRODIGY NO CODING BLOOD GLUC) strip Use as directed to test up to 3-4 times daily 400 each 3     No current facility-administered medications for this visit. Allergies:  No Known Allergies     Labs:  Lab Results   Component Value Date    LABA1C 10.5 03/29/2021    LABA1C 10.6 11/16/2020    LABA1C 11.2 01/15/2020     Estimated Creatinine Clearance: 90 mL/min (based on SCr of 1 mg/dL). eGFR: > 60 mL/min/1.73 m^2    Care Team:   - Physician (endocrinologist): Kameron Shepard (Last OV: I think missed visit 3/31/21)  - ACC/RD: Princess Suero (Last Encounter: 2/9/21, she signed off at this time)  - Upcoming appointments:   Future Appointments   Date Time Provider Avelino Owens   5/28/2021  9:00 AM Gale Delgado MD [facility]   6/28/2021  8:40 AM MADISON Buckley - [facility]       PDC:  On insulin    Refill History:   Humulin u500- 8/27/20, 1/13/21 90 ds  Metformin 1000 BID- 4/14/20, 7/28/21, 10/7/20, 2/8/21 90 ds    Diabetes Care:  - recent heart cath with stent in LAD- atorvastatin 80 mg and plavix  - Glycemic Goal: <7.0% and directed by provider. Is no longer at blood glucose goal which may be related to changes in diet. Recommend extra attention to diet. and which may be related to changes in physical activity. Recommend resuming physical activity. .  Father passed, deals with depression per chart, does not follow diet  - Appropriateness of Insulin Therapy: Humulin U-500 per endo  - Therapy Optimization: consider CGM if cost allows, Per endo avoid GLP1 given sharon TG and Hx pancreatitis, TG 12/31/20 394. Consider SGL2 for heart heatlh? ? Elevated LFTs, alcohol? Tylenol use?    - Medication changes since last A1c: suppose to have endo apt 3/31/21- canceled and no future apts for endo  - Medication Adherence Assessment: pretty adherent per ramona hx    Plan:    Attempt made to reach patient by telephone for diabetes review. Left voice message for patient to return clinician's phone call to direct line. Will continue to attempt to contact patient by telephone as appropriate.  Would like to review: above     Bernardo PerezD, [address] Pharmacist  Direct: [phone]  Department: [phone], option 7

## 2021-04-05 NOTE — TELEPHONE ENCOUNTER
2nd attempt to reach patient for review. MALLIKA Marion, PharmD, [address] [facility] Clinical Pharmacist  Direct: [phone]  Department: [phone], option 7  ==========================================  CLINICAL PHARMACY CONSULT: MED RECONCILIATION/REVIEW ADDENDUM    For Pharmacy Admin Tracking Only    PHSO: Yes    Time Spent (min): [id number] [facility]

## 2021-04-20 ENCOUNTER — TELEPHONE (OUTPATIENT)
Dept: ENDOCRINOLOGY | Age: 61
End: 2021-04-20

## 2021-04-20 NOTE — TELEPHONE ENCOUNTER
Called patient and informed of message below. Patient was given number to schedule with another provider. Once appointment is scheduled refill is needed.

## 2021-04-20 NOTE — TELEPHONE ENCOUNTER
Dr. Pamela Guadarrama patient. I did few refills before, but patient has not seen me and does not have appointment with me. I do not accept Dr. Pamela Guadarrama patient at this time. Please schedule with available provider and then sent message for refill of Metformin to the provider.

## 2021-04-21 NOTE — TELEPHONE ENCOUNTER
3/29/2021  Future Appointments   Date Time Provider Avelino Owens   5/28/2021  9:00 AM MD [facility] [address] [facility]   6/28/2021  8:40 AM MADISON Avalos - CNP [facility]

## 2021-04-22 ENCOUNTER — TELEPHONE (OUTPATIENT)
Dept: PHARMACY | Facility: CLINIC | Age: 61
End: 2021-04-22

## 2021-04-22 NOTE — TELEPHONE ENCOUNTER
POPULATION HEALTH CLINICAL PHARMACY REVIEW - BE WELL WITH DIABETES: HIGH A1C  =============================================  Cecile Bessie is a 57 y.o. male enrolled in the [facility] Be Well with Diabetes program.     Identified care gap(s): A1c > 10%     DM Program Prescriptions:  Current Facility-Administered Medications          Current Outpatient Medications   Medication Sig Dispense Refill    buPROPion (WELLBUTRIN XL) 150 MG extended release tablet TAKE ONE TABLET BY MOUTH EVERY MORNING 90 tablet 3    DULoxetine (CYMBALTA) 60 MG extended release capsule TAKE 1 CAPSULE BY MOUTH 2 TIMES A DAY LAST REFILL PATIENT NEEDS OFFICE VISIT BEFORE FUTURE REFILLS 180 capsule 0    metFORMIN (GLUCOPHAGE) 1000 MG tablet TAKE ONE TABLET BY MOUTH TWICE A  tablet 0    clopidogrel (PLAVIX) 75 MG tablet Take 1 tablet by mouth daily 90 tablet 3    atorvastatin (LIPITOR) 80 MG tablet Take 1 tablet by mouth daily 90 tablet 3    Cholecalciferol (VITAMIN D3) 50 MCG (2000 UT) CAPS Take 1 capsule by mouth daily        omega-3 acid ethyl esters (LOVAZA) 1 g capsule TAKE TWO CAPSULES BY MOUTH TWICE A  capsule 3    atenolol (TENORMIN) 25 MG tablet TAKE 1 TABLET BY MOUTH EVERY NIGHT AT BEDTIME 90 tablet 3    nystatin (MYCOSTATIN) 464946 UNIT/GM ointment Apply topically 2 times daily to corners of mouth. 30 g 1    insulin regular human (HUMULIN R U-500 KWIKPEN) 500 UNIT/ML SOPN concentrated injection pen INJECT 80 UNITS BEFORE BREAKFAST, INJECT 60 UNITS BEFORE LUNCH AND INJECT 60 UNITS BEFORE DINNER 15 pen 2    metroNIDAZOLE (METROGEL) 1 % gel Apply to face daily.  60 g 3    Vitamin D (CHOLECALCIFEROL) 25 MCG (1000 UT) TABS tablet TAKE 1 TABLET BY MOUTH ONE TIME A DAY 90 tablet 3    ASPIRIN ADULT LOW STRENGTH 81 MG EC tablet TAKE 1 TABLET BY MOUTH ONE TIME A DAY 90 tablet 3    lisinopril-hydroCHLOROthiazide (PRINZIDE;ZESTORETIC) 20-12.5 MG per tablet TAKE 1 TABLET BY MOUTH ONE TIME A DAY 90 tablet 3  fenofibrate (TRICOR) 145 MG tablet TAKE 1 TABLET BY MOUTH ONE TIME A DAY 90 tablet 3    Insulin Pen Needle (B-D UF III MINI PEN NEEDLES) 31G X 5 MM MISC 1 each by Does not apply route 3 times daily 300 each 6    PRODIGY LANCETS 28G MISC Use as directed to test up to 3-4 times daily 400 each 3    blood glucose test strips (PRODIGY NO CODING BLOOD GLUC) strip Use as directed to test up to 3-4 times daily 400 each 3      No current facility-administered medications for this visit.             Allergies:  No Known Allergies      Labs:        Lab Results   Component Value Date     LABA1C 10.5 03/29/2021     LABA1C 10.6 11/16/2020     LABA1C 11.2 01/15/2020      Estimated Creatinine Clearance: 90 mL/min (based on SCr of 1 mg/dL). eGFR: > 60 mL/min/1.73 m^2     Care Team:   · Physician (endocrinologist): Lidya Lloyd (Last OV: I think missed visit 3/31/21)  · ACC/RD: Yovanny Nelson (Last Encounter: 2/9/21, she signed off at this time)  - Upcoming appointments:          Future Appointments   Date Time Provider Avelino Owens   5/28/2021  9:00 AM Priscilla Gleason MD [facility]   6/28/2021  8:40 AM MADISON Aldridge - CNP [facility]         PDC:  On insulin     Refill History:   Humulin u500- 8/27/20, 1/13/21 90 ds  Metformin 1000 BID- 4/14/20, 7/28/21, 10/7/20, 2/8/21 90 ds     Diabetes Care:  - recent heart cath with stent in LAD- atorvastatin 80 mg and plavix  - Glycemic Goal: <7.0% and directed by provider. Is no longer at blood glucose goal which may be related to changes in diet. Recommend extra attention to diet. and which may be related to changes in physical activity. Recommend resuming physical activity. .  Father passed, deals with depression per chart, does not follow diet  - Appropriateness of Insulin Therapy: Humulin U-500 per endo  - Therapy Optimization: consider CGM if cost allows, Per endo avoid GLP1 given sharon TG and Hx pancreatitis, TG 12/31/20 394. Consider SGL2 for heart heatlh? ? Elevated LFTs, alcohol? Tylenol use?    - Medication changes since last A1c: suppose to have endo apt 3/31/21- canceled and no future apts for endo  - Medication Adherence Assessment: pretty adherent per ramona hx     Plan:    LM patient to return my call    Marques Blake, PharmD, [address] Pharmacist  Direct: [phone]  Department: [phone], option 7

## 2021-04-28 NOTE — TELEPHONE ENCOUNTER
2nd attempt to reach patient for review. This pharmD also attempted to reach patient earlier this month in previous encounter. LM and sent mychart. Bernardo VyasD, [facility] Clinical Pharmacist  Direct: [phone]  Department: [phone], option 7  ==============================  For Pharmacy Admin Tracking Only     Gap Closed?  No    Time Spent (min): 10

## 2021-05-05 ENCOUNTER — CARE COORDINATION (OUTPATIENT)
Dept: OTHER | Facility: CLINIC | Age: 61
End: 2021-05-05

## 2021-05-05 NOTE — CARE COORDINATION
Associate Care Manager [facility]) called patient for DM education & support. No answer; ACM left HIPAA compliant voicemail message with request for return call. ACM will continue to outreach patient. Gloria Wallis RN BSN  Associate Care Manager  Phone: [phone]  Email: [email or web address]

## 2021-05-11 ENCOUNTER — CARE COORDINATION (OUTPATIENT)
Dept: OTHER | Facility: CLINIC | Age: 61
End: 2021-05-11

## 2021-05-11 NOTE — CARE COORDINATION
Associate Care Manager [facility]) called patient for CC outreach for Be Well with DM management. No answer; ACM left HIPAA compliant voicemail message with request for return call. ACM also sent unable to reach letter to patient via [address] ACM will continue to outreach patient. Muriel See RN BSN  Associate Care Manager  Phone: [phone]  Email: [email or web address]

## 2021-05-21 ENCOUNTER — TELEPHONE (OUTPATIENT)
Dept: PHARMACY | Facility: CLINIC | Age: 61
End: 2021-05-21

## 2021-05-21 NOTE — TELEPHONE ENCOUNTER
POPULATION HEALTH CLINICAL PHARMACY REVIEW - BE WELL WITH DIABETES: HIGH A1C  =============================================  Chantelle Lehman is a 57 y.o. male enrolled in the [facility] [place] Be Well with Diabetes program.    Identified care gap(s): A1c > 10%, pt had visit with CNP, however DM was not discussed bc pt sees endo- but pt canceled March endo visit and has not rescheduled. Needs DM visit by 7/1/21    - Patient also not engaging with me or ACM- multiple calls and mycharts sent    DM Program Prescriptions:  Current Outpatient Medications   Medication Sig Dispense Refill    metFORMIN (GLUCOPHAGE) 1000 MG tablet TAKE ONE TABLET BY MOUTH TWICE A  tablet 0    buPROPion (WELLBUTRIN XL) 150 MG extended release tablet TAKE ONE TABLET BY MOUTH EVERY MORNING 90 tablet 3    DULoxetine (CYMBALTA) 60 MG extended release capsule TAKE 1 CAPSULE BY MOUTH 2 TIMES A DAY LAST REFILL PATIENT NEEDS OFFICE VISIT BEFORE FUTURE REFILLS 180 capsule 0    clopidogrel (PLAVIX) 75 MG tablet Take 1 tablet by mouth daily 90 tablet 3    atorvastatin (LIPITOR) 80 MG tablet Take 1 tablet by mouth daily 90 tablet 3    Cholecalciferol (VITAMIN D3) 50 MCG (2000 UT) CAPS Take 1 capsule by mouth daily      omega-3 acid ethyl esters (LOVAZA) 1 g capsule TAKE TWO CAPSULES BY MOUTH TWICE A  capsule 3    atenolol (TENORMIN) 25 MG tablet TAKE 1 TABLET BY MOUTH EVERY NIGHT AT BEDTIME 90 tablet 3    nystatin (MYCOSTATIN) 717947 UNIT/GM ointment Apply topically 2 times daily to corners of mouth. 30 g 1    insulin regular human (HUMULIN R U-500 KWIKPEN) 500 UNIT/ML SOPN concentrated injection pen INJECT 80 UNITS BEFORE BREAKFAST, INJECT 60 UNITS BEFORE LUNCH AND INJECT 60 UNITS BEFORE DINNER 15 pen 2    metroNIDAZOLE (METROGEL) 1 % gel Apply to face daily.  60 g 3    Vitamin D (CHOLECALCIFEROL) 25 MCG (1000 UT) TABS tablet TAKE 1 TABLET BY MOUTH ONE TIME A DAY 90 tablet 3    ASPIRIN ADULT LOW STRENGTH 81 MG EC tablet and PCP visit did not discuss DM  - Therapy Optimization: consider CGM if cost allows, Per endo avoid GLP1 given high TG and Hx pancreatitis, TG 12/31/20 394. Consider SGL2 for heart health? ? Elevated LFTs, alcohol? Tylenol use?   - Medication changes since last A1c: suppose to have endo apt 3/31/21- canceled and no future apts for endo  - Medication Adherence Assessment: pretty adherent per ramona hx    Plan:    Attempt made to reach patient by telephone for diabetes review. Left voice message for patient to return clinician's phone call to direct line. Will continue to attempt to contact patient by telephone as appropriate.  Would like to review: above      Baljeet Kilgore, PharmD, [address] Pharmacist  Direct: [phone]  Department: [phone], option 7

## 2021-05-25 ENCOUNTER — HOSPITAL ENCOUNTER (OUTPATIENT)
Age: 61
Discharge: HOME | End: 2021-05-25
Payer: COMMERCIAL

## 2021-05-25 ENCOUNTER — CARE COORDINATION (OUTPATIENT)
Dept: OTHER | Facility: CLINIC | Age: 61
End: 2021-05-25

## 2021-05-25 DIAGNOSIS — E78.2 MIXED HYPERLIPIDEMIA: Chronic | ICD-10-CM

## 2021-05-25 LAB
CHOLESTEROL, TOTAL: 113 MG/DL (ref 0–199)
HDLC SERPL-MCNC: 31 MG/DL (ref 40–60)
LDL CHOLESTEROL CALCULATED: 38 MG/DL
TRIGL SERPL-MCNC: 220 MG/DL (ref 0–150)
VLDLC SERPL CALC-MCNC: 44 MG/DL

## 2021-05-25 PROCEDURE — 80061 LIPID PANEL: CPT

## 2021-05-25 PROCEDURE — 36415 COLL VENOUS BLD VENIPUNCTURE: CPT

## 2021-05-25 NOTE — TELEPHONE ENCOUNTER
2nd attempt made to reach patient. LM asking for return call. My previous [employer] message I sent 4/28/21 was read by the patient.      Ayde De Leon, PharmD, [address] [place] [address] Clinical Pharmacist  Direct: [phone]  Department: [phone], option 7  ====================================================  For Pharmacy Admin Tracking Only     Gap Closed?: No    Time Spent (min): 15

## 2021-05-26 ENCOUNTER — TELEPHONE (OUTPATIENT)
Dept: CARDIOLOGY CLINIC | Age: 61
End: 2021-05-26

## 2021-05-26 NOTE — TELEPHONE ENCOUNTER
----- Message from Odilia Prasad MD sent at 5/26/2021  8:04 AM EDT -----  Call  TG a little high, o/w chol ok  Low carb diet

## 2021-05-27 NOTE — PROGRESS NOTES
[address]   Cardiac Consultation    Referring Provider:  MADISON Ponce CNP     Chief Complaint   Patient presents with    6 Month Follow-Up    Coronary Artery Disease    Hypertension    Hyperlipidemia     History of Present Illness:   Thee Mathew is a 57 y.o. male who is here today for [facility] follow up. Initially seen as a new patient on 11/20/2020 for an abnormal EKG carotid artery disease, and preoperative cardiac evaluation for trigger finger surgery, and a family history of heart diease. He had an echocardiogram on 12/18/2020 which showed an EF of 60%, carotid dopplers less than 50% bilaterally. He had an abnormal stress test and underwent a left heart cath with Dr. Alona Warner on 12/30/2020- Successful IVUS-guided PCI of proximal to mid-LAD with overlapping Xience Mariluz 3.0 x 28 mm and 3.5 x 23 mm MING. Doing well today. Checks BP at home and always normal. Staying active with yard work. Denies chest pain, shortness of breath, edema, dizziness, palpitations and syncope. No muscle aches w/ increased statin. Past Medical History:   has a past medical history of Acute, but ill-defined, cerebrovascular disease, CAD in native artery, Cerebral artery occlusion with cerebral infarction [facility]), Cerebrovascular disease, Diplopia, ED (erectile dysfunction), Elbow pain, chronic, Hyperlipidemia, Hypertension, Irritable bowel syndrome, Obstructive sleep apnea (adult) (pediatric), Occlusion and stenosis of carotid artery without mention of cerebral infarction, Pain in joint, upper arm, Polyneuropathy in diabetes(357.2), PONV (postoperative nausea and vomiting), Skin abnormality, Type 2 diabetes mellitus with diabetic polyneuropathy, with long-term current use of insulin ([employer] [facility] [address] and Type II or unspecified type diabetes mellitus without mention of complication, not stated as uncontrolled. Surgical History:   has a past surgical history that includes Cholecystectomy (1995);  Carpal tunnel MD   insulin regular human (HUMULIN R U-500 KWIKPEN) 500 UNIT/ML SOPN concentrated injection pen INJECT 80 UNITS BEFORE BREAKFAST, INJECT 60 UNITS BEFORE LUNCH AND INJECT 60 UNITS BEFORE DINNER 8/24/20  Yes Theresa Malin MD   metroNIDAZOLE (METROGEL) 1 % gel Apply to face daily. 7/6/20  Yes Sam Mazariegos MD   Vitamin D (CHOLECALCIFEROL) 25 MCG (1000 UT) TABS tablet TAKE 1 TABLET BY MOUTH ONE TIME A DAY 6/15/20  Yes MADISON Regalado CNP   ASPIRIN ADULT LOW STRENGTH 81 MG EC tablet TAKE 1 TABLET BY MOUTH ONE TIME A DAY 6/15/20  Yes MADISON Regalado CNP   lisinopril-hydroCHLOROthiazide (PRINZIDE;ZESTORETIC) 20-12.5 MG per tablet TAKE 1 TABLET BY MOUTH ONE TIME A DAY 6/1/20  Yes MADISON Regalado CNP   fenofibrate (TRICOR) 145 MG tablet TAKE 1 TABLET BY MOUTH ONE TIME A DAY 6/1/20  Yes Theresa Malin MD   Insulin Pen Needle (B-D UF III MINI PEN NEEDLES) 31G X 5 MM MISC 1 each by Does not apply route 3 times daily 12/11/19  Yes MD CLAUDETTE Bacon LANCETS 28G MISC Use as directed to test up to 3-4 times daily 3/15/19  Yes Theresa Malin MD   blood glucose test strips (PRODIGY NO CODING BLOOD GLUC) strip Use as directed to test up to 3-4 times daily 3/15/19  Yes Theresa Malin MD   atenolol (TENORMIN) 25 MG tablet TAKE 1 TABLET BY MOUTH EVERY NIGHT AT BEDTIME 1/15/20   MADISON Regalado CNP        Allergies:  Patient has no known allergies. Review of Systems:   · Constitutional: there has been no unanticipated weight loss. There's been no change in energy level, sleep pattern, or activity level. · Eyes: No visual changes or diplopia. No scleral icterus. · ENT: No Headaches, hearing loss or vertigo. No mouth sores or sore throat. · Cardiovascular: Reviewed in HPI  · Respiratory: No cough or wheezing, no sputum production. No hematemesis. · Gastrointestinal: No abdominal pain, appetite loss, blood in stools.  No change in bowel or bladder habits. · Genitourinary: No dysuria, trouble voiding, or hematuria. · Musculoskeletal:  No gait disturbance, weakness or joint complaints. · Integumentary: No rash or pruritis. · Neurological: No headache, diplopia, change in muscle strength, numbness or tingling. No change in gait, balance, coordination, mood, affect, memory, mentation, behavior. · Psychiatric: No anxiety, no depression. · Endocrine: No malaise, fatigue or temperature intolerance. No excessive thirst, fluid intake, or urination. No tremor. · Hematologic/Lymphatic: No abnormal bruising or bleeding, blood clots or swollen lymph nodes. · Allergic/Immunologic: No nasal congestion or hives. Physical Examination:    Vitals:    05/28/21 0904   BP: 118/68   Pulse: 98   SpO2: 99%        Constitutional and General Appearance: NAD   Respiratory:  · Normal excursion and expansion without use of accessory muscles  · Resp Auscultation: Normal breath sounds without dullness  Cardiovascular:  · The apical impulses not displaced  · Heart tones are crisp and normal  · Cervical veins are not engorged  · The carotid upstroke is normal in amplitude and contour without delay or bruit  · Normal S1S2, No S3, No Murmur  · Peripheral pulses are symmetrical and full  · There is no clubbing, cyanosis of the extremities. · No edema  · Femoral Arteries: 2+ and equal  · Pedal Pulses: 2+ and equal   Abdomen:  · No masses or tenderness  · Liver/Spleen: No Abnormalities Noted  Neurological/Psychiatric:  · Alert and oriented in all spheres  · Moves all extremities well  · Exhibits normal gait balance and coordination  · No abnormalities of mood, affect, memory, mentation, or behavior are noted    Carotid dopplers 6/4/13  IMPRESSION:   1. Mild bilateral internal carotid artery stenosis, greatest at the right internal carotid artery, estimated 40 to 59%. 2. Patent bilateral vertebral arteries. CTA neck 2014  IMPRESSION CTA NECK:   1.  Mild bilateral proximal internal carotid artery plaque, without evidence of hemodynamically significant stenosis. 2. Patent bilateral vertebral arteries. EKG 11/16/2020  Sinus  Tachycardia  - frequent ectopic ventricular beat s   # VECs = 3  -RSR(V1) -nondiagnostic    Stress test 12/18/2020  Summary  There is some degree of diaphragmatic attenuation noted. However, a moderate  sized area of severely decreased tracer uptake of the inferior/inferolateral  wall with stress compared with rest is present. FIndings consistent with  inducible ischemia. Normal post-stress LVEF 63%. Abnormal study. Overall  findings represent a intermediate risk scan. Carotid dopplers 12/18/2020  Summary    <50% stenosis is noted in the right internal carotid artery. <50% stenosis is noted in the left internal carotid artery. The bilateral vertebral arteries have normal antegrade flow. Echocardiogram 12/18/2020  Summary   Normal left ventricle size, wall thickness and systolic function with an   estimated ejection fraction of 60%. No regional wall motion abnormalities are seen. Grade I diastolic dysfunction with normal filing pressure. Aortic valve appears sclerotic but opens adequately. Mild aortic regurgitation. Inadequate tricuspid regurgitation to estimate systolic pulmonary artery   pressure. Left heart cath Dr. Dmitry Hyde 12/30/2020  Impression:  1. Severe single-vessel coronary artery disease. 2. Successful IVUS-guided PCI of proximal to mid-LAD with overlapping Xience Mariluz 3.0 x 28 mm and 3.5 x 23 mm MING. 3. Normal LVEDP    Assessment:   Chest pain - atypical, no c/w cardiac. No recent   Coronary artery disease- Stable.  successful IVUS-guided PCI of proximal to mid-LAD with overlapping Xience Mariluz 3.0 x 28 mm and 3.5 x 23 mm MING 12/30/2020   Abnormal EKG   Hypertension - controlled  Hyperlipidemia - much improved post statin increased   Diabetes Mellitus   Sleep apnea   History of CVA 2013  Former smoker - 20 years ago Family history of heart disease in father   History of neck pain and surgery     Plan:  Fasting lipids in 1 year   Cardiac medications reviewed including indications and pertinent side effects    Check blood pressure and heart rate at home a few times per week- keep a log with dates and times and bring to office visit   Regular exercise and following a healthy diet encouraged   Continue current medications   Follow up with me in 6 months     Thank you for allowing me to participate in the care of this individual.    The scribes documentation has been prepared under my direction and personally reviewed by me in its entirety. I confirm that the note above accurately reflects all work, treatment, procedures, and medical decision making performed by me. Dr. Lv Arcos MD    Scribe's attestation: This note was scribed in the presence of Dr. Lv Arcos M.D. By Yelitza Gonzales.  Stacie Serrano M.D., Lola Galeana

## 2021-05-28 ENCOUNTER — OFFICE VISIT (OUTPATIENT)
Dept: CARDIOLOGY CLINIC | Age: 61
End: 2021-05-28
Payer: COMMERCIAL

## 2021-05-28 VITALS
HEIGHT: 70 IN | OXYGEN SATURATION: 99 % | WEIGHT: 215 LBS | BODY MASS INDEX: 30.78 KG/M2 | SYSTOLIC BLOOD PRESSURE: 118 MMHG | HEART RATE: 98 BPM | DIASTOLIC BLOOD PRESSURE: 68 MMHG

## 2021-05-28 DIAGNOSIS — I10 ESSENTIAL HYPERTENSION: Chronic | ICD-10-CM

## 2021-05-28 DIAGNOSIS — I25.10 CAD S/P PERCUTANEOUS CORONARY ANGIOPLASTY: ICD-10-CM

## 2021-05-28 DIAGNOSIS — E78.2 MIXED HYPERLIPIDEMIA: Chronic | ICD-10-CM

## 2021-05-28 DIAGNOSIS — I25.10 CAD IN NATIVE ARTERY: Primary | ICD-10-CM

## 2021-05-28 DIAGNOSIS — Z98.61 CAD S/P PERCUTANEOUS CORONARY ANGIOPLASTY: ICD-10-CM

## 2021-05-28 PROCEDURE — 99214 OFFICE O/P EST MOD 30 MIN: CPT | Performed by: INTERNAL MEDICINE

## 2021-05-28 PROCEDURE — 3017F COLORECTAL CA SCREEN DOC REV: CPT | Performed by: INTERNAL MEDICINE

## 2021-05-28 PROCEDURE — G8417 CALC BMI ABV UP PARAM F/U: HCPCS | Performed by: INTERNAL MEDICINE

## 2021-05-28 PROCEDURE — G8427 DOCREV CUR MEDS BY ELIG CLIN: HCPCS | Performed by: INTERNAL MEDICINE

## 2021-05-28 PROCEDURE — 1036F TOBACCO NON-USER: CPT | Performed by: INTERNAL MEDICINE

## 2021-05-28 NOTE — PATIENT INSTRUCTIONS
Fasting lipids in 1 year   Continue current medications   Check blood pressure at home weekly  Regular exercise and following a healthy diet encouraged   Follow up with me in 6 months

## 2021-05-28 NOTE — LETTER
[address]  Phone: [phone]  Fax: 0[facility], [place]     5/28/2021     MADISON Camarena CNP   [facility]      Patient: Gloria Castanon   MR Number: <[id number]>   YOB: 1960   Date of Visit: 5/28/2021     Dear Dr. Oumou Lund     Today I saw our mutual patient named above. Below are the relevant portions of my assessment and plan of care. If you have questions, please do not hesitate to call me. I look forward to following Nils Lashaun along with you. [facility] [place]   Cardiac Consultation    Referring Provider:  MADISON Camarena CNP     Chief Complaint   Patient presents with    6 Month Follow-Up    Coronary Artery Disease    Hypertension    Hyperlipidemia     History of Present Illness:   Gloria Castanon is a 57 y.o. male who is here today for [facility] follow up. Initially seen as a new patient on 11/20/2020 for an abnormal EKG carotid artery disease, and preoperative cardiac evaluation for trigger finger surgery, and a family history of heart diease. He had an echocardiogram on 12/18/2020 which showed an EF of 60%, carotid dopplers less than 50% bilaterally. He had an abnormal stress test and underwent a left heart cath with Dr. Jenn Atkinson on 12/30/2020- Successful IVUS-guided PCI of proximal to mid-LAD with overlapping Xience Mariluz 3.0 x 28 mm and 3.5 x 23 mm MING. Doing well today. Checks BP at home and always normal. Staying active with yard work. Denies chest pain, shortness of breath, edema, dizziness, palpitations and syncope. No muscle aches w/ increased statin.         Past Medical History:   has a past medical history of Acute, but ill-defined, cerebrovascular disease, CAD in native artery, Cerebral artery occlusion with cerebral infarction [facility]), Cerebrovascular disease, Diplopia, ED (erectile dysfunction), Elbow pain, chronic, Hyperlipidemia, Hypertension, Irritable bowel syndrome, Obstructive sleep apnea (adult) (pediatric), Occlusion and stenosis of carotid artery without mention of cerebral infarction, Pain in joint, upper arm, Polyneuropathy in diabetes(357.2), PONV (postoperative nausea and vomiting), Skin abnormality, Type 2 diabetes mellitus with diabetic polyneuropathy, with long-term current use of insulin ([facility] [address] and Type II or unspecified type diabetes mellitus without mention of complication, not stated as uncontrolled. Surgical History:   has a past surgical history that includes Cholecystectomy (1995); Carpal tunnel release (Left, 2008); Elbow surgery (Left, 2008); hernia repair (2009); Pyloroplasty; Cervical discectomy (08/02/2017); cervical fusion (12/06/2017); Colonoscopy (N/A, 8/14/2018); Finger trigger release (Left, 12/12/2019); and Finger trigger release (Right, 11/23/2020). Social History:   reports that he quit smoking about 21 years ago. His smoking use included cigarettes. He has a 25.00 pack-year smoking history. He has never used smokeless tobacco. He reports that he does not drink alcohol and does not use drugs. Family History:  family history includes Cancer in his brother, father, and paternal uncle; Depression in his maternal cousin; No Known Problems in his mother. Home Medications:  Prior to Admission medications    Medication Sig Start Date End Date Taking?  Authorizing Provider   metFORMIN (GLUCOPHAGE) 1000 MG tablet TAKE ONE TABLET BY MOUTH TWICE A DAY 4/21/21  Yes MADISON Kraus CNP   buPROPion (WELLBUTRIN XL) 150 MG extended release tablet TAKE ONE TABLET BY MOUTH EVERY MORNING 3/23/21  Yes MADISON Kraus CNP   DULoxetine (CYMBALTA) 60 MG extended release capsule TAKE 1 CAPSULE BY MOUTH 2 TIMES A DAY LAST REFILL PATIENT NEEDS OFFICE VISIT BEFORE FUTURE REFILLS 3/22/21  Yes MADISON Kraus CNP   clopidogrel (PLAVIX) 75 MG tablet Take 1 tablet by mouth daily 2/2/21  Yes Cassi Anderson MD Mary   atorvastatin (LIPITOR) 80 MG tablet Take 1 tablet by mouth daily 2/2/21  Yes Odilia Prasad MD   Cholecalciferol (VITAMIN D3) 50 MCG (2000 UT) CAPS Take 1 capsule by mouth daily   Yes Mary Crooks MD   omega-3 acid ethyl esters (LOVAZA) 1 g capsule TAKE TWO CAPSULES BY MOUTH TWICE A DAY 1/21/21  Yes Adriel De La Rosa MD   atenolol (TENORMIN) 25 MG tablet TAKE 1 TABLET BY MOUTH EVERY NIGHT AT BEDTIME 1/4/21  Yes Elida Gomez DO   nystatin (MYCOSTATIN) 764249 UNIT/GM ointment Apply topically 2 times daily to corners of mouth. 10/29/20  Yes Sam Mazariegos MD   insulin regular human (HUMULIN R U-500 KWIKPEN) 500 UNIT/ML SOPN concentrated injection pen INJECT 80 UNITS BEFORE BREAKFAST, INJECT 60 UNITS BEFORE LUNCH AND INJECT 60 UNITS BEFORE DINNER 8/24/20  Yes Theresa Malin MD   metroNIDAZOLE (METROGEL) 1 % gel Apply to face daily.  7/6/20  Yes Sam Mazariegos MD   Vitamin D (CHOLECALCIFEROL) 25 MCG (1000 UT) TABS tablet TAKE 1 TABLET BY MOUTH ONE TIME A DAY 6/15/20  Yes MADISON Regalado CNP   ASPIRIN ADULT LOW STRENGTH 81 MG EC tablet TAKE 1 TABLET BY MOUTH ONE TIME A DAY 6/15/20  Yes MADISON Regalado CNP   lisinopril-hydroCHLOROthiazide (PRINZIDE;ZESTORETIC) 20-12.5 MG per tablet TAKE 1 TABLET BY MOUTH ONE TIME A DAY 6/1/20  Yes MADISON Regalado CNP   fenofibrate (TRICOR) 145 MG tablet TAKE 1 TABLET BY MOUTH ONE TIME A DAY 6/1/20  Yes Theresa Malin MD   Insulin Pen Needle (B-D UF III MINI PEN NEEDLES) 31G X 5 MM MISC 1 each by Does not apply route 3 times daily 12/11/19  Yes MD SEEMA BaconIGY LANCETS 28G MISC Use as directed to test up to 3-4 times daily 3/15/19  Yes Theresa Malin MD   blood glucose test strips (PRODIGY NO CODING BLOOD GLUC) strip Use as directed to test up to 3-4 times daily 3/15/19  Yes Theresa Malin MD   atenolol (TENORMIN) 25 MG tablet TAKE 1 TABLET BY MOUTH EVERY NIGHT AT BEDTIME 1/15/20   Abdirahman George, APRN - CNP        Allergies:  Patient has no known allergies. Review of Systems:   · Constitutional: there has been no unanticipated weight loss. There's been no change in energy level, sleep pattern, or activity level. · Eyes: No visual changes or diplopia. No scleral icterus. · ENT: No Headaches, hearing loss or vertigo. No mouth sores or sore throat. · Cardiovascular: Reviewed in HPI  · Respiratory: No cough or wheezing, no sputum production. No hematemesis. · Gastrointestinal: No abdominal pain, appetite loss, blood in stools. No change in bowel or bladder habits. · Genitourinary: No dysuria, trouble voiding, or hematuria. · Musculoskeletal:  No gait disturbance, weakness or joint complaints. · Integumentary: No rash or pruritis. · Neurological: No headache, diplopia, change in muscle strength, numbness or tingling. No change in gait, balance, coordination, mood, affect, memory, mentation, behavior. · Psychiatric: No anxiety, no depression. · Endocrine: No malaise, fatigue or temperature intolerance. No excessive thirst, fluid intake, or urination. No tremor. · Hematologic/Lymphatic: No abnormal bruising or bleeding, blood clots or swollen lymph nodes. · Allergic/Immunologic: No nasal congestion or hives. Physical Examination:    Vitals:    05/28/21 0904   BP: 118/68   Pulse: 98   SpO2: 99%        Constitutional and General Appearance: NAD   Respiratory:  · Normal excursion and expansion without use of accessory muscles  · Resp Auscultation: Normal breath sounds without dullness  Cardiovascular:  · The apical impulses not displaced  · Heart tones are crisp and normal  · Cervical veins are not engorged  · The carotid upstroke is normal in amplitude and contour without delay or bruit  · Normal S1S2, No S3, No Murmur  · Peripheral pulses are symmetrical and full  · There is no clubbing, cyanosis of the extremities.   · No edema  · Femoral Arteries: 2+ and equal  · Pedal Pulses: 2+ and equal Abdomen:  · No masses or tenderness  · Liver/Spleen: No Abnormalities Noted  Neurological/Psychiatric:  · Alert and oriented in all spheres  · Moves all extremities well  · Exhibits normal gait balance and coordination  · No abnormalities of mood, affect, memory, mentation, or behavior are noted    Carotid dopplers 6/4/13  IMPRESSION:   1. Mild bilateral internal carotid artery stenosis, greatest at the right internal carotid artery, estimated 40 to 59%. 2. Patent bilateral vertebral arteries. CTA neck 2014  IMPRESSION CTA NECK:   1. Mild bilateral proximal internal carotid artery plaque, without evidence of hemodynamically significant stenosis. 2. Patent bilateral vertebral arteries. EKG 11/16/2020  Sinus  Tachycardia  - frequent ectopic ventricular beat s   # VECs = 3  -RSR(V1) -nondiagnostic    Stress test 12/18/2020  Summary  There is some degree of diaphragmatic attenuation noted. However, a moderate  sized area of severely decreased tracer uptake of the inferior/inferolateral  wall with stress compared with rest is present. FIndings consistent with  inducible ischemia. Normal post-stress LVEF 63%. Abnormal study. Overall  findings represent a intermediate risk scan. Carotid dopplers 12/18/2020  Summary    <50% stenosis is noted in the right internal carotid artery. <50% stenosis is noted in the left internal carotid artery. The bilateral vertebral arteries have normal antegrade flow. Echocardiogram 12/18/2020  Summary   Normal left ventricle size, wall thickness and systolic function with an   estimated ejection fraction of 60%. No regional wall motion abnormalities are seen. Grade I diastolic dysfunction with normal filing pressure. Aortic valve appears sclerotic but opens adequately. Mild aortic regurgitation. Inadequate tricuspid regurgitation to estimate systolic pulmonary artery   pressure. Left heart cath Dr. Gilma Ryan 12/30/2020  Impression:  1.  Severe single-vessel coronary artery disease. 2. Successful IVUS-guided PCI of proximal to mid-LAD with overlapping Xience Mariluz 3.0 x 28 mm and 3.5 x 23 mm MING. 3. Normal LVEDP    Assessment:   Chest pain - atypical, no c/w cardiac. No recent   Coronary artery disease- Stable. successful IVUS-guided PCI of proximal to mid-LAD with overlapping Xience Mariluz 3.0 x 28 mm and 3.5 x 23 mm MING 12/30/2020   Abnormal EKG   Hypertension - controlled  Hyperlipidemia - much improved post statin increased   Diabetes Mellitus   Sleep apnea   History of CVA 2013  Former smoker - 20 years ago   Family history of heart disease in father   History of neck pain and surgery     Plan:  Fasting lipids in 1 year   Cardiac medications reviewed including indications and pertinent side effects    Check blood pressure and heart rate at home a few times per week- keep a log with dates and times and bring to office visit   Regular exercise and following a healthy diet encouraged   Continue current medications   Follow up with me in 6 months     Thank you for allowing me to participate in the care of this individual.    The scribes documentation has been prepared under my direction and personally reviewed by me in its entirety. I confirm that the note above accurately reflects all work, treatment, procedures, and medical decision making performed by me. Dr. Brandon Waite MD    Scribe's attestation: This note was scribed in the presence of Dr. Brandon Waite M.D. By Katiuska Arellano.  Alisson Mccormack M.D., Alesia Hook           Sincerely,      Manloo Michel MD

## 2021-06-15 ENCOUNTER — HOSPITAL ENCOUNTER (EMERGENCY)
Age: 61
Discharge: HOME | End: 2021-06-15
Attending: EMERGENCY MEDICINE
Payer: COMMERCIAL

## 2021-06-15 ENCOUNTER — APPOINTMENT (OUTPATIENT)
Dept: CT IMAGING | Age: 61
End: 2021-06-15
Payer: COMMERCIAL

## 2021-06-15 ENCOUNTER — TELEPHONE (OUTPATIENT)
Dept: CARDIOLOGY CLINIC | Age: 61
End: 2021-06-15

## 2021-06-15 ENCOUNTER — APPOINTMENT (OUTPATIENT)
Dept: GENERAL RADIOLOGY | Age: 61
End: 2021-06-15
Payer: COMMERCIAL

## 2021-06-15 VITALS
DIASTOLIC BLOOD PRESSURE: 81 MMHG | RESPIRATION RATE: 15 BRPM | OXYGEN SATURATION: 99 % | TEMPERATURE: 97.8 F | SYSTOLIC BLOOD PRESSURE: 134 MMHG | HEART RATE: 83 BPM

## 2021-06-15 DIAGNOSIS — F43.9 STRESS: ICD-10-CM

## 2021-06-15 DIAGNOSIS — R00.0 TACHYCARDIA: Primary | ICD-10-CM

## 2021-06-15 LAB
A/G RATIO: 1.2 (ref 1.1–2.2)
ALBUMIN SERPL-MCNC: 4.1 G/DL (ref 3.4–5)
ALP BLD-CCNC: 145 U/L (ref 40–129)
ALT SERPL-CCNC: 56 U/L (ref 10–40)
ANION GAP SERPL CALCULATED.3IONS-SCNC: 10 MMOL/L (ref 3–16)
AST SERPL-CCNC: 44 U/L (ref 15–37)
BASOPHILS ABSOLUTE: 0.1 K/UL (ref 0–0.2)
BASOPHILS RELATIVE PERCENT: 1.5 %
BILIRUB SERPL-MCNC: 0.3 MG/DL (ref 0–1)
BILIRUBIN URINE: NEGATIVE
BLOOD, URINE: NEGATIVE
BUN BLDV-MCNC: 20 MG/DL (ref 7–20)
CALCIUM SERPL-MCNC: 9.8 MG/DL (ref 8.3–10.6)
CHLORIDE BLD-SCNC: 95 MMOL/L (ref 99–110)
CLARITY: CLEAR
CO2: 23 MMOL/L (ref 21–32)
COLOR: YELLOW
CREAT SERPL-MCNC: 1 MG/DL (ref 0.8–1.3)
EKG ATRIAL RATE: 129 BPM
EKG DIAGNOSIS: NORMAL
EKG P AXIS: 63 DEGREES
EKG P-R INTERVAL: 132 MS
EKG Q-T INTERVAL: 302 MS
EKG QRS DURATION: 78 MS
EKG QTC CALCULATION (BAZETT): 442 MS
EKG R AXIS: 71 DEGREES
EKG T AXIS: 72 DEGREES
EKG VENTRICULAR RATE: 129 BPM
EOSINOPHILS ABSOLUTE: 0.3 K/UL (ref 0–0.6)
EOSINOPHILS RELATIVE PERCENT: 3.4 %
GFR AFRICAN AMERICAN: >60
GFR NON-AFRICAN AMERICAN: >60
GLOBULIN: 3.5 G/DL
GLUCOSE BLD-MCNC: 530 MG/DL (ref 70–99)
GLUCOSE URINE: >=1000 MG/DL
HCT VFR BLD CALC: 42.1 % (ref 40.5–52.5)
HEMOGLOBIN: 14.2 G/DL (ref 13.5–17.5)
KETONES, URINE: NEGATIVE MG/DL
LEUKOCYTE ESTERASE, URINE: NEGATIVE
LYMPHOCYTES ABSOLUTE: 0.9 K/UL (ref 1–5.1)
LYMPHOCYTES RELATIVE PERCENT: 11.9 %
MCH RBC QN AUTO: 30.4 PG (ref 26–34)
MCHC RBC AUTO-ENTMCNC: 33.8 G/DL (ref 31–36)
MCV RBC AUTO: 90 FL (ref 80–100)
MICROSCOPIC EXAMINATION: ABNORMAL
MONOCYTES ABSOLUTE: 0.9 K/UL (ref 0–1.3)
MONOCYTES RELATIVE PERCENT: 11.9 %
NEUTROPHILS ABSOLUTE: 5.6 K/UL (ref 1.7–7.7)
NEUTROPHILS RELATIVE PERCENT: 71.3 %
NITRITE, URINE: NEGATIVE
PDW BLD-RTO: 13.8 % (ref 12.4–15.4)
PH UA: 5.5 (ref 5–8)
PLATELET # BLD: 264 K/UL (ref 135–450)
PMV BLD AUTO: 8.4 FL (ref 5–10.5)
POTASSIUM SERPL-SCNC: 4.9 MMOL/L (ref 3.5–5.1)
PROTEIN UA: NEGATIVE MG/DL
RBC # BLD: 4.68 M/UL (ref 4.2–5.9)
SODIUM BLD-SCNC: 128 MMOL/L (ref 136–145)
SPECIFIC GRAVITY UA: 1.01 (ref 1–1.03)
TOTAL PROTEIN: 7.6 G/DL (ref 6.4–8.2)
TROPONIN: <0.01 NG/ML
TROPONIN: <0.01 NG/ML
URINE REFLEX TO CULTURE: ABNORMAL
URINE TYPE: ABNORMAL
UROBILINOGEN, URINE: 0.2 E.U./DL
WBC # BLD: 7.8 K/UL (ref 4–11)

## 2021-06-15 PROCEDURE — 84443 ASSAY THYROID STIM HORMONE: CPT

## 2021-06-15 PROCEDURE — 83036 HEMOGLOBIN GLYCOSYLATED A1C: CPT

## 2021-06-15 PROCEDURE — 71046 X-RAY EXAM CHEST 2 VIEWS: CPT

## 2021-06-15 PROCEDURE — 93010 ELECTROCARDIOGRAM REPORT: CPT | Performed by: INTERNAL MEDICINE

## 2021-06-15 PROCEDURE — 6360000004 HC RX CONTRAST MEDICATION: Performed by: PHYSICIAN ASSISTANT

## 2021-06-15 PROCEDURE — 71260 CT THORAX DX C+: CPT

## 2021-06-15 PROCEDURE — 84484 ASSAY OF TROPONIN QUANT: CPT

## 2021-06-15 PROCEDURE — 99284 EMERGENCY DEPT VISIT MOD MDM: CPT

## 2021-06-15 PROCEDURE — 85025 COMPLETE CBC W/AUTO DIFF WBC: CPT

## 2021-06-15 PROCEDURE — 80053 COMPREHEN METABOLIC PANEL: CPT

## 2021-06-15 PROCEDURE — 93005 ELECTROCARDIOGRAM TRACING: CPT | Performed by: EMERGENCY MEDICINE

## 2021-06-15 PROCEDURE — 6370000000 HC RX 637 (ALT 250 FOR IP): Performed by: PHYSICIAN ASSISTANT

## 2021-06-15 PROCEDURE — 81003 URINALYSIS AUTO W/O SCOPE: CPT

## 2021-06-15 PROCEDURE — 2580000003 HC RX 258: Performed by: PHYSICIAN ASSISTANT

## 2021-06-15 RX ORDER — ATENOLOL 25 MG/1
25 TABLET ORAL ONCE
Status: COMPLETED | OUTPATIENT
Start: 2021-06-15 | End: 2021-06-15

## 2021-06-15 RX ORDER — LISINOPRIL 20 MG/1
20 TABLET ORAL 2 TIMES DAILY
Qty: 180 TABLET | Refills: 1 | Status: SHIPPED | OUTPATIENT
Start: 2021-06-15 | End: 2021-10-13 | Stop reason: SDUPTHER

## 2021-06-15 RX ORDER — 0.9 % SODIUM CHLORIDE 0.9 %
1000 INTRAVENOUS SOLUTION INTRAVENOUS ONCE
Status: COMPLETED | OUTPATIENT
Start: 2021-06-15 | End: 2021-06-15

## 2021-06-15 RX ADMIN — ATENOLOL 25 MG: 25 TABLET ORAL at 19:13

## 2021-06-15 RX ADMIN — IOPAMIDOL 75 ML: 755 INJECTION, SOLUTION INTRAVENOUS at 17:36

## 2021-06-15 RX ADMIN — SODIUM CHLORIDE 1000 ML: 9 INJECTION, SOLUTION INTRAVENOUS at 16:38

## 2021-06-15 ASSESSMENT — ENCOUNTER SYMPTOMS
VOMITING: 0
BACK PAIN: 0
NAUSEA: 0
CHEST TIGHTNESS: 0
SHORTNESS OF BREATH: 0
ABDOMINAL PAIN: 0
COUGH: 0
DIARRHEA: 0

## 2021-06-15 ASSESSMENT — HEART SCORE: ECG: 1

## 2021-06-15 NOTE — TELEPHONE ENCOUNTER
Patient's wife calling to report that over the weekend, patient's blood pressure got as low as 77/59 and patient felt that he was going to pass out. Denies any other associated symptoms. She reports that now patient's BP is better, but his HR is 131 currently. Does not have known dx of any arrhythmias but does have hx of CVA. Instructed patient to go to ED. HARISH WELLS.

## 2021-06-15 NOTE — ED NOTES
Call placed to [facility]iology @ [phone]  Re: consult per Dr. Catie Shannon @ [facility]  06/15/21 1915

## 2021-06-15 NOTE — ED PROVIDER NOTES
Date of evaluation: 6/15/2021    ED Attending Attestation Note     CHIEF COMPLAINT     Over the weekend I felt lightheaded and my blood pressure was low with fast heart rate  HISTORY OF PRESENT ILLNESS  (Location/Symptom, Timing/Onset,Context/Setting, Quality, Duration, Modifying Factors, Severity). Note limiting factors. This patient was seen by the advance practice provider. I have seen and examined the patient, agree with the workup, evaluation, management and diagnosis. The care plan has been discussed. Chief Complaint   Patient presents with    Anxiety     pt states low BP at home and noticed HR was high today, states feels anxious and fatigued    Tachycardia      Vlad Sutherland is a 57 y.o. male who presents to the emergency department secondary to concern for SBP 70s and HR 130s on Saturday. His BP has improved (stopped taking meds since Saturday) though HR remains elevated. Cardiac workup in 2020 where he had stents placed. Spoke with Dr. Miguel Angel Anderson over the phone who advised him to come to ED for further workup. Been taking aspirin Plavix, has not taken the lisinopril or atenolol since Saturday. Has had increase in stress in his life (dog with slipped disc and unable to walk). Past medical history significant for CVA, CAD, chronic elbow pain, HLD, HTN, IBS, DARRICK, DM.       Social History     Socioeconomic History    Marital status:      Spouse name: None    Number of children: 2    Years of education: None    Highest education level: None   Occupational History    Occupation: disability   Tobacco Use    Smoking status: Former Smoker     Packs/day: 1.00     Years: 25.00     Pack years: 25.00     Types: Cigarettes     Quit date: 2000     Years since quittin.4    Smokeless tobacco: Never Used   Vaping Use    Vaping Use: Never used   Substance and Sexual Activity    Alcohol use: No    Drug use: No    Sexual activity: Yes   Other Topics Concern    None   Social History Narrative    None     Social Determinants of Health     Financial Resource Strain:     Difficulty of Paying Living Expenses:    Food Insecurity:     Worried About Running Out of Food in the Last Year:     [address] in the Last Year:    Transportation Needs:     Lack of Transportation (Medical):  Lack of Transportation (Non-Medical):    Physical Activity:     Days of Exercise per Week:     Minutes of Exercise per Session:    Stress:     Feeling of Stress :    Social Connections:     Frequency of Communication with Friends and Family:     Frequency of Social Gatherings with Friends and Family:     Attends [employer] Services:     Active Member of Clubs or Organizations:     Attends Club or Organization Meetings:     Marital Status:    Intimate Partner Violence:     Fear of Current or Ex-Partner:     Emotionally Abused:     Physically Abused:     Sexually Abused:      Aside from what is stated above denies any other symptoms or modifying factors. Nursing Notes reviewed.     Past Surgical History:   Procedure Laterality Date    CARPAL TUNNEL RELEASE Left 2008    CERVICAL DISCECTOMY  08/02/2017    ANTERIOR CERVICAL DISCECTOMY AND FUSION C5-6, C6-7 WITH [employer]TRON[employer] PLATES, SCREWS, ALLOGRAFT, WITH EVOKES PARTIAL C6 CORPECTOMY    CERVICAL FUSION  12/06/2017    CERVICAL LAMINECTOMY AND POSTERIOR FUSION C5-T1 WITH [employer]ON[employer] RODS AND SCREWS WITH EVOKES AND O-ARM    CHOLECYSTECTOMY  1995    COLONOSCOPY N/A 8/14/2018    COLONOSCOPY POLYPECTOMY SNARE/COLD BIOPSY performed by Quan Lopez MD at [address] [facility] Left 2008    ULNAR NERVE TRANSPOSITION, AT SAME TIME AS CTR    FINGER TRIGGER RELEASE Left 12/12/2019    LEFT RING TRIGGER FINGER RELEASE performed by Carlos Fitzpatrick MD at [facility] Right 11/23/2020    RIGHT LONG AND RING TRIGGER FINGER RELEASES -SLEEP APNEA- performed by Carlos Fitzpatrick MD at [address] [facility] REPAIR  3371    umbilical    PYLOROPLASTY      PYLORIC STENOSIS AS INFANT     Family History   Problem Relation Age of Onset    Cancer Father         melanoma, face    Cancer Paternal Uncle         melanoma, face    Cancer Brother         skin, NMSC    Depression Maternal Cousin     No Known Problems Mother        CURRENT MEDICATIONS       Previous Medications    ASPIRIN ADULT LOW STRENGTH 81 MG EC TABLET    TAKE 1 TABLET BY MOUTH ONE TIME A DAY    ATENOLOL (TENORMIN) 25 MG TABLET    TAKE 1 TABLET BY MOUTH EVERY NIGHT AT BEDTIME    ATORVASTATIN (LIPITOR) 80 MG TABLET    Take 1 tablet by mouth daily    BLOOD GLUCOSE TEST STRIPS (PRODIGY NO CODING BLOOD GLUC) STRIP    Use as directed to test up to 3-4 times daily    BUPROPION (WELLBUTRIN XL) 150 MG EXTENDED RELEASE TABLET    TAKE ONE TABLET BY MOUTH EVERY MORNING    CHOLECALCIFEROL (VITAMIN D3) 50 MCG (2000 UT) CAPS    Take 1 capsule by mouth daily    CLOPIDOGREL (PLAVIX) 75 MG TABLET    Take 1 tablet by mouth daily    DULOXETINE (CYMBALTA) 60 MG EXTENDED RELEASE CAPSULE    TAKE 1 CAPSULE BY MOUTH 2 TIMES A DAY LAST REFILL PATIENT NEEDS OFFICE VISIT BEFORE FUTURE REFILLS    FENOFIBRATE (TRICOR) 145 MG TABLET    TAKE 1 TABLET BY MOUTH ONE TIME A DAY    INSULIN PEN NEEDLE (B-D UF III MINI PEN NEEDLES) 31G X 5 MM MISC    1 each by Does not apply route 3 times daily    INSULIN REGULAR HUMAN (HUMULIN R U-500 KWIKPEN) 500 UNIT/ML SOPN CONCENTRATED INJECTION PEN    INJECT 80 UNITS BEFORE BREAKFAST, INJECT 60 UNITS BEFORE LUNCH AND INJECT 60 UNITS BEFORE DINNER    METFORMIN (GLUCOPHAGE) 1000 MG TABLET    TAKE ONE TABLET BY MOUTH TWICE A DAY    METRONIDAZOLE (METROGEL) 1 % GEL    Apply to face daily. NYSTATIN (MYCOSTATIN) 539064 UNIT/GM OINTMENT    Apply topically 2 times daily to corners of mouth.     OMEGA-3 ACID ETHYL ESTERS (LOVAZA) 1 G CAPSULE    TAKE TWO CAPSULES BY MOUTH TWICE A DAY    PRODIGY LANCETS 28G MISC    Use as directed to test up to 3-4 times daily VITAMIN D (CHOLECALCIFEROL) 25 MCG (1000 UT) TABS TABLET    TAKE 1 TABLET BY MOUTH ONE TIME A DAY      DIAGNOSTIC RESULTS   EKG: interpreted by the Emergency Department Physician who either signs or Co-signs this chart in the absence of a cardiologist.  EKG Indication: Tachycardia  EKG Interpretation: Rate 129, rhythm sinus with PVCs, axis normal.  ND/QRS/QTc within normal limits. No T/ST changes consistent with acute ischemia. Comparison to prior EKG from December 31, 2020 shows he did not have PVCs at that time and otherwise no significant changes noted. RADIOLOGY:   Interpretation per Radiologist below, if available at the time of this note:  CT CHEST PULMONARY EMBOLISM W CONTRAST   Final Result   No evidence of a pulmonary embolus      Mildly dilated and atherosclerotic thoracic aorta with extensive coronary   artery calcifications. Suggest cardiology follow-up      5 mm nodule left lower lobe posteriorly which is indeterminate. Recommend   follow-up to assure stability. Mild chronic obstructive lung changes with probable subpleural atelectasis or   hazy infiltrates along the right upper lobe posteriorly. Recommend   short-term follow-up. RECOMMENDATIONS:   Fleischner Society guidelines for follow-up and management of incidentally   detected pulmonary nodules:      Single Solid Nodule:      Nodule size less than 6 mm   In a low-risk patient, no routine follow-up. In a high-risk patient, optional CT at 12 months. Nodule size equals 6-8 mm   In a low-risk patient, CT at 6-12 months, then consider CT at 18-24 months. In a high-risk patient, CT at 6-12 months, then CT at 18-24 months. Nodule size greater than 8 mm         In a low-risk patient, consider CT at 3 months, PET/CT, or tissue sampling. In a high-risk patient, consider CT at 3 months, PET/CT, or tissue sampling. Multiple Solid Nodules:      Nodule size less than 6 mm   In a low-risk patient, no routine follow-up. In a high-risk patient, optional CT at 12 months. Nodule size equals 6-8 mm   In a low-risk patient, CT at 3-6 months, then consider CT at 18-24 months. In a high-risk patient, CT at 3-6 months, then CT at 18-24 months. Nodule size greater than 8 mm   In a low-risk patient, CT at 3-6 months, then consider CT at 18-24 months. In a high-risk patient, CT at 3-6 months, then CT at 18-24 months. - Low risk patients include individuals with minimal or absent history of   smoking and other known risk factors. - High risk patients include individuals with a history or smoking or known   risk factors. Radiology 2017 http://pubs. rsna.org/doi/full/10.1148/radiol. 5925939874         XR CHEST (2 VW)   Final Result   No active cardiopulmonary disease           Patient was given the following medications:  Orders Placed This Encounter   Medications    0.9 % sodium chloride bolus    iopamidol (ISOVUE-370) 76 % injection 75 mL    atenolol (TENORMIN) tablet 25 mg    lisinopril (PRINIVIL;ZESTRIL) 20 MG tablet     Sig: Take 1 tablet by mouth 2 times daily     Dispense:  180 tablet     Refill:  1       INITIAL VITALS: BP: (!) 141/83, Temp: 97.8 °F (36.6 °C), Pulse: 130, Resp: 18, SpO2: 96 %   RECENT VITALS:  BP: 134/81,Temp: 97.8 °F (36.6 °C), Pulse: 83, Resp: 15, SpO2: 99 %     My assessment reveals an overall well-appearing white male who is sitting up in bed with vitals that are notable for elevated blood pressure and heart rate. He has received almost 1 L of IV fluids at that time and his heart rate has improved from 130s down to low 100s. Given he has discontinued his atenolol and his lisinopril and this may be resulting in rebound tachycardia as well as his hypertension. He was ordered his home dose of atenolol here. Work-up here is notable for hyperglycemia without signs of DKA. He had 2 troponins done which were both negative.   He states he has not been following up with endocrinology because his doctor moved back to [facility] however he does have an appointment set up in the next few months with a new one. He does also follow with primary care. I spoke with cardiology as well given he had a cath done this past December and was seen by them recently at the end of May. They are in agreement that with his work-up being negative thus far his vitals are most likely secondary to rebound hypertension/tachycardia from stopping the blood pressure medications this past Saturday. Recommend for discontinuation of hydrochlorothiazide while continuing both lisinopril and atenolol. Discussed this with patient. At that time on reassessment his systolic was 508R, pulse was 83. He is feeling better. We discussed stress management as well as following up with primary care, cardiology, and return precautions. Discussed how his hemoglobin A1c, thyroid study, and urine test had not yet resulted. All 3 tests may followed-up with primary care and online through [address] Specifically the urine was sent by triage and as he denies any symptoms I do not feel he needs to stay for the result and he also would like to go home given he has been here 7 hours at this time. He expressed understanding of all instructions including follow-up and return precautions prior to discharge. FINAL IMPRESSION      1. Tachycardia    2.  Stress        DISPOSITION/PLAN   DISPOSITION Decision To Discharge 06/15/2021 08:29:08 PM      PATIENT REFERRED TO:  MADISON Rivera - CNP  [address]  [phone]    Schedule an appointment as soon as possible for a visit   For follow up appointment      DISCHARGE MEDICATIONS:  New Prescriptions    LISINOPRIL (PRINIVIL;ZESTRIL) 20 MG TABLET    Take 1 tablet by mouth 2 times daily            (Please note that portions of this note were completed with a voice recognition program. Efforts were made to edit the dictations but occasionally words are mis-transcribed.)    Robin Carlin MD (electronically signed)  Attending Emergency Physician     Robin Carlin MD  06/15/21 4205

## 2021-06-15 NOTE — PROGRESS NOTES
Called on patient for findings of tachycardia. Patient had PCI LAD 12/2020. Follows Dr. Anurag Kan. No CP. Tachycardia with frequent PVC's. Stopped atenolol abruptly recently due to low Bps at home. No ischemic symptoms. Dehydrated and orthostatic, receiving fluids. Troponin negative x 2. EKG shows no ischemic changes. PVCs resolving in ED. Recommend fluids with repeat orthostatics and DC home from our standpoint appears appropriate if repeat orthos negative. DC HCTZ component of anti-hypertensive regimen. Follow up with PCP and cardiology APNP as appropriate.      Prema Cox MD, [address] [facility]  [phone] [facility]  [phone] [address] [place] [address]

## 2021-06-15 NOTE — ED PROVIDER NOTES
**ADVANCED PRACTICE PROVIDER, I HAVE EVALUATED THIS [facility]  ED  EMERGENCY DEPARTMENT ENCOUNTER      Pt Name: Sonia Calderon  GPI:[id number]  Errolgfsamson 1960  Date of evaluation: 6/15/2021  Provider: DOMITILA Briggs      Chief Complaint:    Chief Complaint   Patient presents with    Anxiety     pt states low BP at home and noticed HR was high today, states feels anxious and fatigued    Tachycardia         Nursing Notes, Past Medical Hx, Past Surgical Hx, Social Hx, Allergies, and Family Hx were all reviewed and agreed with or any disagreements were addressed in the HPI.    HPI: (Location, Duration, Timing, Severity, Quality, Assoc Sx, Context, Modifying factors)  This is a  57 y.o. male who presents with a Chief Complaint of anxiety and tachycardia. Patient states on Saturday he was feeling lightheaded and took his blood pressure and noted it to be low. He states the lightheadedness and dizziness is worse with changing position. He also is complaining of palpitations and states his heart rate at home was 130. He does admit he feels very anxious. He states his dog lost the ability to walk over the weekend and was found to have a slipped disc. He states he is very stressed and emotional about the scenario. He does have a cardiac history and currently sees Dr. Gloria Miller. He did have a cardiac cath in December and received multiple stents. He denies any chest pain today, denies shortness of breath, lightheadedness, dizziness, recent fever, chills, abdominal pain, nausea, vomiting.     PastMedical/Surgical History:      Diagnosis Date    Acute, but ill-defined, cerebrovascular disease 5/31/2013    CAD in native artery 12/30/2020    Cerebral artery occlusion with cerebral infarction [facility]) 2013     X2 PERSONALITY CHANGE, ANGER OUTBURSTS    Cerebrovascular disease     Diplopia 7/11/2013    ED (erectile dysfunction) 1/23/2014    Elbow pain, chronic 12/16/2015    buPROPion (WELLBUTRIN XL) 150 MG extended release tablet TAKE ONE TABLET BY MOUTH EVERY MORNING, Disp-90 tablet, R-3Normal      DULoxetine (CYMBALTA) 60 MG extended release capsule TAKE 1 CAPSULE BY MOUTH 2 TIMES A DAY LAST REFILL PATIENT NEEDS OFFICE VISIT BEFORE FUTURE REFILLS, Disp-180 capsule, R-0Normal      clopidogrel (PLAVIX) 75 MG tablet Take 1 tablet by mouth daily, Disp-90 tablet, R-3Normal      atorvastatin (LIPITOR) 80 MG tablet Take 1 tablet by mouth daily, Disp-90 tablet, R-3Normal      Cholecalciferol (VITAMIN D3) 50 MCG (2000 UT) CAPS Take 1 capsule by mouth dailyHistorical Med      omega-3 acid ethyl esters (LOVAZA) 1 g capsule TAKE TWO CAPSULES BY MOUTH TWICE A DAY, Disp-180 capsule, R-3Normal      atenolol (TENORMIN) 25 MG tablet TAKE 1 TABLET BY MOUTH EVERY NIGHT AT BEDTIME, Disp-90 tablet, R-3Normal      nystatin (MYCOSTATIN) 893132 UNIT/GM ointment Apply topically 2 times daily to corners of mouth., Disp-30 g,R-1, Normal      insulin regular human (HUMULIN R U-500 KWIKPEN) 500 UNIT/ML SOPN concentrated injection pen INJECT 80 UNITS BEFORE BREAKFAST, INJECT 60 UNITS BEFORE LUNCH AND INJECT 60 UNITS BEFORE DINNER, Disp-15 pen,R-2Normal      metroNIDAZOLE (METROGEL) 1 % gel Apply to face daily. , Disp-60 g,R-3, Normal      Vitamin D (CHOLECALCIFEROL) 25 MCG (1000 UT) TABS tablet TAKE 1 TABLET BY MOUTH ONE TIME A DAY, Disp-90 tablet,R-3Normal      ASPIRIN ADULT LOW STRENGTH 81 MG EC tablet TAKE 1 TABLET BY MOUTH ONE TIME A DAY, Disp-90 tablet,R-3Normal      fenofibrate (TRICOR) 145 MG tablet TAKE 1 TABLET BY MOUTH ONE TIME A DAY, Disp-90 tablet,R-3Normal      Insulin Pen Needle (B-D UF III MINI PEN NEEDLES) 31G X 5 MM MISC 3 TIMES DAILY Starting Wed 12/11/2019, Disp-300 each, R-6, Normal      PRODIGY LANCETS 28G MISC Disp-400 each, R-3, NO PRINTUse as directed to test up to 3-4 times daily      blood glucose test strips (PRODIGY NO CODING BLOOD GLUC) strip Disp-400 each, R-3, NO PRINTUse as directed to test up to 3-4 times daily               Review of Systems:  (2-9 systems needed)  Review of Systems   Constitutional: Negative for chills and fever. HENT: Negative for congestion. Eyes: Negative for visual disturbance. Respiratory: Negative for cough, chest tightness and shortness of breath. Cardiovascular: Positive for palpitations. Negative for chest pain. Gastrointestinal: Negative for abdominal pain, diarrhea, nausea and vomiting. Genitourinary: Negative for dysuria, frequency and urgency. Musculoskeletal: Negative for back pain. Skin: Negative for rash. Neurological: Positive for light-headedness. Negative for dizziness, weakness and headaches. Physical Exam:  Physical Exam  Vitals and nursing note reviewed. Constitutional:       Appearance: Normal appearance. He is not diaphoretic. HENT:      Head: Normocephalic and atraumatic. Nose: Nose normal.   Eyes:      General:         Right eye: No discharge. Left eye: No discharge. Extraocular Movements: Extraocular movements intact. Cardiovascular:      Rate and Rhythm: Regular rhythm. Tachycardia present. Pulses: Normal pulses. Heart sounds: Normal heart sounds. No murmur heard. No friction rub. No gallop. Pulmonary:      Effort: Pulmonary effort is normal. No respiratory distress. Breath sounds: Normal breath sounds. No stridor. No wheezing, rhonchi or rales. Abdominal:      General: Abdomen is flat. Bowel sounds are normal.      Palpations: Abdomen is soft. Tenderness: There is no abdominal tenderness. Musculoskeletal:         General: Normal range of motion. Cervical back: Normal range of motion and neck supple. Skin:     General: Skin is warm and dry. Coloration: Skin is not pale. Neurological:      Mental Status: He is alert and oriented to person, place, and time.    Psychiatric:         Mood and Affect: Mood normal.         Behavior: Behavior normal. not returned at the time of this note. RADIOLOGY:   Non-plain film images such as CT, Ultrasound and MRI are read by the radiologist. DOMITILA Astudillo have directly visualized the radiologic plain film image(s) with the below findings:      Interpretation per the Radiologist below, if available at the time of this note:    CT CHEST PULMONARY EMBOLISM W CONTRAST   Final Result   No evidence of a pulmonary embolus      Mildly dilated and atherosclerotic thoracic aorta with extensive coronary   artery calcifications. Suggest cardiology follow-up      5 mm nodule left lower lobe posteriorly which is indeterminate. Recommend   follow-up to assure stability. Mild chronic obstructive lung changes with probable subpleural atelectasis or   hazy infiltrates along the right upper lobe posteriorly. Recommend   short-term follow-up. RECOMMENDATIONS:   Fleischner Society guidelines for follow-up and management of incidentally   detected pulmonary nodules:      Single Solid Nodule:      Nodule size less than 6 mm   In a low-risk patient, no routine follow-up. In a high-risk patient, optional CT at 12 months. Nodule size equals 6-8 mm   In a low-risk patient, CT at 6-12 months, then consider CT at 18-24 months. In a high-risk patient, CT at 6-12 months, then CT at 18-24 months. Nodule size greater than 8 mm         In a low-risk patient, consider CT at 3 months, PET/CT, or tissue sampling. In a high-risk patient, consider CT at 3 months, PET/CT, or tissue sampling. Multiple Solid Nodules:      Nodule size less than 6 mm   In a low-risk patient, no routine follow-up. In a high-risk patient, optional CT at 12 months. Nodule size equals 6-8 mm   In a low-risk patient, CT at 3-6 months, then consider CT at 18-24 months. In a high-risk patient, CT at 3-6 months, then CT at 18-24 months.       Nodule size greater than 8 mm   In a low-risk patient, CT at 3-6 months, then consider CT at 18-24 months. In a high-risk patient, CT at 3-6 months, then CT at 18-24 months. - Low risk patients include individuals with minimal or absent history of   smoking and other known risk factors. - High risk patients include individuals with a history or smoking or known   risk factors. Radiology 2017 http://pubs. rsna.org/doi/full/10.1148/radiol. 5718795724         XR CHEST (2 VW)   Final Result   No active cardiopulmonary disease              XR CHEST (2 VW)    Result Date: 6/15/2021  EXAMINATION: TWO XRAY VIEWS OF THE CHEST 6/15/2021 2:06 pm COMPARISON: None. HISTORY: ORDERING SYSTEM PROVIDED HISTORY: tachy TECHNOLOGIST PROVIDED HISTORY: Reason for exam:->tachy Reason for Exam: Tachy FINDINGS: Heart size and pulmonary vasculature within normal limits. Coronary artery stent noted. Lungs clear. Costophrenic angles sharp     No active cardiopulmonary disease          MEDICAL DECISION MAKING / ED COURSE:          Patient was given:  Medications   0.9 % sodium chloride bolus (0 mLs Intravenous Stopped 6/15/21 1913)   iopamidol (ISOVUE-370) 76 % injection 75 mL (75 mLs Intravenous Given 6/15/21 1736)   atenolol (TENORMIN) tablet 25 mg (25 mg Oral Given 6/15/21 1913)       The patient was seen in conjunction with the attending physician, Dr. Martin Miranda who participated in evaluation, treatment, and disposition of patient. Patient presented with concerns of tachycardia and hypotension. At triage she is found to have elevated blood pressure at 141/83 and is tachycardic at 130. He does admit he has not been taking his blood pressure medication the last 2 days due to his low blood pressure at home. He otherwise feels fine. He states he does not feel lightheaded or dizzy anymore. He does appear mildly anxious and is tearful when discussing the stress of his dog. This may be contributing to his tachycardia, however he is given 1 L of fluids which does not have a significant impact on his heart rate. .    Lab results notable for hyponatremia, however corrected with glucose level is 135. A CT PE study is obtained due to the patient's unexplained tachycardia. A second liter of fluid is started on the patient at the time of my sign out. Repeat troponin is pending. Inpatient consult to cardiology pending. I discussed the history, physical, and treatment plan with Dr. Catie Harmon. Kristine Archer was signed out in stable condition. Please see Dr. Randy Wheatley note for further details, including diagnosis and disposition. CLINICAL IMPRESSION:  1. Tachycardia    2.  Stress        DISPOSITION Decision To Discharge 06/15/2021 08:29:08 PM      PATIENT REFERRED TO:  Anna Whelan, APRN - CNP  [address] [facility] [address]  [phone]    Schedule an appointment as soon as possible for a visit   For follow up appointment      DISCHARGE MEDICATIONS:  Discharge Medication List as of 6/15/2021  8:47 PM      START taking these medications    Details   lisinopril (PRINIVIL;ZESTRIL) 20 MG tablet Take 1 tablet by mouth 2 times daily, Disp-180 tablet, R-1Normal             DISCONTINUED MEDICATIONS:  Discharge Medication List as of 6/15/2021  8:47 PM      STOP taking these medications       lisinopril-hydroCHLOROthiazide (PRINZIDE;ZESTORETIC) 20-12.5 MG per tablet Comments:   Reason for Stopping:                      (Please note the MDM and HPI sections of this note were completed with a voice recognition program.  Efforts were made to edit the dictations but occasionally words are mis-transcribed.)    Electronically signed, Reji Rajan,           Reji Rajan  06/16/21 [id number]

## 2021-06-16 LAB
ESTIMATED AVERAGE GLUCOSE: 254.7 MG/DL
HBA1C MFR BLD: 10.5 %
TSH REFLEX: 1.64 UIU/ML (ref 0.27–4.2)

## 2021-06-16 NOTE — ED NOTES
Discharge: Pt discharged to home as per order. IV removed. Scripts plus instructions given. Pt verbalized understanding. Denied questions.        Minnie Romano RN  06/15/21 2053

## 2021-06-16 NOTE — TELEPHONE ENCOUNTER
Patient's wife called stating patient was seen @ [facility] ER yesterday.  Requesting a call to clarify medcations

## 2021-06-16 NOTE — TELEPHONE ENCOUNTER
I called and spoke with patient's wife. She states she has stopped patient's Atenolol for 3 days due to low blood pressure. Patient went to the ER with tachycardia and was treated with IV fluids for dehydrated and orthostatic. HCTZ portion taken out of Lisinopril. Patient's wife is asking if he should restart Atenolol now? She has not rechecked his blood pressure or heart rate since he left the hospital yesterday. Also she is asking if you could look at the CT of chest and comment on what follow up he should have.

## 2021-06-16 NOTE — ED NOTES
Pt states he is feeling better, denies anxiety at this time. Pt in bed resting quietly. Pt denies needs at this time.       Dmitry Stiles RN  06/15/21 2022

## 2021-06-21 DIAGNOSIS — E78.2 MIXED HYPERLIPIDEMIA: ICD-10-CM

## 2021-06-21 NOTE — TELEPHONE ENCOUNTER
Future Appointments   Date Time Provider Avelino Owens   6/30/2021  8:00 AM MADISON Mera - CNP [facility] - [facility]   8/17/2021  3:40 PM MD [facility] Ta [place]   11/1/2021 11:15 AM Poonam Braun MD [facility] [place]   12/3/2021  8:30 AM Sallie Britton MD [facility]   last ov 3/29/21

## 2021-06-22 RX ORDER — OMEGA-3-ACID ETHYL ESTERS 1 G/1
CAPSULE, LIQUID FILLED ORAL
Qty: 180 CAPSULE | Refills: 3 | Status: SHIPPED | OUTPATIENT
Start: 2021-06-22 | End: 2022-01-03

## 2021-06-30 ENCOUNTER — OFFICE VISIT (OUTPATIENT)
Dept: FAMILY MEDICINE CLINIC | Age: 61
End: 2021-06-30
Payer: COMMERCIAL

## 2021-06-30 VITALS
HEART RATE: 72 BPM | SYSTOLIC BLOOD PRESSURE: 132 MMHG | OXYGEN SATURATION: 98 % | BODY MASS INDEX: 31.28 KG/M2 | DIASTOLIC BLOOD PRESSURE: 78 MMHG | WEIGHT: 218 LBS

## 2021-06-30 DIAGNOSIS — F34.1 DYSTHYMIA: ICD-10-CM

## 2021-06-30 DIAGNOSIS — Z79.4 TYPE 2 DIABETES MELLITUS WITH DIABETIC POLYNEUROPATHY, WITH LONG-TERM CURRENT USE OF INSULIN (HCC): Primary | Chronic | ICD-10-CM

## 2021-06-30 DIAGNOSIS — M79.644 PAIN OF RIGHT THUMB: ICD-10-CM

## 2021-06-30 DIAGNOSIS — E78.2 MIXED HYPERLIPIDEMIA: ICD-10-CM

## 2021-06-30 DIAGNOSIS — I10 ESSENTIAL HYPERTENSION: ICD-10-CM

## 2021-06-30 DIAGNOSIS — E11.42 TYPE 2 DIABETES MELLITUS WITH DIABETIC POLYNEUROPATHY, WITH LONG-TERM CURRENT USE OF INSULIN (HCC): Primary | Chronic | ICD-10-CM

## 2021-06-30 DIAGNOSIS — M25.532 LEFT WRIST PAIN: ICD-10-CM

## 2021-06-30 PROCEDURE — 2022F DILAT RTA XM EVC RTNOPTHY: CPT | Performed by: NURSE PRACTITIONER

## 2021-06-30 PROCEDURE — 99214 OFFICE O/P EST MOD 30 MIN: CPT | Performed by: NURSE PRACTITIONER

## 2021-06-30 PROCEDURE — 3046F HEMOGLOBIN A1C LEVEL >9.0%: CPT | Performed by: NURSE PRACTITIONER

## 2021-06-30 PROCEDURE — 3017F COLORECTAL CA SCREEN DOC REV: CPT | Performed by: NURSE PRACTITIONER

## 2021-06-30 PROCEDURE — G8417 CALC BMI ABV UP PARAM F/U: HCPCS | Performed by: NURSE PRACTITIONER

## 2021-06-30 PROCEDURE — 1036F TOBACCO NON-USER: CPT | Performed by: NURSE PRACTITIONER

## 2021-06-30 PROCEDURE — G8427 DOCREV CUR MEDS BY ELIG CLIN: HCPCS | Performed by: NURSE PRACTITIONER

## 2021-06-30 RX ORDER — DESVENLAFAXINE 50 MG/1
50 TABLET, EXTENDED RELEASE ORAL DAILY
Qty: 90 TABLET | Refills: 3 | Status: SHIPPED | OUTPATIENT
Start: 2021-06-30 | End: 2022-07-14 | Stop reason: SDUPTHER

## 2021-06-30 SDOH — ECONOMIC STABILITY: FOOD INSECURITY: WITHIN THE PAST 12 MONTHS, THE FOOD YOU BOUGHT JUST DIDN'T LAST AND YOU DIDN'T HAVE MONEY TO GET MORE.: NEVER TRUE

## 2021-06-30 SDOH — ECONOMIC STABILITY: FOOD INSECURITY: WITHIN THE PAST 12 MONTHS, YOU WORRIED THAT YOUR FOOD WOULD RUN OUT BEFORE YOU GOT MONEY TO BUY MORE.: NEVER TRUE

## 2021-06-30 ASSESSMENT — ENCOUNTER SYMPTOMS
SHORTNESS OF BREATH: 0
NAUSEA: 0
BACK PAIN: 1
COUGH: 0
DIARRHEA: 0
VOMITING: 0

## 2021-06-30 ASSESSMENT — SOCIAL DETERMINANTS OF HEALTH (SDOH): HOW HARD IS IT FOR YOU TO PAY FOR THE VERY BASICS LIKE FOOD, HOUSING, MEDICAL CARE, AND HEATING?: NOT HARD AT ALL

## 2021-07-20 NOTE — PROGRESS NOTES
Edel Morales received a viral test for COVID-19. They were educated on isolation and quarantine as appropriate. For any symptoms, they were directed to seek care from their PCP, given contact information to establish with a doctor, directed to an urgent care or the emergency room.
yes

## 2021-07-28 ENCOUNTER — OFFICE VISIT (OUTPATIENT)
Dept: ORTHOPEDIC SURGERY | Age: 61
End: 2021-07-28
Payer: COMMERCIAL

## 2021-07-28 VITALS — HEIGHT: 70 IN | RESPIRATION RATE: 16 BRPM | WEIGHT: 225 LBS | BODY MASS INDEX: 32.21 KG/M2

## 2021-07-28 DIAGNOSIS — M19.032 PRIMARY OSTEOARTHRITIS OF LEFT WRIST: ICD-10-CM

## 2021-07-28 DIAGNOSIS — S62.114K: ICD-10-CM

## 2021-07-28 DIAGNOSIS — M65.331 TRIGGER FINGER, RIGHT MIDDLE FINGER: Primary | ICD-10-CM

## 2021-07-28 PROBLEM — S62.114A CLOSED NONDISPLACED FRACTURE OF TRIQUETRAL BONE OF RIGHT WRIST: Status: ACTIVE | Noted: 2021-07-28

## 2021-07-28 PROCEDURE — 99244 OFF/OP CNSLTJ NEW/EST MOD 40: CPT | Performed by: ORTHOPAEDIC SURGERY

## 2021-07-28 PROCEDURE — G8417 CALC BMI ABV UP PARAM F/U: HCPCS | Performed by: ORTHOPAEDIC SURGERY

## 2021-07-28 PROCEDURE — G8427 DOCREV CUR MEDS BY ELIG CLIN: HCPCS | Performed by: ORTHOPAEDIC SURGERY

## 2021-07-28 NOTE — PROGRESS NOTES
This 57 y.o. left hand dominant retired man is seen in consultation for MADISON Sorto CNP with a chief complaint of pain in the bilateral wrists. Symptoms have been present for several months. There is no history of injury. Pain is most severe on the dorsal aspect. It is mild at rest and aggravated by movement, lifting and gripping. It does not radiate. Swelling has been noticed. Similar pain is noted in the opposite upper extremity. Symptoms have not changed recently. He underwent trigger finger releases for his right middle and ring fingers 7 months ago and complains of pain and stiffness of the middle finger only. The pain assessment has been reviewed and is correct. The patient's social history, past medical history, family history, medications, allergies and review of systems, entered 7/28/21, have been reviewed, and dated and are recorded in the chart. On physical examination the patient is Height: 5' 10\" (177.8 cm) tall and weighs Weight: 225 lb (102.1 kg). Respirations are 18 per minute. The patient is well nourished, is oriented to time and place, demonstrates appropriate mood and affect as well as normal gait and station. There is mild soft tissue swelling present about the bilateral wrists and right middle finger. There is no discoloration. There is no deformity or atrophy. Tenderness is not present on palpation. Range of motion of the right middle finger is limited in extension at the PIP joint only, with end range pain, nut no triggering. Wrists demonstrate mild limited range of motion. Skin is intact, as is distal circulation and sensation. Gross muscle strength is normal bilaterally. Hand and wrist joints are stable. There are no subcutaneous nodules or enlarged epitrochlear lymph nodes.     I have personally reviewed and interpreted all pertinent external imaging studies, laboratory tests(HbA1C,TSH,CMP,CBC), diagnostic proceedures and medical encounters relative to this patient's visit today. Xrays: AP, lateral and oblique Xrays of the bilateral hands/ wrists, done in the office today, demonstrate degenerative arthritis on the left and a fibrous union of a fracture of the triquetrum on the right. Impression:  Degenerative arthritis left wrist.                       Fibrous union fracture triquetrum right wrist.                       Mild post operative stiffness of the PIP joint, right middle finger. The Xrays are shown to the patient. He is instructed about a range of motion exercise program to regain full middle finger extension, which is fully discussed and demonstrated. He will manage his wrist pain with intermittent use of OTC medication. If symptoms worsen, he will call or return.

## 2021-08-17 ENCOUNTER — OFFICE VISIT (OUTPATIENT)
Dept: ENDOCRINOLOGY | Age: 61
End: 2021-08-17
Payer: COMMERCIAL

## 2021-08-17 VITALS
DIASTOLIC BLOOD PRESSURE: 73 MMHG | SYSTOLIC BLOOD PRESSURE: 149 MMHG | HEIGHT: 70 IN | WEIGHT: 223.4 LBS | HEART RATE: 78 BPM | BODY MASS INDEX: 31.98 KG/M2 | OXYGEN SATURATION: 96 %

## 2021-08-17 DIAGNOSIS — E11.42 TYPE 2 DIABETES MELLITUS WITH DIABETIC POLYNEUROPATHY, WITH LONG-TERM CURRENT USE OF INSULIN (HCC): Primary | ICD-10-CM

## 2021-08-17 DIAGNOSIS — Z79.4 TYPE 2 DIABETES MELLITUS WITH DIABETIC POLYNEUROPATHY, WITH LONG-TERM CURRENT USE OF INSULIN (HCC): Primary | ICD-10-CM

## 2021-08-17 DIAGNOSIS — I10 ESSENTIAL HYPERTENSION: ICD-10-CM

## 2021-08-17 DIAGNOSIS — E78.49 OTHER HYPERLIPIDEMIA: ICD-10-CM

## 2021-08-17 LAB — HBA1C MFR BLD: 9.7 %

## 2021-08-17 PROCEDURE — G8427 DOCREV CUR MEDS BY ELIG CLIN: HCPCS | Performed by: INTERNAL MEDICINE

## 2021-08-17 PROCEDURE — G8417 CALC BMI ABV UP PARAM F/U: HCPCS | Performed by: INTERNAL MEDICINE

## 2021-08-17 PROCEDURE — 83036 HEMOGLOBIN GLYCOSYLATED A1C: CPT | Performed by: INTERNAL MEDICINE

## 2021-08-17 PROCEDURE — 99215 OFFICE O/P EST HI 40 MIN: CPT | Performed by: INTERNAL MEDICINE

## 2021-08-17 PROCEDURE — 3046F HEMOGLOBIN A1C LEVEL >9.0%: CPT | Performed by: INTERNAL MEDICINE

## 2021-08-17 PROCEDURE — 3017F COLORECTAL CA SCREEN DOC REV: CPT | Performed by: INTERNAL MEDICINE

## 2021-08-17 PROCEDURE — 1036F TOBACCO NON-USER: CPT | Performed by: INTERNAL MEDICINE

## 2021-08-17 PROCEDURE — 2022F DILAT RTA XM EVC RTNOPTHY: CPT | Performed by: INTERNAL MEDICINE

## 2021-08-17 RX ORDER — EMPAGLIFLOZIN 10 MG/1
1 TABLET, FILM COATED ORAL DAILY
Qty: 90 TABLET | Refills: 1 | Status: SHIPPED | OUTPATIENT
Start: 2021-08-17 | End: 2022-02-07

## 2021-08-17 RX ORDER — BLOOD-GLUCOSE TRANSMITTER
EACH MISCELLANEOUS
Qty: 1 EACH | Refills: 0 | Status: SHIPPED | OUTPATIENT
Start: 2021-08-17 | End: 2021-11-22

## 2021-08-17 RX ORDER — BLOOD SUGAR DIAGNOSTIC
STRIP MISCELLANEOUS
Qty: 400 EACH | Refills: 3 | Status: SHIPPED | OUTPATIENT
Start: 2021-08-17 | End: 2022-09-09

## 2021-08-17 RX ORDER — BLOOD-GLUCOSE SENSOR
EACH MISCELLANEOUS
Qty: 9 EACH | Refills: 2 | Status: SHIPPED | OUTPATIENT
Start: 2021-08-17 | End: 2022-03-14

## 2021-08-17 RX ORDER — BLOOD-GLUCOSE,RECEIVER,CONT
EACH MISCELLANEOUS
Qty: 1 DEVICE | Refills: 0 | Status: SHIPPED | OUTPATIENT
Start: 2021-08-17

## 2021-08-17 NOTE — PROGRESS NOTES
Seen as new patient    Has seen Dr. Hillary Jones    Patient has a PMH of Type 2 DM, hypertension, hyperlipidemia, obesity , Spinal cord compression in cervical spine. Diagnosed with Diabetes Mellitus type 2 in 2004. Course has been variable . Microvascular complications: No known retinopathy (Last eye exam: 2016)   No known Nephropathy :  Has Peripheral neuropathy: Some numbness and tingling in feet. Home regimen:   Metformin 1000 mg BID     Humulin R U-500 80 units before breakfast , 60 units before lunch and dinner      Previous Meds  Lantus 50  units BID  humalog 25  25 30      Blood glucose trend    None    A1c 12.2 % ---> 9.1 % ---> 10.7 % ---> 11.1 % ---> 9.7%    Diet: Eats 3 meals/day   Has been non-compliant with diet  Nutrition education:Yes  Exercise:     Severe, uncontrolled     Hypertension:  He has essentail HTN for many yrs. On Lisinopril/Atenolol. No dizziness, SOB , chest pain. Hypertriglyceridemia : has h/o high TGD  Has  h/o pancreatitis many yrs back.     on fenofibrate 145 mg daily, fish oil 2 gram BID.     ROS: Scanned,reviewed    Past Medical History:   Diagnosis Date    Acute, but ill-defined, cerebrovascular disease 5/31/2013    CAD in native artery 12/30/2020    Cerebral artery occlusion with cerebral infarction [facility]) 2013     X2 PERSONALITY CHANGE, ANGER OUTBURSTS    Cerebrovascular disease     Diplopia 7/11/2013    ED (erectile dysfunction) 1/23/2014    Elbow pain, chronic 12/16/2015    Hyperlipidemia     Hypertension     Irritable bowel syndrome     Obstructive sleep apnea (adult) (pediatric) 07/01/2013    NO CPAP, HAD PROBLEMS WITH INSURANCE AND STOPPED USING    Occlusion and stenosis of carotid artery without mention of cerebral infarction 01/13/2014    MINOR    Pain in joint, upper arm 5/14/2015    Polyneuropathy in diabetes(357.2) 7/1/2013    PONV (postoperative nausea and vomiting)     Skin abnormality     PRE-CANCEROUS LESIONS REMOVED FROM ARMS AND EARS tablet 3    omega-3 acid ethyl esters (LOVAZA) 1 g capsule TAKE TWO CAPSULES BY MOUTH TWICE A  capsule 3    lisinopril (PRINIVIL;ZESTRIL) 20 MG tablet Take 1 tablet by mouth 2 times daily 180 tablet 1    buPROPion (WELLBUTRIN XL) 150 MG extended release tablet TAKE ONE TABLET BY MOUTH EVERY MORNING 90 tablet 3    clopidogrel (PLAVIX) 75 MG tablet Take 1 tablet by mouth daily 90 tablet 3    atorvastatin (LIPITOR) 80 MG tablet Take 1 tablet by mouth daily 90 tablet 3    Cholecalciferol (VITAMIN D3) 50 MCG (2000 UT) CAPS Take 1 capsule by mouth daily      atenolol (TENORMIN) 25 MG tablet TAKE 1 TABLET BY MOUTH EVERY NIGHT AT BEDTIME 90 tablet 3    nystatin (MYCOSTATIN) 227118 UNIT/GM ointment Apply topically 2 times daily to corners of mouth. 30 g 1    metroNIDAZOLE (METROGEL) 1 % gel Apply to face daily. 60 g 3    fenofibrate (TRICOR) 145 MG tablet TAKE 1 TABLET BY MOUTH ONE TIME A DAY 90 tablet 3    Insulin Pen Needle (B-D UF III MINI PEN NEEDLES) 31G X 5 MM MISC 1 each by Does not apply route 3 times daily 300 each 6    PRODIGY LANCETS 28G MISC Use as directed to test up to 3-4 times daily 400 each 3    blood glucose test strips (PRODIGY NO CODING BLOOD GLUC) strip Use as directed to test up to 3-4 times daily 400 each 3     No current facility-administered medications for this visit.          Lab Results   Component Value Date    LABA1C 10.5 06/15/2021     Admission on 06/15/2021, Discharged on 06/15/2021   Component Date Value Ref Range Status    WBC 06/15/2021 7.8  4.0 - 11.0 K/uL Final    RBC 06/15/2021 4.68  4.20 - 5.90 M/uL Final    Hemoglobin 06/15/2021 14.2  13.5 - 17.5 g/dL Final    Hematocrit 06/15/2021 42.1  40.5 - 52.5 % Final    MCV 06/15/2021 90.0  80.0 - 100.0 fL Final    MCH 06/15/2021 30.4  26.0 - 34.0 pg Final    MCHC 06/15/2021 33.8  31.0 - 36.0 g/dL Final    RDW 06/15/2021 13.8  12.4 - 15.4 % Final    Platelets 21/37/6555 264  135 - 450 K/uL Final    MPV 06/15/2021 8.4  5.0 - 10.5 fL Final    Neutrophils % 06/15/2021 71.3  % Final    Lymphocytes % 06/15/2021 11.9  % Final    Monocytes % 06/15/2021 11.9  % Final    Eosinophils % 06/15/2021 3.4  % Final    Basophils % 06/15/2021 1.5  % Final    Neutrophils Absolute 06/15/2021 5.6  1.7 - 7.7 K/uL Final    Lymphocytes Absolute 06/15/2021 0.9* 1.0 - 5.1 K/uL Final    Monocytes Absolute 06/15/2021 0.9  0.0 - 1.3 K/uL Final    Eosinophils Absolute 06/15/2021 0.3  0.0 - 0.6 K/uL Final    Basophils Absolute 06/15/2021 0.1  0.0 - 0.2 K/uL Final    Sodium 06/15/2021 128* 136 - 145 mmol/L Final    Potassium 06/15/2021 4.9  3.5 - 5.1 mmol/L Final    Chloride 06/15/2021 95* 99 - 110 mmol/L Final    CO2 06/15/2021 23  21 - 32 mmol/L Final    Anion Gap 06/15/2021 10  3 - 16 Final    Glucose 06/15/2021 530* 70 - 99 mg/dL Final    BUN 06/15/2021 20  7 - 20 mg/dL Final    CREATININE 06/15/2021 1.0  0.8 - 1.3 mg/dL Final    GFR Non- 06/15/2021 >60  >60 Final    Comment: >60 mL/min/1.73m2 EGFR, calc. for ages 25 and older using the  MDRD formula (not corrected for weight), is valid for stable  renal function.  GFR  06/15/2021 >60  >60 Final    Comment: Chronic Kidney Disease: less than 60 ml/min/1.73 sq.m. Kidney Failure: less than 15 ml/min/1.73 sq.m. Results valid for patients 18 years and older.       Calcium 06/15/2021 9.8  8.3 - 10.6 mg/dL Final    Total Protein 06/15/2021 7.6  6.4 - 8.2 g/dL Final    Albumin 06/15/2021 4.1  3.4 - 5.0 g/dL Final    Albumin/Globulin Ratio 06/15/2021 1.2  1.1 - 2.2 Final    Total Bilirubin 06/15/2021 0.3  0.0 - 1.0 mg/dL Final    Alkaline Phosphatase 06/15/2021 145* 40 - 129 U/L Final    ALT 06/15/2021 56* 10 - 40 U/L Final    AST 06/15/2021 44* 15 - 37 U/L Final    Globulin 06/15/2021 3.5  g/dL Final    Ventricular Rate 06/15/2021 129  BPM Final    Atrial Rate 06/15/2021 129  BPM Final    P-R Interval 06/15/2021 132  ms Final    QRS Duration 06/15/2021 78  ms Final    Q-T Interval 06/15/2021 302  ms Final    QTc Calculation (Bazett) 06/15/2021 442  ms Final    P Axis 06/15/2021 63  degrees Final    R Axis 06/15/2021 71  degrees Final    T Axis 06/15/2021 72  degrees Final    Diagnosis 06/15/2021 Sinus tachycardia with frequent Premature ventricular complexesConfirmed by Berenice Carney MD, Anahi Lewis ([id number]) on 6/15/2021 3:34:52 PM   Final    Troponin 06/15/2021 <0.01  <0.01 ng/mL Final    Methodology by Troponin T    Color, UA 06/15/2021 Yellow  Straw/Yellow Final    Clarity, UA 06/15/2021 Clear  Clear Final    Glucose, Ur 06/15/2021 >=1000* Negative mg/dL Final    Bilirubin Urine 06/15/2021 Negative  Negative Final    Ketones, Urine 06/15/2021 Negative  Negative mg/dL Final    Specific [place], UA 06/15/2021 1.015  1.005 - 1.030 Final    Blood, Urine 06/15/2021 Negative  Negative Final    pH, UA 06/15/2021 5.5  5.0 - 8.0 Final    Protein, UA 06/15/2021 Negative  Negative mg/dL Final    Urobilinogen, Urine 06/15/2021 0.2  <2.0 E.U./dL Final    Nitrite, Urine 06/15/2021 Negative  Negative Final    Leukocyte Esterase, Urine 06/15/2021 Negative  Negative Final    Microscopic Examination 06/15/2021 Not Indicated   Final    Urine Type 06/15/2021 NotGiven   Final    Urine received in a container without preservatives.     Urine Reflex to Culture 06/15/2021 Not Indicated   Final    TSH 06/15/2021 1.64  0.27 - 4.20 uIU/mL Final    Troponin 06/15/2021 <0.01  <0.01 ng/mL Final    Methodology by Troponin T    Hemoglobin A1C 06/15/2021 10.5  See comment % Final    Comment: Comment:  Diagnosis of Diabetes: > or = 6.5%  Increased risk of diabetes (Prediabetes): 5.7-6.4%  Glycemic Control: Nonpregnant Adults: <7.0%                    Pregnant: <6.0%        eAG 06/15/2021 254.7  mg/dL Final   Hospital Outpatient Visit on 05/25/2021   Component Date Value Ref Range Status    Cholesterol, Total 05/25/2021 113  0 - 199 mg/dL Final    Triglycerides 05/25/2021 220* 0 - 150 mg/dL Final    HDL 05/25/2021 31* 40 - 60 mg/dL Final    LDL Calculated 05/25/2021 38  <100 mg/dL Final    VLDL Cholesterol Calculated 05/25/2021 44  Not Established mg/dL Final           Assessment/Plan      1. Type 2 DM     Danielle Rose is a 57 y.o. male has Type 2 DM with obesity and insulin resistance. Poorly controlled DM. Complicated by neuropathy. BS have been high    - Increase Humulin R U-500 90 units before breakfast , 70 units before lunch and dinner  Add SSI 5 for 50 > 150  - 20 < 100    Start jardiance  Check Dexcom coverage as not checking glucose    -Will recommend low carb diet ( 40-45 grams with each meal)    Referred to [address] [place] [address] for dietary modification. Avoid GLP-1 given high TGD and h/o pancreatitis. Advised follow-up with the ophthalmologist once BS better. Urine microalbumin/cr ratio normal in 071/8, 12/19 On ace-inhibitor. Discussed foot care. Has neuropathy on exam.       Pt on ASA. Normal TSH in 12/19     2. Hypertension. BP slightly elevated    3. Hyperlipidemia. TGD have improved. Has h/o pancreatitis   on fenofibrate 145 mg daily, fish oil 2 gram BID.   lipitor 80mg    TGD 1560 ---> 506---> 1051--> 544---> 220  LDL 71---> 112---> 114---> 112---> 38  HDL 31---> 31    Continue same regimen. 4. Obesity. Advised life style changes.     Patient is on high dose of insulin which has a narrow therapeutic index and risk of complications including severe hypoglycemia

## 2021-08-25 RX ORDER — FENOFIBRATE 145 MG/1
TABLET, COATED ORAL
Qty: 90 TABLET | Refills: 3 | Status: SHIPPED | OUTPATIENT
Start: 2021-08-25 | End: 2022-08-29

## 2021-08-25 NOTE — TELEPHONE ENCOUNTER
Medication:   Requested Prescriptions     Pending Prescriptions Disp Refills    fenofibrate (TRICOR) 145 MG tablet [Pharmacy Med Name: FENOFIBRATE 145MG TABS] 90 tablet 3     Sig: TAKE 1 TABLET BY MOUTH ONE TIME A DAY       Last Filled:      Patient Phone Number: [phone] (home) [phone] (work)    Last appt: 08/17/2021  Next appt: 12/17/2021    Last Labs DM:   Lab Results   Component Value Date    LABA1C 9.7 08/17/2021     Last Lipid:   Lab Results   Component Value Date    CHOL 113 05/25/2021    TRIG 220 05/25/2021    HDL 31 05/25/2021    HDL 43 11/03/2011    LDLCALC 38 05/25/2021     Last PSA:   Lab Results   Component Value Date    PSA 0.56 11/16/2020     Last Thyroid:   Lab Results   Component Value Date    TSH 1.54 11/16/2020

## 2021-09-09 ENCOUNTER — TELEPHONE (OUTPATIENT)
Dept: ENDOCRINOLOGY | Age: 61
End: 2021-09-09

## 2021-09-09 DIAGNOSIS — E11.9 CONTROLLED TYPE 2 DIABETES MELLITUS WITHOUT COMPLICATION, WITH LONG-TERM CURRENT USE OF INSULIN (HCC): Primary | ICD-10-CM

## 2021-09-09 DIAGNOSIS — Z79.4 CONTROLLED TYPE 2 DIABETES MELLITUS WITHOUT COMPLICATION, WITH LONG-TERM CURRENT USE OF INSULIN (HCC): Primary | ICD-10-CM

## 2021-09-10 ENCOUNTER — HOSPITAL ENCOUNTER (OUTPATIENT)
Dept: CT IMAGING | Age: 61
Discharge: HOME | End: 2021-09-10
Payer: COMMERCIAL

## 2021-09-10 PROCEDURE — 71250 CT THORAX DX C-: CPT

## 2021-09-15 ENCOUNTER — OFFICE VISIT (OUTPATIENT)
Dept: FAMILY MEDICINE CLINIC | Age: 61
End: 2021-09-15
Payer: COMMERCIAL

## 2021-09-15 VITALS
BODY MASS INDEX: 32 KG/M2 | WEIGHT: 223 LBS | HEART RATE: 58 BPM | TEMPERATURE: 97.5 F | RESPIRATION RATE: 16 BRPM | SYSTOLIC BLOOD PRESSURE: 140 MMHG | OXYGEN SATURATION: 97 % | DIASTOLIC BLOOD PRESSURE: 78 MMHG

## 2021-09-15 DIAGNOSIS — F33.1 MODERATE EPISODE OF RECURRENT MAJOR DEPRESSIVE DISORDER (HCC): ICD-10-CM

## 2021-09-15 DIAGNOSIS — E11.42 TYPE 2 DIABETES MELLITUS WITH DIABETIC POLYNEUROPATHY, WITH LONG-TERM CURRENT USE OF INSULIN (HCC): Primary | ICD-10-CM

## 2021-09-15 DIAGNOSIS — Z79.4 TYPE 2 DIABETES MELLITUS WITH DIABETIC POLYNEUROPATHY, WITH LONG-TERM CURRENT USE OF INSULIN (HCC): Primary | ICD-10-CM

## 2021-09-15 DIAGNOSIS — R91.1 PULMONARY NODULE: ICD-10-CM

## 2021-09-15 LAB
CHP ED QC CHECK: NORMAL
GLUCOSE BLD-MCNC: 136 MG/DL

## 2021-09-15 PROCEDURE — G8417 CALC BMI ABV UP PARAM F/U: HCPCS | Performed by: NURSE PRACTITIONER

## 2021-09-15 PROCEDURE — 3046F HEMOGLOBIN A1C LEVEL >9.0%: CPT | Performed by: NURSE PRACTITIONER

## 2021-09-15 PROCEDURE — 1036F TOBACCO NON-USER: CPT | Performed by: NURSE PRACTITIONER

## 2021-09-15 PROCEDURE — 99213 OFFICE O/P EST LOW 20 MIN: CPT | Performed by: NURSE PRACTITIONER

## 2021-09-15 PROCEDURE — G8427 DOCREV CUR MEDS BY ELIG CLIN: HCPCS | Performed by: NURSE PRACTITIONER

## 2021-09-15 PROCEDURE — 2022F DILAT RTA XM EVC RTNOPTHY: CPT | Performed by: NURSE PRACTITIONER

## 2021-09-15 PROCEDURE — 82962 GLUCOSE BLOOD TEST: CPT | Performed by: NURSE PRACTITIONER

## 2021-09-15 PROCEDURE — 3017F COLORECTAL CA SCREEN DOC REV: CPT | Performed by: NURSE PRACTITIONER

## 2021-09-15 ASSESSMENT — ENCOUNTER SYMPTOMS
COUGH: 0
SHORTNESS OF BREATH: 0
NAUSEA: 0
BACK PAIN: 1
VOMITING: 0
DIARRHEA: 0

## 2021-09-15 ASSESSMENT — PATIENT HEALTH QUESTIONNAIRE - PHQ9
SUM OF ALL RESPONSES TO PHQ QUESTIONS 1-9: 0
SUM OF ALL RESPONSES TO PHQ QUESTIONS 1-9: 0
SUM OF ALL RESPONSES TO PHQ9 QUESTIONS 1 & 2: 0
2. FEELING DOWN, DEPRESSED OR HOPELESS: 0
SUM OF ALL RESPONSES TO PHQ QUESTIONS 1-9: 0
1. LITTLE INTEREST OR PLEASURE IN DOING THINGS: 0

## 2021-09-15 NOTE — PROGRESS NOTES
9/15/2021     Chief Complaint   Patient presents with    Follow-up     follow up on CT scan     Marla Louis (:  1960) is a 57 y.o. male, here for evaluation of the following medical concerns:    HPI  Following up on lung nodules. Had follow-up CT done on 9/10/2021. There was a 6 mm lung nodule which is unchanged from previous in . He is a former smoker quit smoking in . Depression: His wife has even mentioned that he is much more irritable and gets angry and yells at little things. Examples he gave today were if he dropped something he gets really upset about it and frustrated, he they have 2 small puppies and if they P in the house or chewing the furniture he feels like he just \"blows up. \". Has been dealing with depression for some time. Complicated by the lack of emotional support. Feels that his wife is on affectionate toward him. He does not have many friends as most of his childhood friends have passed away. He does have a son around but can only talk to him about so much. Physically just wants to sleep most days. His not have many days where \"I feel happy. \" denies SI/HI. Taking Pristiq- feels that mood has improved a little with this medication adjustment; also on Wellbutrin. Diabetes: Managed by his endocrinologist.  Was started on Jardiance and has a continuous glucose monitor. Reports that his blood sugar readings at home have mid much better with addition of the Jardiance. He had one episode of hypoglycemia and this was after he forgot to eat breakfast after he took his insulin. We discussed the importance of avoiding hypoglycemia and time his insulin with his meals. Narrative   EXAMINATION:   CT OF THE CHEST WITHOUT CONTRAST 9/10/2021 8:20 am       TECHNIQUE:   CT of the chest was performed without the administration of intravenous   contrast. Multiplanar reformatted images are provided for review.  Dose   modulation, iterative reconstruction, and/or weight based adjustment of the   mA/kV was utilized to reduce the radiation dose to as low as reasonably   achievable.       COMPARISON:   06/15/2021       HISTORY:   ORDERING SYSTEM PROVIDED HISTORY: Lung nodule < 6cm on CT   TECHNOLOGIST PROVIDED HISTORY:   Reason for exam:->lung nodule/atelectasis follow up june 2021   Reason for Exam: f/u lung nodule seen on CXR in June 2021; no other sx   Acuity: Acute   Type of Exam: Subsequent/Follow-up   Relevant Medical/Surgical History: no hx of surg to chest; former smoker x20   yrs, quit 21 yrs ago       FINDINGS:   Lungs/pleura: The central airways are patent. 6 mm subpleural nodule left   lower lobe (image 87 series 3) unchanged.  There are no new or enlarging   pulmonary nodules       Mediastinum: There is no acute mediastinal abnormality       Upper Abdomen: No definite acute abnormality       Soft Tissues/Bones: No suspicious osteolytic or osteoblastic lesions           Impression   1. 6 mm subpleural nodule left lower lobe remains unchanged.  Continue   follow-up per [employer] criteria is suggested.    Single Solid Nodule:       Nodule size less than 6 mm       In a low-risk patient, no routine follow-up.       In a high-risk patient, optional CT at 12 months.       Nodule size equals 6-8 mm       In a low-risk patient, CT at 6-12 months, then consider CT at 18-24 months.       In a high-risk patient, CT at 6-12 months, then CT at 18-24 months.       Nodule size greater than 8 mm       In a low-risk patient, consider CT at 3 months, PET/CT, or tissue sampling.       In a high-risk patient, consider CT at 3 months, PET/CT, or tissue sampling.       Multiple Solid Nodules:       Nodule size less than 6 mm       In a low-risk patient, no routine follow-up.       In a high-risk patient, optional CT at 12 months.       Nodule size equals 6-8 mm       In a low-risk patient, CT at 3-6 months, then consider CT at 18-24 months.       In a high-risk patient, CT at 3-6 months, then CT at 18-24 months.       Nodule size greater than 8 mm       In a low-risk patient, CT at 3-6 months, then consider CT at 18-24 months.       In a high-risk patient, CT at 3-6 months, then CT at 18-24 months.       - Low risk patients include individuals with minimal or absent history of       smoking and other known risk factors.       - High risk patients include individuals with a history or smoking or known       risk factors.       Radiology 2017 http://pubs. rsna.org/doi/full/10.1148/radiol. 9218111419       Review of Systems   Constitutional: Negative for fatigue and fever. Respiratory: Negative for cough and shortness of breath. Cardiovascular: Negative for chest pain and leg swelling. Gastrointestinal: Negative for diarrhea, nausea and vomiting. Musculoskeletal: Positive for arthralgias, back pain (chronic) and joint swelling. Negative for gait problem. Neurological: Positive for numbness (tingling- neuroathy bilateral feet). Negative for dizziness and headaches. Psychiatric/Behavioral: Positive for decreased concentration and dysphoric mood. Negative for self-injury, sleep disturbance and suicidal ideas. The patient is not nervous/anxious. Prior to Visit Medications    Medication Sig Taking?  Authorizing Provider   fenofibrate (TRICOR) 145 MG tablet TAKE 1 TABLET BY MOUTH ONE TIME A DAY Yes Scot Brambila MD   Continuous Blood Gluc Transmit (DEXCOM G6 TRANSMITTER) MISC As needed Yes Scot Brambila MD   Continuous Blood Gluc Sensor (DEXCOM G6 SENSOR) MISC every 10 days Yes Scot Brambila MD   Continuous Blood Gluc  (Oketo Renrendai) [address] As needed Yes Scot Brambila MD   blood glucose test strips (PRODIGY NO CODING BLOOD GLUC) strip Use as directed to test up to 3-4 times daily Yes Scot Brambila MD   empagliflozin (JARDIANCE) 10 MG tablet Take 1 tablet by mouth daily Yes Scot Brambila MD   insulin regular human (HUMULIN R U-500 [facility]) 500 UNIT/ML SOPN concentrated injection pen INJECT 80 UNITS INTO THE SKIN BEFORE BREAKFAST, INJECT 60 UNITS INTO THE SKIN BEFORE LUNCH AND INJECT 60 UNITS INTO THE SKIN BEFORE DINNER Yes Scot Brambila MD   metFORMIN (GLUCOPHAGE) 1000 MG tablet TAKE 1 TABLET BY MOUTH 2 TIMES A DAY Yes MADISON Mejia CNP   desvenlafaxine succinate (PRISTIQ) 50 MG TB24 extended release tablet Take 1 tablet by mouth daily Yes MADISON Mejia CNP   vitamin D3 (CHOLECALCIFEROL) 25 MCG (1000 UT) TABS tablet TAKE 1 TABLET BY MOUTH ONE TIME A DAY Yes MADISON Mejia CNP   ASPIRIN ADULT LOW STRENGTH 81 MG EC tablet TAKE 1 TABLET BY MOUTH ONE TIME A DAY Yes MADISON Mejia CNP   omega-3 acid ethyl esters (LOVAZA) 1 g capsule TAKE TWO CAPSULES BY MOUTH TWICE A DAY Yes MADISON Mejia CNP   lisinopril (PRINIVIL;ZESTRIL) 20 MG tablet Take 1 tablet by mouth 2 times daily Yes Marleen Lopez MD   buPROPion (WELLBUTRIN XL) 150 MG extended release tablet TAKE ONE TABLET BY MOUTH EVERY MORNING Yes MADISON Mejia CNP   clopidogrel (PLAVIX) 75 MG tablet Take 1 tablet by mouth daily Yes Brooke Cottrell MD   atorvastatin (LIPITOR) 80 MG tablet Take 1 tablet by mouth daily Yes Brooke Cottrell MD   Cholecalciferol (VITAMIN D3) 50 MCG (2000 UT) CAPS Take 1 capsule by mouth daily Yes Mary Crooks MD   atenolol (TENORMIN) 25 MG tablet TAKE 1 TABLET BY MOUTH EVERY NIGHT AT BEDTIME Yes Ana Marrufo,    nystatin (MYCOSTATIN) 690339 UNIT/GM ointment Apply topically 2 times daily to corners of mouth. Yes Ashley Nath MD   metroNIDAZOLE (METROGEL) 1 % gel Apply to face daily.  Yes Ashley Nath MD   Insulin Pen Needle (B-D UF III MINI PEN NEEDLES) 31G X 5 MM MISC 1 each by Does not apply route 3 times daily Yes Erik Zamora MD   PRODIGY LANCETS 28G MISC Use as directed to test up to 3-4 times daily Yes Erik Zamora MD   lisinopril-hydroCHLOROthiazide (PRINZIDE;ZESTORETIC) 20-12.5 MG per tablet TAKE 1 TABLET BY MOUTH ONE TIME A DAY  MADISON Sorto CNP   atenolol (TENORMIN) 25 MG tablet TAKE 1 TABLET BY MOUTH EVERY NIGHT AT BEDTIME  MADISON Sorto CNP        Social History     Tobacco Use    Smoking status: Former Smoker     Packs/day: 1.00     Years: 25.00     Pack years: 25.00     Types: Cigarettes     Quit date: 2000     Years since quittin.6    Smokeless tobacco: Never Used   Substance Use Topics    Alcohol use: No        Vitals:    09/15/21 0920 09/15/21 0926 09/15/21 0946   BP: (!) 160/70 (!) 152/72 (!) 140/78   Site: Left Upper Arm Right Upper Arm    Position: Sitting Sitting    Pulse: 58     Resp: 16     Temp: 97.5 °F (36.4 °C)     TempSrc: Temporal     SpO2: 97%     Weight: 223 lb (101.2 kg)       Estimated body mass index is 32 kg/m² as calculated from the following:    Height as of 21: 5' 10\" (1.778 m). Weight as of this encounter: 223 lb (101.2 kg). Physical Exam  Vitals and nursing note reviewed. Constitutional:       General: He is not in acute distress. Appearance: Normal appearance. He is well-developed. He is obese. He is not ill-appearing, toxic-appearing or diaphoretic. HENT:      Head: Normocephalic and atraumatic. Eyes:      Extraocular Movements: Extraocular movements intact. Conjunctiva/sclera: Conjunctivae normal.      Pupils: Pupils are equal, round, and reactive to light. Cardiovascular:      Rate and Rhythm: Normal rate and regular rhythm. Heart sounds: Normal heart sounds. No murmur heard. No friction rub. No gallop. Pulmonary:      Effort: Pulmonary effort is normal. No respiratory distress. Breath sounds: Normal breath sounds. No stridor. No wheezing, rhonchi or rales. Neurological:      General: No focal deficit present. Mental Status: He is alert and oriented to person, place, and time. Mental status is at baseline. Cranial Nerves:  No

## 2021-10-07 ENCOUNTER — TELEPHONE (OUTPATIENT)
Dept: PHARMACY | Facility: CLINIC | Age: 61
End: 2021-10-07

## 2021-10-07 NOTE — TELEPHONE ENCOUNTER
[facility] Employee Diabetes Program    Shirley Barnard is a 57 y.o. male enrolled in the [facility] Diabetes Program. The goal of this voluntary program is to help employees and covered dependents reach their health maintenance goals in regards to their diabetes diagnosis. According to our records, patient is missing the following requirement(s) that must be completed by December 31, 2021 to avoid discharge from the program:    [address] with a [facility] Coordinator or Diabetes Educator (if A1c > 8%)   Influenza vaccination for the 3489-0527 flu season      Patients with an A1c of 8% or higher are required to meet in person or via phone with an [facility] or Diabetes Educator. [address] attempted outreach on 5/25/21 for final time. Kate Munguia RN BSN  Associate Care Manager  Phone: [phone]      Attempt made to reach patient by telephone to review above. Left voice message for patient to return clinician's phone call to [phone], option 3. [employer] message also sent.     Amberly Burgess, PharmD, [address] [facility]  Department, toll free: [phone]    For Pharmacy Admin Tracking Only     Time Spent (min): 5

## 2021-10-13 RX ORDER — LISINOPRIL 20 MG/1
20 TABLET ORAL 2 TIMES DAILY
Qty: 180 TABLET | Refills: 3 | Status: SHIPPED | OUTPATIENT
Start: 2021-10-13 | End: 2022-10-13

## 2021-10-13 NOTE — TELEPHONE ENCOUNTER
Pts wife called to request a refill on Lisinopril 20 mg BID. Pt states insurance requires a 90 day supply. Pt was last prescribed this while in [facility] ED on 6/15. Preferred pharmacy is [facility] [address] @ [phone]. Last OV 5/28/2021 with [facility].

## 2021-10-13 NOTE — TELEPHONE ENCOUNTER
Patient last seen 5/28/2021. Seen in the ER 6/15/2021. lisinopril increased to 20 mg twice at this time.

## 2021-10-18 RX ORDER — PEN NEEDLE, DIABETIC 31 GX5/16"
1 NEEDLE, DISPOSABLE MISCELLANEOUS 3 TIMES DAILY
Qty: 300 EACH | Refills: 6 | Status: SHIPPED | OUTPATIENT
Start: 2021-10-18

## 2021-10-28 DIAGNOSIS — L71.9 ROSACEA: ICD-10-CM

## 2021-10-28 RX ORDER — METRONIDAZOLE 10 MG/G
GEL TOPICAL
Qty: 60 G | Refills: 3 | Status: SHIPPED | OUTPATIENT
Start: 2021-10-28 | End: 2022-05-23 | Stop reason: SDUPTHER

## 2021-11-15 DIAGNOSIS — E11.42 TYPE 2 DIABETES MELLITUS WITH DIABETIC POLYNEUROPATHY, WITH LONG-TERM CURRENT USE OF INSULIN (HCC): Chronic | ICD-10-CM

## 2021-11-15 DIAGNOSIS — Z79.4 TYPE 2 DIABETES MELLITUS WITH DIABETIC POLYNEUROPATHY, WITH LONG-TERM CURRENT USE OF INSULIN (HCC): Chronic | ICD-10-CM

## 2021-11-15 RX ORDER — INSULIN HUMAN 500 [IU]/ML
INJECTION, SOLUTION SUBCUTANEOUS
Qty: 60 ML | Refills: 0 | Status: SHIPPED | OUTPATIENT
Start: 2021-11-15 | End: 2022-02-21

## 2021-11-15 NOTE — TELEPHONE ENCOUNTER
Medication:   Requested Prescriptions     Pending Prescriptions Disp Refills    insulin regular human (HUMULIN R U-500 KWIKPEN) 500 UNIT/ML SOPN concentrated injection pen [Pharmacy Med Name: HUMULIN R U-500 KWIKPEN 500 SOPN] 60 mL 0     Sig: INJECT 80 UNITS UNDER THE SKIN BEFORE BREAKFAST.  THEN INJECT 60 UNITS UNDER THE SKIN BEFORE LUNCH AND DINNER       Last Filled:  07/29/2021    Patient Phone Number: [phone] (home) [phone] (work)    Last appt: 8/17/2021   Next appt: 12/7/2021    Last Labs DM:   Lab Results   Component Value Date    LABA1C 9.7 08/17/2021

## 2021-11-21 DIAGNOSIS — E11.42 TYPE 2 DIABETES MELLITUS WITH DIABETIC POLYNEUROPATHY, WITH LONG-TERM CURRENT USE OF INSULIN (HCC): Primary | ICD-10-CM

## 2021-11-21 DIAGNOSIS — Z79.4 TYPE 2 DIABETES MELLITUS WITH DIABETIC POLYNEUROPATHY, WITH LONG-TERM CURRENT USE OF INSULIN (HCC): Primary | ICD-10-CM

## 2021-11-22 RX ORDER — BLOOD-GLUCOSE TRANSMITTER
EACH MISCELLANEOUS
Qty: 1 EACH | Refills: 0 | Status: SHIPPED | OUTPATIENT
Start: 2021-11-22 | End: 2022-02-28

## 2021-11-22 NOTE — TELEPHONE ENCOUNTER
Medication:   Requested Prescriptions     Pending Prescriptions Disp Refills    Continuous Blood Gluc Transmit (DEXCOM G6 TRANSMITTER) MISC [Pharmacy Med Name: [address] [place] [address] Rd 1 each 0     Sig: USE AS NEEDED, CHANGE EVERY 90 DAYS       Last Filled:  08/17/2021    Patient Phone Number: [phone] (home) [phone] (work)    Last appt: 8/17/2021   Next appt: 12/7/2021    Last Labs DM:   Lab Results   Component Value Date    LABA1C 9.7 08/17/2021

## 2021-11-30 ENCOUNTER — TELEPHONE (OUTPATIENT)
Dept: FAMILY MEDICINE CLINIC | Age: 61
End: 2021-11-30

## 2021-11-30 ENCOUNTER — NURSE ONLY (OUTPATIENT)
Dept: FAMILY MEDICINE CLINIC | Age: 61
End: 2021-11-30
Payer: COMMERCIAL

## 2021-11-30 DIAGNOSIS — Z23 NEED FOR INFLUENZA VACCINATION: Primary | ICD-10-CM

## 2021-11-30 PROCEDURE — 90674 CCIIV4 VAC NO PRSV 0.5 ML IM: CPT | Performed by: NURSE PRACTITIONER

## 2021-11-30 PROCEDURE — 90471 IMMUNIZATION ADMIN: CPT | Performed by: NURSE PRACTITIONER

## 2021-11-30 NOTE — PROGRESS NOTES
2021 - 2022 Flu Vaccine Questionnaire    VIS given -  Yes    1. Have you received any other vaccine within the last 14 days? No  2. Do you currently have an active infectious or acute respiratory illness or fever? No  3. Are you taking steroids or immune suppressive drugs? No  4. Have you ever had a reaction to a flu vaccine? No  5. Are you allergic to eggs, egg products, chicken, Thimerosal (preservative) Gentamycin, polymixin, neomycin or Latex? No  6. Have you ever had [place] Syndrome?   No

## 2021-11-30 NOTE — TELEPHONE ENCOUNTER
Spoke to our  & I have scheduled the patient for the flu shot here in our office at [facility], per her request can [facility] please place the order to be release and administered in our office this afternoon?

## 2021-11-30 NOTE — TELEPHONE ENCOUNTER
Patient wife calling in to schedule flu shots. Asks if spouse can receive flu shot with her this afternoon at [facility] to save a trip to [facility]. I have reached out to see if this is possible.

## 2021-12-03 ENCOUNTER — OFFICE VISIT (OUTPATIENT)
Dept: CARDIOLOGY CLINIC | Age: 61
End: 2021-12-03
Payer: COMMERCIAL

## 2021-12-03 VITALS
BODY MASS INDEX: 31.07 KG/M2 | DIASTOLIC BLOOD PRESSURE: 60 MMHG | HEIGHT: 70 IN | SYSTOLIC BLOOD PRESSURE: 110 MMHG | WEIGHT: 217 LBS | OXYGEN SATURATION: 98 % | HEART RATE: 61 BPM

## 2021-12-03 DIAGNOSIS — I25.10 CAD IN NATIVE ARTERY: ICD-10-CM

## 2021-12-03 DIAGNOSIS — E78.2 MIXED HYPERLIPIDEMIA: Chronic | ICD-10-CM

## 2021-12-03 DIAGNOSIS — R94.39 ABNORMAL STRESS TEST: ICD-10-CM

## 2021-12-03 DIAGNOSIS — I25.10 CAD S/P PERCUTANEOUS CORONARY ANGIOPLASTY: Primary | ICD-10-CM

## 2021-12-03 DIAGNOSIS — I65.23 BILATERAL CAROTID ARTERY STENOSIS: ICD-10-CM

## 2021-12-03 DIAGNOSIS — Z98.61 CAD S/P PERCUTANEOUS CORONARY ANGIOPLASTY: Primary | ICD-10-CM

## 2021-12-03 PROCEDURE — 3017F COLORECTAL CA SCREEN DOC REV: CPT | Performed by: INTERNAL MEDICINE

## 2021-12-03 PROCEDURE — 1036F TOBACCO NON-USER: CPT | Performed by: INTERNAL MEDICINE

## 2021-12-03 PROCEDURE — G8417 CALC BMI ABV UP PARAM F/U: HCPCS | Performed by: INTERNAL MEDICINE

## 2021-12-03 PROCEDURE — G8482 FLU IMMUNIZE ORDER/ADMIN: HCPCS | Performed by: INTERNAL MEDICINE

## 2021-12-03 PROCEDURE — G8427 DOCREV CUR MEDS BY ELIG CLIN: HCPCS | Performed by: INTERNAL MEDICINE

## 2021-12-03 PROCEDURE — 99214 OFFICE O/P EST MOD 30 MIN: CPT | Performed by: INTERNAL MEDICINE

## 2021-12-03 RX ORDER — ATORVASTATIN CALCIUM 40 MG/1
40 TABLET, FILM COATED ORAL DAILY
Qty: 90 TABLET | Refills: 3 | Status: SHIPPED | OUTPATIENT
Start: 2021-12-03 | End: 2022-08-05

## 2021-12-03 NOTE — LETTER
[facility]   Cardiac Consultation    Referring Provider:  MADISON Mack CNP     Chief Complaint   Patient presents with    6 Month Follow-Up     no new cadiac complaints     Keena Mancini   1960     History of Present Illness:   Keena Mancini is a 57 y.o. male who is here today for follow up for a past medical history of coronary artery disease, abnormal EKG carotid artery disease, hypertension, hyperlipidemia. Patient has a family history of heart diease. He had an echocardiogram on 12/18/2020 which showed an EF of 60%, carotid dopplers less than 50% bilaterally. He had an abnormal stress test and underwent a left heart cath with Dr. Mell Morris on 12/30/2020- Successful IVUS-guided PCI of proximal to mid-LAD with overlapping Xience Mariluz 3.0 x 28 mm and 3.5 x 23 mm MING. Today he states he has been feeling well since his last visit. He is tolerating his medications and taking them as prescribed. His blood pressure at home is well controlled, less than 140/90. Patient currently denies any weight gain, edema, palpitations, chest pain, shortness of breath, dizziness, and syncope. Past Medical History:   has a past medical history of Acute, but ill-defined, cerebrovascular disease, CAD in native artery, Cerebral artery occlusion with cerebral infarction [facility]), Cerebrovascular disease, Diplopia, ED (erectile dysfunction), Elbow pain, chronic, Hyperlipidemia, Hypertension, Irritable bowel syndrome, Obstructive sleep apnea (adult) (pediatric), Occlusion and stenosis of carotid artery without mention of cerebral infarction, Pain in joint, upper arm, Polyneuropathy in diabetes(357.2), PONV (postoperative nausea and vomiting), Skin abnormality, Type 2 diabetes mellitus with diabetic polyneuropathy, with long-term current use of insulin ([facility] [address] and Type II or unspecified type diabetes mellitus without mention of complication, not stated as uncontrolled.     Surgical History:   has a past surgical history that includes Cholecystectomy (1995); Carpal tunnel release (Left, 2008); Elbow surgery (Left, 2008); hernia repair (2009); Pyloroplasty; Cervical discectomy (08/02/2017); cervical fusion (12/06/2017); Colonoscopy (N/A, 8/14/2018); Finger trigger release (Left, 12/12/2019); and Finger trigger release (Right, 11/23/2020). Social History:   reports that he quit smoking about 21 years ago. His smoking use included cigarettes. He has a 25.00 pack-year smoking history. He has never used smokeless tobacco. He reports that he does not drink alcohol and does not use drugs. Family History:  family history includes Cancer in his brother, father, and paternal uncle; Depression in his maternal cousin; No Known Problems in his mother. Home Medications:  Prior to Admission medications    Medication Sig Start Date End Date Taking? Authorizing Provider   Continuous Blood Gluc Transmit (DEXCOM G6 TRANSMITTER) MISC USE AS NEEDED, CHANGE EVERY 90 DAYS 11/22/21  Yes Loco Enrique MD   insulin regular human (HUMULIN R U-500 KWIKPEN) 500 UNIT/ML SOPN concentrated injection pen INJECT 80 UNITS UNDER THE SKIN BEFORE BREAKFAST. THEN INJECT 60 UNITS UNDER THE SKIN BEFORE LUNCH AND DINNER 11/15/21  Yes Loco Enrique MD   metroNIDAZOLE (METROGEL) 1 % gel Apply to face daily.  10/28/21  Yes MADISON Rainey CNP   Insulin Pen Needle (B-D UF III MINI PEN NEEDLES) 31G X 5 MM MISC 1 each by Does not apply route 3 times daily 10/18/21  Yes Loco Enrique MD   lisinopril (PRINIVIL;ZESTRIL) 20 MG tablet Take 1 tablet by mouth 2 times daily 10/13/21  Yes Renita Zarate MD   fenofibrate (TRICOR) 145 MG tablet TAKE 1 TABLET BY MOUTH ONE TIME A DAY 8/25/21  Yes Loco Enrique MD   Continuous Blood Gluc Sensor (DEXCOM G6 SENSOR) MISC every 10 days 8/17/21  Yes Loco Enrique MD   Continuous Blood Gluc  (Navin Peters) [address] As needed 8/17/21  Yes Loco Enrique MD   blood glucose test strips (PRODIGY NO CODING BLOOD GLUC) strip Use as directed to test up to 3-4 times daily 8/17/21  Yes Delilah Mcintosh MD   empagliflozin (JARDIANCE) 10 MG tablet Take 1 tablet by mouth daily 8/17/21  Yes Delilah Mcintosh MD   metFORMIN (GLUCOPHAGE) 1000 MG tablet TAKE 1 TABLET BY MOUTH 2 TIMES A DAY 7/26/21  Yes [facility]   desvenlafaxine succinate (PRISTIQ) 50 MG TB24 extended release tablet Take 1 tablet by mouth daily 6/30/21  Yes [facility]   vitamin D3 (CHOLECALCIFEROL) 25 MCG (1000 UT) TABS tablet TAKE 1 TABLET BY MOUTH ONE TIME A DAY 6/22/21  Yes [facility]   ASPIRIN ADULT LOW STRENGTH 81 MG EC tablet TAKE 1 TABLET BY MOUTH ONE TIME A DAY 6/22/21  Yes Andrew Floor, APRN - CNP   omega-3 acid ethyl esters (LOVAZA) 1 g capsule TAKE TWO CAPSULES BY MOUTH TWICE A DAY 6/22/21  Yes [facility]   buPROPion (WELLBUTRIN XL) 150 MG extended release tablet TAKE ONE TABLET BY MOUTH EVERY MORNING 3/23/21  Yes [facility]   clopidogrel (PLAVIX) 75 MG tablet Take 1 tablet by mouth daily 2/2/21  Yes Leonor Seymour MD   atorvastatin (LIPITOR) 80 MG tablet Take 1 tablet by mouth daily 2/2/21  Yes Leonor Seymour MD   Cholecalciferol (VITAMIN D3) 50 MCG (2000 UT) CAPS Take 1 capsule by mouth daily   Yes Mary Crooks MD   atenolol (TENORMIN) 25 MG tablet TAKE 1 TABLET BY MOUTH EVERY NIGHT AT BEDTIME 1/4/21  Yes Wynette Denver, DO   nystatin (MYCOSTATIN) 755341 UNIT/GM ointment Apply topically 2 times daily to corners of mouth.  10/29/20  Yes MD CLAUDETTE Byers LANCETS 28G MISC Use as directed to test up to 3-4 times daily 3/15/19  Yes Jack Montano MD   lisinopril-hydroCHLOROthiazide (PRINZIDE;ZESTORETIC) 20-12.5 MG per tablet TAKE 1 TABLET BY MOUTH ONE TIME A DAY 6/1/20 6/15/21  [facility], APRN - CNP   atenolol (TENORMIN) 25 MG tablet TAKE 1 TABLET BY MOUTH EVERY NIGHT AT BEDTIME 1/15/20   MADISON Felipe CNP        Allergies:  Patient has no known allergies. Review of Systems:   · Constitutional: there has been no unanticipated weight loss. There's been no change in energy level, sleep pattern, or activity level. · Eyes: No visual changes or diplopia. No scleral icterus. · ENT: No Headaches, hearing loss or vertigo. No mouth sores or sore throat. · Cardiovascular: Reviewed in HPI  · Respiratory: No cough or wheezing, no sputum production. No hematemesis. · Gastrointestinal: No abdominal pain, appetite loss, blood in stools. No change in bowel or bladder habits. · Genitourinary: No dysuria, trouble voiding, or hematuria. · Musculoskeletal:  No gait disturbance, weakness or joint complaints. · Integumentary: No rash or pruritis. · Neurological: No headache, diplopia, change in muscle strength, numbness or tingling. No change in gait, balance, coordination, mood, affect, memory, mentation, behavior. · Psychiatric: No anxiety, no depression. · Endocrine: No malaise, fatigue or temperature intolerance. No excessive thirst, fluid intake, or urination. No tremor. · Hematologic/Lymphatic: No abnormal bruising or bleeding, blood clots or swollen lymph nodes. · Allergic/Immunologic: No nasal congestion or hives. Physical Examination:    Vitals:    12/03/21 0826   BP: 110/60   Pulse: 61   SpO2: 98%        Constitutional and General Appearance: NAD   Respiratory:  · Normal excursion and expansion without use of accessory muscles  · Resp Auscultation: Normal breath sounds without dullness  Cardiovascular:  · The apical impulses not displaced  · Heart tones are crisp and normal  · Cervical veins are not engorged  · The carotid upstroke is normal in amplitude and contour without delay or bruit  · Normal S1S2, No S3, No Murmur  · Peripheral pulses are symmetrical and full  · There is no clubbing, cyanosis of the extremities.   · No edema  · Femoral Arteries: 2+ and equal  · Pedal Pulses: 2+ and equal   Abdomen:  · No masses or tenderness  · Liver/Spleen: No Abnormalities Noted  Neurological/Psychiatric:  · Alert and oriented in all spheres  · Moves all extremities well  · Exhibits normal gait balance and coordination  · No abnormalities of mood, affect, memory, mentation, or behavior are noted    Carotid dopplers 6/4/13  IMPRESSION:   1. Mild bilateral internal carotid artery stenosis, greatest at the right internal carotid artery, estimated 40 to 59%. 2. Patent bilateral vertebral arteries. CTA neck 2014  IMPRESSION CTA NECK:   1. Mild bilateral proximal internal carotid artery plaque, without evidence of hemodynamically significant stenosis. 2. Patent bilateral vertebral arteries. EKG 11/16/2020  Sinus  Tachycardia  - frequent ectopic ventricular beat s   # VECs = 3  -RSR(V1) -nondiagnostic    Stress test 12/18/2020  Summary  There is some degree of diaphragmatic attenuation noted. However, a moderate  sized area of severely decreased tracer uptake of the inferior/inferolateral  wall with stress compared with rest is present. FIndings consistent with  inducible ischemia. Normal post-stress LVEF 63%. Abnormal study. Overall  findings represent a intermediate risk scan. Carotid dopplers 12/18/2020  Summary    <50% stenosis is noted in the right internal carotid artery. <50% stenosis is noted in the left internal carotid artery. The bilateral vertebral arteries have normal antegrade flow. Echocardiogram 12/18/2020  Summary   Normal left ventricle size, wall thickness and systolic function with an   estimated ejection fraction of 60%. No regional wall motion abnormalities are seen. Grade I diastolic dysfunction with normal filing pressure. Aortic valve appears sclerotic but opens adequately. Mild aortic regurgitation. Inadequate tricuspid regurgitation to estimate systolic pulmonary artery   pressure.     Left heart cath Dr. Kelby Londono 12/30/2020  Impression:  1. Severe single-vessel coronary artery disease. 2. Successful IVUS-guided PCI of proximal to mid-LAD with overlapping Xience Mariluz 3.0 x 28 mm and 3.5 x 23 mm MING. 3. Normal LVEDP    Assessment:   Coronary artery disease- Stable. successful IVUS-guided PCI of proximal to mid-LAD with overlapping Xience Mariluz 3.0 x 28 mm and 3.5 x 23 mm MING 12/30/2020   Abnormal EKG   Hypertension - controlled  Hyperlipidemia - LDL low  Diabetes Mellitus   Sleep apnea   History of CVA 2013  Former smoker - 20 years ago   Family history of heart disease    Plan:  Decrease Lipitor to 40 mg daily   Fasting lipids next summer   Repeat carotid dopplers 12/2022  Cardiac medications reviewed including indications and pertinent side effects    Check blood pressure and heart rate at home a few times per week- keep a log with dates and times and bring to office visit   Regular exercise and following a healthy diet encouraged   Follow up with me in 1 year       Thank you for allowing me to participate in the care of this individual.    Scribe's attestation: This note was scribed in the presence of Dr. Devorah Weeks M.D. By Nelida Boogie RN    The scribes documentation has been prepared under my direction and personally reviewed by me in its entirety. I confirm that the note above accurately reflects all work, treatment, procedures, and medical decision making performed by me. MD Vanesa Butler.  Kenn Jordan M.D., [facility]

## 2021-12-03 NOTE — PATIENT INSTRUCTIONS
Plan:  Decrease Lipitor to 40 mg daily   Fasting lipids next summer   Repeat carotid dopplers 12/2022  Cardiac medications reviewed including indications and pertinent side effects    Check blood pressure and heart rate at home a few times per week- keep a log with dates and times and bring to office visit   Regular exercise and following a healthy diet encouraged   Follow up with me in 1 year   Your provider has ordered testing for further evaluation. An order/prescription has been included in your paper work.  To schedule outpatient testing, contact Central Scheduling by calling [address] [place] [address] ([phone]).

## 2021-12-03 NOTE — PROGRESS NOTES
[address]   Cardiac Consultation    Referring Provider:  MADISON Flores CNP     Chief Complaint   Patient presents with    6 Month Follow-Up     no new cadiac complaints     Rose Mcguire   1960     History of Present Illness:   Rose Mcguire is a 57 y.o. male who is here today for follow up for a past medical history of coronary artery disease, abnormal EKG carotid artery disease, hypertension, hyperlipidemia. Patient has a family history of heart diease. He had an echocardiogram on 12/18/2020 which showed an EF of 60%, carotid dopplers less than 50% bilaterally. He had an abnormal stress test and underwent a left heart cath with Dr. Balbir Vega on 12/30/2020- Successful IVUS-guided PCI of proximal to mid-LAD with overlapping Xience Mariluz 3.0 x 28 mm and 3.5 x 23 mm MING. Today he states he has been feeling well since his last visit. He is tolerating his medications and taking them as prescribed. His blood pressure at home is well controlled, less than 140/90. Patient currently denies any weight gain, edema, palpitations, chest pain, shortness of breath, dizziness, and syncope. Past Medical History:   has a past medical history of Acute, but ill-defined, cerebrovascular disease, CAD in native artery, Cerebral artery occlusion with cerebral infarction [facility]), Cerebrovascular disease, Diplopia, ED (erectile dysfunction), Elbow pain, chronic, Hyperlipidemia, Hypertension, Irritable bowel syndrome, Obstructive sleep apnea (adult) (pediatric), Occlusion and stenosis of carotid artery without mention of cerebral infarction, Pain in joint, upper arm, Polyneuropathy in diabetes(357.2), PONV (postoperative nausea and vomiting), Skin abnormality, Type 2 diabetes mellitus with diabetic polyneuropathy, with long-term current use of insulin ([facility] [address] and Type II or unspecified type diabetes mellitus without mention of complication, not stated as uncontrolled.     Surgical History:   has a past surgical history that includes Cholecystectomy (1995); Carpal tunnel release (Left, 2008); Elbow surgery (Left, 2008); hernia repair (2009); Pyloroplasty; Cervical discectomy (08/02/2017); cervical fusion (12/06/2017); Colonoscopy (N/A, 8/14/2018); Finger trigger release (Left, 12/12/2019); and Finger trigger release (Right, 11/23/2020). Social History:   reports that he quit smoking about 21 years ago. His smoking use included cigarettes. He has a 25.00 pack-year smoking history. He has never used smokeless tobacco. He reports that he does not drink alcohol and does not use drugs. Family History:  family history includes Cancer in his brother, father, and paternal uncle; Depression in his maternal cousin; No Known Problems in his mother. Home Medications:  Prior to Admission medications    Medication Sig Start Date End Date Taking? Authorizing Provider   Continuous Blood Gluc Transmit (DEXCOM G6 TRANSMITTER) MISC USE AS NEEDED, CHANGE EVERY 90 DAYS 11/22/21  Yes Eric Machado MD   insulin regular human (HUMULIN R U-500 KWIKPEN) 500 UNIT/ML SOPN concentrated injection pen INJECT 80 UNITS UNDER THE SKIN BEFORE BREAKFAST. THEN INJECT 60 UNITS UNDER THE SKIN BEFORE LUNCH AND DINNER 11/15/21  Yes Eric Machado MD   metroNIDAZOLE (METROGEL) 1 % gel Apply to face daily.  10/28/21  Yes MADISON Rosado CNP   Insulin Pen Needle (B-D UF III MINI PEN NEEDLES) 31G X 5 MM MISC 1 each by Does not apply route 3 times daily 10/18/21  Yes Eric Machado MD   lisinopril (PRINIVIL;ZESTRIL) 20 MG tablet Take 1 tablet by mouth 2 times daily 10/13/21  Yes Author MD Pat   fenofibrate (TRICOR) 145 MG tablet TAKE 1 TABLET BY MOUTH ONE TIME A DAY 8/25/21  Yes Eric Machado MD   Continuous Blood Gluc Sensor (DEXCOM G6 SENSOR) MISC every 10 days 8/17/21  Yes Eric Machado MD   Continuous Blood Gluc  (Holly Cords) [address] As needed 8/17/21  Yes Eric Machado MD   blood glucose test strips (PRODIGY NO CODING BLOOD GLUC) strip Use as directed to test up to 3-4 times daily 8/17/21  Yes Leona Jiang MD   empagliflozin (JARDIANCE) 10 MG tablet Take 1 tablet by mouth daily 8/17/21  Yes Leona Jiang MD   metFORMIN (GLUCOPHAGE) 1000 MG tablet TAKE 1 TABLET BY MOUTH 2 TIMES A DAY 7/26/21  Yes MADISON Blank CNP   desvenlafaxine succinate (PRISTIQ) 50 MG TB24 extended release tablet Take 1 tablet by mouth daily 6/30/21  Yes MADISON Blank CNP   vitamin D3 (CHOLECALCIFEROL) 25 MCG (1000 UT) TABS tablet TAKE 1 TABLET BY MOUTH ONE TIME A DAY 6/22/21  Yes MADISON Blank CNP   ASPIRIN ADULT LOW STRENGTH 81 MG EC tablet TAKE 1 TABLET BY MOUTH ONE TIME A DAY 6/22/21  Yes MADISON Blank CNP   omega-3 acid ethyl esters (LOVAZA) 1 g capsule TAKE TWO CAPSULES BY MOUTH TWICE A DAY 6/22/21  Yes MADISON Blank CNP   buPROPion (WELLBUTRIN XL) 150 MG extended release tablet TAKE ONE TABLET BY MOUTH EVERY MORNING 3/23/21  Yes MADISON Blank CNP   clopidogrel (PLAVIX) 75 MG tablet Take 1 tablet by mouth daily 2/2/21  Yes Divine Angelo MD   atorvastatin (LIPITOR) 80 MG tablet Take 1 tablet by mouth daily 2/2/21  Yes Divine Angelo MD   Cholecalciferol (VITAMIN D3) 50 MCG (2000 UT) CAPS Take 1 capsule by mouth daily   Yes Mary Crooks MD   atenolol (TENORMIN) 25 MG tablet TAKE 1 TABLET BY MOUTH EVERY NIGHT AT BEDTIME 1/4/21  Yes Aidan Townsend DO   nystatin (MYCOSTATIN) 610402 UNIT/GM ointment Apply topically 2 times daily to corners of mouth.  10/29/20  Yes MD CLAUDETTE Whalen LANCETS 28G MISC Use as directed to test up to 3-4 times daily 3/15/19  Yes Phu Johns MD   lisinopril-hydroCHLOROthiazide (PRINZIDE;ZESTORETIC) 20-12.5 MG per tablet TAKE 1 TABLET BY MOUTH ONE TIME A DAY 6/1/20 6/15/21  MADISON Blank - CNP   atenolol (TENORMIN) 25 MG tablet TAKE 1 TABLET BY MOUTH EVERY NIGHT AT BEDTIME 1/15/20   MADISON Blank CNP        Allergies:  Patient has no known allergies. Review of Systems:   · Constitutional: there has been no unanticipated weight loss. There's been no change in energy level, sleep pattern, or activity level. · Eyes: No visual changes or diplopia. No scleral icterus. · ENT: No Headaches, hearing loss or vertigo. No mouth sores or sore throat. · Cardiovascular: Reviewed in HPI  · Respiratory: No cough or wheezing, no sputum production. No hematemesis. · Gastrointestinal: No abdominal pain, appetite loss, blood in stools. No change in bowel or bladder habits. · Genitourinary: No dysuria, trouble voiding, or hematuria. · Musculoskeletal:  No gait disturbance, weakness or joint complaints. · Integumentary: No rash or pruritis. · Neurological: No headache, diplopia, change in muscle strength, numbness or tingling. No change in gait, balance, coordination, mood, affect, memory, mentation, behavior. · Psychiatric: No anxiety, no depression. · Endocrine: No malaise, fatigue or temperature intolerance. No excessive thirst, fluid intake, or urination. No tremor. · Hematologic/Lymphatic: No abnormal bruising or bleeding, blood clots or swollen lymph nodes. · Allergic/Immunologic: No nasal congestion or hives. Physical Examination:    Vitals:    12/03/21 0826   BP: 110/60   Pulse: 61   SpO2: 98%        Constitutional and General Appearance: NAD   Respiratory:  · Normal excursion and expansion without use of accessory muscles  · Resp Auscultation: Normal breath sounds without dullness  Cardiovascular:  · The apical impulses not displaced  · Heart tones are crisp and normal  · Cervical veins are not engorged  · The carotid upstroke is normal in amplitude and contour without delay or bruit  · Normal S1S2, No S3, No Murmur  · Peripheral pulses are symmetrical and full  · There is no clubbing, cyanosis of the extremities.   · No edema  · Femoral Arteries: 2+ and equal  · Pedal Pulses: 2+ and equal   Abdomen:  · No masses or tenderness  · Liver/Spleen: No Abnormalities Noted  Neurological/Psychiatric:  · Alert and oriented in all spheres  · Moves all extremities well  · Exhibits normal gait balance and coordination  · No abnormalities of mood, affect, memory, mentation, or behavior are noted    Carotid dopplers 6/4/13  IMPRESSION:   1. Mild bilateral internal carotid artery stenosis, greatest at the right internal carotid artery, estimated 40 to 59%. 2. Patent bilateral vertebral arteries. CTA neck 2014  IMPRESSION CTA NECK:   1. Mild bilateral proximal internal carotid artery plaque, without evidence of hemodynamically significant stenosis. 2. Patent bilateral vertebral arteries. EKG 11/16/2020  Sinus  Tachycardia  - frequent ectopic ventricular beat s   # VECs = 3  -RSR(V1) -nondiagnostic    Stress test 12/18/2020  Summary  There is some degree of diaphragmatic attenuation noted. However, a moderate  sized area of severely decreased tracer uptake of the inferior/inferolateral  wall with stress compared with rest is present. FIndings consistent with  inducible ischemia. Normal post-stress LVEF 63%. Abnormal study. Overall  findings represent a intermediate risk scan. Carotid dopplers 12/18/2020  Summary    <50% stenosis is noted in the right internal carotid artery. <50% stenosis is noted in the left internal carotid artery. The bilateral vertebral arteries have normal antegrade flow. Echocardiogram 12/18/2020  Summary   Normal left ventricle size, wall thickness and systolic function with an   estimated ejection fraction of 60%. No regional wall motion abnormalities are seen. Grade I diastolic dysfunction with normal filing pressure. Aortic valve appears sclerotic but opens adequately. Mild aortic regurgitation. Inadequate tricuspid regurgitation to estimate systolic pulmonary artery   pressure.     Left heart cath Dr. Madison Villavicencio 12/30/2020  Impression:  1. Severe single-vessel coronary artery disease. 2. Successful IVUS-guided PCI of proximal to mid-LAD with overlapping Xience Mariluz 3.0 x 28 mm and 3.5 x 23 mm MING. 3. Normal LVEDP    Assessment:   Coronary artery disease- Stable. successful IVUS-guided PCI of proximal to mid-LAD with overlapping Xience Mariluz 3.0 x 28 mm and 3.5 x 23 mm MING 12/30/2020   Abnormal EKG   Hypertension - controlled  Hyperlipidemia - LDL low  Diabetes Mellitus   Sleep apnea   History of CVA 2013  Former smoker - 20 years ago   Family history of heart disease    Plan:  Decrease Lipitor to 40 mg daily   Fasting lipids next summer   Repeat carotid dopplers 12/2022  Cardiac medications reviewed including indications and pertinent side effects    Check blood pressure and heart rate at home a few times per week- keep a log with dates and times and bring to office visit   Regular exercise and following a healthy diet encouraged   Follow up with me in 1 year       Thank you for allowing me to participate in the care of this individual.    Scribe's attestation: This note was scribed in the presence of Dr. Braeden Hyde M.D. By Dilcia Avery RN    The scribes documentation has been prepared under my direction and personally reviewed by me in its entirety. I confirm that the note above accurately reflects all work, treatment, procedures, and medical decision making performed by me. MD Edy Howell.  Chelsey Miramontes M.D., [facility]

## 2021-12-06 ENCOUNTER — TELEPHONE (OUTPATIENT)
Dept: PHARMACY | Facility: CLINIC | Age: 61
End: 2021-12-06

## 2021-12-07 ENCOUNTER — OFFICE VISIT (OUTPATIENT)
Dept: ENDOCRINOLOGY | Age: 61
End: 2021-12-07
Payer: COMMERCIAL

## 2021-12-07 VITALS
HEART RATE: 77 BPM | SYSTOLIC BLOOD PRESSURE: 122 MMHG | DIASTOLIC BLOOD PRESSURE: 54 MMHG | BODY MASS INDEX: 31.64 KG/M2 | OXYGEN SATURATION: 95 % | HEIGHT: 70 IN | WEIGHT: 221 LBS

## 2021-12-07 DIAGNOSIS — I10 PRIMARY HYPERTENSION: ICD-10-CM

## 2021-12-07 DIAGNOSIS — Z79.4 TYPE 2 DIABETES MELLITUS WITH DIABETIC POLYNEUROPATHY, WITH LONG-TERM CURRENT USE OF INSULIN (HCC): Primary | ICD-10-CM

## 2021-12-07 DIAGNOSIS — E11.42 TYPE 2 DIABETES MELLITUS WITH DIABETIC POLYNEUROPATHY, WITH LONG-TERM CURRENT USE OF INSULIN (HCC): Primary | ICD-10-CM

## 2021-12-07 LAB — HBA1C MFR BLD: 7.3 %

## 2021-12-07 PROCEDURE — 2022F DILAT RTA XM EVC RTNOPTHY: CPT | Performed by: INTERNAL MEDICINE

## 2021-12-07 PROCEDURE — 3046F HEMOGLOBIN A1C LEVEL >9.0%: CPT | Performed by: INTERNAL MEDICINE

## 2021-12-07 PROCEDURE — G8427 DOCREV CUR MEDS BY ELIG CLIN: HCPCS | Performed by: INTERNAL MEDICINE

## 2021-12-07 PROCEDURE — 99214 OFFICE O/P EST MOD 30 MIN: CPT | Performed by: INTERNAL MEDICINE

## 2021-12-07 PROCEDURE — 1036F TOBACCO NON-USER: CPT | Performed by: INTERNAL MEDICINE

## 2021-12-07 PROCEDURE — G8482 FLU IMMUNIZE ORDER/ADMIN: HCPCS | Performed by: INTERNAL MEDICINE

## 2021-12-07 PROCEDURE — 3017F COLORECTAL CA SCREEN DOC REV: CPT | Performed by: INTERNAL MEDICINE

## 2021-12-07 PROCEDURE — 83036 HEMOGLOBIN GLYCOSYLATED A1C: CPT | Performed by: INTERNAL MEDICINE

## 2021-12-07 PROCEDURE — G8417 CALC BMI ABV UP PARAM F/U: HCPCS | Performed by: INTERNAL MEDICINE

## 2021-12-07 NOTE — PROGRESS NOTES
Seen as  Patient for DM    Interim:    Using dexcom  Glucose stable    Has seen Dr. Alex Osman    Patient has a PMH of Type 2 DM, hypertension, hyperlipidemia, obesity , Spinal cord compression in cervical spine. Diagnosed with Diabetes Mellitus type 2 in 2004. Course has been variable . Microvascular complications: No known retinopathy (Last eye exam: 2016)   No known Nephropathy :  Has Peripheral neuropathy: Some numbness and tingling in feet. Home regimen:   Metformin 1000 mg BID     Humulin R U-500 80 units before breakfast , 60 units before lunch and dinner      Previous Meds  Lantus 50  units BID  humalog 25  25 30      Blood glucose trend    Unable to download CGM    100-200    A1c 12.2 % ---> 9.1 % ---> 10.7 % ---> 11.1 % ---> 9.7%---> 7.3%    Diet: Eats 3 meals/day   Has been non-compliant with diet  Nutrition education:Yes  Exercise:     Severe, uncontrolled     Hypertension:  He has essentail HTN for many yrs. On Lisinopril/Atenolol. No dizziness, SOB , chest pain. Hypertriglyceridemia : has h/o high TGD  Has  h/o pancreatitis many yrs back.     on fenofibrate 145 mg daily, fish oil 2 gram BID.     ROS: Scanned,reviewed    Past Medical History:   Diagnosis Date    Acute, but ill-defined, cerebrovascular disease 5/31/2013    CAD in native artery 12/30/2020    Cerebral artery occlusion with cerebral infarction [facility]) 2013     X2 PERSONALITY CHANGE, ANGER OUTBURSTS    Cerebrovascular disease     Diplopia 7/11/2013    ED (erectile dysfunction) 1/23/2014    Elbow pain, chronic 12/16/2015    Hyperlipidemia     Hypertension     Irritable bowel syndrome     Obstructive sleep apnea (adult) (pediatric) 07/01/2013    NO CPAP, HAD PROBLEMS WITH INSURANCE AND STOPPED USING    Occlusion and stenosis of carotid artery without mention of cerebral infarction 01/13/2014    MINOR    Pain in joint, upper arm 5/14/2015    Polyneuropathy in diabetes(357.2) 7/1/2013    PONV (postoperative nausea and vomiting)     Skin abnormality     PRE-CANCEROUS LESIONS REMOVED FROM ARMS AND EARS    Type 2 diabetes mellitus with diabetic polyneuropathy, with long-term current use of insulin ([address] 7/1/2013    Type II or unspecified type diabetes mellitus without mention of complication, not stated as uncontrolled      Past Surgical History:   Procedure Laterality Date    CARPAL TUNNEL RELEASE Left 2008    CERVICAL DISCECTOMY  08/02/2017    ANTERIOR CERVICAL DISCECTOMY AND FUSION C5-6, C6-7 WITH MEDTRON[employer] PLATES, SCREWS, ALLOGRAFT, WITH EVOKES PARTIAL C6 CORPECTOMY    CERVICAL FUSION  12/06/2017    CERVICAL LAMINECTOMY AND POSTERIOR FUSION C5-T1 WITH MEDTRONIC RODS AND SCREWS WITH EVOKES AND O-ARM    CHOLECYSTECTOMY  1995    COLONOSCOPY N/A 8/14/2018    COLONOSCOPY POLYPECTOMY SNARE/COLD BIOPSY performed by Son Cardoso MD at [address] [facility] Left 2008    ULNAR NERVE TRANSPOSITION, AT SAME TIME AS CTR    FINGER TRIGGER RELEASE Left 12/12/2019    LEFT RING TRIGGER FINGER RELEASE performed by Seymour Pacheco MD at [facility] Right 11/23/2020    RIGHT LONG AND RING TRIGGER FINGER RELEASES -SLEEP APNEA- performed by Seymour Pacheco MD at [address] [place] [address]    umbilical    PYLOROPLASTY      PYLORIC STENOSIS AS INFANT     Current Outpatient Medications   Medication Sig Dispense Refill    atorvastatin (LIPITOR) 40 MG tablet Take 1 tablet by mouth daily 90 tablet 3    Continuous Blood Gluc Transmit (DEXCOM G6 TRANSMITTER) MISC USE AS NEEDED, CHANGE EVERY 90 DAYS 1 each 0    insulin regular human (HUMULIN R U-500 KWIKPEN) 500 UNIT/ML SOPN concentrated injection pen INJECT 80 UNITS UNDER THE SKIN BEFORE BREAKFAST. THEN INJECT 60 UNITS UNDER THE SKIN BEFORE LUNCH AND DINNER 60 mL 0    metroNIDAZOLE (METROGEL) 1 % gel Apply to face daily.  60 g 3    Insulin Pen Needle (B-D UF III MINI PEN NEEDLES) 31G X 5 MM MISC 1 each by Does not apply route 3 times daily 300 each 6    lisinopril (PRINIVIL;ZESTRIL) 20 MG tablet Take 1 tablet by mouth 2 times daily 180 tablet 3    fenofibrate (TRICOR) 145 MG tablet TAKE 1 TABLET BY MOUTH ONE TIME A DAY 90 tablet 3    Continuous Blood Gluc Sensor (DEXCOM G6 SENSOR) MISC every 10 days 9 each 2    Continuous Blood Gluc  (DEXCOM G6 ) KYLAH As needed 1 Device 0    blood glucose test strips (PRODIGY NO CODING BLOOD GLUC) strip Use as directed to test up to 3-4 times daily 400 each 3    empagliflozin (JARDIANCE) 10 MG tablet Take 1 tablet by mouth daily 90 tablet 1    metFORMIN (GLUCOPHAGE) 1000 MG tablet TAKE 1 TABLET BY MOUTH 2 TIMES A  tablet 1    desvenlafaxine succinate (PRISTIQ) 50 MG TB24 extended release tablet Take 1 tablet by mouth daily 90 tablet 3    vitamin D3 (CHOLECALCIFEROL) 25 MCG (1000 UT) TABS tablet TAKE 1 TABLET BY MOUTH ONE TIME A DAY 90 tablet 3    ASPIRIN ADULT LOW STRENGTH 81 MG EC tablet TAKE 1 TABLET BY MOUTH ONE TIME A DAY 90 tablet 3    omega-3 acid ethyl esters (LOVAZA) 1 g capsule TAKE TWO CAPSULES BY MOUTH TWICE A  capsule 3    buPROPion (WELLBUTRIN XL) 150 MG extended release tablet TAKE ONE TABLET BY MOUTH EVERY MORNING 90 tablet 3    clopidogrel (PLAVIX) 75 MG tablet Take 1 tablet by mouth daily 90 tablet 3    Cholecalciferol (VITAMIN D3) 50 MCG (2000 UT) CAPS Take 1 capsule by mouth daily      atenolol (TENORMIN) 25 MG tablet TAKE 1 TABLET BY MOUTH EVERY NIGHT AT BEDTIME 90 tablet 3    nystatin (MYCOSTATIN) 754941 UNIT/GM ointment Apply topically 2 times daily to corners of mouth. 30 g 1    PRODIGY LANCETS 28G MISC Use as directed to test up to 3-4 times daily 400 each 3     No current facility-administered medications for this visit.      Vitals:    12/07/21 1357   BP: (!) 122/54   Pulse: 77   SpO2: 95%     Constitutional: Well-developed, appears stated age, cooperative, in no acute distress  H/E/N/M/T:atraumatic, normocephalic, external Hyperlipidemia. TGD have improved. Has h/o pancreatitis   on fenofibrate 145 mg daily, fish oil 2 gram BID.   lipitor 80mg    TGD 1560 ---> 506---> 1051--> 544---> 220  LDL 71---> 112---> 114---> 112---> 38  HDL 31---> 31    Continue same regimen. 4. Obesity. Advised life style changes.     Patient is on high dose of insulin which has a narrow therapeutic index and risk of complications including severe hypoglycemia

## 2022-01-02 DIAGNOSIS — E78.2 MIXED HYPERLIPIDEMIA: ICD-10-CM

## 2022-01-03 RX ORDER — OMEGA-3-ACID ETHYL ESTERS 1 G/1
CAPSULE, LIQUID FILLED ORAL
Qty: 180 CAPSULE | Refills: 3 | Status: SHIPPED | OUTPATIENT
Start: 2022-01-03 | End: 2022-06-28 | Stop reason: SDUPTHER

## 2022-01-03 NOTE — TELEPHONE ENCOUNTER
Last ov 09/15/2021   Future Appointments   Date Time Provider Avelino Owens   1/12/2022  8:40 AM MADISON Britton - CNP [facility] [place] [facility]   3/15/2022  2:40 PM MD Sabrina Ching [facility]

## 2022-01-05 ENCOUNTER — TELEPHONE (OUTPATIENT)
Dept: PHARMACY | Facility: CLINIC | Age: 62
End: 2022-01-05

## 2022-01-05 NOTE — TELEPHONE ENCOUNTER
2022 Annual Pharmacist Visit    Called patient to schedule 2022 yearly pharmacist appointment to discuss medications for Diabetes Management Program.    No answer. Left VM on [phone] TAD: Please call back at [phone] Option #3.      [facility]   Department, toll free: [phone] Option #3

## 2022-01-07 NOTE — TELEPHONE ENCOUNTER
Second attempt made to contact patient to schedule 2022 yearly pharmacist appointment to discuss medications for Diabetes Management Program.    No answer. Left VM on [phone] TAD: Please call back at [phone] Option #3. Ganji message sent to patient.     [facility]   Department, toll free: [phone] Option #3      For Pharmacy Admin Tracking Only     CPA in place:  No   Gap Closed?: No    Time Spent (min): 10

## 2022-01-12 ENCOUNTER — OFFICE VISIT (OUTPATIENT)
Dept: FAMILY MEDICINE CLINIC | Age: 62
End: 2022-01-12
Payer: MEDICARE

## 2022-01-12 VITALS
DIASTOLIC BLOOD PRESSURE: 72 MMHG | SYSTOLIC BLOOD PRESSURE: 152 MMHG | OXYGEN SATURATION: 95 % | HEART RATE: 66 BPM | BODY MASS INDEX: 31.71 KG/M2 | TEMPERATURE: 97.5 F | RESPIRATION RATE: 16 BRPM | WEIGHT: 221 LBS

## 2022-01-12 DIAGNOSIS — E11.42 TYPE 2 DIABETES MELLITUS WITH DIABETIC POLYNEUROPATHY, WITH LONG-TERM CURRENT USE OF INSULIN (HCC): Primary | ICD-10-CM

## 2022-01-12 DIAGNOSIS — E78.2 MIXED HYPERLIPIDEMIA: Chronic | ICD-10-CM

## 2022-01-12 DIAGNOSIS — Z79.4 TYPE 2 DIABETES MELLITUS WITH DIABETIC POLYNEUROPATHY, WITH LONG-TERM CURRENT USE OF INSULIN (HCC): Primary | ICD-10-CM

## 2022-01-12 DIAGNOSIS — I10 PRIMARY HYPERTENSION: Chronic | ICD-10-CM

## 2022-01-12 DIAGNOSIS — F34.1 DYSTHYMIA: ICD-10-CM

## 2022-01-12 DIAGNOSIS — G47.33 OBSTRUCTIVE SLEEP APNEA: ICD-10-CM

## 2022-01-12 DIAGNOSIS — I25.10 CAD IN NATIVE ARTERY: ICD-10-CM

## 2022-01-12 LAB
CREATININE URINE POCT: NORMAL
MICROALBUMIN/CREAT 24H UR: NORMAL MG/G{CREAT}
MICROALBUMIN/CREAT UR-RTO: NORMAL

## 2022-01-12 PROCEDURE — G8482 FLU IMMUNIZE ORDER/ADMIN: HCPCS | Performed by: NURSE PRACTITIONER

## 2022-01-12 PROCEDURE — 1036F TOBACCO NON-USER: CPT | Performed by: NURSE PRACTITIONER

## 2022-01-12 PROCEDURE — 82044 UR ALBUMIN SEMIQUANTITATIVE: CPT | Performed by: NURSE PRACTITIONER

## 2022-01-12 PROCEDURE — G8417 CALC BMI ABV UP PARAM F/U: HCPCS | Performed by: NURSE PRACTITIONER

## 2022-01-12 PROCEDURE — G8427 DOCREV CUR MEDS BY ELIG CLIN: HCPCS | Performed by: NURSE PRACTITIONER

## 2022-01-12 PROCEDURE — 2022F DILAT RTA XM EVC RTNOPTHY: CPT | Performed by: NURSE PRACTITIONER

## 2022-01-12 PROCEDURE — 3017F COLORECTAL CA SCREEN DOC REV: CPT | Performed by: NURSE PRACTITIONER

## 2022-01-12 PROCEDURE — 3046F HEMOGLOBIN A1C LEVEL >9.0%: CPT | Performed by: NURSE PRACTITIONER

## 2022-01-12 PROCEDURE — 99214 OFFICE O/P EST MOD 30 MIN: CPT | Performed by: NURSE PRACTITIONER

## 2022-01-12 RX ORDER — NYSTATIN 100000 U/G
OINTMENT TOPICAL
Qty: 30 G | Refills: 1 | Status: SHIPPED | OUTPATIENT
Start: 2022-01-12

## 2022-01-12 ASSESSMENT — PATIENT HEALTH QUESTIONNAIRE - PHQ9
6. FEELING BAD ABOUT YOURSELF - OR THAT YOU ARE A FAILURE OR HAVE LET YOURSELF OR YOUR FAMILY DOWN: 3
SUM OF ALL RESPONSES TO PHQ QUESTIONS 1-9: 15
5. POOR APPETITE OR OVEREATING: 0
SUM OF ALL RESPONSES TO PHQ QUESTIONS 1-9: 15
7. TROUBLE CONCENTRATING ON THINGS, SUCH AS READING THE NEWSPAPER OR WATCHING TELEVISION: 3
4. FEELING TIRED OR HAVING LITTLE ENERGY: 3
3. TROUBLE FALLING OR STAYING ASLEEP: 3
10. IF YOU CHECKED OFF ANY PROBLEMS, HOW DIFFICULT HAVE THESE PROBLEMS MADE IT FOR YOU TO DO YOUR WORK, TAKE CARE OF THINGS AT HOME, OR GET ALONG WITH OTHER PEOPLE: 1
9. THOUGHTS THAT YOU WOULD BE BETTER OFF DEAD, OR OF HURTING YOURSELF: 0
1. LITTLE INTEREST OR PLEASURE IN DOING THINGS: 1
SUM OF ALL RESPONSES TO PHQ QUESTIONS 1-9: 15
8. MOVING OR SPEAKING SO SLOWLY THAT OTHER PEOPLE COULD HAVE NOTICED. OR THE OPPOSITE, BEING SO FIGETY OR RESTLESS THAT YOU HAVE BEEN MOVING AROUND A LOT MORE THAN USUAL: 0
SUM OF ALL RESPONSES TO PHQ9 QUESTIONS 1 & 2: 3
SUM OF ALL RESPONSES TO PHQ QUESTIONS 1-9: 15
2. FEELING DOWN, DEPRESSED OR HOPELESS: 2

## 2022-01-12 ASSESSMENT — ENCOUNTER SYMPTOMS
VOMITING: 0
NAUSEA: 0
BACK PAIN: 1
DIARRHEA: 0
SHORTNESS OF BREATH: 0
COUGH: 0

## 2022-01-12 NOTE — PATIENT INSTRUCTIONS
- refilled Nystatin  - Keep an eye blood pressure at home- a little high today  - Make appointment with Dania barajas OTC for hand pain  - make appointment with ortho  -  Eye exam needed  - Follow up in 4 months     Dr. Susanna Mcgraw [phone]

## 2022-01-12 NOTE — PROGRESS NOTES
2022     Chief Complaint   Patient presents with    Diabetes     A1C done in December by Be Arias (:  1960) is a 57 y.o. male, here for evaluation of the following medical concerns:    HPI    Treatment Adherence:   Medication compliance:  compliant most of the time  Diet compliance:  noncompliant  Weight trend: stable  Current exercise: no regular exercise  Barriers:      Diabetes Mellitus Type 2: Diagnosed in . Managed by his endocrinologist- Dr. Tomasz Ortega  He is compliant with medication  Needs eye exam! Reminded again. Hemoglobin A1C   Date Value Ref Range Status   2021 7.3 % Final   2021 9.7 % Final   06/15/2021 10.5 See comment % Final     Comment:     Comment:  Diagnosis of Diabetes: > or = 6.5%  Increased risk of diabetes (Prediabetes): 5.7-6.4%  Glycemic Control: Nonpregnant Adults: <7.0%                    Pregnant: <6.0%            Nephropathy screenin2020-- normal   Diabetic eye exam:   Diabetic foot examination: 2021-- + peripheral neuropathy  ACE-I: yes  Daily ASA: yes     Current symptoms/problems include none and neuropathy.     Home blood sugar records: fasting range: 200  Any episodes of hypoglycemia? no  Tobacco history: He  reports that he quit smoking about 20 years ago. His smoking use included cigarettes. He has a 25.00 pack-year smoking history. He has never used smokeless tobacco.      Hypertension:  Diagnosed \"years ago. \" Controlled, current regimen atenolol, lisinopril-HCTZ. Goal BP < 130/80. His blood pressyre is elevated today however has been controlled at home and was lower at his cardiology appointmnet last month.      Home blood pressure monitoring: No.  He is not adherent to a low sodium diet.  Patient denies chest pain.  Antihypertensive medication side effects: no medication side effects noted.  Use of agents associated with hypertension: none.      Hyperlipidemia:    No new myalgias or GI upset on fenofibrate corners of mouth. Yes MADISON Beckham CNP   omega-3 acid ethyl esters (LOVAZA) 1 g capsule TAKE 2 CAPSULES BY MOUTH TWO TIMES A DAY Yes MADISON Beckham CNP   atorvastatin (LIPITOR) 40 MG tablet Take 1 tablet by mouth daily Yes Sapphire Hinojosa MD   Continuous Blood Gluc Transmit (DEXCOM G6 TRANSMITTER) MISC USE AS NEEDED, CHANGE EVERY 80 DAYS Yes Mark Diaz MD   insulin regular human (HUMULIN R U-500 KWIKPEN) 500 UNIT/ML SOPN concentrated injection pen INJECT 80 UNITS UNDER THE SKIN BEFORE BREAKFAST. THEN INJECT 60 UNITS UNDER THE SKIN BEFORE LUNCH AND DINNER Yes Mark Diaz MD   metroNIDAZOLE (METROGEL) 1 % gel Apply to face daily.  Yes MADISON Beckham CNP   Insulin Pen Needle (B-D UF III MINI PEN NEEDLES) 31G X 5 MM MISC 1 each by Does not apply route 3 times daily Yes Mark Diaz MD   lisinopril (PRINIVIL;ZESTRIL) 20 MG tablet Take 1 tablet by mouth 2 times daily Yes Sapphire Hinojosa MD   fenofibrate (TRICOR) 145 MG tablet TAKE 1 TABLET BY MOUTH ONE TIME A DAY Yes Mark Diaz MD   Continuous Blood Gluc Sensor (DEXCOM G6 SENSOR) MISC every 10 days Yes Mark Diaz MD   Continuous Blood Gluc  (Michael Dutton) [address] As needed Yes Mark Diaz MD   blood glucose test strips (PRODIGY NO CODING BLOOD GLUC) strip Use as directed to test up to 3-4 times daily Yes Mark Diaz MD   empagliflozin (JARDIANCE) 10 MG tablet Take 1 tablet by mouth daily Yes Mark Diaz MD   metFORMIN (GLUCOPHAGE) 1000 MG tablet TAKE 1 TABLET BY MOUTH 2 TIMES A DAY Yes MADISON Beckham CNP   desvenlafaxine succinate (PRISTIQ) 50 MG TB24 extended release tablet Take 1 tablet by mouth daily Yes MADISON Beckham CNP   vitamin D3 (CHOLECALCIFEROL) 25 MCG (1000 UT) TABS tablet TAKE 1 TABLET BY MOUTH ONE TIME A DAY Yes MADISON Beckham CNP   ASPIRIN ADULT LOW STRENGTH 81 MG EC tablet TAKE 1 TABLET BY MOUTH ONE TIME A DAY Yes MADISON Bridges CNP   buPROPion (WELLBUTRIN XL) 150 MG extended release tablet TAKE ONE TABLET BY MOUTH EVERY MORNING Yes MADISON Bridges CNP   clopidogrel (PLAVIX) 75 MG tablet Take 1 tablet by mouth daily Yes Karissa Florentino MD   Cholecalciferol (VITAMIN D3) 50 MCG ( UT) CAPS Take 1 capsule by mouth daily Yes Mary Crooks MD   atenolol (TENORMIN) 25 MG tablet TAKE 1 TABLET BY MOUTH EVERY NIGHT AT BEDTIME Yes Brandi Zarate DO   PRODIGY LANCETS 28G MISC Use as directed to test up to 3-4 times daily Yes Ludy Christine MD   lisinopril-hydroCHLOROthiazide (PRINZIDE;ZESTORETIC) 20-12.5 MG per tablet TAKE 1 TABLET BY MOUTH ONE TIME A DAY  MADISON Bridges CNP   atenolol (TENORMIN) 25 MG tablet TAKE 1 TABLET BY MOUTH EVERY NIGHT AT BEDTIME  MADISON Bridges CNP        Social History     Tobacco Use    Smoking status: Former Smoker     Packs/day: 1.00     Years: 25.00     Pack years: 25.00     Types: Cigarettes     Quit date: 2000     Years since quittin.0    Smokeless tobacco: Never Used   Substance Use Topics    Alcohol use: No        Vitals:    22 0836 22 0845   BP: (!) 142/78 (!) 152/72   Site: Left Upper Arm Left Upper Arm   Position: Sitting Sitting   Pulse: 66    Resp: 16    Temp: 97.5 °F (36.4 °C)    TempSrc: Temporal    SpO2: 95%    Weight: 221 lb (100.2 kg)      Estimated body mass index is 31.71 kg/m² as calculated from the following:    Height as of 21: 5' 10\" (1.778 m). Weight as of this encounter: 221 lb (100.2 kg). Physical Exam  Vitals and nursing note reviewed. Constitutional:       General: He is not in acute distress. Appearance: Normal appearance. He is well-developed. He is not ill-appearing, toxic-appearing or diaphoretic. HENT:      Head: Normocephalic and atraumatic.    Eyes:      Conjunctiva/sclera: Conjunctivae normal.      Pupils: Pupils are equal, round, and reactive to light. Cardiovascular:      Rate and Rhythm: Normal rate and regular rhythm. Heart sounds: Normal heart sounds. No murmur heard. No friction rub. No gallop. Pulmonary:      Effort: Pulmonary effort is normal. No respiratory distress. Breath sounds: Normal breath sounds. No stridor. No wheezing, rhonchi or rales. Neurological:      General: No focal deficit present. Mental Status: He is alert and oriented to person, place, and time. Mental status is at baseline. Cranial Nerves: No cranial nerve deficit. Psychiatric:         Mood and Affect: Mood normal.         Behavior: Behavior normal.         Thought Content: Thought content normal.         Judgment: Judgment normal.         ASSESSMENT/PLAN:  1. Type 2 diabetes mellitus with diabetic polyneuropathy, with long-term current use of insulin ([address]- improving, managed by endo  - POCT microalbumin    2. Primary hypertension- elevated today, has been controlled at home and at recent cardiology visit    3. Mixed hyperlipidemia- on Lipitor    4. Obstructive sleep apnea- on CPAP    5. CAD in native artery    6. Dysthymia- discussed, does not want medication changes. He is open to therapy. Given information to schedule with David Ayala. Return in about 4 months (around 5/12/2022). An electronic signature was used to authenticate this note.     --MADISON Ferguson - CNP on 1/12/2022 at 12:24 PM

## 2022-02-07 RX ORDER — EMPAGLIFLOZIN 10 MG/1
TABLET, FILM COATED ORAL
Qty: 90 TABLET | Refills: 1 | Status: SHIPPED | OUTPATIENT
Start: 2022-02-07 | End: 2022-08-09

## 2022-02-07 NOTE — TELEPHONE ENCOUNTER
1/12/22    Future Appointments   Date Time Provider Avelino Owens   3/15/2022  2:40 PM MD Jm Eckert [facility]   5/12/2022  8:40 AM MADISON Huggins - CNP [facility] [place] [facility]

## 2022-02-07 NOTE — TELEPHONE ENCOUNTER
Medication:   Requested Prescriptions     Pending Prescriptions Disp Refills    JARDIANCE 10 MG tablet [Pharmacy Med Name: Nancylee Fothergill 10MG TABS] 90 tablet 1     Sig: TAKE 1 TABLET BY MOUTH ONE TIME A DAY       Last Filled:      Patient Phone Number: [phone] (home) [phone] (work)    Last appt: 12/7/2021   Next appt: 03/15/2022  Last Labs DM:   Lab Results   Component Value Date    LABA1C 7.3 12/07/2021

## 2022-02-08 ENCOUNTER — TELEPHONE (OUTPATIENT)
Dept: PHARMACY | Facility: CLINIC | Age: 62
End: 2022-02-08

## 2022-02-08 ENCOUNTER — TELEPHONE (OUTPATIENT)
Dept: FAMILY MEDICINE CLINIC | Age: 62
End: 2022-02-08

## 2022-02-08 RX ORDER — ATENOLOL 25 MG/1
TABLET ORAL
Qty: 90 TABLET | Refills: 2 | Status: SHIPPED | OUTPATIENT
Start: 2022-02-08

## 2022-02-08 RX ORDER — CLOPIDOGREL BISULFATE 75 MG/1
75 TABLET ORAL DAILY
Qty: 90 TABLET | Refills: 3 | Status: SHIPPED | OUTPATIENT
Start: 2022-02-08 | End: 2022-04-21 | Stop reason: SDUPTHER

## 2022-02-08 NOTE — TELEPHONE ENCOUNTER
2022 Annual Pharmacist Visit    Called patient to schedule 2022 yearly pharmacist appointment to discuss medications for Diabetes Management Program.      No answer. Left VM on Mobile line  TAD: Please call back at [phone] Option #3.      [facility] [id number]   Department, toll free: [phone] Option #3

## 2022-02-08 NOTE — TELEPHONE ENCOUNTER
----- Message from Ines Candido sent at 2022  9:36 AM EST -----  Subject: Medication Problem    QUESTIONS  Name of Medication? clopidogrel (PLAVIX) 75 MG tablet  Patient-reported dosage and instructions? prescribed at 75 mg  What question or problem do you have with the medication? [address] [place] [address]   has requested a 90 day refill, previously sent to Dr. Rosas Conner. Please   review  Preferred Pharmacy? [facility], [address] [facility] [address], [address] [facility]  Pharmacy phone number (if available)? [phone]  Additional Information for Provider? any pharmacist can assist, pleas   reference patient name and   ---------------------------------------------------------------------------  --------------  CALL BACK INFO  What is the best way for the office to contact you? OK to leave message on   voicemail  Preferred Call Back Phone Number? [phone]  ---------------------------------------------------------------------------  --------------  SCRIPT ANSWERS  Relationship to Patient? Third Party  Representative Name?  Helga Dakins, [address] [place] [address]

## 2022-02-08 NOTE — TELEPHONE ENCOUNTER
Pts pharmacy called to get a refill on   clopidogrel (PLAVIX) 75 MG tablet    Preferred pharmacy is [address] [place] [address] home delivery. [phone]. Last ov 12/03/2021 [facility]. Pt will be out of medication this week.

## 2022-02-08 NOTE — TELEPHONE ENCOUNTER
clopidogrel (PLAVIX) 75 MG tablet    [address] Cast[address], [address]        Last appt.   1/12/2022    Future Appointments   Date Time Provider Avelino Owens   3/15/2022  2:40 PM Prescilla Riedel, MD [facility]   5/12/2022  8:40 AM Shari Roche, APRN - [facility]

## 2022-02-15 RX ORDER — CLOPIDOGREL BISULFATE 75 MG/1
TABLET ORAL
Qty: 90 TABLET | Refills: 3 | Status: SHIPPED | OUTPATIENT
Start: 2022-02-15 | End: 2022-05-23 | Stop reason: SDUPTHER

## 2022-02-15 NOTE — TELEPHONE ENCOUNTER
No answer after 2 attempts sending [email or web address] message.      For Saul Goyal in place:  No   Recommendation Provided To: Patient/Caregiver: 1 via Telephone and Franca Aranda 20 Intervention Detail: Scheduled Appointment   Gap Closed?: No    Intervention Accepted By: Patient/Caregiver: 0   Time Spent (min): 5

## 2022-02-18 DIAGNOSIS — E11.42 TYPE 2 DIABETES MELLITUS WITH DIABETIC POLYNEUROPATHY, WITH LONG-TERM CURRENT USE OF INSULIN (HCC): Chronic | ICD-10-CM

## 2022-02-18 DIAGNOSIS — Z79.4 TYPE 2 DIABETES MELLITUS WITH DIABETIC POLYNEUROPATHY, WITH LONG-TERM CURRENT USE OF INSULIN (HCC): Chronic | ICD-10-CM

## 2022-02-21 RX ORDER — INSULIN HUMAN 500 [IU]/ML
INJECTION, SOLUTION SUBCUTANEOUS
Qty: 60 ML | Refills: 0 | Status: SHIPPED | OUTPATIENT
Start: 2022-02-21 | End: 2022-06-14

## 2022-02-21 NOTE — TELEPHONE ENCOUNTER
Medication:   Requested Prescriptions     Pending Prescriptions Disp Refills    HUMULIN R U-500 KWIKPEN 500 UNIT/ML SOPN concentrated injection pen [Pharmacy Med Name: HUMULIN R U-500 KWIKPEN 500 SOPN] 60 mL 0     Sig: INJECT 80 UNITS UNDER THE SKIN BEFORE BREAKFAST THEN INJECT 60 UNITS UNDER THE SKIN BEFORE LUNCH AND DINNER       Last Filled:      Patient Phone Number: [phone] (home) [phone] (work)    Last appt: 12/7/2021   Next appt: 03/15/2022    Last Labs DM:   Lab Results   Component Value Date    LABA1C 7.3 12/07/2021

## 2022-02-27 DIAGNOSIS — E11.42 TYPE 2 DIABETES MELLITUS WITH DIABETIC POLYNEUROPATHY, WITH LONG-TERM CURRENT USE OF INSULIN (HCC): ICD-10-CM

## 2022-02-27 DIAGNOSIS — Z79.4 TYPE 2 DIABETES MELLITUS WITH DIABETIC POLYNEUROPATHY, WITH LONG-TERM CURRENT USE OF INSULIN (HCC): ICD-10-CM

## 2022-02-28 RX ORDER — BLOOD-GLUCOSE TRANSMITTER
EACH MISCELLANEOUS
Qty: 1 EACH | Refills: 0 | Status: SHIPPED | OUTPATIENT
Start: 2022-02-28

## 2022-03-14 ENCOUNTER — TELEPHONE (OUTPATIENT)
Dept: PHARMACY | Facility: CLINIC | Age: 62
End: 2022-03-14

## 2022-03-14 RX ORDER — BLOOD-GLUCOSE SENSOR
EACH MISCELLANEOUS
Qty: 9 EACH | Refills: 0 | Status: SHIPPED | OUTPATIENT
Start: 2022-03-14 | End: 2022-06-06

## 2022-03-14 NOTE — TELEPHONE ENCOUNTER
Requested Prescriptions     Pending Prescriptions Disp Refills    Continuous Blood Gluc Sensor ([address]) MISC [Pharmacy Med Name: [address])] 9 each 2     Sig: CHANGE SENSOR EVERY 10 DAYS         LOV 12/7/21  NOV 3/5/22    Last refill 8/17/21

## 2022-03-14 NOTE — TELEPHONE ENCOUNTER
2022 Annual Pharmacist Visit    Called patient to schedule 2022 yearly pharmacist appointment to discuss medications for Diabetes Management Program.    No answer. Left VM.      Please call back at [phone], option #3         [place] Funes, [address]   Phone: [phone], option #3

## 2022-03-15 ENCOUNTER — OFFICE VISIT (OUTPATIENT)
Dept: ENDOCRINOLOGY | Age: 62
End: 2022-03-15
Payer: COMMERCIAL

## 2022-03-15 VITALS
HEART RATE: 77 BPM | DIASTOLIC BLOOD PRESSURE: 55 MMHG | BODY MASS INDEX: 31.84 KG/M2 | WEIGHT: 222.4 LBS | HEIGHT: 70 IN | SYSTOLIC BLOOD PRESSURE: 120 MMHG | OXYGEN SATURATION: 97 %

## 2022-03-15 DIAGNOSIS — E11.42 TYPE 2 DIABETES MELLITUS WITH DIABETIC POLYNEUROPATHY, WITH LONG-TERM CURRENT USE OF INSULIN (HCC): Primary | ICD-10-CM

## 2022-03-15 DIAGNOSIS — Z79.4 TYPE 2 DIABETES MELLITUS WITH DIABETIC POLYNEUROPATHY, WITH LONG-TERM CURRENT USE OF INSULIN (HCC): Primary | ICD-10-CM

## 2022-03-15 DIAGNOSIS — I10 PRIMARY HYPERTENSION: ICD-10-CM

## 2022-03-15 LAB — HBA1C MFR BLD: 6.9 %

## 2022-03-15 PROCEDURE — 99214 OFFICE O/P EST MOD 30 MIN: CPT | Performed by: INTERNAL MEDICINE

## 2022-03-15 PROCEDURE — G8417 CALC BMI ABV UP PARAM F/U: HCPCS | Performed by: INTERNAL MEDICINE

## 2022-03-15 PROCEDURE — 2022F DILAT RTA XM EVC RTNOPTHY: CPT | Performed by: INTERNAL MEDICINE

## 2022-03-15 PROCEDURE — 95251 CONT GLUC MNTR ANALYSIS I&R: CPT | Performed by: INTERNAL MEDICINE

## 2022-03-15 PROCEDURE — 1036F TOBACCO NON-USER: CPT | Performed by: INTERNAL MEDICINE

## 2022-03-15 PROCEDURE — G8427 DOCREV CUR MEDS BY ELIG CLIN: HCPCS | Performed by: INTERNAL MEDICINE

## 2022-03-15 PROCEDURE — 3017F COLORECTAL CA SCREEN DOC REV: CPT | Performed by: INTERNAL MEDICINE

## 2022-03-15 PROCEDURE — G8482 FLU IMMUNIZE ORDER/ADMIN: HCPCS | Performed by: INTERNAL MEDICINE

## 2022-03-15 PROCEDURE — 83036 HEMOGLOBIN GLYCOSYLATED A1C: CPT | Performed by: INTERNAL MEDICINE

## 2022-03-15 PROCEDURE — 3046F HEMOGLOBIN A1C LEVEL >9.0%: CPT | Performed by: INTERNAL MEDICINE

## 2022-03-15 NOTE — PROGRESS NOTES
Seen as  Patient for DM    Interim:    Using dexcom  Glucose stable  Low glucose occ at night  Hold insulin if glucose is low    Has seen Dr. Johan Marte    Patient has a PMH of Type 2 DM, hypertension, hyperlipidemia, obesity , Spinal cord compression in cervical spine. Diagnosed with Diabetes Mellitus type 2 in 2004. Course has been variable . Microvascular complications: No known retinopathy (Last eye exam: 2016)   No known Nephropathy :  Has Peripheral neuropathy: Some numbness and tingling in feet. Home regimen:   Metformin 1000 mg BID     Humulin R U-500 80 units before breakfast , 60 units before lunch and dinner      Previous Meds  Lantus 50  units BID  humalog 25  25 30      Blood glucose trend    Average 165      Occasional lows      A1c 12.2 % ---> 9.1 % ---> 10.7 % ---> 11.1 % ---> 9.7%---> 7.3%--->6.9%    Diet: Eats 3 meals/day   Has been non-compliant with diet  Nutrition education:Yes  Exercise:     Severe, uncontrolled     Hypertension:  He has essentail HTN for many yrs. On Lisinopril/Atenolol. No dizziness, SOB , chest pain. Hypertriglyceridemia : has h/o high TGD  Has  h/o pancreatitis many yrs back.     on fenofibrate 145 mg daily, fish oil 2 gram BID.     ROS: Scanned,reviewed    Past Medical History:   Diagnosis Date    Acute, but ill-defined, cerebrovascular disease 5/31/2013    CAD in native artery 12/30/2020    Cerebral artery occlusion with cerebral infarction [facility]) 2013     X2 PERSONALITY CHANGE, ANGER OUTBURSTS    Cerebrovascular disease     Diplopia 7/11/2013    ED (erectile dysfunction) 1/23/2014    Elbow pain, chronic 12/16/2015    Hyperlipidemia     Hypertension     Irritable bowel syndrome     Obstructive sleep apnea (adult) (pediatric) 07/01/2013    NO CPAP, HAD PROBLEMS WITH INSURANCE AND STOPPED USING    Occlusion and stenosis of carotid artery without mention of cerebral infarction 01/13/2014    MINOR    Pain in joint, upper arm 5/14/2015    Polyneuropathy in diabetes(357.2) 7/1/2013    PONV (postoperative nausea and vomiting)     Skin abnormality     PRE-CANCEROUS LESIONS REMOVED FROM ARMS AND EARS    Type 2 diabetes mellitus with diabetic polyneuropathy, with long-term current use of insulin ([address] 7/1/2013    Type II or unspecified type diabetes mellitus without mention of complication, not stated as uncontrolled      Past Surgical History:   Procedure Laterality Date    CARPAL TUNNEL RELEASE Left 2008    CERVICAL DISCECTOMY  08/02/2017    ANTERIOR CERVICAL DISCECTOMY AND FUSION C5-6, C6-7 WITH MEDTRONIC PLATES, SCREWS, ALLOGRAFT, WITH EVOKES PARTIAL C6 CORPECTOMY    CERVICAL FUSION  12/06/2017    CERVICAL LAMINECTOMY AND POSTERIOR FUSION C5-T1 WITH MEDTRONIC RODS AND SCREWS WITH EVOKES AND O-ARM    CHOLECYSTECTOMY  1995    COLONOSCOPY N/A 8/14/2018    COLONOSCOPY POLYPECTOMY SNARE/COLD BIOPSY performed by Darell Ruggiero MD at [address] Left 2008    ULNAR NERVE TRANSPOSITION, AT SAME TIME AS CTR    FINGER TRIGGER RELEASE Left 12/12/2019    LEFT RING TRIGGER FINGER RELEASE performed by Mary Sawyer MD at [facility] Right 11/23/2020    RIGHT LONG AND RING TRIGGER FINGER RELEASES -SLEEP APNEA- performed by Mary Sawyer MD at [address] [place] [address]    umbilical    PYLOROPLASTY      PYLORIC STENOSIS AS INFANT     Current Outpatient Medications   Medication Sig Dispense Refill    Continuous Blood Gluc Sensor (DEXCOM G6 SENSOR) MISC CHANGE SENSOR EVERY 10 DAYS 9 each 0    Continuous Blood Gluc Transmit (DEXCOM G6 TRANSMITTER) MISC USE AS DIRECTED AND CHANGE EVERY 90 DAYS 1 each 0    HUMULIN R U-500 KWIKPEN 500 UNIT/ML SOPN concentrated injection pen INJECT 80 UNITS UNDER THE SKIN BEFORE BREAKFAST THEN INJECT 60 UNITS UNDER THE SKIN BEFORE LUNCH AND DINNER 60 mL 0    clopidogrel (PLAVIX) 75 MG tablet TAKE 1 TABLET BY MOUTH ONE TIME A DAY 90 tablet 3    atenolol (TENORMIN) 25 MG tablet TAKE 1 TABLET BY MOUTH EVERY NIGHT AT BEDTIME 90 tablet 2    clopidogrel (PLAVIX) 75 MG tablet Take 1 tablet by mouth daily 90 tablet 3    JARDIANCE 10 MG tablet TAKE 1 TABLET BY MOUTH ONE TIME A DAY 90 tablet 1    metFORMIN (GLUCOPHAGE) 1000 MG tablet TAKE 1 TABLET BY MOUTH 2 TIMES A  tablet 1    nystatin (MYCOSTATIN) 749218 UNIT/GM ointment Apply topically 2 times daily to corners of mouth. 30 g 1    omega-3 acid ethyl esters (LOVAZA) 1 g capsule TAKE 2 CAPSULES BY MOUTH TWO TIMES A  capsule 3    atorvastatin (LIPITOR) 40 MG tablet Take 1 tablet by mouth daily 90 tablet 3    metroNIDAZOLE (METROGEL) 1 % gel Apply to face daily. 60 g 3    Insulin Pen Needle (B-D UF III MINI PEN NEEDLES) 31G X 5 MM MISC 1 each by Does not apply route 3 times daily 300 each 6    lisinopril (PRINIVIL;ZESTRIL) 20 MG tablet Take 1 tablet by mouth 2 times daily 180 tablet 3    fenofibrate (TRICOR) 145 MG tablet TAKE 1 TABLET BY MOUTH ONE TIME A DAY 90 tablet 3    Continuous Blood Gluc  (DEXCOM G6 ) KYLAH As needed 1 Device 0    blood glucose test strips (PRODIGY NO CODING BLOOD GLUC) strip Use as directed to test up to 3-4 times daily 400 each 3    desvenlafaxine succinate (PRISTIQ) 50 MG TB24 extended release tablet Take 1 tablet by mouth daily 90 tablet 3    vitamin D3 (CHOLECALCIFEROL) 25 MCG (1000 UT) TABS tablet TAKE 1 TABLET BY MOUTH ONE TIME A DAY 90 tablet 3    ASPIRIN ADULT LOW STRENGTH 81 MG EC tablet TAKE 1 TABLET BY MOUTH ONE TIME A DAY 90 tablet 3    buPROPion (WELLBUTRIN XL) 150 MG extended release tablet TAKE ONE TABLET BY MOUTH EVERY MORNING 90 tablet 3    Cholecalciferol (VITAMIN D3) 50 MCG (2000 UT) CAPS Take 1 capsule by mouth daily      PRODIGY LANCETS 28G MISC Use as directed to test up to 3-4 times daily 400 each 3     No current facility-administered medications for this visit.      Vitals:    03/15/22 1431   BP: (!) 120/55   Pulse: 77 SpO2: 97%     Constitutional: Well-developed, appears stated age, cooperative, in no acute distress  H/E/N/M/T:atraumatic, normocephalic, external ears, nose, lips normal without lesions  Eyes: Lids, lashes, conjunctivae and sclerae normal, No proptosis, no redness  Neck: supple, symmetrical, no swelling  Skin: No obvious rashes or lesions present. Skin and hair texture normal  Psychiatric: Judgement and Insight:  judgement and insight appear normal  Neuro: Normal without focal findings, speech is normal normal, speech is spontaneous  Chest: No labored breathing, no chest deformity, no stridor  Musculoskeletal: No joint deformity, swelling      Lab Results   Component Value Date    LABA1C 7.3 12/07/2021     Office Visit on 01/12/2022   Component Date Value Ref Range Status    Microalb, Ur 01/12/2022 10 mg/L   Final    Creatinine Ur POCT 01/12/2022 50 mg/dL   Final    Microalbumin Creatinine Ratio 01/12/2022 30mg/ g   Final   Office Visit on 12/07/2021   Component Date Value Ref Range Status    Hemoglobin A1C 12/07/2021 7.3  % Final           Assessment/Plan      1. Type 2 DM     Adriel Meade is a 60 y.o. male has Type 2 DM with obesity and insulin resistance. Poorly controlled DM. Complicated by neuropathy.     - Change  Humulin R U-500 80 units before breakfast , 55 units before lunch and dinner   SSI 5 for 50 > 150  - 20 < 100    jardiance    Using dexcom     recommend low carb diet ( 40-45 grams with each meal)    Referred to [address] [place] [address] for dietary modification. Avoid GLP-1 given high TGD and h/o pancreatitis. Advised follow-up with the ophthalmologist once BS better. Urine microalbumin/cr ratio normal in 071/8, 12/19 , 9/21  On ace-inhibitor. Discussed foot care. Has neuropathy on exam.       Pt on ASA. Normal TSH in 12/19     2. Hypertension. BP at goal    3. Hyperlipidemia. TGD have improved. Has h/o pancreatitis   on fenofibrate 145 mg daily, fish oil 2 gram BID. lipitor 80mg    TGD 1560 ---> 506---> 1051--> 544---> 220  LDL 71---> 112---> 114---> 112---> 38  HDL 31---> 31    Continue same regimen. 4. Obesity. Advised life style changes.     Patient is on high dose of insulin which has a narrow therapeutic index and risk of complications including severe hypoglycemia

## 2022-03-21 NOTE — TELEPHONE ENCOUNTER
Second attempt made to contact patient to schedule 2022 yearly pharmacist appointment to discuss medications for Diabetes Management Program.    No answer. Left VM. Please call back at [phone], option #3    Application [employer] message sent to patient.         Yolanda Galindo, [address]   Phone: [phone], option #3

## 2022-03-21 NOTE — TELEPHONE ENCOUNTER
For [place] in place:  No   Recommendation Provided To: Patient/Caregiver: 1 via Telephone   Gap Closed?: No    Intervention Accepted By: Patient/Caregiver: 0   Time Spent (min): 10

## 2022-03-27 DIAGNOSIS — F34.1 DYSTHYMIA: ICD-10-CM

## 2022-03-28 NOTE — TELEPHONE ENCOUNTER
Last ov 01/12/2022   Future Appointments   Date Time Provider Avelino Graciela   4/21/2022  8:30 AM MD DAV [facility] [place] [facility]   5/12/2022  8:40 AM MADISON Rodriguez - CNP [facility] [place] [facility]   6/28/2022  2:20 PM MD Matheus Read

## 2022-03-29 RX ORDER — BUPROPION HYDROCHLORIDE 150 MG/1
TABLET ORAL
Qty: 90 TABLET | Refills: 3 | Status: SHIPPED | OUTPATIENT
Start: 2022-03-29

## 2022-04-12 ENCOUNTER — TELEPHONE (OUTPATIENT)
Dept: PHARMACY | Facility: CLINIC | Age: 62
End: 2022-04-12

## 2022-04-12 NOTE — TELEPHONE ENCOUNTER
[facility] [employer] [facility] - Be Well With Diabetes  =================================================================  Jovan Martínez is a 60 y.o. male enrolled in the [address] [place] [address] with patient for yearly visit to discuss enrollment in program. Patient provided writer with verbal consent to remain in the program for this year. Program Requirements to be completed in 2022:  1. Two office visits in 2022 for diabetes (1st must be completed by 7/1/2022)  2. Two A1c levels in 2022 (1st must be completed by 7/1/2022)  3. Yearly lipid panel  4. Yearly urine microalbumin test  5. Diabetes education if A1c is over 8%  6. Taking an ACE/ARB medication if appropriate  7. Taking a statin medication if appropriate  8. Pneumonia vaccine up to date  5. Yearly flu shot (for 0339-0332 season)  10. Medication adherence over 70%. Patients who fall below 70% will be contacted by a pharmacist in the program to discuss any issues. Are you currently using [facility] (home delivery pharmacy) to get your prescriptions? Yes    Would you like to speak with a pharmacist about the program, your diabetes, and/or your prescriptions? No    [facility] Clinical Pharmacy Team  Phone: toll free [phone], option #3  Fax [phone]  Email: [email or web address]    For Pharmacy Admin Tracking Only    PHSO: Yes  Recommended intervention potential cost savings: 1  Time Spent (min): 15

## 2022-04-21 ENCOUNTER — OFFICE VISIT (OUTPATIENT)
Dept: FAMILY MEDICINE CLINIC | Age: 62
End: 2022-04-21
Payer: COMMERCIAL

## 2022-04-21 VITALS
HEIGHT: 71 IN | HEART RATE: 74 BPM | SYSTOLIC BLOOD PRESSURE: 136 MMHG | OXYGEN SATURATION: 98 % | BODY MASS INDEX: 30.52 KG/M2 | DIASTOLIC BLOOD PRESSURE: 70 MMHG | WEIGHT: 218 LBS

## 2022-04-21 DIAGNOSIS — M54.2 CHRONIC NECK AND BACK PAIN: ICD-10-CM

## 2022-04-21 DIAGNOSIS — R29.898 HAND WEAKNESS: ICD-10-CM

## 2022-04-21 DIAGNOSIS — M25.60 JOINT STIFFNESS: ICD-10-CM

## 2022-04-21 DIAGNOSIS — M25.50 ARTHRALGIA, UNSPECIFIED JOINT: ICD-10-CM

## 2022-04-21 DIAGNOSIS — M54.9 CHRONIC NECK AND BACK PAIN: ICD-10-CM

## 2022-04-21 DIAGNOSIS — I48.91 ATRIAL FIBRILLATION, UNSPECIFIED TYPE (HCC): ICD-10-CM

## 2022-04-21 DIAGNOSIS — Z79.4 TYPE 2 DIABETES MELLITUS WITH DIABETIC POLYNEUROPATHY, WITH LONG-TERM CURRENT USE OF INSULIN (HCC): Chronic | ICD-10-CM

## 2022-04-21 DIAGNOSIS — G47.33 OSA (OBSTRUCTIVE SLEEP APNEA): ICD-10-CM

## 2022-04-21 DIAGNOSIS — R93.89 ABNORMAL FINDINGS ON DIAGNOSTIC IMAGING OF OTHER SPECIFIED BODY STRUCTURES: ICD-10-CM

## 2022-04-21 DIAGNOSIS — I10 PRIMARY HYPERTENSION: Chronic | ICD-10-CM

## 2022-04-21 DIAGNOSIS — Z98.890 HISTORY OF CERVICAL SPINAL SURGERY: ICD-10-CM

## 2022-04-21 DIAGNOSIS — G89.29 CHRONIC NECK AND BACK PAIN: ICD-10-CM

## 2022-04-21 DIAGNOSIS — Z76.89 ENCOUNTER TO ESTABLISH CARE: Primary | ICD-10-CM

## 2022-04-21 DIAGNOSIS — E11.42 DIABETIC POLYNEUROPATHY ASSOCIATED WITH TYPE 2 DIABETES MELLITUS (HCC): Chronic | ICD-10-CM

## 2022-04-21 DIAGNOSIS — F33.1 MODERATE EPISODE OF RECURRENT MAJOR DEPRESSIVE DISORDER (HCC): ICD-10-CM

## 2022-04-21 DIAGNOSIS — G56.90 NEUROPATHY OF UPPER EXTREMITY, UNSPECIFIED LATERALITY: ICD-10-CM

## 2022-04-21 DIAGNOSIS — E11.42 TYPE 2 DIABETES MELLITUS WITH DIABETIC POLYNEUROPATHY, WITH LONG-TERM CURRENT USE OF INSULIN (HCC): Chronic | ICD-10-CM

## 2022-04-21 DIAGNOSIS — I65.23 BILATERAL CAROTID ARTERY STENOSIS: ICD-10-CM

## 2022-04-21 DIAGNOSIS — Z87.891 FORMER SMOKER: ICD-10-CM

## 2022-04-21 DIAGNOSIS — E78.2 MIXED HYPERLIPIDEMIA: Chronic | ICD-10-CM

## 2022-04-21 LAB
C-REACTIVE PROTEIN: 3.5 MG/L (ref 0–5.1)
FERRITIN: 149.7 NG/ML (ref 30–400)
FOLATE: 15.77 NG/ML (ref 4.78–24.2)
MAGNESIUM: 1.8 MG/DL (ref 1.8–2.4)
RHEUMATOID FACTOR: <10 IU/ML
TSH REFLEX: 1.96 UIU/ML (ref 0.27–4.2)
VITAMIN B-12: 463 PG/ML (ref 211–911)

## 2022-04-21 PROCEDURE — 3044F HG A1C LEVEL LT 7.0%: CPT | Performed by: FAMILY MEDICINE

## 2022-04-21 PROCEDURE — 2022F DILAT RTA XM EVC RTNOPTHY: CPT | Performed by: FAMILY MEDICINE

## 2022-04-21 PROCEDURE — G8417 CALC BMI ABV UP PARAM F/U: HCPCS | Performed by: FAMILY MEDICINE

## 2022-04-21 PROCEDURE — G8427 DOCREV CUR MEDS BY ELIG CLIN: HCPCS | Performed by: FAMILY MEDICINE

## 2022-04-21 PROCEDURE — 1036F TOBACCO NON-USER: CPT | Performed by: FAMILY MEDICINE

## 2022-04-21 PROCEDURE — 3017F COLORECTAL CA SCREEN DOC REV: CPT | Performed by: FAMILY MEDICINE

## 2022-04-21 PROCEDURE — 99214 OFFICE O/P EST MOD 30 MIN: CPT | Performed by: FAMILY MEDICINE

## 2022-04-21 RX ORDER — GABAPENTIN 300 MG/1
300 CAPSULE ORAL 2 TIMES DAILY
Qty: 60 CAPSULE | Refills: 0 | Status: SHIPPED | OUTPATIENT
Start: 2022-04-21 | End: 2022-08-04 | Stop reason: ALTCHOICE

## 2022-04-21 NOTE — PROGRESS NOTES
4/21/2022    This is a 60 y.o. male who presents for  Chief Complaint   Patient presents with    Establish Care    Arthritis     arthritis in both wrists and fingers, burning sensation        HPI:     New pt     DMII: Follows with Dr. Anisa Argueta, Endocrinology, last visit in 3/22 reviewed      CAD/ Carotid artery disease/ A fib : follows with Dr. Maggi Bailey s/p PCI 12/2020, last visit reviewed from 12/21  No acute concerning Sxs: No CP, SOB, palpitations, dizziness, HA, etc.     Wrist pain: left wrist, has been on going for some time. Saw ortho previously for trigger finger, does plan to f/u again but wants to see diff provider. Hx of lung nodules. Had follow-up CT done on 9/10/2021. There was a 6 mm lung nodule which is unchanged from previous in June. He is a former smoker quit smoking in 2000.     Sick for 8 years, stomach problems,   2 years of chelation therapy, was on disability  Hasn't worked in 22 years  Chronic neck pain, difficult to turn his head  + n/t b/l hands, all fingers, worsening progressively     Past Medical History:   Diagnosis Date    Acute, but ill-defined, cerebrovascular disease 5/31/2013    CAD in native artery 12/30/2020    Cerebral artery occlusion with cerebral infarction [facility]) 2013     X2 PERSONALITY CHANGE, ANGER OUTBURSTS    Cerebrovascular disease     Diplopia 7/11/2013    ED (erectile dysfunction) 1/23/2014    Elbow pain, chronic 12/16/2015    Hyperlipidemia     Hypertension     Irritable bowel syndrome     Obstructive sleep apnea (adult) (pediatric) 07/01/2013    NO CPAP, HAD PROBLEMS WITH INSURANCE AND STOPPED USING    Occlusion and stenosis of carotid artery without mention of cerebral infarction 01/13/2014    MINOR    Pain in joint, upper arm 5/14/2015    Polyneuropathy in diabetes(357.2) 7/1/2013    PONV (postoperative nausea and vomiting)     Skin abnormality     PRE-CANCEROUS LESIONS REMOVED FROM ARMS AND EARS    Type 2 diabetes mellitus with diabetic polyneuropathy, with long-term current use of insulin ([facility] [address] 2013    Type II or unspecified type diabetes mellitus without mention of complication, not stated as uncontrolled        Past Surgical History:   Procedure Laterality Date    CARPAL TUNNEL RELEASE Left     CERVICAL DISCECTOMY  2017    ANTERIOR CERVICAL DISCECTOMY AND FUSION C5-6, C6-7 WITH MEDTRONIC PLATES, SCREWS, ALLOGRAFT, WITH EVOKES PARTIAL C6 CORPECTOMY    CERVICAL FUSION  2017    CERVICAL LAMINECTOMY AND POSTERIOR FUSION C5-T1 WITH MEDTRONIC RODS AND SCREWS WITH EVOKES AND O-ARM    CHOLECYSTECTOMY      COLONOSCOPY N/A 2018    COLONOSCOPY POLYPECTOMY SNARE/COLD BIOPSY performed by Keren Mendoza MD at [address] [facility] Left     [address] [facility], AT SAME TIME AS CTR    FINGER TRIGGER RELEASE Left 2019    LEFT RING TRIGGER FINGER RELEASE performed by Larena Bernheim, MD at [address] [place] [address] Right 2020    RIGHT LONG AND RING TRIGGER FINGER RELEASES -SLEEP APNEA- performed by Larena Bernheim, MD at [address] [place] [address]    umbilical    PYLOROPLASTY      PYLORIC STENOSIS AS INFANT       Social History     Socioeconomic History    Marital status:      Spouse name: Not on file    Number of children: 2    Years of education: Not on file    Highest education level: Not on file   Occupational History    Occupation: disability   Tobacco Use    Smoking status: Former Smoker     Packs/day: 1.00     Years: 25.00     Pack years: 25.00     Types: Cigarettes     Quit date: 2000     Years since quittin.3    Smokeless tobacco: Never Used   Vaping Use    Vaping Use: Never used   Substance and Sexual Activity    Alcohol use: No    Drug use: No    Sexual activity: Yes   Other Topics Concern    Not on file   Social History Narrative    Not on file     Social Determinants of Health     Financial Resource Strain: Low Risk     Difficulty of Paying Living Expenses: Not hard at all   Food Insecurity: No Food Insecurity    Worried About Running Out of Food in the Last Year: Never true    Zak of Food in the Last Year: Never true   Transportation Needs:     Lack of Transportation (Medical): Not on file    Lack of Transportation (Non-Medical): Not on file   Physical Activity:     Days of Exercise per Week: Not on file    Minutes of Exercise per Session: Not on file   Stress:     Feeling of Stress : Not on file   Social Connections:     Frequency of Communication with Friends and Family: Not on file    Frequency of Social Gatherings with Friends and Family: Not on file    Attends [employer] Services: Not on file    Active Member of [address] [place] [address] [employer] or Organizations: Not on file    Attends Club or Organization Meetings: Not on file    Marital Status: Not on file   Intimate Partner Violence:     Fear of Current or Ex-Partner: Not on file    Emotionally Abused: Not on file    Physically Abused: Not on file    Sexually Abused: Not on file   Housing Stability:     Unable to Pay for Housing in the Last Year: Not on file    Number of [place] in the Last Year: Not on file    Unstable Housing in the Last Year: Not on file       Family History   Problem Relation Age of Onset    Cancer Father         melanoma, face    Cancer Paternal Uncle         melanoma, face    Cancer Brother         skin, NMSC    Depression Maternal Cousin     No Known Problems Mother        Current Outpatient Medications   Medication Sig Dispense Refill    gabapentin (NEURONTIN) 300 MG capsule Take 1 capsule by mouth 2 times daily for 180 days.  Intended supply: 30 days 60 capsule 0    buPROPion (WELLBUTRIN XL) 150 MG extended release tablet TAKE ONE TABLET BY MOUTH EVERY MORNING 90 tablet 3    Continuous Blood Gluc Sensor (DEXCOM G6 SENSOR) MISC CHANGE SENSOR EVERY 10 DAYS 9 each 0    Continuous Blood Gluc Transmit (DEXCOM G6 TRANSMITTER) MISC USE AS DIRECTED AND CHANGE EVERY 90 DAYS 1 each 0    HUMULIN R U-500 KWIKPEN 500 UNIT/ML SOPN concentrated injection pen INJECT 80 UNITS UNDER THE SKIN BEFORE BREAKFAST THEN INJECT 60 UNITS UNDER THE SKIN BEFORE LUNCH AND DINNER 60 mL 0    clopidogrel (PLAVIX) 75 MG tablet TAKE 1 TABLET BY MOUTH ONE TIME A DAY 90 tablet 3    atenolol (TENORMIN) 25 MG tablet TAKE 1 TABLET BY MOUTH EVERY NIGHT AT BEDTIME 90 tablet 2    JARDIANCE 10 MG tablet TAKE 1 TABLET BY MOUTH ONE TIME A DAY 90 tablet 1    metFORMIN (GLUCOPHAGE) 1000 MG tablet TAKE 1 TABLET BY MOUTH 2 TIMES A  tablet 1    nystatin (MYCOSTATIN) 831492 UNIT/GM ointment Apply topically 2 times daily to corners of mouth. 30 g 1    omega-3 acid ethyl esters (LOVAZA) 1 g capsule TAKE 2 CAPSULES BY MOUTH TWO TIMES A  capsule 3    atorvastatin (LIPITOR) 40 MG tablet Take 1 tablet by mouth daily 90 tablet 3    metroNIDAZOLE (METROGEL) 1 % gel Apply to face daily.  60 g 3    Insulin Pen Needle (B-D UF III MINI PEN NEEDLES) 31G X 5 MM MISC 1 each by Does not apply route 3 times daily 300 each 6    lisinopril (PRINIVIL;ZESTRIL) 20 MG tablet Take 1 tablet by mouth 2 times daily 180 tablet 3    fenofibrate (TRICOR) 145 MG tablet TAKE 1 TABLET BY MOUTH ONE TIME A DAY 90 tablet 3    Continuous Blood Gluc  (DEXCOM G6 ) KYLAH As needed 1 Device 0    blood glucose test strips (PRODIGY NO CODING BLOOD GLUC) strip Use as directed to test up to 3-4 times daily 400 each 3    desvenlafaxine succinate (PRISTIQ) 50 MG TB24 extended release tablet Take 1 tablet by mouth daily 90 tablet 3    vitamin D3 (CHOLECALCIFEROL) 25 MCG (1000 UT) TABS tablet TAKE 1 TABLET BY MOUTH ONE TIME A DAY 90 tablet 3    ASPIRIN ADULT LOW STRENGTH 81 MG EC tablet TAKE 1 TABLET BY MOUTH ONE TIME A DAY 90 tablet 3    Cholecalciferol (VITAMIN D3) 50 MCG (2000 UT) CAPS Take 1 capsule by mouth daily      PRODIGY LANCETS 28G MISC Use as directed to test up to 3-4 times daily 400 each 3     No current facility-administered medications for this visit. Immunization History   Administered Date(s) Administered    COVID-19, [place], Primary or Immunocompromised, PF, 100mcg/0.5mL 12/27/2021    COVID-19, Pfizer Purple top, DILUTE for use, 12+ yrs, 30mcg/0.3mL dose 02/25/2021, 03/18/2021    Influenza, MDCK Quadv, IM, PF (Flucelvax 2 yrs and older) 11/30/2021    Influenza, Quadv, IM, PF (6 mo and older Fluzone, Flulaval, Fluarix, and 3 yrs and older Afluria) 11/29/2018, 12/06/2019, 11/16/2020    Pneumococcal Polysaccharide ([id number]) 11/29/2018       No Known Allergies    Review of Systems   Constitutional: Negative for activity change, appetite change, chills, diaphoresis, fatigue, fever and unexpected weight change. HENT: Negative for ear pain, hearing loss and trouble swallowing. Eyes: Negative for visual disturbance. Respiratory: Negative for cough and shortness of breath. Cardiovascular: Negative for chest pain, palpitations and leg swelling. Gastrointestinal: Negative for abdominal pain, blood in stool, constipation, diarrhea, nausea and vomiting. Genitourinary: Negative for decreased urine volume, difficulty urinating, dysuria, flank pain, hematuria and urgency. Musculoskeletal: Positive for arthralgias, myalgias and neck pain. Negative for back pain. Skin: Negative for color change and rash. Neurological: Positive for weakness and numbness. Negative for dizziness, light-headedness and headaches. Psychiatric/Behavioral: Negative for dysphoric mood and sleep disturbance. The patient is not nervous/anxious. /70 (Site: Right Upper Arm, Position: Sitting, Cuff Size: Small Adult)   Pulse 74   Ht 5' 11\" (1.803 m)   Wt 218 lb (98.9 kg)   SpO2 98%   BMI 30.40 kg/m²     Physical Exam  Vitals and nursing note reviewed. Constitutional:       General: He is not in acute distress. Appearance: He is well-developed. He is not diaphoretic. HENT:      Head: Normocephalic and atraumatic. Eyes:      Conjunctiva/sclera: Conjunctivae normal.      Pupils: Pupils are equal, round, and reactive to light. Cardiovascular:      Rate and Rhythm: Normal rate and regular rhythm. Heart sounds: Normal heart sounds. No murmur heard. No friction rub. No gallop. Pulmonary:      Effort: Pulmonary effort is normal. No respiratory distress. Breath sounds: Normal breath sounds. No wheezing or rales. Chest:      Chest wall: No tenderness. Abdominal:      Palpations: Abdomen is soft. Musculoskeletal:         General: Tenderness and deformity present. Normal range of motion. Cervical back: Normal range of motion and neck supple. Skin:     General: Skin is warm and dry. Capillary Refill: Capillary refill takes 2 to 3 seconds. Findings: No erythema. Neurological:      Mental Status: He is alert and oriented to person, place, and time. Cranial Nerves: No cranial nerve deficit. Sensory: Sensory deficit present. Psychiatric:         Behavior: Behavior normal.         Thought Content: Thought content normal.         Judgment: Judgment normal.         Plan  1. Encounter to establish care    2. Moderate episode of recurrent major depressive disorder (HCC)  stable    3. Atrial fibrillation, unspecified type [facility])  Follows with Cardiology   4. Bilateral carotid artery stenosis    5. Chronic neck and back pain  - MRI CERVICAL SPINE WO CONTRAST; Future  - EMILIANA; Future  - C-Reactive Protein; Future  - Sedimentation Rate; Future  - TSH with Reflex; Future  - Vitamin B12; Future  - Folate; Future  - Ferritin; Future  - Magnesium; Future  - RHEUMATOID FACTOR; Future  - [facility])-OSR    6. Diabetic polyneuropathy associated with type 2 diabetes mellitus ([facility] [address]    7. Former smoker    8.  Mixed hyperlipidemia  The ASCVD Risk score (Carly Guadalupe., et al., 2013) failed to calculate for the following reasons: The valid total cholesterol range is 130 to 320 mg/dL    9. Primary hypertension  stable    10. Type 2 diabetes mellitus with diabetic polyneuropathy, with long-term current use of insulin ([address]  Follows with Endocrinology     11. History of cervical spinal surgery  - MRI CERVICAL SPINE WO CONTRAST; Future  - [facility] ([facility])-OSR    12. Neuropathy of upper extremity, unspecified laterality  - MRI CERVICAL SPINE WO CONTRAST; Future  - EMILIANA; Future  - C-Reactive Protein; Future  - Sedimentation Rate; Future  - TSH with Reflex; Future  - Vitamin B12; Future  - Folate; Future  - Ferritin; Future  - Magnesium; Future  - RHEUMATOID FACTOR; Future  - [facility] ([facility])-OSR  13. Hand weakness  - MRI CERVICAL SPINE WO CONTRAST; Future  - EMILIANA; Future  - C-Reactive Protein; Future  - Sedimentation Rate; Future  - TSH with Reflex; Future  - Vitamin B12; Future  - Folate; Future  - Ferritin; Future  - Magnesium; Future  - RHEUMATOID FACTOR; Future    14. DARRICK (obstructive sleep apnea)  - [employer]    15. Arthralgia, unspecified joint  - EMILIANA; Future  - C-Reactive Protein; Future  - Sedimentation Rate; Future  - TSH with Reflex; Future  - Vitamin B12; Future  - Folate; Future  - Ferritin; Future  - Magnesium; Future  - RHEUMATOID FACTOR; Future  - [facility] ([facility])-OSR    16. Joint stiffness  - EMILIANA; Future  - C-Reactive Protein; Future  - Sedimentation Rate; Future  - TSH with Reflex; Future  - Vitamin B12; Future  - Folate; Future  - Ferritin; Future  - Magnesium; Future  - RHEUMATOID FACTOR; Future        While assessing care for this patient, I have reviewed all pertinent lab work/imaging/ specialist notes and care in reference to those problems addressed above in detail. Appropriate medical decision making was based on this.      Please note that portions of this note may have been completed with a voice recognition program. Efforts were made to edit the dictations but occasionally words are mis-transcribed. Return in about 4 weeks (around 5/19/2022) for 30 minute visit, follow up medication changes.

## 2022-04-22 LAB
ANTI-NUCLEAR ANTIBODY (ANA): NEGATIVE
SEDIMENTATION RATE, ERYTHROCYTE: 25 MM/HR (ref 0–20)

## 2022-05-04 ENCOUNTER — HOSPITAL ENCOUNTER (OUTPATIENT)
Dept: MRI IMAGING | Age: 62
Discharge: HOME | End: 2022-05-04
Payer: COMMERCIAL

## 2022-05-04 DIAGNOSIS — R29.898 HAND WEAKNESS: ICD-10-CM

## 2022-05-04 DIAGNOSIS — G56.90 NEUROPATHY OF UPPER EXTREMITY, UNSPECIFIED LATERALITY: ICD-10-CM

## 2022-05-04 DIAGNOSIS — M54.9 CHRONIC NECK AND BACK PAIN: ICD-10-CM

## 2022-05-04 DIAGNOSIS — Z98.890 HISTORY OF CERVICAL SPINAL SURGERY: ICD-10-CM

## 2022-05-04 DIAGNOSIS — M54.2 CHRONIC NECK AND BACK PAIN: ICD-10-CM

## 2022-05-04 DIAGNOSIS — G89.29 CHRONIC NECK AND BACK PAIN: ICD-10-CM

## 2022-05-04 PROCEDURE — G1010 CDSM STANSON: HCPCS

## 2022-05-05 PROBLEM — G56.90 NEUROPATHY OF UPPER EXTREMITY: Status: ACTIVE | Noted: 2022-05-05

## 2022-05-05 PROBLEM — Z98.890 HISTORY OF CERVICAL SPINAL SURGERY: Status: ACTIVE | Noted: 2022-05-05

## 2022-05-05 ASSESSMENT — ENCOUNTER SYMPTOMS
COLOR CHANGE: 0
DIARRHEA: 0
SHORTNESS OF BREATH: 0
CONSTIPATION: 0
BACK PAIN: 0
COUGH: 0
TROUBLE SWALLOWING: 0
ABDOMINAL PAIN: 0
BLOOD IN STOOL: 0
NAUSEA: 0
VOMITING: 0

## 2022-05-16 ENCOUNTER — TELEMEDICINE (OUTPATIENT)
Dept: FAMILY MEDICINE CLINIC | Age: 62
End: 2022-05-16
Payer: COMMERCIAL

## 2022-05-16 DIAGNOSIS — M48.02 CERVICAL SPINAL STENOSIS: ICD-10-CM

## 2022-05-16 DIAGNOSIS — Z98.890 HISTORY OF CERVICAL SPINAL SURGERY: Primary | ICD-10-CM

## 2022-05-16 DIAGNOSIS — M79.2 NEUROPATHIC PAIN: ICD-10-CM

## 2022-05-16 PROCEDURE — 1036F TOBACCO NON-USER: CPT | Performed by: FAMILY MEDICINE

## 2022-05-16 PROCEDURE — G8427 DOCREV CUR MEDS BY ELIG CLIN: HCPCS | Performed by: FAMILY MEDICINE

## 2022-05-16 PROCEDURE — G8417 CALC BMI ABV UP PARAM F/U: HCPCS | Performed by: FAMILY MEDICINE

## 2022-05-16 PROCEDURE — 3017F COLORECTAL CA SCREEN DOC REV: CPT | Performed by: FAMILY MEDICINE

## 2022-05-16 PROCEDURE — 99213 OFFICE O/P EST LOW 20 MIN: CPT | Performed by: FAMILY MEDICINE

## 2022-05-16 ASSESSMENT — ENCOUNTER SYMPTOMS
COLOR CHANGE: 0
SHORTNESS OF BREATH: 0
ABDOMINAL PAIN: 0
NAUSEA: 0
BACK PAIN: 1
VOMITING: 0

## 2022-05-16 NOTE — PROGRESS NOTES
2022    TELEHEALTH EVALUATION -- Audio/Visual (During CIYJX-00 public health emergency)    HPI:    Jarred Robledo (:  1960) has requested an audio/video evaluation for the following concern(s):    Chief Complaint   Patient presents with    Results     MRI results     MRI cervical spine reviewed in detail   Previous surgical intervention performed by Dr. Deysi Hinds    + neuropathic pain, worse at night with laying  \"shooting pains in his legs and feet\"  Has intermittent extremity weakness    Review of Systems   Constitutional: Negative for activity change, chills, diaphoresis, fatigue, fever and unexpected weight change. Respiratory: Negative for shortness of breath. Cardiovascular: Negative for chest pain and leg swelling. Gastrointestinal: Negative for abdominal pain, nausea and vomiting. Genitourinary: Negative for difficulty urinating. Musculoskeletal: Positive for arthralgias, back pain, myalgias and neck pain. Negative for gait problem, joint swelling and neck stiffness. Skin: Negative for color change, pallor, rash and wound. Neurological: Positive for weakness and numbness. Negative for headaches. Psychiatric/Behavioral: Positive for sleep disturbance. Negative for dysphoric mood. The patient is not nervous/anxious. Prior to Visit Medications    Medication Sig Taking? Authorizing Provider   gabapentin (NEURONTIN) 300 MG capsule Take 1 capsule by mouth 2 times daily for 180 days.  Intended supply: 30 days Yes Matheus Paredes MD   buPROPion (WELLBUTRIN XL) 150 MG extended release tablet TAKE ONE TABLET BY MOUTH EVERY MORNING Yes MADISON Garcia - CNP   Continuous Blood Gluc Sensor (DEXCOM G6 SENSOR) MISC CHANGE SENSOR EVERY 10 DAYS Yes Agustin Gonzalez MD   Continuous Blood Gluc Transmit (DEXCOM G6 TRANSMITTER) MISC USE AS DIRECTED AND CHANGE EVERY 80 DAYS Yes Agustin Gonzalez MD   HUMULIN R U-500 KWIKPEN 500 UNIT/ML SOPN concentrated injection pen INJECT 80 UNITS UNDER THE SKIN BEFORE BREAKFAST THEN INJECT 60 UNITS UNDER THE SKIN BEFORE LUNCH AND DINNER Yes Scot Brambila MD   clopidogrel (PLAVIX) 75 MG tablet TAKE 1 TABLET BY MOUTH ONE TIME A DAY Yes Brooke Cottrell MD   atenolol (TENORMIN) 25 MG tablet TAKE 1 TABLET BY MOUTH EVERY NIGHT AT BEDTIME Yes MADISON Mejia CNP   JARDIANCE 10 MG tablet TAKE 1 TABLET BY MOUTH ONE TIME A DAY Yes Scot Brambila MD   metFORMIN (GLUCOPHAGE) 1000 MG tablet TAKE 1 TABLET BY MOUTH 2 TIMES A DAY Yes MADISON Mejia CNP   nystatin (MYCOSTATIN) 403052 UNIT/GM ointment Apply topically 2 times daily to corners of mouth. Yes MADISON Mejia CNP   omega-3 acid ethyl esters (LOVAZA) 1 g capsule TAKE 2 CAPSULES BY MOUTH TWO TIMES A DAY Yes MADISON Mejia CNP   atorvastatin (LIPITOR) 40 MG tablet Take 1 tablet by mouth daily Yes Brooke Cottrell MD   metroNIDAZOLE (METROGEL) 1 % gel Apply to face daily.  Yes MADISON Mejia CNP   Insulin Pen Needle (B-D UF III MINI PEN NEEDLES) 31G X 5 MM MISC 1 each by Does not apply route 3 times daily Yes Scot Brambila MD   lisinopril (PRINIVIL;ZESTRIL) 20 MG tablet Take 1 tablet by mouth 2 times daily Yes Brooke Cottrell MD   fenofibrate (TRICOR) 145 MG tablet TAKE 1 TABLET BY MOUTH ONE TIME A DAY Yes Scot Brambila MD   Continuous Blood Gluc  ([address]) [address] As needed Yes Scot Brambila MD   blood glucose test strips (PRODIGY NO CODING BLOOD GLUC) strip Use as directed to test up to 3-4 times daily Yes Scot Brambila MD   desvenlafaxine succinate (PRISTIQ) 50 MG TB24 extended release tablet Take 1 tablet by mouth daily Yes MADISON Mejia CNP   vitamin D3 (CHOLECALCIFEROL) 25 MCG (1000 UT) TABS tablet TAKE 1 TABLET BY MOUTH ONE TIME A DAY Yes MADISON Mejia CNP   ASPIRIN ADULT LOW STRENGTH 81 MG EC tablet TAKE 1 TABLET BY MOUTH ONE TIME A DAY Yes MADISON Saeed CNP   Cholecalciferol (VITAMIN D3) 50 MCG ( UT) CAPS Take 1 capsule by mouth daily Yes Historical Provider, MD   PRODIGY LANCETS 28G MISC Use as directed to test up to 3-4 times daily Yes Bridgette Herzog MD   lisinopril-hydroCHLOROthiazide (PRINZIDE;ZESTORETIC) 20-12.5 MG per tablet TAKE 1 TABLET BY MOUTH ONE TIME A DAY  MADISON Saeed CNP   atenolol (TENORMIN) 25 MG tablet TAKE 1 TABLET BY MOUTH EVERY NIGHT AT BEDTIME  MADISON Saeed CNP       Social History     Tobacco Use    Smoking status: Former Smoker     Packs/day: 1.00     Years: 25.00     Pack years: 25.00     Types: Cigarettes     Quit date: 2000     Years since quittin.3    Smokeless tobacco: Never Used   Vaping Use    Vaping Use: Never used   Substance Use Topics    Alcohol use: No    Drug use: No        No Known Allergies,   Past Medical History:   Diagnosis Date    Acute, but ill-defined, cerebrovascular disease 2013    CAD in native artery 2020    Cerebral artery occlusion with cerebral infarction [facility]) 2013     X2 PERSONALITY CHANGE, ANGER OUTBURSTS    Cerebrovascular disease     Diplopia 2013    ED (erectile dysfunction) 2014    Elbow pain, chronic 2015    Hyperlipidemia     Hypertension     Irritable bowel syndrome     Obstructive sleep apnea (adult) (pediatric) 2013    NO CPAP, HAD PROBLEMS WITH INSURANCE AND STOPPED USING    Occlusion and stenosis of carotid artery without mention of cerebral infarction 2014    MINOR    Pain in joint, upper arm 2015    Polyneuropathy in diabetes(357.2) 2013    PONV (postoperative nausea and vomiting)     Skin abnormality     PRE-CANCEROUS LESIONS REMOVED FROM ARMS AND EARS    Type 2 diabetes mellitus with diabetic polyneuropathy, with long-term current use of insulin ([facility] [address] 2013    Type II or unspecified type diabetes mellitus without mention of complication, not stated as uncontrolled    ,   Past Surgical History:   Procedure Laterality Date    CARPAL TUNNEL RELEASE Left     CERVICAL DISCECTOMY  2017    ANTERIOR CERVICAL DISCECTOMY AND FUSION C5-6, C6-7 WITH MEDTRONIC PLATES, SCREWS, ALLOGRAFT, WITH EVOKES PARTIAL C6 CORPECTOMY    CERVICAL FUSION  2017    CERVICAL LAMINECTOMY AND POSTERIOR FUSION C5-T1 WITH MEDTRONIC RODS AND SCREWS WITH EVOKES AND O-ARM    CHOLECYSTECTOMY      COLONOSCOPY N/A 2018    COLONOSCOPY POLYPECTOMY SNARE/COLD BIOPSY performed by Tia Glaser MD at [facility] Left     ULNAR NERVE TRANSPOSITION, AT SAME TIME AS CTR    FINGER TRIGGER RELEASE Left 2019    LEFT RING TRIGGER FINGER RELEASE performed by Jeremy Slater MD at [facility] Right 2020    RIGHT LONG AND RING TRIGGER FINGER RELEASES -SLEEP APNEA- performed by Jeremy Slater MD at [address] [place] [address]    umbilical    PYLOROPLASTY      PYLORIC STENOSIS AS INFANT   ,   Social History     Tobacco Use    Smoking status: Former Smoker     Packs/day: 1.00     Years: 25.00     Pack years: 25.00     Types: Cigarettes     Quit date: 2000     Years since quittin.3    Smokeless tobacco: Never Used   Vaping Use    Vaping Use: Never used   Substance Use Topics    Alcohol use: No    Drug use: No   ,   Family History   Problem Relation Age of Onset    Cancer Father         melanoma, face    Cancer Paternal Uncle         melanoma, face    Cancer Brother         skin, NMSC    Depression Maternal Cousin     No Known Problems Mother    ,   Immunization History   Administered Date(s) Administered    COVID-19, Timo Hawkins, Primary or Immunocompromised, PF, 100mcg/0.5mL 2021    COVID-19, Pfizer Purple top, DILUTE for use, 12+ yrs, 30mcg/0.3mL dose 2021, 2021    Influenza, MDCK Quadv, IM, PF (Flucelvax 2 yrs and older) 2021    Influenza, Kristie Oliveira, IM, PF (6 mo and older Fluzone, Flulaval, Fluarix, and 3 yrs and older Afluria) 11/29/2018, 12/06/2019, 11/16/2020    Pneumococcal Polysaccharide ([id number]) 11/29/2018   ,   Health Maintenance   Topic Date Due    Annual Wellness Visit (AWV)  Never done    DTaP/Tdap/Td vaccine (1 - Tdap) Never done    Shingles vaccine (1 of 2) Never done    Diabetic retinal exam  08/26/2017    Pneumococcal 0-64 years Vaccine (2 - PCV) 11/29/2019    Diabetic foot exam  03/29/2022    Lipids  05/25/2022    Diabetic microalbuminuria test  01/12/2023    Depression Monitoring  01/12/2023    A1C test (Diabetic or Prediabetic)  03/15/2023    Colorectal Cancer Screen  08/14/2023    Flu vaccine  Completed    COVID-19 Vaccine  Completed    Hepatitis C screen  Completed    HIV screen  Completed    Hepatitis A vaccine  Aged Out    Hib vaccine  Aged Out    Meningococcal (ACWY) vaccine  Aged Out       PHYSICAL EXAMINATION:  [ INSTRUCTIONS:  \"[x]\" Indicates a positive item  \"[]\" Indicates a negative item  -- DELETE ALL ITEMS NOT EXAMINED]  Vital Signs: (As obtained by patient/caregiver or practitioner observation)    Blood pressure-  Heart rate-    Respiratory rate-    Temperature-  Pulse oximetry-     Constitutional: [x] Appears well-developed and well-nourished [x] No apparent distress      [] Abnormal-   Mental status  [x] Alert and awake  [] Oriented to person/place/time [x]Able to follow commands      Eyes:  EOM    [x]  Normal  [] Abnormal-  Sclera  [x]  Normal  [] Abnormal -         Discharge []  None visible  [] Abnormal -    HENT:   [x] Normocephalic, atraumatic.   [x] Abnormal   [] Mouth/Throat: Mucous membranes are moist.     External Ears [x] Normal  [] Abnormal-     Neck: [x] No visualized mass     Pulmonary/Chest: [x] Respiratory effort normal.  [x] No visualized signs of difficulty breathing or respiratory distress        [] Abnormal-      Musculoskeletal:   [] Normal gait with no signs of ataxia         [x] Normal range of motion of neck        [] Abnormal-       Neurological:        [x] No Facial Asymmetry (Cranial nerve 7 motor function) (limited exam to video visit)          [] No gaze palsy        [] Abnormal-         Skin:        [x] No significant exanthematous lesions or discoloration noted on facial skin         [] Abnormal-            Psychiatric:       [x] Normal Affect [] No Hallucinations        [] Abnormal-     Other pertinent observable physical exam findings-     ASSESSMENT/PLAN:  1. History of cervical spinal surgery  - TOMEKA Mendenhall MD, Neurosurgery, [facility]    2. Cervical spinal stenosis  - TOMEKA Mendenhall MD, Neurosurgery, [facility]    3. Neuropathic pain  - TOMEKA Mendenhall MD, Neurosurgery, [facility]      Reviewed MRI findings in great detail  Pt requests to see Dr. Stephie Cha again, however, I did discuss that Dr. Stephie Cha may defer further surgical intervention at this time and referred to Rachel. Pt agreeable. Will CC Dr. Stephie Cha to update and inquire about his preferences. Return if symptoms worsen or fail to improve. Marla Louis is a 60 y.o. male being evaluated by a Virtual Visit (video visit) encounter to address concerns as mentioned above. A caregiver was present when appropriate. Due to this being a TeleHealth encounter (During [place] [address] public health emergency), evaluation of the following organ systems was limited: Vitals/Constitutional/EENT/Resp/CV/GI//MS/Neuro/Skin/Heme-Lymph-Imm. Pursuant to the emergency declaration under the [address] [place] [address], [address] [place] [address] authority and the [employer] Act, this Virtual Visit was conducted with patient's (and/or legal guardian's) consent, to reduce the patient's risk of exposure to COVID-19 and provide necessary medical care.   The patient (and/or legal guardian) has also been advised to contact this office for worsening conditions or problems, and seek emergency medical treatment and/or call 911 if deemed necessary. Services were provided through a video synchronous discussion virtually to substitute for in-person clinic visit. Patient and provider were located at their individual homes. --Rosas Soto MD on 5/16/2022 at 11:56 AM    An electronic signature was used to authenticate this note.

## 2022-05-16 NOTE — Clinical Note
Good morning,     I repeated an MRI on this patient of his neck and reviewed this with him in detail. He has seen you in the past for surgical interventions, however, I did recall that you are performing less surgical interventions from notes/office notes so I went ahead and referred him to [facility]. The  trust's you and a your judgement, so if you would like to see him please let me know and I will reach out to him to inform him! I told him I would reach out to see what your recommendations were. Thank you!   Mai Norton

## 2022-05-23 ENCOUNTER — OFFICE VISIT (OUTPATIENT)
Dept: FAMILY MEDICINE CLINIC | Age: 62
End: 2022-05-23
Payer: COMMERCIAL

## 2022-05-23 ENCOUNTER — HOSPITAL ENCOUNTER (OUTPATIENT)
Age: 62
Discharge: HOME | End: 2022-05-23
Payer: COMMERCIAL

## 2022-05-23 ENCOUNTER — HOSPITAL ENCOUNTER (OUTPATIENT)
Dept: GENERAL RADIOLOGY | Age: 62
Discharge: HOME | End: 2022-05-23
Payer: COMMERCIAL

## 2022-05-23 VITALS
SYSTOLIC BLOOD PRESSURE: 124 MMHG | BODY MASS INDEX: 30.4 KG/M2 | HEART RATE: 64 BPM | WEIGHT: 218 LBS | DIASTOLIC BLOOD PRESSURE: 60 MMHG | OXYGEN SATURATION: 96 %

## 2022-05-23 DIAGNOSIS — M53.3 CHRONIC LEFT SACROILIAC JOINT PAIN: ICD-10-CM

## 2022-05-23 DIAGNOSIS — G89.29 CHRONIC LEFT-SIDED LOW BACK PAIN WITH LEFT-SIDED SCIATICA: ICD-10-CM

## 2022-05-23 DIAGNOSIS — G89.29 CHRONIC LEFT SACROILIAC JOINT PAIN: ICD-10-CM

## 2022-05-23 DIAGNOSIS — E11.69 TYPE 2 DIABETES MELLITUS WITH OTHER SPECIFIED COMPLICATION, WITH LONG-TERM CURRENT USE OF INSULIN (HCC): ICD-10-CM

## 2022-05-23 DIAGNOSIS — M54.42 CHRONIC LEFT-SIDED LOW BACK PAIN WITH LEFT-SIDED SCIATICA: Primary | ICD-10-CM

## 2022-05-23 DIAGNOSIS — M54.42 CHRONIC LEFT-SIDED LOW BACK PAIN WITH LEFT-SIDED SCIATICA: ICD-10-CM

## 2022-05-23 DIAGNOSIS — Z79.4 TYPE 2 DIABETES MELLITUS WITH OTHER SPECIFIED COMPLICATION, WITH LONG-TERM CURRENT USE OF INSULIN (HCC): ICD-10-CM

## 2022-05-23 DIAGNOSIS — G89.29 CHRONIC LEFT-SIDED LOW BACK PAIN WITH LEFT-SIDED SCIATICA: Primary | ICD-10-CM

## 2022-05-23 DIAGNOSIS — L71.9 ROSACEA: ICD-10-CM

## 2022-05-23 PROCEDURE — 3017F COLORECTAL CA SCREEN DOC REV: CPT | Performed by: FAMILY MEDICINE

## 2022-05-23 PROCEDURE — 1036F TOBACCO NON-USER: CPT | Performed by: FAMILY MEDICINE

## 2022-05-23 PROCEDURE — G8427 DOCREV CUR MEDS BY ELIG CLIN: HCPCS | Performed by: FAMILY MEDICINE

## 2022-05-23 PROCEDURE — 3044F HG A1C LEVEL LT 7.0%: CPT | Performed by: FAMILY MEDICINE

## 2022-05-23 PROCEDURE — 2022F DILAT RTA XM EVC RTNOPTHY: CPT | Performed by: FAMILY MEDICINE

## 2022-05-23 PROCEDURE — 72100 X-RAY EXAM L-S SPINE 2/3 VWS: CPT

## 2022-05-23 PROCEDURE — 99214 OFFICE O/P EST MOD 30 MIN: CPT | Performed by: FAMILY MEDICINE

## 2022-05-23 PROCEDURE — G8417 CALC BMI ABV UP PARAM F/U: HCPCS | Performed by: FAMILY MEDICINE

## 2022-05-23 PROCEDURE — 72220 X-RAY EXAM SACRUM TAILBONE: CPT

## 2022-05-23 RX ORDER — LIDOCAINE 50 MG/G
1 PATCH TOPICAL DAILY
Qty: 30 PATCH | Refills: 0 | Status: SHIPPED | OUTPATIENT
Start: 2022-05-23 | End: 2022-06-22

## 2022-05-23 RX ORDER — METRONIDAZOLE 10 MG/G
GEL TOPICAL
Qty: 60 G | Refills: 3 | Status: SHIPPED | OUTPATIENT
Start: 2022-05-23

## 2022-05-23 ASSESSMENT — ENCOUNTER SYMPTOMS
SHORTNESS OF BREATH: 0
CHEST TIGHTNESS: 0
BACK PAIN: 1
ABDOMINAL PAIN: 0
NAUSEA: 0
VOMITING: 0
COUGH: 0

## 2022-05-23 ASSESSMENT — PATIENT HEALTH QUESTIONNAIRE - PHQ9
8. MOVING OR SPEAKING SO SLOWLY THAT OTHER PEOPLE COULD HAVE NOTICED. OR THE OPPOSITE, BEING SO FIGETY OR RESTLESS THAT YOU HAVE BEEN MOVING AROUND A LOT MORE THAN USUAL: 0
4. FEELING TIRED OR HAVING LITTLE ENERGY: 2
SUM OF ALL RESPONSES TO PHQ QUESTIONS 1-9: 10
10. IF YOU CHECKED OFF ANY PROBLEMS, HOW DIFFICULT HAVE THESE PROBLEMS MADE IT FOR YOU TO DO YOUR WORK, TAKE CARE OF THINGS AT HOME, OR GET ALONG WITH OTHER PEOPLE: 0
2. FEELING DOWN, DEPRESSED OR HOPELESS: 0
9. THOUGHTS THAT YOU WOULD BE BETTER OFF DEAD, OR OF HURTING YOURSELF: 0
5. POOR APPETITE OR OVEREATING: 0
3. TROUBLE FALLING OR STAYING ASLEEP: 3
SUM OF ALL RESPONSES TO PHQ9 QUESTIONS 1 & 2: 1
SUM OF ALL RESPONSES TO PHQ QUESTIONS 1-9: 10
SUM OF ALL RESPONSES TO PHQ QUESTIONS 1-9: 10
7. TROUBLE CONCENTRATING ON THINGS, SUCH AS READING THE NEWSPAPER OR WATCHING TELEVISION: 1
1. LITTLE INTEREST OR PLEASURE IN DOING THINGS: 1
6. FEELING BAD ABOUT YOURSELF - OR THAT YOU ARE A FAILURE OR HAVE LET YOURSELF OR YOUR FAMILY DOWN: 3
SUM OF ALL RESPONSES TO PHQ QUESTIONS 1-9: 10

## 2022-05-23 ASSESSMENT — LIFESTYLE VARIABLES
HOW OFTEN DO YOU HAVE A DRINK CONTAINING ALCOHOL: NEVER
HOW MANY STANDARD DRINKS CONTAINING ALCOHOL DO YOU HAVE ON A TYPICAL DAY: PATIENT DECLINED

## 2022-05-23 NOTE — PATIENT INSTRUCTIONS
Patient Education        Sacroiliac Joint Pain: Care Instructions  Your Care Instructions     The sacroiliac joints connect the spine and each side of the pelvis. These joints bear the weight and stress of your torso. This makes them easy toinjure. Injury or overuse of these joints may cause low back pain. Stress on these joints can cause joint pain. Sacroiliac joint pain is more common in pregnant women. Certain kinds of arthritis also may cause this typeof joint pain. Home treatment may help you feel better. So can avoiding activities that stress your back. Your doctor also may recommend physical therapy. This may include doing exercises and stretches to help with pain. You may also learn to use goodposture. Follow-up care is a key part of your treatment and safety. Be sure to make and go to all appointments, and call your doctor if you are having problems. It's also a good idea to know your test results and keep alist of the medicines you take. How can you care for yourself at home?  Ask your doctor about light exercises that may help your back pain. Try to do light activity throughout the day. But make sure to take rests as needed. Find a comfortable position for rest, but don't stay in one position for too long. Avoid activities that cause pain.  To apply heat, put a warm water bottle, a heating pad set on low, or a warm cloth on your back. Do not go to sleep with a heating pad on your skin.  Put ice or a cold pack on your back for 10 to 20 minutes at a time. Put a thin cloth between the ice and your skin.  If the doctor gave you a prescription medicine for pain, take it as prescribed.  If you are not taking a prescription pain medicine, ask your doctor if you can take an over-the-counter pain medicine, such as acetaminophen (Tylenol), ibuprofen (Advil, Motrin), or naproxen (Aleve). Read and follow all instructions on the label. Take pain medicines exactly as directed.    Do not take two or more pain medicines at the same time unless the doctor told you to. Many pain medicines have acetaminophen, which is Tylenol. Too much acetaminophen (Tylenol) can be harmful.  To prevent future back pain, do exercises to stretch and strengthen your back and stomach. Learn how to use good posture, safe lifting techniques, and proper body mechanics. When should you call for help? Call 911 anytime you think you may need emergency care. For example, call if:     You are unable to move a leg at all. Call your doctor now or seek immediate medical care if:     You have new or worse symptoms in your legs or buttocks. Symptoms may include:  ? Numbness or tingling. ? Weakness. ? Pain.      You lose bladder or bowel control. Watch closely for changes in your health, and be sure to contact your doctor if:     You are not getting better as expected. Where can you learn more? Go to https://[employer]Realtime.[employer]ch. org and sign in to your [employer] account. Enter Z080 in the [employer] box to learn more about \"Sacroiliac Joint Pain: Care Instructions. \"     If you do not have an account, please click on the \"Sign Up Now\" link. Current as of: July 1, 2021               Content Version: 13.2  © 2006-2022 [employer]. Care instructions adapted under license by [facility] ([facility]). If you have questions about a medical condition or this instruction, always ask your healthcare professional. [place] [address] any warranty or liability for your use of this information. Patient Education        Sacroiliac Pain: Exercises  Introduction  Here are some examples of exercises for you to try. The exercises may be suggested for a condition or for rehabilitation. Start each exercise slowly. Ease off the exercises if you start to have pain. You will be told when to start these exercises and which ones will work bestfor you. How to do the exercises  Knee-to-chest stretch    1.  Do not do the knee-to-chest exercise if it causes or increases back or leg pain. 2. Lie on your back with your knees bent and your feet flat on the floor. You can put a small pillow under your head and neck if it is more comfortable. 3. Grasp your hands under one knee and bring the knee to your chest, keeping the other foot flat on the floor. 4. Keep your lower back pressed to the floor. Hold for at least 15 to 30 seconds. 5. Relax and lower the knee to the starting position. Repeat with the other leg. 6. Repeat 2 to 4 times with each leg. 7. To get more stretch, keep your other leg flat on the floor while pulling your knee to your chest.  Bridging    1. Lie on your back with both knees bent. Your knees should be bent about 90 degrees. 2. Tighten your belly muscles by pulling in your belly button toward your spine. Then push your feet into the floor, squeeze your buttocks, and lift your hips off the floor until your shoulders, hips, and knees are all in a straight line. 3. Hold for about 6 seconds as you continue to breathe normally, and then slowly lower your hips back down to the floor and rest for up to 10 seconds. 4. Repeat 8 to 12 times. Hip extension    1. Get down on your hands and knees on the floor. 2. Keeping your back and neck straight, lift one leg straight out behind you. When you lift your leg, keep your hips level. Don't let your back twist, and don't let your hip drop toward the floor. 3. Hold for 6 seconds. Repeat 8 to 12 times with each leg. 4. If you feel steady and strong when you do this exercise, you can make it more difficult. To do this, when you lift your leg, also lift the opposite arm straight out in front of you. For example, lift the left leg and the right arm at the same time. (This is sometimes called the \"bird dog exercise. \") Hold for 6 seconds, and repeat 8 to 12 times on each side. Clamshell    1. Lie on your side with a pillow under your head.  Keep your feet and knees together and your knees bent. 2. Raise your top knee, but keep your feet together. Do not let your hips roll back. Your legs should open up like a clamshell. 3. Hold for 6 seconds. 4. Slowly lower your knee back down. Rest for 10 seconds. 5. Repeat 8 to 12 times. 6. Switch to your other side and repeat steps 1 through 5. Hamstring wall stretch    1. Lie on your back in a doorway, with one leg through the open door. 2. Slide your affected leg up the wall to straighten your knee. You should feel a gentle stretch down the back of your leg. 3. Hold the stretch for at least 1 minute to begin. Then try to lengthen the time you hold the stretch to as long as 6 minutes. 4. Switch legs, and repeat steps 1 through 3.  5. Repeat 2 to 4 times. 6. If you do not have a place to do this exercise in a doorway, there is another way to do it:  7. Lie on your back, and bend one knee. 8. Loop a towel under the ball and toes of that foot, and hold the ends of the towel in your hands. 9. Straighten your knee, and slowly pull back on the towel. You should feel a gentle stretch down the back of your leg. 10. Switch legs, and repeat steps 1 through 3.  11. Repeat 2 to 4 times. 1. Do not arch your back. 2. Do not bend either knee. 3. Keep one heel touching the floor and the other heel touching the wall. Do not point your toes. Lower abdominal strengthening    1. Lie on your back with your knees bent and your feet flat on the floor. 2. Tighten your belly muscles by pulling your belly button in toward your spine. 3. Lift one foot off the floor and bring your knee toward your chest, so that your knee is straight above your hip and your leg is bent like the letter \"L. \"  4. Lift the other knee up to the same position. 5. Lower one leg at a time to the starting position. 6. Keep alternating legs until you have lifted each leg 8 to 12 times. 7. Be sure to keep your belly muscles tight and your back still as you are moving your legs.  Be sure to breathe normally. Piriformis stretch    1. Lie on your back with your legs straight. 2. Lift your affected leg, and bend your knee. With your opposite hand, reach across your body, and then gently pull your knee toward your opposite shoulder. 3. Hold the stretch for 15 to 30 seconds. 4. Switch legs and repeat steps 1 through 3.  5. Repeat 2 to 4 times. Follow-up care is a key part of your treatment and safety. Be sure to make and go to all appointments, and call your doctor if you are having problems. It's also a good idea to know your test results and keep alist of the medicines you take. Where can you learn more? Go to https://[employer]. org and sign in to your [employer] account. Enter R292 in the [employer] box to learn more about \"Sacroiliac Pain: Exercises. \"     If you do not have an account, please click on the \"Sign Up Now\" link. Current as of: July 1, 2021               Content Version: 13.2  © 2006-2022 [employer]. Care instructions adapted under license by [facility] ([facility]). If you have questions about a medical condition or this instruction, always ask your healthcare professional. [place] [address] any warranty or liability for your use of this information.

## 2022-05-23 NOTE — PROGRESS NOTES
5/23/2022    This is a 60 y.o. male who presents for  Chief Complaint   Patient presents with    Diabetes    Medicare AWV       HPI:     Established 4/22     DMII: Dx'd 2004. Follows with Dr. Esme Tinsley, Endocrinology, last visit in 3/22 reviewed. Using a Dexcom. + neuropathy, no known retinopathy. Avoid GLP-1 given high TGD and h/o pancreatitis. CAD/ Carotid artery disease/ A fib : follows with Dr. Ishaan Dozier s/p PCI 12/2020, last visit reviewed from 12/21. No acute concerning Sxs noted today       Hx of lung nodules.  Had follow-up CT done on 9/10/2021. Allyson Cheeks was a 6 mm lung nodule which is unchanged from previous in June. Bar Dill is a former smoker quit smoking in 2000. Following yearly     + hepatic steatosis. Aware, has seen GI for this in the past      Chronic neck pain, s/p surgical interventions. Has seen Dr. Kayce Burton in the past. Recent MRI cervical spine was discussed during a prior visit. Will see Dr. Alicea Son office next week. + worsening L lower back pain. Makes it difficult to sleep. + neuropathy, chronic.       Past Medical History:   Diagnosis Date    Acute, but ill-defined, cerebrovascular disease 5/31/2013    CAD in native artery 12/30/2020    Cerebral artery occlusion with cerebral infarction [facility]) 2013     X2 PERSONALITY CHANGE, ANGER OUTBURSTS    Cerebrovascular disease     Diplopia 7/11/2013    ED (erectile dysfunction) 1/23/2014    Elbow pain, chronic 12/16/2015    Hyperlipidemia     Hypertension     Irritable bowel syndrome     Obstructive sleep apnea (adult) (pediatric) 07/01/2013    NO CPAP, HAD PROBLEMS WITH INSURANCE AND STOPPED USING    Occlusion and stenosis of carotid artery without mention of cerebral infarction 01/13/2014    MINOR    Pain in joint, upper arm 5/14/2015    Polyneuropathy in diabetes(357.2) 7/1/2013    PONV (postoperative nausea and vomiting)     Skin abnormality     PRE-CANCEROUS LESIONS REMOVED FROM ARMS AND EARS    Type 2 diabetes mellitus with diabetic polyneuropathy, with long-term current use of insulin ([facility] [address] 2013    Type II or unspecified type diabetes mellitus without mention of complication, not stated as uncontrolled        Past Surgical History:   Procedure Laterality Date    CARPAL TUNNEL RELEASE Left     CERVICAL DISCECTOMY  2017    ANTERIOR CERVICAL DISCECTOMY AND FUSION C5-6, C6-7 WITH MEDTRONIC PLATES, SCREWS, ALLOGRAFT, WITH EVOKES PARTIAL C6 CORPECTOMY    CERVICAL FUSION  2017    CERVICAL LAMINECTOMY AND POSTERIOR FUSION C5-T1 WITH MEDTRONIC RODS AND SCREWS WITH EVOKES AND O-ARM    CHOLECYSTECTOMY      COLONOSCOPY N/A 2018    COLONOSCOPY POLYPECTOMY SNARE/COLD BIOPSY performed by Chavo Agudelo MD at [address] [facility] Left     [address] [facility], AT SAME TIME AS CTR    FINGER TRIGGER RELEASE Left 2019    LEFT RING TRIGGER FINGER RELEASE performed by Kain Alatorre MD at [address] [place] [address] Right 2020    RIGHT LONG AND RING TRIGGER FINGER RELEASES -SLEEP APNEA- performed by Kain Alatorre MD at [address] [place] [address]    umbilical    PYLOROPLASTY      PYLORIC STENOSIS AS INFANT       Social History     Socioeconomic History    Marital status:      Spouse name: Not on file    Number of children: 2    Years of education: Not on file    Highest education level: Not on file   Occupational History    Occupation: disability   Tobacco Use    Smoking status: Former Smoker     Packs/day: 1.00     Years: 25.00     Pack years: 25.00     Types: Cigarettes     Quit date: 2000     Years since quittin.3    Smokeless tobacco: Never Used   Vaping Use    Vaping Use: Never used   Substance and Sexual Activity    Alcohol use: No    Drug use: No    Sexual activity: Yes   Other Topics Concern    Not on file   Social History Narrative    Not on file     Social Determinants of Health     Financial Resource Strain: Low Risk     Difficulty of Paying Living Expenses: Not hard at all   Food Insecurity: No Food Insecurity    Worried About Running Out of Food in the Last Year: Never true    Zak of Food in the Last Year: Never true   Transportation Needs:     Lack of Transportation (Medical): Not on file    Lack of Transportation (Non-Medical): Not on file   Physical Activity: Sufficiently Active    Days of Exercise per Week: 2 days    Minutes of Exercise per Session: 90 min   Stress:     Feeling of Stress : Not on file   Social Connections:     Frequency of Communication with Friends and Family: Not on file    Frequency of Social Gatherings with Friends and Family: Not on file    Attends [employer] Services: Not on file    Active Member of [address] [place] [address] [employer] or Organizations: Not on file    Attends Club or Organization Meetings: Not on file    Marital Status: Not on file   Intimate Partner Violence:     Fear of Current or Ex-Partner: Not on file    Emotionally Abused: Not on file    Physically Abused: Not on file    Sexually Abused: Not on file   Housing Stability:     Unable to Pay for Housing in the Last Year: Not on file    Number of [place] in the Last Year: Not on file    Unstable Housing in the Last Year: Not on file       Family History   Problem Relation Age of Onset    Cancer Father         melanoma, face    Cancer Paternal Uncle         melanoma, face    Cancer Brother         skin, NMSC    Depression Maternal Cousin     No Known Problems Mother        Current Outpatient Medications   Medication Sig Dispense Refill    lidocaine (LIDODERM) 5 % Place 1 patch onto the skin daily 12 hours on, 12 hours off. 30 patch 0    metroNIDAZOLE (METROGEL) 1 % gel Apply to face daily.  60 g 3    buPROPion (WELLBUTRIN XL) 150 MG extended release tablet TAKE ONE TABLET BY MOUTH EVERY MORNING 90 tablet 3    Continuous Blood Gluc Sensor (DEXCOM G6 SENSOR) MISC CHANGE SENSOR EVERY 10 DAYS 9 each 0    Continuous Blood Gluc Transmit (DEXCOM G6 TRANSMITTER) MISC USE AS DIRECTED AND CHANGE EVERY 90 DAYS 1 each 0    clopidogrel (PLAVIX) 75 MG tablet TAKE 1 TABLET BY MOUTH ONE TIME A DAY 90 tablet 3    atenolol (TENORMIN) 25 MG tablet TAKE 1 TABLET BY MOUTH EVERY NIGHT AT BEDTIME 90 tablet 2    atorvastatin (LIPITOR) 40 MG tablet Take 1 tablet by mouth daily 90 tablet 3    Continuous Blood Gluc  (DEXCOM G6 ) KYLAH As needed 1 Device 0    blood glucose test strips (PRODIGY NO CODING BLOOD GLUC) strip Use as directed to test up to 3-4 times daily 400 each 3    desvenlafaxine succinate (PRISTIQ) 50 MG TB24 extended release tablet Take 1 tablet by mouth daily 90 tablet 3    ASPIRIN ADULT LOW STRENGTH 81 MG EC tablet TAKE 1 TABLET BY MOUTH ONE TIME A DAY 90 tablet 3    Cholecalciferol (VITAMIN D3) 50 MCG (2000 UT) CAPS Take 1 capsule by mouth daily      gabapentin (NEURONTIN) 300 MG capsule Take 1 capsule by mouth 2 times daily for 180 days. Intended supply: 30 days 60 capsule 0    HUMULIN R U-500 KWIKPEN 500 UNIT/ML SOPN concentrated injection pen INJECT 80 UNITS UNDER THE SKIN BEFORE BREAKFAST THEN INJECT 60 UNITS UNDER THE SKIN BEFORE LUNCH AND DINNER 60 mL 0    JARDIANCE 10 MG tablet TAKE 1 TABLET BY MOUTH ONE TIME A DAY 90 tablet 1    metFORMIN (GLUCOPHAGE) 1000 MG tablet TAKE 1 TABLET BY MOUTH 2 TIMES A  tablet 1    nystatin (MYCOSTATIN) 158699 UNIT/GM ointment Apply topically 2 times daily to corners of mouth.  30 g 1    omega-3 acid ethyl esters (LOVAZA) 1 g capsule TAKE 2 CAPSULES BY MOUTH TWO TIMES A  capsule 3    Insulin Pen Needle (B-D UF III MINI PEN NEEDLES) 31G X 5 MM MISC 1 each by Does not apply route 3 times daily 300 each 6    lisinopril (PRINIVIL;ZESTRIL) 20 MG tablet Take 1 tablet by mouth 2 times daily 180 tablet 3    fenofibrate (TRICOR) 145 MG tablet TAKE 1 TABLET BY MOUTH ONE TIME A DAY 90 tablet 3    vitamin D3 (CHOLECALCIFEROL) 25 MCG (1000 UT) TABS tablet TAKE 1 TABLET BY MOUTH ONE TIME A DAY 90 tablet 3    PRODIGY LANCETS 28G MISC Use as directed to test up to 3-4 times daily 400 each 3     No current facility-administered medications for this visit. Immunization History   Administered Date(s) Administered    COVID-19, Timo Hawkins, Primary or Immunocompromised, PF, 100mcg/0.5mL 12/27/2021    COVID-19, Pfizer Purple top, DILUTE for use, 12+ yrs, 30mcg/0.3mL dose 02/25/2021, 03/18/2021    Influenza, MDCK Quadv, IM, PF (Flucelvax 2 yrs and older) 11/30/2021    Influenza, Quadv, IM, PF (6 mo and older Fluzone, Flulaval, Fluarix, and 3 yrs and older Afluria) 11/29/2018, 12/06/2019, 11/16/2020    Pneumococcal Polysaccharide ([id number]) 11/29/2018       No Known Allergies    Review of Systems   Constitutional: Negative for activity change, fatigue and fever. HENT: Negative for congestion and tinnitus. Eyes: Negative for visual disturbance. Respiratory: Negative for cough, chest tightness and shortness of breath. Cardiovascular: Negative for chest pain, palpitations and leg swelling. Gastrointestinal: Negative for abdominal pain, nausea and vomiting. Genitourinary: Negative for decreased urine volume and difficulty urinating. Musculoskeletal: Positive for arthralgias, back pain, myalgias and neck pain. Skin: Negative for rash. Neurological: Positive for numbness. Negative for dizziness, weakness, light-headedness and headaches. Psychiatric/Behavioral: Positive for sleep disturbance. The patient is not nervous/anxious. /60 (Site: Right Upper Arm, Position: Sitting, Cuff Size: Medium Adult)   Pulse 64   Wt 218 lb (98.9 kg)   SpO2 96%   BMI 30.40 kg/m²     Physical Exam  Vitals and nursing note reviewed. Constitutional:       General: He is not in acute distress. Appearance: He is well-developed. He is not diaphoretic. HENT:      Head: Normocephalic and atraumatic.    Eyes:      Conjunctiva/sclera: Conjunctivae normal.      Pupils: Pupils are equal, round, and reactive to light. Cardiovascular:      Rate and Rhythm: Normal rate and regular rhythm. Heart sounds: Normal heart sounds. No murmur heard. No friction rub. No gallop. Pulmonary:      Effort: Pulmonary effort is normal. No respiratory distress. Breath sounds: Normal breath sounds. No wheezing or rales. Chest:      Chest wall: No tenderness. Abdominal:      Palpations: Abdomen is soft. Musculoskeletal:         General: Normal range of motion. Cervical back: Normal range of motion and neck supple. Skin:     General: Skin is warm and dry. Capillary Refill: Capillary refill takes 2 to 3 seconds. Findings: No erythema. Neurological:      Mental Status: He is alert and oriented to person, place, and time. Cranial Nerves: No cranial nerve deficit. Psychiatric:         Behavior: Behavior normal.         Thought Content: Thought content normal.         Judgment: Judgment normal.         Plan  1. Chronic left-sided low back pain with left-sided sciatica  - XR LUMBAR SPINE (2-3 VIEWS); Future  - lidocaine (LIDODERM) 5 %; Place 1 patch onto the skin daily 12 hours on, 12 hours off. Dispense: 30 patch; Refill: 0    2. Chronic left sacroiliac joint pain  - XR SACRUM COCCYX (MIN 2 VIEWS); Future  - lidocaine (LIDODERM) 5 %; Place 1 patch onto the skin daily 12 hours on, 12 hours off. Dispense: 30 patch; Refill: 0    3. Rosacea  - metroNIDAZOLE (METROGEL) 1 % gel; Apply to face daily. Dispense: 60 g; Refill: 3    4. Type 2 diabetes mellitus with other specified complication, with long-term current use of insulin ([facility])  - Basic Metabolic Panel; Future        While assessing care for this patient, I have reviewed all pertinent lab work/imaging/ specialist notes and care in reference to those problems addressed above in detail. Appropriate medical decision making was based on this.      Please note that portions of this note may have been completed with a voice recognition program. Efforts were made to edit the dictations but occasionally words are mis-transcribed. Return in about 6 months (around 11/23/2022) for 30 minute visit.

## 2022-05-24 RX ORDER — CLOPIDOGREL BISULFATE 75 MG/1
75 TABLET ORAL DAILY
Qty: 90 TABLET | Refills: 3 | Status: SHIPPED | OUTPATIENT
Start: 2022-05-24 | End: 2022-08-15

## 2022-06-06 RX ORDER — BLOOD-GLUCOSE SENSOR
EACH MISCELLANEOUS
Qty: 9 EACH | Refills: 0 | Status: SHIPPED | OUTPATIENT
Start: 2022-06-06 | End: 2022-08-29

## 2022-06-07 ENCOUNTER — TELEPHONE (OUTPATIENT)
Dept: PULMONOLOGY | Age: 62
End: 2022-06-07

## 2022-06-07 NOTE — TELEPHONE ENCOUNTER
Garry Bell  Patient Appointment Cancel Request Pool 22 hours ago (11:04 AM)           Appointment canceled for Romina Padilla ([id number])  Visit Type: NEW PATIENT  Date        Time      Length    Provider                  Department  6/30/2022   10:20 AM  20 mins.   MADISON Neal - [facility]     Reason for Cancellation: Patient preference

## 2022-06-14 DIAGNOSIS — E11.42 TYPE 2 DIABETES MELLITUS WITH DIABETIC POLYNEUROPATHY, WITH LONG-TERM CURRENT USE OF INSULIN (HCC): Chronic | ICD-10-CM

## 2022-06-14 DIAGNOSIS — Z79.4 TYPE 2 DIABETES MELLITUS WITH DIABETIC POLYNEUROPATHY, WITH LONG-TERM CURRENT USE OF INSULIN (HCC): Chronic | ICD-10-CM

## 2022-06-14 RX ORDER — INSULIN HUMAN 500 [IU]/ML
INJECTION, SOLUTION SUBCUTANEOUS
Qty: 60 ML | Refills: 0 | Status: SHIPPED | OUTPATIENT
Start: 2022-06-14 | End: 2022-09-07

## 2022-06-14 NOTE — TELEPHONE ENCOUNTER
Medication:   Requested Prescriptions     Pending Prescriptions Disp Refills    insulin regular human (HUMULIN R U-500 KWIKPEN) 500 UNIT/ML SOPN concentrated injection pen [Pharmacy Med Name: Nadeem Pagan U-500 KWIKPEN 500 SOPN] 60 mL 0     Sig: INJECT 80 UNITS UNDER THE SKIN BEFORE BREAKFAST THEN 60 UNITS UNDER THE SKIN BEFORE LUNCH AND DINNER       Last Filled:      Patient Phone Number: [phone] (home) [phone] (work)    Last appt: 3/15/2022   Next appt: 6/28/2022    Last Labs DM:   Lab Results   Component Value Date    LABA1C 6.9 03/15/2022

## 2022-06-16 ENCOUNTER — HOSPITAL ENCOUNTER (OUTPATIENT)
Dept: MRI IMAGING | Age: 62
Discharge: HOME | End: 2022-06-16
Payer: COMMERCIAL

## 2022-06-16 DIAGNOSIS — M54.16 RADICULOPATHY, LUMBAR REGION: ICD-10-CM

## 2022-06-16 PROCEDURE — 72148 MRI LUMBAR SPINE W/O DYE: CPT

## 2022-06-28 ENCOUNTER — OFFICE VISIT (OUTPATIENT)
Dept: ENDOCRINOLOGY | Age: 62
End: 2022-06-28
Payer: COMMERCIAL

## 2022-06-28 VITALS
HEIGHT: 71 IN | HEART RATE: 72 BPM | WEIGHT: 218.6 LBS | BODY MASS INDEX: 30.6 KG/M2 | DIASTOLIC BLOOD PRESSURE: 57 MMHG | SYSTOLIC BLOOD PRESSURE: 123 MMHG | OXYGEN SATURATION: 98 %

## 2022-06-28 DIAGNOSIS — E11.42 TYPE 2 DIABETES MELLITUS WITH DIABETIC POLYNEUROPATHY, WITH LONG-TERM CURRENT USE OF INSULIN (HCC): Primary | ICD-10-CM

## 2022-06-28 DIAGNOSIS — E78.2 MIXED HYPERLIPIDEMIA: ICD-10-CM

## 2022-06-28 DIAGNOSIS — Z79.4 TYPE 2 DIABETES MELLITUS WITH DIABETIC POLYNEUROPATHY, WITH LONG-TERM CURRENT USE OF INSULIN (HCC): Primary | ICD-10-CM

## 2022-06-28 LAB — HBA1C MFR BLD: 6.6 %

## 2022-06-28 PROCEDURE — 3044F HG A1C LEVEL LT 7.0%: CPT | Performed by: INTERNAL MEDICINE

## 2022-06-28 PROCEDURE — G8417 CALC BMI ABV UP PARAM F/U: HCPCS | Performed by: INTERNAL MEDICINE

## 2022-06-28 PROCEDURE — 83036 HEMOGLOBIN GLYCOSYLATED A1C: CPT | Performed by: INTERNAL MEDICINE

## 2022-06-28 PROCEDURE — 1036F TOBACCO NON-USER: CPT | Performed by: INTERNAL MEDICINE

## 2022-06-28 PROCEDURE — 99214 OFFICE O/P EST MOD 30 MIN: CPT | Performed by: INTERNAL MEDICINE

## 2022-06-28 PROCEDURE — 3017F COLORECTAL CA SCREEN DOC REV: CPT | Performed by: INTERNAL MEDICINE

## 2022-06-28 PROCEDURE — 2022F DILAT RTA XM EVC RTNOPTHY: CPT | Performed by: INTERNAL MEDICINE

## 2022-06-28 PROCEDURE — G8427 DOCREV CUR MEDS BY ELIG CLIN: HCPCS | Performed by: INTERNAL MEDICINE

## 2022-06-28 RX ORDER — OMEGA-3-ACID ETHYL ESTERS 1 G/1
CAPSULE, LIQUID FILLED ORAL
Qty: 360 CAPSULE | Refills: 3 | Status: SHIPPED | OUTPATIENT
Start: 2022-06-28

## 2022-06-28 NOTE — PROGRESS NOTES
Seen as  Patient for DM    Interim:    Using dexcom  Glucose stable  Low glucose ocC during the day    Has seen Dr. Harleen Kaufman    Patient has a PMH of Type 2 DM, hypertension, hyperlipidemia, obesity , Spinal cord compression in cervical spine. Diagnosed with Diabetes Mellitus type 2 in 2004. Course has been variable . Microvascular complications: No known retinopathy (Last eye exam: 2016)   No known Nephropathy :  Has Peripheral neuropathy: Some numbness and tingling in feet. Home regimen:   Metformin 1000 mg BID  Jardiance   Humulin R U-500 80 units before breakfast , 55 units before lunch and dinner      Previous Meds  Lantus 50  units BID  humalog 25  25 30      Blood glucose trend    Not able to get data as patient not logged in clarity      A1c 12.2 % ---> 9.1 % ---> 10.7 % ---> 11.1 % ---> 9.7%---> 7.3%--->6.9%---> 6.6%    Diet: Eats 3 meals/day   Has been non-compliant with diet  Nutrition education:Yes  Exercise:     Severe, uncontrolled     Hypertension:  He has essentail HTN for many yrs. On Lisinopril/Atenolol. No dizziness, SOB , chest pain. Hypertriglyceridemia : has h/o high TGD  Has  h/o pancreatitis many yrs back.     on fenofibrate 145 mg daily, fish oil 2 gram BID.     ROS: Scanned,reviewed    Past Medical History:   Diagnosis Date    Acute, but ill-defined, cerebrovascular disease 5/31/2013    CAD in native artery 12/30/2020    Cerebral artery occlusion with cerebral infarction [facility]) 2013     X2 PERSONALITY CHANGE, ANGER OUTBURSTS    Cerebrovascular disease     Diplopia 7/11/2013    ED (erectile dysfunction) 1/23/2014    Elbow pain, chronic 12/16/2015    Hyperlipidemia     Hypertension     Irritable bowel syndrome     Obstructive sleep apnea (adult) (pediatric) 07/01/2013    NO CPAP, HAD PROBLEMS WITH INSURANCE AND STOPPED USING    Occlusion and stenosis of carotid artery without mention of cerebral infarction 01/13/2014    MINOR    Pain in joint, upper arm 5/14/2015  Polyneuropathy in diabetes(357.2) 7/1/2013    PONV (postoperative nausea and vomiting)     Skin abnormality     PRE-CANCEROUS LESIONS REMOVED FROM ARMS AND EARS    Type 2 diabetes mellitus with diabetic polyneuropathy, with long-term current use of insulin ([address] 7/1/2013    Type II or unspecified type diabetes mellitus without mention of complication, not stated as uncontrolled      Past Surgical History:   Procedure Laterality Date    CARPAL TUNNEL RELEASE Left 2008    CERVICAL DISCECTOMY  08/02/2017    ANTERIOR CERVICAL DISCECTOMY AND FUSION C5-6, C6-7 WITH MEDTRONIC PLATES, SCREWS, ALLOGRAFT, WITH EVOKES PARTIAL C6 CORPECTOMY    CERVICAL FUSION  12/06/2017    CERVICAL LAMINECTOMY AND POSTERIOR FUSION C5-T1 WITH MEDTRONIC RODS AND SCREWS WITH EVOKES AND O-ARM    CHOLECYSTECTOMY  1995    COLONOSCOPY N/A 8/14/2018    COLONOSCOPY POLYPECTOMY SNARE/COLD BIOPSY performed by Noel Loera MD at [address] [facility] Left 2008    ULNAR NERVE TRANSPOSITION, AT SAME TIME AS CTR    FINGER TRIGGER RELEASE Left 12/12/2019    LEFT RING TRIGGER FINGER RELEASE performed by Zachary Castanon MD at [address] [facility] Right 11/23/2020    RIGHT LONG AND RING TRIGGER FINGER RELEASES -SLEEP APNEA- performed by Zachary Castanon MD at [address] [place] [address]    umbilical    PYLOROPLASTY      PYLORIC STENOSIS AS INFANT     Current Outpatient Medications   Medication Sig Dispense Refill    insulin regular human (HUMULIN R U-500 KWIKPEN) 500 UNIT/ML SOPN concentrated injection pen INJECT 80 UNITS UNDER THE SKIN BEFORE BREAKFAST THEN 60 UNITS UNDER THE SKIN BEFORE LUNCH AND DINNER 60 mL 0    Continuous Blood Gluc Sensor (DEXCOM G6 SENSOR) MISC USE AS DIRECTED AND CHANGE SENSORS EVERY 10 DAYS 9 each 0    clopidogrel (PLAVIX) 75 MG tablet Take 1 tablet by mouth daily 90 tablet 3    metroNIDAZOLE (METROGEL) 1 % gel Apply to face daily.  60 g 3    gabapentin (NEURONTIN) 300 MG capsule Take 1 capsule by mouth 2 times daily for 180 days. Intended supply: 30 days 60 capsule 0    buPROPion (WELLBUTRIN XL) 150 MG extended release tablet TAKE ONE TABLET BY MOUTH EVERY MORNING 90 tablet 3    Continuous Blood Gluc Transmit (DEXCOM G6 TRANSMITTER) MISC USE AS DIRECTED AND CHANGE EVERY 90 DAYS 1 each 0    atenolol (TENORMIN) 25 MG tablet TAKE 1 TABLET BY MOUTH EVERY NIGHT AT BEDTIME 90 tablet 2    JARDIANCE 10 MG tablet TAKE 1 TABLET BY MOUTH ONE TIME A DAY 90 tablet 1    metFORMIN (GLUCOPHAGE) 1000 MG tablet TAKE 1 TABLET BY MOUTH 2 TIMES A  tablet 1    nystatin (MYCOSTATIN) 066184 UNIT/GM ointment Apply topically 2 times daily to corners of mouth.  30 g 1    omega-3 acid ethyl esters (LOVAZA) 1 g capsule TAKE 2 CAPSULES BY MOUTH TWO TIMES A  capsule 3    atorvastatin (LIPITOR) 40 MG tablet Take 1 tablet by mouth daily 90 tablet 3    Insulin Pen Needle (B-D UF III MINI PEN NEEDLES) 31G X 5 MM MISC 1 each by Does not apply route 3 times daily 300 each 6    lisinopril (PRINIVIL;ZESTRIL) 20 MG tablet Take 1 tablet by mouth 2 times daily 180 tablet 3    fenofibrate (TRICOR) 145 MG tablet TAKE 1 TABLET BY MOUTH ONE TIME A DAY 90 tablet 3    Continuous Blood Gluc  (DEXCOM G6 ) KYLAH As needed 1 Device 0    blood glucose test strips (PRODIGY NO CODING BLOOD GLUC) strip Use as directed to test up to 3-4 times daily 400 each 3    desvenlafaxine succinate (PRISTIQ) 50 MG TB24 extended release tablet Take 1 tablet by mouth daily 90 tablet 3    vitamin D3 (CHOLECALCIFEROL) 25 MCG (1000 UT) TABS tablet TAKE 1 TABLET BY MOUTH ONE TIME A DAY 90 tablet 3    ASPIRIN ADULT LOW STRENGTH 81 MG EC tablet TAKE 1 TABLET BY MOUTH ONE TIME A DAY 90 tablet 3    Cholecalciferol (VITAMIN D3) 50 MCG (2000 UT) CAPS Take 1 capsule by mouth daily      PRODIGY LANCETS 28G MISC Use as directed to test up to 3-4 times daily 400 each 3     No current facility-administered medications for this visit. Vitals:    22 1405   BP: (!) 123/57   Pulse: 72   SpO2: 98%     Constitutional: Well-developed, appears stated age, cooperative, in no acute distress  H/E/N/M/T:atraumatic, normocephalic, external ears, nose, lips normal without lesions  Eyes: Lids, lashes, conjunctivae and sclerae normal, No proptosis, no redness  Neck: supple, symmetrical, no swelling  Skin: No obvious rashes or lesions present. Skin and hair texture normal  Psychiatric: Judgement and Insight:  judgement and insight appear normal  Neuro: Normal without focal findings, speech is normal normal, speech is spontaneous  Chest: No labored breathing, no chest deformity, no stridor  Musculoskeletal: No joint deformity, swelling      Lab Results   Component Value Date    LABA1C 6.9 03/15/2022     Orders Only on 2022   Component Date Value Ref Range Status    Rheumatoid Factor 2022 <10.0  <14 IU/mL Final    Magnesium 2022 1.80  1.80 - 2.40 mg/dL Final    Ferritin 2022 149.7  30.0 - 400.0 ng/mL Final    Folate 2022 15.77  4.78 - 24.20 ng/mL Final    Comment: Effective 11-15- 10:00am EST  Please note reference ranges have  changed for Folate.  Vitamin B-12 2022 463  211 - 911 pg/mL Final    TSH 2022 1.96  0.27 - 4.20 uIU/mL Final    Sed Rate 2022 25* 0 - 20 mm/Hr Final    CRP 2022 3.5  0.0 - 5.1 mg/L Final    Comment: WR-CRP Reference range:  30D-199Y    <5.1  <30D        Not established    CRP is used in the detection and evaluation of infection,  tissue injury, inflammatory disorders, and associated  disease. Increases in CRP values are non-specific and  should not be interpreted without a complete clinical  evaluation.  reference ranges have not been  established and values should be interpreted within clinical  context and with serial measurements, if clinically  appropriate.       EMILIANA 2022 Negative  Negative Final    Comment: If POSITIVE, specimen will be sent to reference laboratory  for further testing. EMILIANA specimens are screened using multiplex bead immunoassay  methodology. All results reported as positive are further tested  by indirect fluorescent assay (IFA) using Hep-2 substrate with  an IgG-specific conjugate. The EMILIANA screen is designed to detect  antibodies against dsDNA, SS-A (Ro), SS-B (La), Padilla (Sm), SmRNP,  RNP, Scl-70, Earlene-1, Centromere, Chromatin, and Ribosomal P. Office Visit on 03/15/2022   Component Date Value Ref Range Status    Hemoglobin A1C 03/15/2022 6.9  % Final           Assessment/Plan      1. Type 2 DM     Kirk Renteria is a 60 y.o. male has Type 2 DM with obesity and insulin resistance. Poorly controlled DM. Complicated by neuropathy.     - Change  Humulin R U-500 70 units before breakfast , 55 units before lunch and dinner   SSI 5 for 50 > 150  - 20 < 100    jardiance  Metformin    Using dexcom     recommend low carb diet ( 40-45 grams with each meal)    Referred to [address] [place] [address] for dietary modification. Avoid GLP-1 given high TGD and h/o pancreatitis. Advised follow-up with the ophthalmologist once BS better. Due  Urine microalbumin/cr ratio normal in 071/8, 12/19 , 9/21  On ace-inhibitor. Discussed foot care. Has neuropathy on exam.       Pt on ASA. Normal TSH in 12/19     2. Hypertension. BP at goal    3. Hyperlipidemia. TGD have improved. Has h/o pancreatitis   on fenofibrate 145 mg daily, fish oil 2 gram BID.   lipitor 80mg    TGD 1560 ---> 506---> 1051--> 544---> 220  LDL 71---> 112---> 114---> 112---> 38  HDL 31---> 31    Continue same regimen. 4. Obesity. Advised life style changes.     Patient is on high dose of insulin which has a narrow therapeutic index and risk of complications including severe hypoglycemia

## 2022-06-30 ENCOUNTER — OFFICE VISIT (OUTPATIENT)
Dept: ORTHOPEDIC SURGERY | Age: 62
End: 2022-06-30
Payer: COMMERCIAL

## 2022-06-30 VITALS — WEIGHT: 218 LBS | HEIGHT: 71 IN | BODY MASS INDEX: 30.52 KG/M2

## 2022-06-30 DIAGNOSIS — M47.12 CERVICAL SPONDYLOSIS WITH MYELOPATHY: Primary | ICD-10-CM

## 2022-06-30 PROCEDURE — 3017F COLORECTAL CA SCREEN DOC REV: CPT | Performed by: ORTHOPAEDIC SURGERY

## 2022-06-30 PROCEDURE — 99204 OFFICE O/P NEW MOD 45 MIN: CPT | Performed by: ORTHOPAEDIC SURGERY

## 2022-06-30 PROCEDURE — 1036F TOBACCO NON-USER: CPT | Performed by: ORTHOPAEDIC SURGERY

## 2022-06-30 PROCEDURE — G8427 DOCREV CUR MEDS BY ELIG CLIN: HCPCS | Performed by: ORTHOPAEDIC SURGERY

## 2022-06-30 PROCEDURE — G8417 CALC BMI ABV UP PARAM F/U: HCPCS | Performed by: ORTHOPAEDIC SURGERY

## 2022-06-30 NOTE — PROGRESS NOTES
New Patient: CERVICAL SPINE    CHIEF COMPLAINT:    Chief Complaint   Patient presents with    Neck Pain     Patient states that his neck pain started severral months ago. Patient has neck pain and burning in palm of hands. Trouble with balance. HISTORY OF PRESENT ILLNESS:   Mr. Bailey Goyal is a pleasant 60 y.o. old male, 4 years s/p ACDF C5-6 and C6-7, and posterior cervical laminectomy and fusion, here for consultation regarding his neck pain. He states his symptoms began several months ago. He rates his neck pain 3/10 and shoulder and arm pain 3/10. He notes 5/10 low back and left greater than right leg pain. He notes stiffness in his neck. He denies upper extremity radicular symptoms, but does note intermittent burning in his palms and occasional difficulty with fine motor tasks in his hands. He notes numbness and tingling in bilateral feet. He states his arm symptoms are not as severe as his symptoms prior to his previous surgery. He does note similar balance disruption and gait instability over the pat 6-12 months. He denies lower extremity symptoms, gait abnormality, saddle numbness and bowel or bladder dysfunction.      Current/Past Treatment:   · Physical Therapy: No  · Chiropractic:  No  · Injection:  No   · Medications: Lidoderm patch, Neurontin      Past Medical History:   Past Medical History:   Diagnosis Date    Acute, but ill-defined, cerebrovascular disease 5/31/2013    CAD in native artery 12/30/2020    Cerebral artery occlusion with cerebral infarction [facility]) 2013     X2 PERSONALITY CHANGE, ANGER OUTBURSTS    Cerebrovascular disease     Diplopia 7/11/2013    ED (erectile dysfunction) 1/23/2014    Elbow pain, chronic 12/16/2015    Hyperlipidemia     Hypertension     Irritable bowel syndrome     Obstructive sleep apnea (adult) (pediatric) 07/01/2013    NO CPAP, HAD PROBLEMS WITH INSURANCE AND STOPPED USING    Occlusion and stenosis of carotid artery without mention of cerebral infarction 01/13/2014    MINOR    Pain in joint, upper arm 5/14/2015    Polyneuropathy in diabetes(357.2) 7/1/2013    PONV (postoperative nausea and vomiting)     Skin abnormality     PRE-CANCEROUS LESIONS REMOVED FROM ARMS AND EARS    Type 2 diabetes mellitus with diabetic polyneuropathy, with long-term current use of insulin ([facility] [address] 7/1/2013    Type II or unspecified type diabetes mellitus without mention of complication, not stated as uncontrolled         Past Surgical History:     Past Surgical History:   Procedure Laterality Date    CARPAL TUNNEL RELEASE Left 2008    CERVICAL DISCECTOMY  08/02/2017    ANTERIOR CERVICAL DISCECTOMY AND FUSION C5-6, C6-7 WITH MEDTRONIC PLATES, SCREWS, ALLOGRAFT, WITH EVOKES PARTIAL C6 CORPECTOMY    CERVICAL FUSION  12/06/2017    CERVICAL LAMINECTOMY AND POSTERIOR FUSION C5-T1 WITH MEDTRONIC RODS AND SCREWS WITH EVOKES AND O-ARM    CHOLECYSTECTOMY  1995    COLONOSCOPY N/A 8/14/2018    COLONOSCOPY POLYPECTOMY SNARE/COLD BIOPSY performed by Estefanía Ray MD at [address] [facility] [place] Left 2008    ULNAR NERVE TRANSPOSITION, AT SAME TIME AS CTR    FINGER TRIGGER RELEASE Left 12/12/2019    LEFT RING TRIGGER FINGER RELEASE performed by Linda Kendrick MD at [facility] Right 11/23/2020    RIGHT LONG AND RING TRIGGER FINGER RELEASES -SLEEP APNEA- performed by Linda Kendrick MD at [address] [place] [address]    umbilical    PYLOROPLASTY      PYLORIC STENOSIS AS INFANT       Current Medications:     Current Outpatient Medications:     omega-3 acid ethyl esters (LOVAZA) 1 g capsule, TAKE 2 CAPSULES BY MOUTH TWO TIMES A DAY, Disp: 360 capsule, Rfl: 3    metFORMIN (GLUCOPHAGE) 1000 MG tablet, One tab PO bid, Disp: 180 tablet, Rfl: 1    insulin regular human (HUMULIN R U-500 KWIKPEN) 500 UNIT/ML SOPN concentrated injection pen, INJECT 80 UNITS UNDER THE SKIN BEFORE BREAKFAST THEN 60 UNITS UNDER THE SKIN BEFORE LUNCH AND DINNER, Disp: 60 mL, Rfl: 0    Continuous Blood Gluc Sensor (DEXCOM G6 SENSOR) MISC, USE AS DIRECTED AND CHANGE SENSORS EVERY 10 DAYS, Disp: 9 each, Rfl: 0    clopidogrel (PLAVIX) 75 MG tablet, Take 1 tablet by mouth daily, Disp: 90 tablet, Rfl: 3    metroNIDAZOLE (METROGEL) 1 % gel, Apply to face daily. , Disp: 60 g, Rfl: 3    gabapentin (NEURONTIN) 300 MG capsule, Take 1 capsule by mouth 2 times daily for 180 days.  Intended supply: 30 days, Disp: 60 capsule, Rfl: 0    buPROPion (WELLBUTRIN XL) 150 MG extended release tablet, TAKE ONE TABLET BY MOUTH EVERY MORNING, Disp: 90 tablet, Rfl: 3    Continuous Blood Gluc Transmit (DEXCOM G6 TRANSMITTER) MISC, USE AS DIRECTED AND CHANGE EVERY 90 DAYS, Disp: 1 each, Rfl: 0    atenolol (TENORMIN) 25 MG tablet, TAKE 1 TABLET BY MOUTH EVERY NIGHT AT BEDTIME, Disp: 90 tablet, Rfl: 2    JARDIANCE 10 MG tablet, TAKE 1 TABLET BY MOUTH ONE TIME A DAY, Disp: 90 tablet, Rfl: 1    nystatin (MYCOSTATIN) 242528 UNIT/GM ointment, Apply topically 2 times daily to corners of mouth., Disp: 30 g, Rfl: 1    atorvastatin (LIPITOR) 40 MG tablet, Take 1 tablet by mouth daily, Disp: 90 tablet, Rfl: 3    Insulin Pen Needle (B-D UF III MINI PEN NEEDLES) 31G X 5 MM MISC, 1 each by Does not apply route 3 times daily, Disp: 300 each, Rfl: 6    lisinopril (PRINIVIL;ZESTRIL) 20 MG tablet, Take 1 tablet by mouth 2 times daily, Disp: 180 tablet, Rfl: 3    fenofibrate (TRICOR) 145 MG tablet, TAKE 1 TABLET BY MOUTH ONE TIME A DAY, Disp: 90 tablet, Rfl: 3    Continuous Blood Gluc  (DEXCOM G6 ) KYLAH, As needed, Disp: 1 Device, Rfl: 0    blood glucose test strips (PRODIGY NO CODING BLOOD GLUC) strip, Use as directed to test up to 3-4 times daily, Disp: 400 each, Rfl: 3    desvenlafaxine succinate (PRISTIQ) 50 MG TB24 extended release tablet, Take 1 tablet by mouth daily, Disp: 90 tablet, Rfl: 3    vitamin D3 (CHOLECALCIFEROL) 25 MCG (1000 UT) TABS tablet, TAKE 1 TABLET BY MOUTH ONE TIME A DAY, Disp: 90 tablet, Rfl: 3    ASPIRIN ADULT LOW STRENGTH 81 MG EC tablet, TAKE 1 TABLET BY MOUTH ONE TIME A DAY, Disp: 90 tablet, Rfl: 3    Cholecalciferol (VITAMIN D3) 50 MCG (2000 UT) CAPS, Take 1 capsule by mouth daily, Disp: , Rfl:     PRODIGY LANCETS 28G MISC, Use as directed to test up to 3-4 times daily, Disp: 400 each, Rfl: 3    Allergies:  Patient has no known allergies. Social History:    reports that he quit smoking about 22 years ago. His smoking use included cigarettes. He has a 25.00 pack-year smoking history. He has never used smokeless tobacco. He reports that he does not drink alcohol and does not use drugs. Family History:   Family History   Problem Relation Age of Onset    Cancer Father         melanoma, face    Cancer Paternal Uncle         melanoma, face    Cancer Brother         skin, NMSC    Depression Maternal Cousin     No Known Problems Mother        REVIEW OF SYSTEMS: Full ROS noted & scanned   CONSTITUTIONAL: Denies unexplained weight loss, fevers, chills or fatigue  NEUROLOGICAL: Denies unsteady gait or progressive weakness  MUSCULOSKELETAL: Denies joint swelling or redness  PSYCHOLOGICAL: Denies anxiety, depression   SKIN: Denies skin changes, delayed healing, rash, itching   HEMATOLOGIC: Denies easy bleeding or bruising  ENDOCRINE: Denies excessive thirst, urination, heat/cold  RESPIRATORY: Denies current dyspnea, cough  GI: Denies nausea, vomiting, diarrhea   : Denies bowel or bladder issues       PHYSICAL EXAM:    Vitals: Height 5' 11\" (1.803 m), weight 218 lb (98.9 kg). GENERAL EXAM:  · General Apparence: Patient is adequately groomed with no evidence of malnutrition. · Orientation: The patient is oriented to time, place and person. · Mood & Affect:The patient's mood and affect are appropriate   · Vascular: Examination reveals no swelling tenderness in upper or lower extremities.  Good capillary refill  · Lymphatic: The lymphatic examination bilaterally reveals all areas to be without enlargement or induration  · Sensation: Sensation is intact without deficit  · Coordination/Balance: Good coordination. Decreased ability to tandem walk. CERVICAL EXAMINATION:  · Inspection: Local inspection shows no step-off or bruising. Cervical alignment is normal.     · Palpation: No evidence of tenderness at the midline, and trapezius. Paraspinal tenderness is present. There is no step-off or paraspinal spasm. · Range of Motion: Cervical flexion, extension, and rotation are mildly reduced with pain. · Strength: 5/5 bilateral upper extremities   · Special Tests:    ·  Spurling's negative & Vasquez's negative bilaterally. ·  Cubital and Carpal tunnel Tinel's negative bilaterally. · Skin:There are no rashes, ulcerations or lesions in right & left upper extremities. · Reflexes: Bilaterally triceps, biceps and brachioradialis are 2+. Clonus absent bilaterally at the feet. · Gait & station: normal, patient ambulates without assistance       · Additional Examinations:       · RIGHT UPPER EXTREMITY:  Inspection/examination of the right upper extremity does not show any tenderness, deformity or injury. Range of motion is unremarkable. There is no gross instability. There are no rashes, ulcerations or lesions. Strength and tone are normal.  · LEFT UPPER EXTREMITY: Inspection/examination of the left upper extremity does not show any tenderness, deformity or injury. Range of motion is unremarkable. There is no gross instability. There are no rashes, ulcerations or lesions. Strength and tone are normal.    LUMBAR/SACRAL EXAMINATION:  · Inspection: Local inspection shows no step-off or bruising. Lumbar alignment is normal.  Sagittal and Coronal balance is neutral.      · Palpation:   No evidence of tenderness at the midline. No tenderness bilaterally at the paraspinal or trochanters. There is no step-off or paraspinal spasm.    · Range of Motion: Lumbar flexion, extension and rotation are mildly limited due to pain. · Strength:   Strength testing is 5/5 in all muscle groups tested. · Special Tests:   Straight leg raise and crossed SLR negative. Leg length and pelvis level. · Skin: There are no rashes, ulcerations or lesions. · Reflexes: Reflexes are symmetrically 1+ at the patellar and ankle tendons. Clonus absent bilaterally at the feet. · Gait & station: normal, patient ambulates without assistance  · Additional Examinations:   · RIGHT LOWER EXTREMITY: Inspection/examination of the right lower extremity does not show any tenderness, deformity or injury. Range of motion is unremarkable. There is no gross instability. There are no rashes, ulcerations or lesions. Strength and tone are normal.  · LEFT LOWER EXTREMITY:  Inspection/examination of the left lower extremity does not show any tenderness, deformity or injury. Range of motion is unremarkable. There is no gross instability. There are no rashes, ulcerations or lesions. Strength and tone are normal.    Diagnostic Testing:  I independently reviewed MRI images of his cervical spine from 5/4/22. They show ACDF C5-6 and C6-7, and posterior laminectomy and fusion C5-6 and C6-7 with severe stenosis C4-5. I Independently reviewed MRI images of his lumbar spine from 6/16/22. They show moderate to severe spinal stenosis at L4-5. Central disc protrusion L5-S1 and facet arthropathy with moderate bilateral foraminal narrowing. Impression:   4 years s/p C5-C7 ACDF/posterior cervical laminectomy and fusion with severe spinal stenosis C4-5  Lumbar stenosis with neurogenic claudication    Plan:    We discussed treatment options including observation, epidural injections, physical therapy and anterior cervical discectomy and fusion or posterior cervical laminectomy and fusion. I recommended he return to see Dr. Nadeen Wheat for possible surgical intervention.

## 2022-07-06 ENCOUNTER — TELEPHONE (OUTPATIENT)
Dept: CARDIOLOGY CLINIC | Age: 62
End: 2022-07-06

## 2022-07-06 ENCOUNTER — TELEPHONE (OUTPATIENT)
Dept: FAMILY MEDICINE CLINIC | Age: 62
End: 2022-07-06

## 2022-07-06 NOTE — TELEPHONE ENCOUNTER
Telephone Call - Requesting Pre-Op appointment    Please ask patient to bring Pre-Op forms with them to the Pre-Op appointment. Date of Surgery:  8/10/2022  Procedure: Neck   Surgeon: Dr. Gamaliel Worrell:    Noe Torre appointment scheduled - Yes   If no preop appointment available, advise patient practice will call back. Consult with Provider or MA and call patient back to schedule at the work in time.        Future Appointments   Date Time Provider Avelino Owens   7/7/2022  1:00 PM Davion Reeves MD [facility]   8/3/2022 10:00 AM [facility] VCT [facility] CT Teresia Pillo   10/12/2022 11:20 AM MD Tania Mejia [facility]   12/1/2022  9:00 AM MD DAV Perez  [place] - [facility]   12/5/2022  7:30 AM MD Anand Mackay [facility]

## 2022-07-06 NOTE — TELEPHONE ENCOUNTER
Patient last seen 12/2021. Assessment:   Coronary artery disease- Stable.  successful IVUS-guided PCI of proximal to mid-LAD with overlapping Xience Mariluz 3.0 x 28 mm and 3.5 x 23 mm MING 12/30/2020   Abnormal EKG   Hypertension - controlled  Hyperlipidemia - LDL low  Diabetes Mellitus   Sleep apnea   History of CVA 2013  Former smoker - 20 years ago   Family history of heart disease     Plan:  Decrease Lipitor to 40 mg daily   Fasting lipids next summer   Repeat carotid dopplers 12/2022  Cardiac medications reviewed including indications and pertinent side effects    Check blood pressure and heart rate at home a few times per week- keep a log with dates and times and bring to office visit   Regular exercise and following a healthy diet encouraged   Follow up with me in 1 year

## 2022-07-06 NOTE — TELEPHONE ENCOUNTER
>6 mo since last eval. Needs evaluated. I have spot opening tomorrow 2:15 PM incidentally and can fit in if he can make it here at [facility].  YUE CANO

## 2022-07-06 NOTE — TELEPHONE ENCOUNTER
CARDIAC CLEARANCE REQUEST    What type of procedure are you having: neck surgery- pinched spine     Are you taking any blood thinners: aspirin     Type on anesthesia: unsure     When is your procedure scheduled for: 07/10/2022    What physician is performing your procedure: Dr. Mary Fuentes     Phone Number: [phone]    Fax number to send the letter:

## 2022-07-07 ENCOUNTER — OFFICE VISIT (OUTPATIENT)
Dept: CARDIOLOGY CLINIC | Age: 62
End: 2022-07-07
Payer: COMMERCIAL

## 2022-07-07 VITALS
SYSTOLIC BLOOD PRESSURE: 126 MMHG | HEIGHT: 71 IN | WEIGHT: 214 LBS | OXYGEN SATURATION: 97 % | BODY MASS INDEX: 29.96 KG/M2 | DIASTOLIC BLOOD PRESSURE: 60 MMHG | HEART RATE: 97 BPM

## 2022-07-07 DIAGNOSIS — Z01.818 PRE-OP EXAMINATION: Primary | ICD-10-CM

## 2022-07-07 DIAGNOSIS — E78.2 MIXED HYPERLIPIDEMIA: Chronic | ICD-10-CM

## 2022-07-07 DIAGNOSIS — I10 PRIMARY HYPERTENSION: Chronic | ICD-10-CM

## 2022-07-07 DIAGNOSIS — Z79.899 MEDICATION MANAGEMENT: ICD-10-CM

## 2022-07-07 DIAGNOSIS — Z01.810 PREOP CARDIOVASCULAR EXAM: ICD-10-CM

## 2022-07-07 DIAGNOSIS — I48.91 ATRIAL FIBRILLATION, UNSPECIFIED TYPE (HCC): ICD-10-CM

## 2022-07-07 DIAGNOSIS — I25.10 CAD IN NATIVE ARTERY: ICD-10-CM

## 2022-07-07 DIAGNOSIS — I65.23 BILATERAL CAROTID ARTERY STENOSIS: ICD-10-CM

## 2022-07-07 PROCEDURE — 3017F COLORECTAL CA SCREEN DOC REV: CPT | Performed by: INTERNAL MEDICINE

## 2022-07-07 PROCEDURE — 93000 ELECTROCARDIOGRAM COMPLETE: CPT | Performed by: INTERNAL MEDICINE

## 2022-07-07 PROCEDURE — 1036F TOBACCO NON-USER: CPT | Performed by: INTERNAL MEDICINE

## 2022-07-07 PROCEDURE — G8417 CALC BMI ABV UP PARAM F/U: HCPCS | Performed by: INTERNAL MEDICINE

## 2022-07-07 PROCEDURE — 99214 OFFICE O/P EST MOD 30 MIN: CPT | Performed by: INTERNAL MEDICINE

## 2022-07-07 PROCEDURE — G8427 DOCREV CUR MEDS BY ELIG CLIN: HCPCS | Performed by: INTERNAL MEDICINE

## 2022-07-07 NOTE — PATIENT INSTRUCTIONS
1. Current medications reviewed. Refills given as warranted. 2. Repeat cholesterol blood work   -you will need to be fasting   -it is ok to take medications with sips of water or black coffee  3. Your EKG looks good today  4. Call to schedule a Myoview Stress Test to look for blockages and the blood flow through the heart  -A small IV will be placed into your arm to administer a radioisotope (myoview) to visualize your heart. .   -You will then have a waiting period to allow the tracer to be absorbed by the heart muscle. After the waiting period, we will take pictures of the blood flow to your heart muscle with the nuclear camera. -Following your pictures, we will perform your stress test. We can do your stress test by having you walk on the treadmill or by giving you a medication (Ricardo Lacer) which makes your body think it is exercising.   -We will monitor you before, during, and after your stress test to make sure you are safe and comfortable. Your provider has ordered testing for further evaluation. An order/prescription has been included in your paper work.  To schedule outpatient testing, contact Central Scheduling by calling [address] [place] [address] ([phone]).

## 2022-07-07 NOTE — PROGRESS NOTES
CARDIOLOGY CONSULTATION        Patient Name: Efra Ocampo  Primary Care physician: Luis Pratt MD    Reason for Referral/Chief Complaint: Efra Ocampo is a 60 y.o. patient who is referred to cardiology clinic today for cardiac risk stratification. History of Present Illness:   Efra Ocampo 1960 has a prior medical hisotry significant for coronary artery disease, abnormal EKG carotid artery disease, hypertension, hyperlipidemia. Patient has a family history of heart diease. He had an echocardiogram on 12/18/2020 which showed an EF of 60%, carotid dopplers less than 50% bilaterally. He had an abnormal stress test and underwent a left heart cath with Dr. Bianca Hastings on 12/30/2020- Successful IVUS-guided PCI of proximal to mid-LAD with overlapping Xience Mariluz 3.0 x 28 mm and 3.5 x 23 mm MING. Today, he states he is having neck surgery with [facility] on Aug 10th. He presents with a cane for a bulging disc in his back the past month. He reports he has been feeling fine and hasn't been having any cardiac problems. No chest pain. No dyspnea with exertion however, he has not been greatly active due to MSK complaints. The patient endorses highest level of activity as He was active a month ago and was mowing the grass with a riding mower and trim work. He hasn't been walking much since he had his previous neck surgery and he hasn't walked much as well apart from grocery shopping. He has a ranch home, no stairs, no hills on regular basis. He denied any symptoms before his previous heart cath (had abnormal EKG that led to stress test). His A1C was 6.6 last week. Reports his weight is stable. Patient denies current edema, orthopnea, paroxysmal nocturnal dyspnea. Denies palpitations, dizziness or syncope. Patient is taking all cardiac medications as prescribed and tolerates them well.       Past Medical History:   has a past medical history of Acute, but ill-defined, cerebrovascular disease, CAD in native artery, Cerebral artery occlusion with cerebral infarction [facility]), Cerebrovascular disease, Diplopia, ED (erectile dysfunction), Elbow pain, chronic, Hyperlipidemia, Hypertension, Irritable bowel syndrome, Obstructive sleep apnea (adult) (pediatric), Occlusion and stenosis of carotid artery without mention of cerebral infarction, Pain in joint, upper arm, Polyneuropathy in diabetes(357.2), PONV (postoperative nausea and vomiting), Skin abnormality, Type 2 diabetes mellitus with diabetic polyneuropathy, with long-term current use of insulin ([facility] [address] and Type II or unspecified type diabetes mellitus without mention of complication, not stated as uncontrolled. Surgical History:   has a past surgical history that includes Cholecystectomy (1995); Carpal tunnel release (Left, 2008); Elbow surgery (Left, 2008); hernia repair (2009); Pyloroplasty; Cervical discectomy (08/02/2017); cervical fusion (12/06/2017); Colonoscopy (N/A, 8/14/2018); Finger trigger release (Left, 12/12/2019); and Finger trigger release (Right, 11/23/2020). Social History:   reports that he quit smoking about 22 years ago. His smoking use included cigarettes. He has a 25.00 pack-year smoking history. He has never used smokeless tobacco. He reports that he does not drink alcohol and does not use drugs. Family History:  family history includes Cancer in his brother, father, and paternal uncle; Depression in his maternal cousin; No Known Problems in his mother. Home Medications:  Were reviewed and are listed in nursing record and/or below  Prior to Admission medications    Medication Sig Start Date End Date Taking?  Authorizing Provider   omega-3 acid ethyl esters (LOVAZA) 1 g capsule TAKE 2 CAPSULES BY MOUTH TWO TIMES A DAY 6/28/22  Yes Kelsey Mcginnis MD   metFORMIN (GLUCOPHAGE) 1000 MG tablet One tab PO bid 6/28/22  Yes Kelsey Mcginnis MD   insulin regular human (HUMULIN R U-500 KWIKPEN) 500 UNIT/ML SOPN concentrated injection pen INJECT 80 UNITS UNDER THE SKIN BEFORE BREAKFAST THEN 60 UNITS UNDER THE SKIN BEFORE LUNCH AND DINNER 6/14/22  Yes Linda Daniels MD   Continuous Blood Gluc Sensor (DEXCOM G6 SENSOR) MISC USE AS DIRECTED AND CHANGE SENSORS EVERY 10 DAYS 6/6/22  Yes Linda Daniels MD   clopidogrel (PLAVIX) 75 MG tablet Take 1 tablet by mouth daily 5/24/22  Yes Herve Kathleen MD   metroNIDAZOLE (METROGEL) 1 % gel Apply to face daily. 5/23/22  Yes Hunter Bobo MD   buPROPion (WELLBUTRIN XL) 150 MG extended release tablet TAKE ONE TABLET BY MOUTH EVERY MORNING 3/29/22  Yes MADISON Walker CNP   Continuous Blood Gluc Transmit (DEXCOM G6 TRANSMITTER) MISC USE AS DIRECTED AND CHANGE EVERY 90 DAYS 2/28/22  Yes Linda Daniels MD   atenolol (TENORMIN) 25 MG tablet TAKE 1 TABLET BY MOUTH EVERY NIGHT AT BEDTIME 2/8/22  Yes MADISON Walker CNP   JARDIANCE 10 MG tablet TAKE 1 TABLET BY MOUTH ONE TIME A DAY 2/7/22  Yes Linda Daniels MD   nystatin (MYCOSTATIN) 195087 UNIT/GM ointment Apply topically 2 times daily to corners of mouth.  1/12/22  Yes MADISON Walker CNP   atorvastatin (LIPITOR) 40 MG tablet Take 1 tablet by mouth daily 12/3/21  Yes Herve Kathleen MD   Insulin Pen Needle (B-D UF III MINI PEN NEEDLES) 31G X 5 MM MISC 1 each by Does not apply route 3 times daily 10/18/21  Yes Linda Daniels MD   lisinopril (PRINIVIL;ZESTRIL) 20 MG tablet Take 1 tablet by mouth 2 times daily 10/13/21  Yes Herve Kathleen MD   fenofibrate (TRICOR) 145 MG tablet TAKE 1 TABLET BY MOUTH ONE TIME A DAY 8/25/21  Yes Linda Daniels MD   Continuous Blood Gluc  (Bekah Sanes) [address] As needed 8/17/21  Yes Linda Daniels MD   blood glucose test strips (PRODIGY NO CODING BLOOD GLUC) strip Use as directed to test up to 3-4 times daily 8/17/21  Yes Linda Daniels MD   desvenlafaxine succinate (PRISTIQ) 50 MG TB24 extended release tablet Take 1 tablet by mouth daily 6/30/21  Yes MADISON Mosquera CNP   vitamin D3 (CHOLECALCIFEROL) 25 MCG (1000 UT) TABS tablet TAKE 1 TABLET BY MOUTH ONE TIME A DAY 6/22/21  Yes MADISON Mosquera CNP   ASPIRIN ADULT LOW STRENGTH 81 MG EC tablet TAKE 1 TABLET BY MOUTH ONE TIME A DAY 6/22/21  Yes MADISON Mosquera CNP   Cholecalciferol (VITAMIN D3) 50 MCG (2000 UT) CAPS Take 1 capsule by mouth daily   Yes Historical Provider, MD   PRODIGY LANCETS 28G MISC Use as directed to test up to 3-4 times daily 3/15/19  Yes Mary Michael, MD   gabapentin (NEURONTIN) 300 MG capsule Take 1 capsule by mouth 2 times daily for 180 days. Intended supply: 30 days  Patient not taking: Reported on 7/7/2022 4/21/22 10/18/22  Sandro Yeager MD   atenolol (TENORMIN) 25 MG tablet TAKE 1 TABLET BY MOUTH EVERY NIGHT AT BEDTIME 1/15/20   MADISON Mosquera CNP        CURRENT Medications:  No current facility-administered medications for this visit. Allergies:  Patient has no known allergies. Review of Systems:   A 14 point review of symptoms completed. Pertinent positives identified in the HPI, all other review of symptoms negative as below. Objective:     Vitals:    07/07/22 1314   BP: 126/60   Pulse: 97   SpO2: 97%    Weight: 214 lb (97.1 kg)       PHYSICAL EXAM:      General:   Middle-age man in no acute distress   Head:  Normocephalic, atraumatic   Eyes:  Conjunctiva/corneas clear, anicteric sclerae    Nose: Nares normal, no drainage or sinus tenderness   Throat: No abnormalities of the lips, oral mucosa or tongue. Neck: Trachea midline.  Neck supple with no lymphadenopathy, thyroid not enlarged, symmetric, no tenderness/mass/nodules    Lungs:   Clear to auscultation bilaterally, no wheezes, no rales, no respiratory distress   Chest Wall:  No deformity or tenderness to palpation   Heart:  Regular rate and rhythm, normal S1, normal S2 soft, systolic murmur at the base, no rub, no S3/S4, PMI non-palpable. Abdomen:    Central adiposity, soft, non-tender, with normoactive bowel sounds. No masses, no hepatosplenomegaly   Extremities: No cyanosis, clubbing or pitting edema. Vascular: 2+ radial, 2+ dorsalis pedis and posterior tibial pulses bilaterally. Brisk carotid upstrokes without carotid bruit. Skin: Skin color, texture, turgor are normal with no rashes or ulceration. Pysch: Euthymic mood, appropriate affect   Neurologic: Oriented to person, place and time. No slurred speech or facial asymmetry. No motor or sensory deficits on gross examination. Labs:   CBC:   Lab Results   Component Value Date/Time    WBC 7.8 06/15/2021 02:08 PM    RBC 4.68 06/15/2021 02:08 PM    HGB 14.2 06/15/2021 02:08 PM    HCT 42.1 06/15/2021 02:08 PM    MCV 90.0 06/15/2021 02:08 PM    RDW 13.8 06/15/2021 02:08 PM     06/15/2021 02:08 PM     CMP:  Lab Results   Component Value Date/Time     06/15/2021 02:08 PM    K 4.9 06/15/2021 02:08 PM    K 4.3 2020 05:19 AM    CL 95 06/15/2021 02:08 PM    CO2 23 06/15/2021 02:08 PM    BUN 20 06/15/2021 02:08 PM    CREATININE 1.0 06/15/2021 02:08 PM    GFRAA >60 06/15/2021 02:08 PM    GFRAA >60 2013 02:59 PM    AGRATIO 1.2 06/15/2021 02:08 PM    LABGLOM >60 06/15/2021 02:08 PM    GLUCOSE 136 09/15/2021 10:10 AM    PROT 7.6 06/15/2021 02:08 PM    PROT 7.5 10/04/2012 02:40 PM    CALCIUM 9.8 06/15/2021 02:08 PM    BILITOT 0.3 06/15/2021 02:08 PM    ALKPHOS 145 06/15/2021 02:08 PM    AST 44 06/15/2021 02:08 PM    ALT 56 06/15/2021 02:08 PM     PT/INR:  No results found for: PTINR  HgBA1c:  Lab Results   Component Value Date    LABA1C 6.6 2022     Lab Results   Component Value Date    TROPONINI <0.01 06/15/2021         Cardiac Data:     EK/7/22  Sinus  Rhythm 100 bpm  -RSR(V1) -nondiagnostic.    -Anterolateral ST-elevation -repolarization variant.      6/15/21  Sinus tachycardia 129 bpm with frequent Premature ventricular complexes     Echo:   12/18/20  Normal left ventricle size, wall thickness and systolic function with an estimated ejection fraction of 60%. No regional wall motion abnormalities are seen. Grade I diastolic dysfunction with normal filing pressure. Aortic valve appears sclerotic but opens adequately. Mild aortic regurgitation. Inadequate tricuspid regurgitation to estimate systolic pulmonary artery pressure. Stress Test:   12/18/20  There is some degree of diaphragmatic attenuation noted. However, a moderate  sized area of severely decreased tracer uptake of the inferior/inferolateral  wall with stress compared with rest is present. FIndings consistent with  inducible ischemia. Normal post-stress LVEF 63%. Cardiac catheterization:   12/30/20  1. Severe single-vessel coronary artery disease. 2. Successful IVUS-guided PCI of proximal to mid-LAD with overlapping Xience Mariluz 3.0 x 28 mm and 3.5 x 23 mm MING. 3. Normal LVEDP. Plan:  Plavix 75 mg daily for at least one year. Aspirin 81 mg daily indefinitely. Increase atorvastatin to 80 mg daily. Additional studies:   CT Chest 6/15/21  Mildly dilated and atherosclerotic thoracic aorta with extensive coronary artery calcifications. Carotid Doppler 12/18/20  <50% stenosis is noted in the right internal carotid artery. <50% stenosis is noted in the left internal carotid artery. The bilateral vertebral arteries have normal antegrade flow. Impression and Plan:      Coronary artery disease- Stable without angina but in the context of low workload, less than 4 METS. He is status post successful IVUS-guided PCI of proximal to mid-LAD with overlapping Xience Mariluz 3.0 x 28 mm and 3.5 x 23 mm MING 12/30/2020   Hypertension - controlled  Hyperlipidemia - LDL 30's by last fasting lipids profile.  TG >150  Diabetes Mellitus , got control with a1c 6.6   Sleep apnea   History of CVA 2013  Former smoker - 20 years ago   Family history of heart disease    Patient Active Problem List   Diagnosis    Hypertriglyceridemia    Hyperlipidemia    Hypertension    Testosterone deficiency    Diabetic polyneuropathy ([facility] [address]    Type 2 diabetes mellitus with diabetic polyneuropathy, with long-term current use of insulin (HCC)    DARRICK (obstructive sleep apnea)    Occlusion and stenosis of carotid artery    ED (erectile dysfunction)    Osteoarthritis of cervical spine with myelopathy    Vitamin D deficiency    Polyp of rectum    Chronic neck and back pain    Class 1 obesity due to excess calories with serious comorbidity and body mass index (BMI) of 32.0 to 32.9 in adult    Left lateral epicondylitis    Acquired trigger finger of left ring finger    Former smoker    Dysthymia    Trigger finger, right middle finger    Trigger finger, right ring finger    Bilateral carotid artery stenosis    CAD in native artery    CAD S/P percutaneous coronary angioplasty    Abnormal stress test    Primary osteoarthritis of left wrist    Closed nondisplaced fracture of triquetral bone of right wrist    Moderate episode of recurrent major depressive disorder (HCC)    Atrial fibrillation, unspecified type ([facility] [address]    Neuropathy of upper extremity    History of cervical spinal surgery    Preop cardiovascular exam       Plan:  1. Current medications reviewed. Refills given as warranted. 2. Repeat fasting lipids profile   3. EKG stable  4. Proceed with Lele Dominguez for further restratification prior to the operating room as he is performing less than 4 METS by history and has prior history of PCI to LAD. Follow up with Dr. Donaldo Arceo as prescribed in December pending the outcome of testing above. Call with results when they return. This note is scribed in the presence of Dr. Tierra Wilson by Kevin Mccoy RN. The scribes documentation has been prepared under my direction and personally reviewed by me in its entirety.   I confirm that the note above accurately reflects all work, treatment, procedures, and medical decision making performed by me. Batsheva Flowers MD, personally performed the services described in this documentation as scribed by Tg Ontiveros RN. in my presence, and it is both accurate and complete to the best of our ability. I will address the patient's cardiac risk factors and adjusted pharmacologic treatment as needed. In addition, I have reinforced the need for patient directed risk factor modification. All questions and concerns were addressed to the patient/family. Alternatives to my treatment were discussed. Thank you for allowing us to participate in the care of Raven Asencio. Please call me with any questions [phone].     Lisa Collins MD, [facility] [place]  Cardiovascular Disease  [facility]  [phone] [facility]  [phone] [address]  7/7/2022 1:41 PM

## 2022-07-14 DIAGNOSIS — F34.1 DYSTHYMIA: ICD-10-CM

## 2022-07-14 RX ORDER — DESVENLAFAXINE 50 MG/1
50 TABLET, EXTENDED RELEASE ORAL DAILY
Qty: 90 TABLET | Refills: 3 | Status: SHIPPED | OUTPATIENT
Start: 2022-07-14

## 2022-07-15 ENCOUNTER — TELEPHONE (OUTPATIENT)
Dept: PHARMACY | Facility: CLINIC | Age: 62
End: 2022-07-15

## 2022-08-03 ENCOUNTER — HOSPITAL ENCOUNTER (OUTPATIENT)
Dept: CT IMAGING | Age: 62
Discharge: HOME | End: 2022-08-03
Payer: COMMERCIAL

## 2022-08-03 ENCOUNTER — HOSPITAL ENCOUNTER (OUTPATIENT)
Age: 62
Discharge: HOME | End: 2022-08-03
Payer: COMMERCIAL

## 2022-08-03 DIAGNOSIS — Z79.899 MEDICATION MANAGEMENT: ICD-10-CM

## 2022-08-03 DIAGNOSIS — M48.02 SPINAL STENOSIS IN CERVICAL REGION: ICD-10-CM

## 2022-08-03 LAB
CHOLESTEROL, TOTAL: 154 MG/DL (ref 0–199)
HDLC SERPL-MCNC: 42 MG/DL (ref 40–60)
LDL CHOLESTEROL CALCULATED: 82 MG/DL
TRIGL SERPL-MCNC: 149 MG/DL (ref 0–150)
VLDLC SERPL CALC-MCNC: 30 MG/DL

## 2022-08-03 PROCEDURE — 80061 LIPID PANEL: CPT

## 2022-08-03 PROCEDURE — 72125 CT NECK SPINE W/O DYE: CPT

## 2022-08-03 PROCEDURE — 36415 COLL VENOUS BLD VENIPUNCTURE: CPT

## 2022-08-04 ENCOUNTER — OFFICE VISIT (OUTPATIENT)
Dept: FAMILY MEDICINE CLINIC | Age: 62
End: 2022-08-04
Payer: COMMERCIAL

## 2022-08-04 VITALS
BODY MASS INDEX: 29.9 KG/M2 | DIASTOLIC BLOOD PRESSURE: 60 MMHG | OXYGEN SATURATION: 97 % | HEIGHT: 71 IN | HEART RATE: 81 BPM | SYSTOLIC BLOOD PRESSURE: 128 MMHG | WEIGHT: 213.6 LBS

## 2022-08-04 DIAGNOSIS — I10 PRIMARY HYPERTENSION: Chronic | ICD-10-CM

## 2022-08-04 DIAGNOSIS — Z01.818 PREOP EXAMINATION: ICD-10-CM

## 2022-08-04 DIAGNOSIS — M50.30 DDD (DEGENERATIVE DISC DISEASE), CERVICAL: ICD-10-CM

## 2022-08-04 DIAGNOSIS — M47.12 OSTEOARTHRITIS OF CERVICAL SPINE WITH MYELOPATHY: Primary | ICD-10-CM

## 2022-08-04 DIAGNOSIS — R93.89 ABNORMAL FINDINGS ON DIAGNOSTIC IMAGING OF OTHER SPECIFIED BODY STRUCTURES: ICD-10-CM

## 2022-08-04 DIAGNOSIS — M51.36 DDD (DEGENERATIVE DISC DISEASE), LUMBAR: ICD-10-CM

## 2022-08-04 DIAGNOSIS — Z12.5 SCREENING FOR PROSTATE CANCER: ICD-10-CM

## 2022-08-04 DIAGNOSIS — M51.36 BULGE OF LUMBAR DISC WITHOUT MYELOPATHY: ICD-10-CM

## 2022-08-04 DIAGNOSIS — Z98.890 HISTORY OF CERVICAL SPINAL SURGERY: ICD-10-CM

## 2022-08-04 PROBLEM — M51.369 BULGE OF LUMBAR DISC WITHOUT MYELOPATHY: Status: ACTIVE | Noted: 2022-08-04

## 2022-08-04 PROBLEM — M51.369 DDD (DEGENERATIVE DISC DISEASE), LUMBAR: Status: ACTIVE | Noted: 2022-08-04

## 2022-08-04 LAB
A/G RATIO: 2.1 (ref 1.1–2.2)
ALBUMIN SERPL-MCNC: 4.8 G/DL (ref 3.4–5)
ALP BLD-CCNC: 95 U/L (ref 40–129)
ALT SERPL-CCNC: 52 U/L (ref 10–40)
ANION GAP SERPL CALCULATED.3IONS-SCNC: 22 MMOL/L (ref 3–16)
AST SERPL-CCNC: 39 U/L (ref 15–37)
BASOPHILS ABSOLUTE: 0.1 K/UL (ref 0–0.2)
BASOPHILS RELATIVE PERCENT: 1.6 %
BILIRUB SERPL-MCNC: 0.6 MG/DL (ref 0–1)
BUN BLDV-MCNC: 22 MG/DL (ref 7–20)
CALCIUM SERPL-MCNC: 9.8 MG/DL (ref 8.3–10.6)
CHLORIDE BLD-SCNC: 101 MMOL/L (ref 99–110)
CO2: 18 MMOL/L (ref 21–32)
CREAT SERPL-MCNC: 1.1 MG/DL (ref 0.8–1.3)
EOSINOPHILS ABSOLUTE: 0.4 K/UL (ref 0–0.6)
EOSINOPHILS RELATIVE PERCENT: 5.7 %
GFR AFRICAN AMERICAN: >60
GFR NON-AFRICAN AMERICAN: >60
GLUCOSE BLD-MCNC: 249 MG/DL (ref 70–99)
HCT VFR BLD CALC: 44.5 % (ref 40.5–52.5)
HEMOGLOBIN: 15 G/DL (ref 13.5–17.5)
LYMPHOCYTES ABSOLUTE: 1.5 K/UL (ref 1–5.1)
LYMPHOCYTES RELATIVE PERCENT: 20.8 %
MCH RBC QN AUTO: 30.2 PG (ref 26–34)
MCHC RBC AUTO-ENTMCNC: 33.7 G/DL (ref 31–36)
MCV RBC AUTO: 89.8 FL (ref 80–100)
MONOCYTES ABSOLUTE: 0.7 K/UL (ref 0–1.3)
MONOCYTES RELATIVE PERCENT: 9.1 %
NEUTROPHILS ABSOLUTE: 4.6 K/UL (ref 1.7–7.7)
NEUTROPHILS RELATIVE PERCENT: 62.8 %
PDW BLD-RTO: 14 % (ref 12.4–15.4)
PLATELET # BLD: 274 K/UL (ref 135–450)
PMV BLD AUTO: 8.1 FL (ref 5–10.5)
POTASSIUM SERPL-SCNC: 4.6 MMOL/L (ref 3.5–5.1)
PROSTATE SPECIFIC ANTIGEN: 0.65 NG/ML (ref 0–4)
RBC # BLD: 4.96 M/UL (ref 4.2–5.9)
SODIUM BLD-SCNC: 141 MMOL/L (ref 136–145)
TOTAL PROTEIN: 7.1 G/DL (ref 6.4–8.2)
TSH REFLEX: 1.63 UIU/ML (ref 0.27–4.2)
WBC # BLD: 7.3 K/UL (ref 4–11)

## 2022-08-04 PROCEDURE — G8417 CALC BMI ABV UP PARAM F/U: HCPCS | Performed by: FAMILY MEDICINE

## 2022-08-04 PROCEDURE — G8427 DOCREV CUR MEDS BY ELIG CLIN: HCPCS | Performed by: FAMILY MEDICINE

## 2022-08-04 PROCEDURE — 99213 OFFICE O/P EST LOW 20 MIN: CPT | Performed by: FAMILY MEDICINE

## 2022-08-04 ASSESSMENT — PATIENT HEALTH QUESTIONNAIRE - PHQ9
SUM OF ALL RESPONSES TO PHQ QUESTIONS 1-9: 2
1. LITTLE INTEREST OR PLEASURE IN DOING THINGS: 1
2. FEELING DOWN, DEPRESSED OR HOPELESS: 1
SUM OF ALL RESPONSES TO PHQ9 QUESTIONS 1 & 2: 2

## 2022-08-04 NOTE — Clinical Note
Please fax today's preop note and a copy of Jett's Cervical Spine MRI (MRI per pt request) to Dr. Shell Palomares office with [facility].  Thank you

## 2022-08-04 NOTE — PROGRESS NOTES
Preoperative Evaluation    Subjective:   Efra Ocampo is a 60 y.o. y.o.  male who presents to the office today for a preoperative consultation. Surgeon: Dr. Aaron Urrutia    Location: [facility]   Performing C3-4 ANTERIOR CERVICAL DISCECTOMY AND FUSION   Date: August 15 or TBD pending changes     Planned anesthesia is General.   The patient has the following known anesthesia issues: none   Patient has a bleeding risk of : no recent abnormal bleeding   Patient does not have objection to receiving blood products if needed. Denies any steroid use in the past 6 mo or any new seizure activity     Review of Systems   Pertinent items are noted in HPI.      Denies fevers, chills, diaphoresis, CP, SOB, dysphagia, abd pain, urinary complaints, new edema, rashes, cyanosis    Past Medical History:   Diagnosis Date    Acute, but ill-defined, cerebrovascular disease 5/31/2013    CAD in native artery 12/30/2020    Cerebral artery occlusion with cerebral infarction [facility]) 2013     X2 PERSONALITY CHANGE, ANGER OUTBURSTS    Cerebrovascular disease     Diplopia 7/11/2013    ED (erectile dysfunction) 1/23/2014    Elbow pain, chronic 12/16/2015    Hyperlipidemia     Hypertension     Irritable bowel syndrome     Obstructive sleep apnea (adult) (pediatric) 07/01/2013    NO CPAP, HAD PROBLEMS WITH INSURANCE AND STOPPED USING    Occlusion and stenosis of carotid artery without mention of cerebral infarction 01/13/2014    MINOR    Pain in joint, upper arm 5/14/2015    Polyneuropathy in diabetes(357.2) 7/1/2013    PONV (postoperative nausea and vomiting)     Skin abnormality     PRE-CANCEROUS LESIONS REMOVED FROM ARMS AND EARS    Type 2 diabetes mellitus with diabetic polyneuropathy, with long-term current use of insulin ([facility] [address] 7/1/2013    Type II or unspecified type diabetes mellitus without mention of complication, not stated as uncontrolled        Past Surgical History:   Procedure Laterality Date    CARPAL TUNNEL RELEASE Left 2008    CERVICAL DISCECTOMY  08/02/2017    ANTERIOR CERVICAL DISCECTOMY AND FUSION C5-6, C6-7 WITH MEDTRONIC PLATES, SCREWS, ALLOGRAFT, WITH EVOKES PARTIAL C6 CORPECTOMY    CERVICAL FUSION  12/06/2017    CERVICAL LAMINECTOMY AND POSTERIOR FUSION C5-T1 WITH MEDTRONIC RODS AND SCREWS WITH EVOKES AND O-ARM    CHOLECYSTECTOMY  1995    COLONOSCOPY N/A 8/14/2018    COLONOSCOPY POLYPECTOMY SNARE/COLD BIOPSY performed by Ilia Larson MD at [address] Left 2008    ULNAR NERVE TRANSPOSITION, AT SAME TIME AS CTR    FINGER TRIGGER RELEASE Left 12/12/2019    LEFT RING TRIGGER FINGER RELEASE performed by Bayron Dockery MD at [place] [address] Right 11/23/2020    RIGHT LONG AND RING TRIGGER FINGER RELEASES -SLEEP APNEA- performed by Bayron Dockery MD at [address] [place] [address]    umbilical    PYLOROPLASTY      PYLORIC STENOSIS AS INFANT       Social History       Tobacco History       Smoking Status  Former Quit Date  1/11/2000 Smoking Frequency  1 pack/day for 25.00 years (25.00 pk-yrs) Smoking Tobacco Type  Cigarettes quit in 1/11/2000      Smokeless Tobacco Use  Never              Alcohol History       Alcohol Use Status  No              Drug Use       Drug Use Status  No              Sexual Activity       Sexually Active  Yes                    Family History   Problem Relation Age of Onset    Cancer Father         melanoma, face    Cancer Paternal Uncle         melanoma, face    Cancer Brother         skin, NMSC    Depression Maternal Cousin     No Known Problems Mother        Current Outpatient Medications   Medication Sig Dispense Refill    desvenlafaxine succinate (PRISTIQ) 50 MG TB24 extended release tablet Take 1 tablet by mouth daily 90 tablet 3    omega-3 acid ethyl esters (LOVAZA) 1 g capsule TAKE 2 CAPSULES BY MOUTH TWO TIMES A  capsule 3    metFORMIN (GLUCOPHAGE) 1000 MG tablet One tab PO bid 180 tablet 1    insulin regular human (HUMULIN R U-500 KWIKPEN) 500 UNIT/ML SOPN concentrated injection pen INJECT 80 UNITS UNDER THE SKIN BEFORE BREAKFAST THEN 60 UNITS UNDER THE SKIN BEFORE LUNCH AND DINNER (Patient taking differently: INJECT 70 UNITS UNDER THE SKIN BEFORE BREAKFAST THEN 40 UNITS UNDER THE SKIN BEFORE LUNCH AND DINNER) 60 mL 0    Continuous Blood Gluc Sensor (DEXCOM G6 SENSOR) MISC USE AS DIRECTED AND CHANGE SENSORS EVERY 10 DAYS 9 each 0    clopidogrel (PLAVIX) 75 MG tablet Take 1 tablet by mouth daily 90 tablet 3    metroNIDAZOLE (METROGEL) 1 % gel Apply to face daily. 60 g 3    buPROPion (WELLBUTRIN XL) 150 MG extended release tablet TAKE ONE TABLET BY MOUTH EVERY MORNING 90 tablet 3    Continuous Blood Gluc Transmit (DEXCOM G6 TRANSMITTER) MISC USE AS DIRECTED AND CHANGE EVERY 90 DAYS 1 each 0    atenolol (TENORMIN) 25 MG tablet TAKE 1 TABLET BY MOUTH EVERY NIGHT AT BEDTIME 90 tablet 2    JARDIANCE 10 MG tablet TAKE 1 TABLET BY MOUTH ONE TIME A DAY 90 tablet 1    nystatin (MYCOSTATIN) 306693 UNIT/GM ointment Apply topically 2 times daily to corners of mouth.  30 g 1    atorvastatin (LIPITOR) 40 MG tablet Take 1 tablet by mouth daily 90 tablet 3    Insulin Pen Needle (B-D UF III MINI PEN NEEDLES) 31G X 5 MM MISC 1 each by Does not apply route 3 times daily 300 each 6    lisinopril (PRINIVIL;ZESTRIL) 20 MG tablet Take 1 tablet by mouth 2 times daily 180 tablet 3    fenofibrate (TRICOR) 145 MG tablet TAKE 1 TABLET BY MOUTH ONE TIME A DAY 90 tablet 3    Continuous Blood Gluc  (DEXCOM G6 ) KYLAH As needed 1 Device 0    blood glucose test strips (PRODIGY NO CODING BLOOD GLUC) strip Use as directed to test up to 3-4 times daily 400 each 3    vitamin D3 (CHOLECALCIFEROL) 25 MCG (1000 UT) TABS tablet TAKE 1 TABLET BY MOUTH ONE TIME A DAY 90 tablet 3    ASPIRIN ADULT LOW STRENGTH 81 MG EC tablet TAKE 1 TABLET BY MOUTH ONE TIME A DAY 90 tablet 3    Cholecalciferol (VITAMIN D3) 50 MCG (2000 UT) CAPS Take 1 capsule by mouth daily      PRODIGY LANCETS 28G MISC Use as directed to test up to 3-4 times daily 400 each 3     No current facility-administered medications for this visit. Objective:   Vitals:    08/04/22 0934   BP: 128/60   Pulse: 81   SpO2: 97%       Physical Exam   /60 (Site: Right Upper Arm, Position: Sitting, Cuff Size: Large Adult)   Pulse 81   Ht 5' 11\" (1.803 m)   Wt 213 lb 9.6 oz (96.9 kg)   SpO2 97%   BMI 29.79 kg/m²     General appearance: alert, appears stated age, and cooperative  Lungs: clear to auscultation bilaterally  Heart: regular rate and rhythm, S1, S2 normal, no murmur, click, rub or gallop  Abdomen: soft, non-tender; bowel sounds normal; no masses,  no organomegaly  Extremities: extremities normal, atraumatic, no cyanosis or edema  Pulses: 2+ and symmetric  Skin: Skin color, texture, turgor normal. No rashes or lesions  Neurologic: Grossly normal     Predictors of intubation difficulty:   Morbid obesity? Body mass index is 29.79 kg/m².   Anatomically abnormal facies? no   Short, thick neck? no   Neck range of motion: normal     Cardiographics   ECG:  ordered and reviewed by Cardiology previously     Echocardiogram: not done     Imaging   Chest X-Ray: not indicated     Lab Review   Hospital Outpatient Visit on 08/03/2022   Component Date Value    Cholesterol, Total 08/03/2022 154     Triglycerides 08/03/2022 149     HDL 08/03/2022 42     LDL Calculated 08/03/2022 82     VLDL Cholesterol Calcula* 08/03/2022 30    Office Visit on 06/28/2022   Component Date Value    Hemoglobin A1C 06/28/2022 6.6    Orders Only on 04/21/2022   Component Date Value    Rheumatoid Factor 04/21/2022 <10.0     Magnesium 04/21/2022 1.80     Ferritin 04/21/2022 149.7     Folate 04/21/2022 15.77     Vitamin B-12 04/21/2022 463     TSH 04/21/2022 1.96     Sed Rate 04/21/2022 25 (A)    CRP 04/21/2022 3.5     EMILIANA 04/21/2022 Negative    Office Visit on 03/15/2022   Component Date Value    Hemoglobin A1C 03/15/2022 6.9 Assessment:   60 y.o. male with planned surgery as above. - Known risk factors for perioperative complications: Coronary disease  Diabetes mellitus   - Difficulty with intubation is not anticipated. - Current medications which may produce withdrawal symptoms if withheld perioperatively: pristiq, wellbutrin     Plan:   1. Preoperative workup as follows hemoglobin, hematocrit, electrolytes, creatinine, glucose, liver function studies   2. Change in medication regimen before surgery: discussed in detail   Pt instructed to avoid NSAIDs, ASA, MV, Vitamin E 1 week prior to surgery to decrease bleeding risk. 3. Prophylaxis for cardiac events with perioperative beta-blockers: not indicated   4. Invasive hemodynamic monitoring perioperatively: not indicated   5. Deep vein thrombosis prophylaxis postoperatively:regimen to be chosen by surgical team   6. Other measures: none    1. Preop examination  Cardiac clearance performed by Dr. Brijesh Miner, pending stress testing tomorrow  Labs today  No new acute concerning Sxs   - CBC with Auto Differential; Future  - Comprehensive Metabolic Panel; Future  - TSH with Reflex; Future    2. Osteoarthritis of cervical spine with myelopathy  - PT aquatic therapy; Future  3. History of cervical spinal surgery  - PT aquatic therapy; Future  4. DDD (degenerative disc disease), cervical  - PT aquatic therapy; Future  5. DDD (degenerative disc disease), lumbar  - PT aquatic therapy; Future  6. Bulge of lumbar disc without myelopathy  - PT aquatic therapy; Future    Referral to be utilized after surgery, orders placed today     7. Primary hypertension  stable    DMII: A1c 6.6 in 6/22, has Dexcom in place, R lower abd   HLD: LDL 82 on 8/3/22  Still with L lower back pain, with sciatic nerve pain L side, Gabapentin 300 mg BID did not help. Will return to medication trials after cervical spine surgery.      While assessing care for this patient, I have reviewed all pertinent lab work/imaging/ specialist notes and care in reference to those problems addressed above in detail. Appropriate medical decision making was based on this. Please note that portions of this note may have been completed with a voice recognition program. Efforts were made to edit the dictations but occasionally words are mis-transcribed.       Pt is at an acceptable risk for planned procedure    Please call with any questions  Radha Rutherford MD/MS

## 2022-08-05 ENCOUNTER — TELEPHONE (OUTPATIENT)
Dept: CARDIOLOGY CLINIC | Age: 62
End: 2022-08-05

## 2022-08-05 ENCOUNTER — HOSPITAL ENCOUNTER (OUTPATIENT)
Dept: NON INVASIVE DIAGNOSTICS | Age: 62
Discharge: HOME | End: 2022-08-05
Payer: COMMERCIAL

## 2022-08-05 ENCOUNTER — HOSPITAL ENCOUNTER (OUTPATIENT)
Dept: NUCLEAR MEDICINE | Age: 62
Discharge: HOME | End: 2022-08-05
Payer: COMMERCIAL

## 2022-08-05 DIAGNOSIS — Z79.899 MEDICATION MANAGEMENT: ICD-10-CM

## 2022-08-05 DIAGNOSIS — E78.2 MIXED HYPERLIPIDEMIA: Primary | ICD-10-CM

## 2022-08-05 DIAGNOSIS — Z01.810 PREOP CARDIOVASCULAR EXAM: ICD-10-CM

## 2022-08-05 LAB
LV EF: 71 %
LVEF MODALITY: NORMAL

## 2022-08-05 PROCEDURE — 3430000000 HC RX DIAGNOSTIC RADIOPHARMACEUTICAL: Performed by: INTERNAL MEDICINE

## 2022-08-05 PROCEDURE — 93017 CV STRESS TEST TRACING ONLY: CPT

## 2022-08-05 PROCEDURE — 78452 HT MUSCLE IMAGE SPECT MULT: CPT

## 2022-08-05 PROCEDURE — A9502 TC99M TETROFOSMIN: HCPCS | Performed by: INTERNAL MEDICINE

## 2022-08-05 PROCEDURE — 6360000002 HC RX W HCPCS: Performed by: INTERNAL MEDICINE

## 2022-08-05 RX ORDER — ATORVASTATIN CALCIUM 80 MG/1
80 TABLET, FILM COATED ORAL NIGHTLY
Qty: 90 TABLET | Refills: 3 | Status: SHIPPED | OUTPATIENT
Start: 2022-08-05

## 2022-08-05 RX ADMIN — TETROFOSMIN 10.5 MILLICURIE: 1.38 INJECTION, POWDER, LYOPHILIZED, FOR SOLUTION INTRAVENOUS at 09:38

## 2022-08-05 RX ADMIN — REGADENOSON 0.4 MG: 0.08 INJECTION, SOLUTION INTRAVENOUS at 10:51

## 2022-08-05 RX ADMIN — TETROFOSMIN 34 MILLICURIE: 1.38 INJECTION, POWDER, LYOPHILIZED, FOR SOLUTION INTRAVENOUS at 10:49

## 2022-08-05 NOTE — TELEPHONE ENCOUNTER
----- Message from Majo Guevara MD sent at 8/4/2022  7:40 AM EDT -----  LDL cholesterol or \"bad\"cholesterol is above goal 70. Rec inc lipitor to 80 mg nightly and repeat fasting lipids profile in 6 weeks.  TY

## 2022-08-05 NOTE — TELEPHONE ENCOUNTER
----- Message from Emiliano Mejia MD sent at 8/5/2022  3:56 PM EDT -----  Please notify patient that their stress test is normal  Ok for surgery  Hold plavix x 5 days prior to surgery  Remain on ASA, if possible.   defer to surgeon

## 2022-08-06 PROBLEM — Z01.810 PREOP CARDIOVASCULAR EXAM: Status: RESOLVED | Noted: 2022-07-07 | Resolved: 2022-08-06

## 2022-08-07 DIAGNOSIS — E11.42 TYPE 2 DIABETES MELLITUS WITH DIABETIC POLYNEUROPATHY, WITH LONG-TERM CURRENT USE OF INSULIN (HCC): Primary | ICD-10-CM

## 2022-08-07 DIAGNOSIS — Z79.4 TYPE 2 DIABETES MELLITUS WITH DIABETIC POLYNEUROPATHY, WITH LONG-TERM CURRENT USE OF INSULIN (HCC): Primary | ICD-10-CM

## 2022-08-08 NOTE — PROGRESS NOTES
[facility] PRE-SURGICAL TESTING INSTRUCTIONS                      PRIOR TO PROCEDURE DATE:    1. PLEASE FOLLOW ANY INSTRUCTIONS GIVEN TO YOU PER YOUR SURGEON. 2. Arrange for someone to drive you home and be with you for the first 24 hours after discharge for your safety after your procedure for which you received sedation. Ensure it is someone we can share information with regarding your discharge. NOTE: At this time ONLY 2 ADULTS may accompany you & everyone must be MASKED. 3. You must contact your surgeon for instructions IF:  You are taking any blood thinners, aspirin, anti-inflammatory or vitamins. There is a change in your physical condition such as a cold, fever, rash, cuts, sores, or any other infection, especially near your surgical site. 4. Do not drink alcohol the day before or day of your procedure. Do not use any recreational marijuana at least 24 hours or street drugs (heroin, cocaine) at minimum 5 days prior to your procedure. 5. A Pre-Surgical History and Physical MUST be completed WITHIN 30 DAYS OR LESS prior to your procedure. by your Physician or an Urgent Care        THE DAY OF YOUR PROCEDURE:  1. Follow instructions for ARRIVAL TIME as DIRECTED BY YOUR SURGEON. 2. Enter the MAIN entrance from [employer] and follow the signs to the free Parking Garage or [employer] (offered free of charge 7 am-5pm). 3. Enter the Main Entrance of the hospital (do not enter from the lower level of the parking garage). Upon entrance, check in with the  at the surgical information desk on your LEFT. Bring your insurance card and photo ID to register      4. DO NOT EAT ANYTHING 8 hours prior to arrival for surgery. You may have up to 8 ounces of water 4 hours prior to your arrival for surgery.    NOTE: ALL Gastric, Bariatric & Bowel surgery patients - you MUST follow your surgeon's instructions regarding eating/ drinking as you will have very specific instructions to follow. If you did not receive these, call your surgeon's office immediately. 5. MEDICATIONS:  Take the following medications with a SMALL sip of water: atenolol  Use your usual dose of inhalers the morning of surgery. BRING your rescue inhaler with you to hospital.   Anesthesia does NOT want you to take insulin the morning of surgery. They will control your blood sugar while you are at the hospital. Please contact your ordering physician for instructions regarding your insulin the night before your procedure. If you have an insulin pump, please keep it set on basal rate. Bariatric patient's call your surgeon if on diabetic medications as some may need to be stopped 1 week prior to surgery    6. Do not swallow additional water when brushing teeth. No gum, candy, mints, or ice chips. Refrain from smoking or at least decrease the amount on day of surgery. 7. Morning of surgery:   Take a shower with an antibacterial soap (i.e., Safeguard or Dial) OR your physician may have instructed you to use Hibiclens. Dress in loose, comfortable clothing appropriate for redressing after your procedure. Do not wear jewelry (including body piercings), make-up (especially NO eye make-up), fingernail polish (NO toenail polish if foot/leg surgery), lotion, powders, or metal hairclips. Do not shave or wax for 72 hours prior to procedure near your operative site. Shaving with a razor can irritate your skin and make it easier to develop an infection. On the day of your procedure, any hair that needs to be removed near the surgical site will be 'clipped' by a healthcare worker using a special clipper designed to avoid skin irritation. 8. Dentures, glasses, or contacts will need to be removed before your procedure. Bring cases for your glasses, contacts, dentures, or hearing aids to protect them while you are in surgery. 9. If you use a CPAP, please bring it with you on the day of your procedure.     10. We recommend that valuable personal belongings such as cash, cell phones, e-tablets, or jewelry, be left at home during your stay. The hospital will not be responsible for valuables that are not secured in the hospital safe. However, if your insurance requires a co-pay, you may want to bring a method of payment, i.e., Check or credit card, if you wish to pay your co-pay the day of surgery. 11. If you are to stay overnight, you may bring a bag with personal items. Please have any large items you may need brought in by your family after your arrival to your hospital room. 12. If you have a Living Will or Durable Power of , please bring a copy on the day of your procedure. How we keep you safe and work to prevent surgical site infections:   1. Health care workers should always check your ID bracelet to verify your name and birth date. You will be asked many times to state your name, date of birth, and allergies. 2. Health care workers should always clean their hands with soap or alcohol gel before providing care to you. It is okay to ask anyone if they cleaned their hands before they touch you. 3. You will be actively involved in verifying the type of procedure you are having and ensuring the correct surgical site. This will be confirmed multiple times prior to your procedure. Do NOT edda your surgery site UNLESS instructed to by your surgeon. 4. When you are in the operating room, your surgical site will be cleansed with a special soap, and in most cases, you will be given an antibiotic before the surgery begins. What to expect AFTER your procedure? 1. Immediately following your procedure, your will be taken to the PACU for the first phase of your recovery. Your nurse will help you recover from any potential side effects of anesthesia, such as extreme drowsiness, changes in your vital signs or breathing patterns.  Nausea, headache, muscle aches, or sore throat may also occur after

## 2022-08-08 NOTE — PROGRESS NOTES
Place patient label inside box (if no patient label, complete below)  Name:  :  MR#:   Sangita Mayfield / PROCEDURE  I (we), Rommel Barrow (Patient Name) authorize Roxine Dec. [place], MD (Provider / [place] [place]) and/or such assistants as may be selected by him/her, to perform the following operation/procedure(s): C3-4 ANTERIOR CERVICAL DISCECTOMY AND FUSION       Note: If unable to obtain consent prior to an emergent procedure, document the emergent reason in the medical record. This procedure has been explained to my (our) satisfaction and included in the explanation was: The intended benefit, nature, and extent of the procedure to be performed; The significant risks involved and the probability of success; Alternative procedures and methods of treatment; The dangers and probable consequences of such alternatives (including no procedure or treatment); The expected consequences of the procedure on my future health; Whether other qualified individuals would be performing important surgical tasks and/or whether  would be present to advise or support the procedure. I (we) understand that there are other risks of infection and other serious complications in the pre-operative/procedural and postoperative/procedural stages of my (our) care. I (we) have asked all of the questions which I (we) thought were important in deciding whether or not to undergo treatment or diagnosis. These questions have been answered to my (our) satisfaction. I (we) understand that no assurance can be given that the procedure will be a success, and no guarantee or warranty of success has been given to me (us). It has been explained to me (us) that during the course of the operation/procedure, unforeseen conditions may be revealed that necessitate extension of the original procedure(s) or different procedure(s) than those set forth in Paragraph 1.  I (we) authorize because:   ______________________________________________________________________________________________    If a phone consent is obtained, consent will be documented by using two health care professionals, each affirming that the consenting party has no questions and gives consent for the procedure discussed with the physician/provider.   _____________________          ____________________       _____/_____am/pm   2nd witness to phone consent        Printed name           Date / Time    Informed Consent:  I have provided the explanation described above in section 1 to the patient and/or legal representative.  I have provided the patient and/or legal representative with an opportunity to ask any questions about the proposed operation/procedure.   ___________________________          ____________________         ____/____am/pm  Provider / Proceduralist                            Printed name            Date / Time  Revised 8/2/2021                                                                      Page 2 of 2

## 2022-08-09 RX ORDER — EMPAGLIFLOZIN 10 MG/1
TABLET, FILM COATED ORAL
Qty: 90 TABLET | Refills: 0 | Status: SHIPPED | OUTPATIENT
Start: 2022-08-09

## 2022-08-12 ENCOUNTER — ANESTHESIA EVENT (OUTPATIENT)
Dept: OPERATING ROOM | Age: 62
End: 2022-08-12
Payer: COMMERCIAL

## 2022-08-15 ENCOUNTER — HOSPITAL ENCOUNTER (OUTPATIENT)
Age: 62
Setting detail: OUTPATIENT SURGERY
Discharge: HOME | End: 2022-08-15
Attending: NEUROLOGICAL SURGERY | Admitting: NEUROLOGICAL SURGERY
Payer: COMMERCIAL

## 2022-08-15 ENCOUNTER — APPOINTMENT (OUTPATIENT)
Dept: GENERAL RADIOLOGY | Age: 62
End: 2022-08-15
Attending: NEUROLOGICAL SURGERY
Payer: COMMERCIAL

## 2022-08-15 ENCOUNTER — ANESTHESIA (OUTPATIENT)
Dept: OPERATING ROOM | Age: 62
End: 2022-08-15
Payer: COMMERCIAL

## 2022-08-15 VITALS
SYSTOLIC BLOOD PRESSURE: 157 MMHG | DIASTOLIC BLOOD PRESSURE: 81 MMHG | TEMPERATURE: 97.6 F | HEART RATE: 82 BPM | WEIGHT: 221.1 LBS | RESPIRATION RATE: 18 BRPM | HEIGHT: 71 IN | BODY MASS INDEX: 30.95 KG/M2 | OXYGEN SATURATION: 94 %

## 2022-08-15 DIAGNOSIS — M50.30 DDD (DEGENERATIVE DISC DISEASE), CERVICAL: Primary | ICD-10-CM

## 2022-08-15 DIAGNOSIS — Z79.4 TYPE 2 DIABETES MELLITUS WITH DIABETIC POLYNEUROPATHY, WITH LONG-TERM CURRENT USE OF INSULIN (HCC): Chronic | ICD-10-CM

## 2022-08-15 DIAGNOSIS — E11.42 TYPE 2 DIABETES MELLITUS WITH DIABETIC POLYNEUROPATHY, WITH LONG-TERM CURRENT USE OF INSULIN (HCC): Chronic | ICD-10-CM

## 2022-08-15 LAB
ABO/RH: NORMAL
ANTIBODY SCREEN: NORMAL
APTT: 26.6 SEC (ref 23–34.3)
GLUCOSE BLD-MCNC: 133 MG/DL (ref 70–99)
GLUCOSE BLD-MCNC: 160 MG/DL (ref 70–99)
INR BLD: 1.03 (ref 0.87–1.14)
PERFORMED ON: ABNORMAL
PERFORMED ON: ABNORMAL
PROTHROMBIN TIME: 13.4 SEC (ref 11.7–14.5)

## 2022-08-15 PROCEDURE — 6360000002 HC RX W HCPCS: Performed by: ANESTHESIOLOGY

## 2022-08-15 PROCEDURE — C1889 IMPLANT/INSERT DEVICE, NOC: HCPCS | Performed by: NEUROLOGICAL SURGERY

## 2022-08-15 PROCEDURE — 2500000003 HC RX 250 WO HCPCS: Performed by: NEUROLOGICAL SURGERY

## 2022-08-15 PROCEDURE — C1713 ANCHOR/SCREW BN/BN,TIS/BN: HCPCS | Performed by: NEUROLOGICAL SURGERY

## 2022-08-15 PROCEDURE — 3209999900 FLUORO FOR SURGICAL PROCEDURES

## 2022-08-15 PROCEDURE — A4217 STERILE WATER/SALINE, 500 ML: HCPCS | Performed by: NEUROLOGICAL SURGERY

## 2022-08-15 PROCEDURE — 2720000010 HC SURG SUPPLY STERILE: Performed by: NEUROLOGICAL SURGERY

## 2022-08-15 PROCEDURE — 86900 BLOOD TYPING SEROLOGIC ABO: CPT

## 2022-08-15 PROCEDURE — 7100000001 HC PACU RECOVERY - ADDTL 15 MIN: Performed by: NEUROLOGICAL SURGERY

## 2022-08-15 PROCEDURE — 6360000002 HC RX W HCPCS: Performed by: NEUROLOGICAL SURGERY

## 2022-08-15 PROCEDURE — 2500000003 HC RX 250 WO HCPCS: Performed by: NURSE ANESTHETIST, CERTIFIED REGISTERED

## 2022-08-15 PROCEDURE — 2709999900 HC NON-CHARGEABLE SUPPLY: Performed by: NEUROLOGICAL SURGERY

## 2022-08-15 PROCEDURE — 3700000001 HC ADD 15 MINUTES (ANESTHESIA): Performed by: NEUROLOGICAL SURGERY

## 2022-08-15 PROCEDURE — 7100000011 HC PHASE II RECOVERY - ADDTL 15 MIN: Performed by: NEUROLOGICAL SURGERY

## 2022-08-15 PROCEDURE — 7100000010 HC PHASE II RECOVERY - FIRST 15 MIN: Performed by: NEUROLOGICAL SURGERY

## 2022-08-15 PROCEDURE — 6360000002 HC RX W HCPCS: Performed by: NURSE ANESTHETIST, CERTIFIED REGISTERED

## 2022-08-15 PROCEDURE — 2580000003 HC RX 258: Performed by: NEUROLOGICAL SURGERY

## 2022-08-15 PROCEDURE — 85610 PROTHROMBIN TIME: CPT

## 2022-08-15 PROCEDURE — 86901 BLOOD TYPING SEROLOGIC RH(D): CPT

## 2022-08-15 PROCEDURE — 86850 RBC ANTIBODY SCREEN: CPT

## 2022-08-15 PROCEDURE — 2580000003 HC RX 258: Performed by: NURSE ANESTHETIST, CERTIFIED REGISTERED

## 2022-08-15 PROCEDURE — 72040 X-RAY EXAM NECK SPINE 2-3 VW: CPT

## 2022-08-15 PROCEDURE — 2580000003 HC RX 258: Performed by: ANESTHESIOLOGY

## 2022-08-15 PROCEDURE — 85730 THROMBOPLASTIN TIME PARTIAL: CPT

## 2022-08-15 PROCEDURE — 3600000004 HC SURGERY LEVEL 4 BASE: Performed by: NEUROLOGICAL SURGERY

## 2022-08-15 PROCEDURE — 3600000014 HC SURGERY LEVEL 4 ADDTL 15MIN: Performed by: NEUROLOGICAL SURGERY

## 2022-08-15 PROCEDURE — 7100000000 HC PACU RECOVERY - FIRST 15 MIN: Performed by: NEUROLOGICAL SURGERY

## 2022-08-15 PROCEDURE — 3700000000 HC ANESTHESIA ATTENDED CARE: Performed by: NEUROLOGICAL SURGERY

## 2022-08-15 PROCEDURE — C9359 IMPLNT,BON VOID FILLER-PUTTY: HCPCS | Performed by: NEUROLOGICAL SURGERY

## 2022-08-15 DEVICE — SCREW PK2 G6623515 SA S-D 3.5MM X 15MM
Type: IMPLANTABLE DEVICE | Site: SPINE CERVICAL | Status: FUNCTIONAL
Brand: DIVERGENCE® ANTERIOR CERVICAL FUSION SYSTEM

## 2022-08-15 DEVICE — PUTTY T50101 GRFT DBF 1CC: Type: IMPLANTABLE DEVICE | Site: SPINE CERVICAL | Status: FUNCTIONAL

## 2022-08-15 RX ORDER — SODIUM CHLORIDE 9 MG/ML
INJECTION, SOLUTION INTRAVENOUS PRN
Status: DISCONTINUED | OUTPATIENT
Start: 2022-08-15 | End: 2022-08-15 | Stop reason: HOSPADM

## 2022-08-15 RX ORDER — KETAMINE HCL IN NACL, ISO-OSM 20 MG/2 ML
SYRINGE (ML) INJECTION PRN
Status: DISCONTINUED | OUTPATIENT
Start: 2022-08-15 | End: 2022-08-15 | Stop reason: SDUPTHER

## 2022-08-15 RX ORDER — ROCURONIUM BROMIDE 10 MG/ML
INJECTION, SOLUTION INTRAVENOUS PRN
Status: DISCONTINUED | OUTPATIENT
Start: 2022-08-15 | End: 2022-08-15 | Stop reason: SDUPTHER

## 2022-08-15 RX ORDER — REMIFENTANIL HYDROCHLORIDE 1 MG/ML
INJECTION, POWDER, LYOPHILIZED, FOR SOLUTION INTRAVENOUS PRN
Status: DISCONTINUED | OUTPATIENT
Start: 2022-08-15 | End: 2022-08-15 | Stop reason: SDUPTHER

## 2022-08-15 RX ORDER — PROPOFOL 10 MG/ML
INJECTION, EMULSION INTRAVENOUS PRN
Status: DISCONTINUED | OUTPATIENT
Start: 2022-08-15 | End: 2022-08-15 | Stop reason: SDUPTHER

## 2022-08-15 RX ORDER — CYCLOBENZAPRINE HCL 10 MG
10 TABLET ORAL 3 TIMES DAILY PRN
Qty: 21 TABLET | Refills: 0 | Status: SHIPPED | OUTPATIENT
Start: 2022-08-15 | End: 2022-08-25

## 2022-08-15 RX ORDER — HYDRALAZINE HYDROCHLORIDE 20 MG/ML
10 INJECTION INTRAMUSCULAR; INTRAVENOUS
Status: DISCONTINUED | OUTPATIENT
Start: 2022-08-15 | End: 2022-08-15 | Stop reason: HOSPADM

## 2022-08-15 RX ORDER — SODIUM CHLORIDE 0.9 % (FLUSH) 0.9 %
5-40 SYRINGE (ML) INJECTION EVERY 12 HOURS SCHEDULED
Status: DISCONTINUED | OUTPATIENT
Start: 2022-08-15 | End: 2022-08-15 | Stop reason: HOSPADM

## 2022-08-15 RX ORDER — SUCCINYLCHOLINE/SOD CL,ISO/PF 200MG/10ML
SYRINGE (ML) INTRAVENOUS PRN
Status: DISCONTINUED | OUTPATIENT
Start: 2022-08-15 | End: 2022-08-15 | Stop reason: SDUPTHER

## 2022-08-15 RX ORDER — FENTANYL CITRATE 50 UG/ML
INJECTION, SOLUTION INTRAMUSCULAR; INTRAVENOUS PRN
Status: DISCONTINUED | OUTPATIENT
Start: 2022-08-15 | End: 2022-08-15 | Stop reason: SDUPTHER

## 2022-08-15 RX ORDER — DIPHENHYDRAMINE HYDROCHLORIDE 50 MG/ML
12.5 INJECTION INTRAMUSCULAR; INTRAVENOUS
Status: DISCONTINUED | OUTPATIENT
Start: 2022-08-15 | End: 2022-08-15 | Stop reason: HOSPADM

## 2022-08-15 RX ORDER — SODIUM CHLORIDE 0.9 % (FLUSH) 0.9 %
5-40 SYRINGE (ML) INJECTION PRN
Status: DISCONTINUED | OUTPATIENT
Start: 2022-08-15 | End: 2022-08-15 | Stop reason: HOSPADM

## 2022-08-15 RX ORDER — SODIUM CHLORIDE, SODIUM LACTATE, POTASSIUM CHLORIDE, CALCIUM CHLORIDE 600; 310; 30; 20 MG/100ML; MG/100ML; MG/100ML; MG/100ML
INJECTION, SOLUTION INTRAVENOUS CONTINUOUS
Status: DISCONTINUED | OUTPATIENT
Start: 2022-08-15 | End: 2022-08-15 | Stop reason: HOSPADM

## 2022-08-15 RX ORDER — ONDANSETRON 2 MG/ML
4 INJECTION INTRAMUSCULAR; INTRAVENOUS
Status: DISCONTINUED | OUTPATIENT
Start: 2022-08-15 | End: 2022-08-15 | Stop reason: HOSPADM

## 2022-08-15 RX ORDER — ONDANSETRON 2 MG/ML
INJECTION INTRAMUSCULAR; INTRAVENOUS PRN
Status: DISCONTINUED | OUTPATIENT
Start: 2022-08-15 | End: 2022-08-15 | Stop reason: SDUPTHER

## 2022-08-15 RX ORDER — PROCHLORPERAZINE EDISYLATE 5 MG/ML
5 INJECTION INTRAMUSCULAR; INTRAVENOUS
Status: DISCONTINUED | OUTPATIENT
Start: 2022-08-15 | End: 2022-08-15 | Stop reason: HOSPADM

## 2022-08-15 RX ORDER — PROPOFOL 10 MG/ML
INJECTION, EMULSION INTRAVENOUS CONTINUOUS PRN
Status: DISCONTINUED | OUTPATIENT
Start: 2022-08-15 | End: 2022-08-15 | Stop reason: SDUPTHER

## 2022-08-15 RX ORDER — OXYCODONE HYDROCHLORIDE 5 MG/1
5 TABLET ORAL EVERY 6 HOURS PRN
Qty: 28 TABLET | Refills: 0 | Status: SHIPPED | OUTPATIENT
Start: 2022-08-15 | End: 2022-08-22

## 2022-08-15 RX ORDER — DEXAMETHASONE SODIUM PHOSPHATE 4 MG/ML
INJECTION, SOLUTION INTRA-ARTICULAR; INTRALESIONAL; INTRAMUSCULAR; INTRAVENOUS; SOFT TISSUE PRN
Status: DISCONTINUED | OUTPATIENT
Start: 2022-08-15 | End: 2022-08-15 | Stop reason: SDUPTHER

## 2022-08-15 RX ORDER — APREPITANT 40 MG/1
40 CAPSULE ORAL ONCE
Status: COMPLETED | OUTPATIENT
Start: 2022-08-15 | End: 2022-08-15

## 2022-08-15 RX ORDER — MEPERIDINE HYDROCHLORIDE 25 MG/ML
12.5 INJECTION INTRAMUSCULAR; INTRAVENOUS; SUBCUTANEOUS AS NEEDED
Status: DISCONTINUED | OUTPATIENT
Start: 2022-08-15 | End: 2022-08-15 | Stop reason: HOSPADM

## 2022-08-15 RX ORDER — MIDAZOLAM HYDROCHLORIDE 1 MG/ML
INJECTION INTRAMUSCULAR; INTRAVENOUS PRN
Status: DISCONTINUED | OUTPATIENT
Start: 2022-08-15 | End: 2022-08-15 | Stop reason: SDUPTHER

## 2022-08-15 RX ORDER — SODIUM CHLORIDE, SODIUM LACTATE, POTASSIUM CHLORIDE, CALCIUM CHLORIDE 600; 310; 30; 20 MG/100ML; MG/100ML; MG/100ML; MG/100ML
INJECTION, SOLUTION INTRAVENOUS CONTINUOUS PRN
Status: DISCONTINUED | OUTPATIENT
Start: 2022-08-15 | End: 2022-08-15 | Stop reason: SDUPTHER

## 2022-08-15 RX ADMIN — SODIUM CHLORIDE, SODIUM LACTATE, POTASSIUM CHLORIDE, CALCIUM CHLORIDE: 600; 310; 30; 20 INJECTION, SOLUTION INTRAVENOUS at 12:27

## 2022-08-15 RX ADMIN — CEFAZOLIN 2000 MG: 2 INJECTION, POWDER, FOR SOLUTION INTRAMUSCULAR; INTRAVENOUS at 12:42

## 2022-08-15 RX ADMIN — SODIUM CHLORIDE, SODIUM LACTATE, POTASSIUM CHLORIDE, AND CALCIUM CHLORIDE: .6; .31; .03; .02 INJECTION, SOLUTION INTRAVENOUS at 13:02

## 2022-08-15 RX ADMIN — MIDAZOLAM HYDROCHLORIDE 2 MG: 2 INJECTION, SOLUTION INTRAMUSCULAR; INTRAVENOUS at 12:27

## 2022-08-15 RX ADMIN — SUGAMMADEX 200 MG: 100 INJECTION, SOLUTION INTRAVENOUS at 13:29

## 2022-08-15 RX ADMIN — Medication 100 MG: at 12:33

## 2022-08-15 RX ADMIN — Medication 140 MG: at 12:36

## 2022-08-15 RX ADMIN — SODIUM CHLORIDE, POTASSIUM CHLORIDE, SODIUM LACTATE AND CALCIUM CHLORIDE: 600; 310; 30; 20 INJECTION, SOLUTION INTRAVENOUS at 11:14

## 2022-08-15 RX ADMIN — SODIUM CHLORIDE, SODIUM LACTATE, POTASSIUM CHLORIDE, AND CALCIUM CHLORIDE: .6; .31; .03; .02 INJECTION, SOLUTION INTRAVENOUS at 12:27

## 2022-08-15 RX ADMIN — Medication 20 MG: at 12:33

## 2022-08-15 RX ADMIN — DEXAMETHASONE SODIUM PHOSPHATE 8 MG: 4 INJECTION, SOLUTION INTRAMUSCULAR; INTRAVENOUS at 12:44

## 2022-08-15 RX ADMIN — ONDANSETRON 4 MG: 2 INJECTION INTRAMUSCULAR; INTRAVENOUS at 12:44

## 2022-08-15 RX ADMIN — PHENYLEPHRINE HYDROCHLORIDE 200 MCG: 10 INJECTION, SOLUTION INTRAMUSCULAR; INTRAVENOUS; SUBCUTANEOUS at 13:07

## 2022-08-15 RX ADMIN — PROPOFOL 120 MCG/KG/MIN: 10 INJECTION, EMULSION INTRAVENOUS at 12:41

## 2022-08-15 RX ADMIN — HYDROMORPHONE HYDROCHLORIDE 0.25 MG: 1 INJECTION, SOLUTION INTRAMUSCULAR; INTRAVENOUS; SUBCUTANEOUS at 14:29

## 2022-08-15 RX ADMIN — APREPITANT 40 MG: 40 CAPSULE ORAL at 11:11

## 2022-08-15 RX ADMIN — FENTANYL CITRATE 50 MCG: 50 INJECTION, SOLUTION INTRAMUSCULAR; INTRAVENOUS at 13:27

## 2022-08-15 RX ADMIN — PROPOFOL 200 MG: 10 INJECTION, EMULSION INTRAVENOUS at 12:35

## 2022-08-15 RX ADMIN — FENTANYL CITRATE 50 MCG: 50 INJECTION, SOLUTION INTRAMUSCULAR; INTRAVENOUS at 13:19

## 2022-08-15 RX ADMIN — PHENYLEPHRINE HYDROCHLORIDE 100 MCG: 10 INJECTION, SOLUTION INTRAMUSCULAR; INTRAVENOUS; SUBCUTANEOUS at 12:55

## 2022-08-15 RX ADMIN — REMIFENTANIL HYDROCHLORIDE 0.15 MCG/KG/MIN: 1 INJECTION, POWDER, LYOPHILIZED, FOR SOLUTION INTRAVENOUS at 12:35

## 2022-08-15 RX ADMIN — SODIUM CHLORIDE, POTASSIUM CHLORIDE, SODIUM LACTATE AND CALCIUM CHLORIDE: 600; 310; 30; 20 INJECTION, SOLUTION INTRAVENOUS at 11:11

## 2022-08-15 RX ADMIN — ROCURONIUM BROMIDE 30 MG: 10 INJECTION, SOLUTION INTRAVENOUS at 13:01

## 2022-08-15 RX ADMIN — HYDROMORPHONE HYDROCHLORIDE 0.25 MG: 1 INJECTION, SOLUTION INTRAMUSCULAR; INTRAVENOUS; SUBCUTANEOUS at 15:00

## 2022-08-15 ASSESSMENT — PAIN DESCRIPTION - ORIENTATION
ORIENTATION: ANTERIOR;RIGHT
ORIENTATION: RIGHT;ANTERIOR
ORIENTATION: ANTERIOR

## 2022-08-15 ASSESSMENT — PAIN DESCRIPTION - FREQUENCY
FREQUENCY: CONTINUOUS
FREQUENCY: CONTINUOUS

## 2022-08-15 ASSESSMENT — PAIN SCALES - GENERAL
PAINLEVEL_OUTOF10: 4
PAINLEVEL_OUTOF10: 1

## 2022-08-15 ASSESSMENT — PAIN DESCRIPTION - DESCRIPTORS
DESCRIPTORS: BURNING
DESCRIPTORS: SHARP

## 2022-08-15 ASSESSMENT — PAIN DESCRIPTION - LOCATION
LOCATION: NECK

## 2022-08-15 ASSESSMENT — PAIN DESCRIPTION - PAIN TYPE
TYPE: SURGICAL PAIN
TYPE: SURGICAL PAIN

## 2022-08-15 ASSESSMENT — LIFESTYLE VARIABLES: SMOKING_STATUS: 1

## 2022-08-15 ASSESSMENT — PAIN - FUNCTIONAL ASSESSMENT: PAIN_FUNCTIONAL_ASSESSMENT: 0-10

## 2022-08-15 NOTE — ANESTHESIA PRE PROCEDURE
Department of Anesthesiology  Preprocedure Note       Name:  Millie Caro   Age:  60 y.o.  :  1960                                          MRN:  [id number]         Date:  8/15/2022      Surgeon: Phil Amin):  Minus Hipps. [place], MD    Procedure: Procedure(s):  C3-4 ANTERIOR CERVICAL DISCECTOMY AND FUSION    Medications prior to admission:   Prior to Admission medications    Medication Sig Start Date End Date Taking? Authorizing Provider   JARDIANCE 10 MG tablet TAKE 1 TABLET BY MOUTH ONE TIME A DAY 22   Mal Hurtado MD   atorvastatin (LIPITOR) 80 MG tablet Take 1 tablet by mouth at bedtime 22   Gemini Altamirano MD   desvenlafaxine succinate (PRISTIQ) 50 MG TB24 extended release tablet Take 1 tablet by mouth daily 22   Sara Rai MD   omega-3 acid ethyl esters (LOVAZA) 1 g capsule TAKE 2 CAPSULES BY MOUTH TWO TIMES A DAY 22   Mal Hurtado MD   metFORMIN (GLUCOPHAGE) 1000 MG tablet One tab PO bid 22   Mal Hurtado MD   insulin regular human (HUMULIN R U-500 KWIKPEN) 500 UNIT/ML SOPN concentrated injection pen INJECT 80 UNITS UNDER THE SKIN BEFORE BREAKFAST THEN 60 UNITS UNDER THE SKIN BEFORE LUNCH AND DINNER  Patient taking differently: INJECT 70 UNITS UNDER THE SKIN BEFORE BREAKFAST THEN 40 UNITS UNDER THE SKIN BEFORE LUNCH AND DINNER 22   Mal Hurtado MD   Continuous Blood Gluc Sensor (DEXCOM G6 SENSOR) MISC USE AS DIRECTED AND CHANGE SENSORS EVERY 10 DAYS 22   Mal Hurtado MD   clopidogrel (PLAVIX) 75 MG tablet Take 1 tablet by mouth daily 22   John Harmon MD   metroNIDAZOLE (METROGEL) 1 % gel Apply to face daily.  22   Sara Rai MD   buPROPion (WELLBUTRIN XL) 150 MG extended release tablet TAKE ONE TABLET BY MOUTH EVERY MORNING 3/29/22   MADISON Lara - CNP   Continuous Blood Gluc Transmit (DEXCOM G6 TRANSMITTER) MISC USE AS DIRECTED AND CHANGE EVERY 90 DAYS 22   Mal Hurtado MD   atenolol (TENORMIN) 25 MG tablet TAKE 1 TABLET BY MOUTH EVERY NIGHT AT BEDTIME 2/8/22   [facility], APRN - CNP   nystatin (MYCOSTATIN) 522990 UNIT/GM ointment Apply topically 2 times daily to corners of mouth. 1/12/22   [facility], APRN - CNP   Insulin Pen Needle (B-D UF III MINI PEN NEEDLES) 31G X 5 MM MISC 1 each by Does not apply route 3 times daily 10/18/21   Rosa Rod MD   lisinopril (PRINIVIL;ZESTRIL) 20 MG tablet Take 1 tablet by mouth 2 times daily 10/13/21   Omar Man MD   fenofibrate (TRICOR) 145 MG tablet TAKE 1 TABLET BY MOUTH ONE TIME A DAY 8/25/21   Rosa Rod MD   Continuous Blood Gluc  ([address]) [address] As needed 8/17/21   Rosa Rod MD   blood glucose test strips (PRODIGY NO CODING BLOOD GLUC) strip Use as directed to test up to 3-4 times daily 8/17/21   Rosa Rod MD   vitamin D3 (CHOLECALCIFEROL) 25 MCG (1000 UT) TABS tablet TAKE 1 TABLET BY MOUTH ONE TIME A DAY 6/22/21   [facility], MADISON Mercado CNP   ASPIRIN ADULT LOW STRENGTH 81 MG EC tablet TAKE 1 TABLET BY MOUTH ONE TIME A DAY 6/22/21   [facility], MADISON - CNP   Cholecalciferol (VITAMIN D3) 50 MCG (2000 UT) CAPS Take 1 capsule by mouth daily    Historical Provider, MD   atenolol (TENORMIN) 25 MG tablet TAKE 1 TABLET BY MOUTH EVERY NIGHT AT BEDTIME 1/15/20   [facility], MADISON - CNP   PRODIGY LANCETS 28G MISC Use as directed to test up to 3-4 times daily 3/15/19   Estrella Shine MD       Current medications:    Current Facility-Administered Medications   Medication Dose Route Frequency Provider Last Rate Last Admin    ceFAZolin (ANCEF) 2,000 mg in sodium chloride 0.9 % 50 mL IVPB (mini-bag)  2,000 mg IntraVENous Once Tacos Garcia MD        lactated ringers infusion   IntraVENous Continuous Matthew Shoemaker DO        sodium chloride flush 0.9 % injection 5-40 mL  5-40 mL IntraVENous 2 times per day Matthew Shoemaker DO        sodium chloride flush 0.9 % injection 5-40 mL  5-40 mL IntraVENous PRN Ronalee Oregon, DO        0.9 % sodium chloride infusion   IntraVENous PRN Ronalee [place], DO           Allergies:  No Known Allergies    Problem List:    Patient Active Problem List   Diagnosis Code    Hypertriglyceridemia E78.1    Hyperlipidemia E78.5    Hypertension I10    Testosterone deficiency E34.9    Diabetic polyneuropathy ([facility] [address] E11.42    Type 2 diabetes mellitus with diabetic polyneuropathy, with long-term current use of insulin ([facility]) E11.42, Z79.4    DARRICK (obstructive sleep apnea) G47.33    Occlusion and stenosis of carotid artery I65.29    ED (erectile dysfunction) N52.9    Osteoarthritis of cervical spine with myelopathy M47.12    Vitamin D deficiency E55.9    Polyp of rectum K62.1    Chronic neck and back pain M54.2, M54.9, G89.29    Class 1 obesity due to excess calories with serious comorbidity and body mass index (BMI) of 32.0 to 32.9 in adult E66.09, Z68.32    Left lateral epicondylitis M77.12    Acquired trigger finger of left ring finger M65.342    Former smoker Z87.891    Dysthymia F34.1    Trigger finger, right middle finger M65.331    Trigger finger, right ring finger M65.341    Bilateral carotid artery stenosis I65.23    CAD in native artery I25.10    CAD S/P percutaneous coronary angioplasty I25.10, Z98.61    Abnormal stress test R94.39    Primary osteoarthritis of left wrist M19.032    Closed nondisplaced fracture of triquetral bone of right wrist S62.114A    Moderate episode of recurrent major depressive disorder (HCC) F33.1    Atrial fibrillation, unspecified type ([facility]) I48.91    Neuropathy of upper extremity G56.90    History of cervical spinal surgery Z98.890    DDD (degenerative disc disease), cervical M50.30    DDD (degenerative disc disease), lumbar M51.36    Bulge of lumbar disc without myelopathy M51.26       Past Medical History:        Diagnosis Date    Acute, but ill-defined, cerebrovascular disease 05/31/2013    CAD in native artery 12/30/2020    Cerebral artery occlusion with cerebral infarction [facility]) 2013     X2 PERSONALITY CHANGE, ANGER OUTBURSTS    Cerebrovascular disease     Diplopia 07/11/2013    ED (erectile dysfunction) 01/23/2014    Elbow pain, chronic 12/16/2015    Hyperlipidemia     Hypertension     Irritable bowel syndrome     Obstructive sleep apnea (adult) (pediatric) 07/01/2013    NO CPAP, HAD PROBLEMS WITH INSURANCE AND STOPPED USING    Occlusion and stenosis of carotid artery without mention of cerebral infarction 01/13/2014    MINOR    Pain in joint, upper arm 05/14/2015    Polyneuropathy in diabetes(357.2) 07/01/2013    PONV (postoperative nausea and vomiting)     Skin abnormality     PRE-CANCEROUS LESIONS REMOVED FROM ARMS AND EARS    Type 2 diabetes mellitus with diabetic polyneuropathy, with long-term current use of insulin ([facility] [address] 07/01/2013    Type II or unspecified type diabetes mellitus without mention of complication, not stated as uncontrolled     Use of cane as ambulatory aid        Past Surgical History:        Procedure Laterality Date    CARPAL TUNNEL RELEASE Left 2008    CERVICAL DISCECTOMY  08/02/2017    ANTERIOR CERVICAL DISCECTOMY AND FUSION C5-6, C6-7 WITH MEDTRONIC PLATES, SCREWS, ALLOGRAFT, WITH EVOKES PARTIAL C6 CORPECTOMY    CERVICAL FUSION  12/06/2017    CERVICAL LAMINECTOMY AND POSTERIOR FUSION C5-T1 WITH MEDTRONIC RODS AND SCREWS WITH EVOKES AND O-ARM    CHOLECYSTECTOMY  1995    COLONOSCOPY N/A 8/14/2018    COLONOSCOPY POLYPECTOMY SNARE/COLD BIOPSY performed by Yogesh Buitrago MD at [facility] Left 2008    ULNAR NERVE TRANSPOSITION, AT SAME TIME AS CTR    FINGER TRIGGER RELEASE Left 12/12/2019    LEFT RING TRIGGER FINGER RELEASE performed by Pat Ko MD at [address] [place] [address] Right 11/23/2020    RIGHT LONG AND RING TRIGGER FINGER RELEASES -SLEEP APNEA- performed by Sushma Delgado MD at [address] [place] [address]    umbilical    PYLOROPLASTY      PYLORIC STENOSIS AS INFANT       Social History:    Social History     Tobacco Use    Smoking status: Former     Packs/day: 1.00     Years: 25.00     Pack years: 25.00     Types: Cigarettes     Quit date: 2000     Years since quittin.6    Smokeless tobacco: Never   Substance Use Topics    Alcohol use: No                                Counseling given: Not Answered      Vital Signs (Current):   Vitals:    08/15/22 1024   BP: (!) 157/103   Pulse: 55   Resp: 16   Temp: 98.4 °F (36.9 °C)   TempSrc: Temporal   SpO2: 98%   Weight: 221 lb 1.6 oz (100.3 kg)   Height: 5' 11\" (1.803 m)                                              BP Readings from Last 3 Encounters:   08/15/22 (!) 157/103   22 128/60   22 126/60       NPO Status: Time of last liquid consumption: 2300                        Time of last solid consumption: 2300                                                      BMI:   Wt Readings from Last 3 Encounters:   08/15/22 221 lb 1.6 oz (100.3 kg)   22 213 lb 9.6 oz (96.9 kg)   22 214 lb (97.1 kg)     Body mass index is 30.84 kg/m².     CBC:   Lab Results   Component Value Date/Time    WBC 7.3 2022 10:06 AM    RBC 4.96 2022 10:06 AM    HGB 15.0 2022 10:06 AM    HCT 44.5 2022 10:06 AM    MCV 89.8 2022 10:06 AM    RDW 14.0 2022 10:06 AM     2022 10:06 AM       CMP:   Lab Results   Component Value Date/Time     2022 10:06 AM    K 4.6 2022 10:06 AM    K 4.3 2020 05:19 AM     2022 10:06 AM    CO2 18 2022 10:06 AM    BUN 22 2022 10:06 AM    CREATININE 1.1 2022 10:06 AM    GFRAA >60 2022 10:06 AM    GFRAA >60 2013 02:59 PM    AGRATIO 2.1 2022 10:06 AM    LABGLOM >60 2022 10:06 AM    GLUCOSE 249 2022 10:06 AM    PROT 7.1 2022 10:06 AM    PROT 7.5 10/04/2012 02:40 PM    CALCIUM 9.8 08/04/2022 10:06 AM    BILITOT 0.6 08/04/2022 10:06 AM    ALKPHOS 95 08/04/2022 10:06 AM    AST 39 08/04/2022 10:06 AM    ALT 52 08/04/2022 10:06 AM       POC Tests: No results for input(s): POCGLU, POCNA, POCK, POCCL, POCBUN, POCHEMO, POCHCT in the last 72 hours. Coags:   Lab Results   Component Value Date/Time    PROTIME 12.0 12/30/2020 07:49 AM    INR 1.03 12/30/2020 07:49 AM       HCG (If Applicable): No results found for: PREGTESTUR, PREGSERUM, HCG, HCGQUANT     ABGs: No results found for: PHART, PO2ART, GXR0JQY, YTS5HIT, BEART, N4DYRQQO     Type & Screen (If Applicable):  No results found for: LABABO, LABRH    Drug/Infectious Status (If Applicable):  No results found for: HIV, HEPCAB    COVID-19 Screening (If Applicable):   Lab Results   Component Value Date/Time    COVID19 NOT DETECTED 12/24/2020 08:09 AM           Anesthesia Evaluation  Patient summary reviewed and Nursing notes reviewed   history of anesthetic complications: PONV.   Airway: Mallampati: II  TM distance: >3 FB   Neck ROM: full  Mouth opening: > = 3 FB   Dental: normal exam         Pulmonary: breath sounds clear to auscultation  (+) sleep apnea: on CPAP,  current smoker (quit 20 yrs ago  )                           Cardiovascular:  Exercise tolerance: good (>4 METS),   (+) hypertension: moderate, CAD:, CABG/stent (2 stents 3 yrs ago doing well  ):, dysrhythmias:,       NYHA Classification: II  ECG reviewed  Rhythm: regular  Rate: normal  Echocardiogram reviewed         Beta Blocker:  Dose within 24 Hrs      ROS comment: Results for orders placed or performed during the hospital encounter of 12/18/20  -ECHO Complete 2D W Doppler W Color:        Result                      Value             Ref Range           Left Ventricular Eject*     60                                    LVEF MODALITY               ECHO                                  Neuro/Psych:   (+) CVA (2013   off plavix x 5 days ):, ROS comment: Right hand thumb tingling/weak  GI/Hepatic/Renal:        (-) GERD       Endo/Other:    (+) Diabetes (bs 160)Type II DM, well controlled, , .                 Abdominal:             Vascular: Other Findings:           Anesthesia Plan      general     ASA 3       Induction: intravenous. MIPS: Prophylactic antiemetics administered. Anesthetic plan and risks discussed with patient and child/children. Plan discussed with CRNA.     Attending anesthesiologist reviewed and agrees with Preprocedure content                Nicole Fajardo DO   8/15/2022

## 2022-08-15 NOTE — PROGRESS NOTES
Ambulatory Surgery/Procedure Discharge Note    Vitals:    08/15/22 1530   BP: (!) 157/81   Pulse: 82   Resp: 18   Temp: 97.6 °F (36.4 °C)   SpO2: 94%     Patient meets criteria for discharge per Christina score. In: 5836 [I.V.:2251]  Out: 0     Restroom use offered before discharge. Yes    Pain assessment:  present - adequately treated  Pain Level: 1    Pt and S.O./family states \"ready to go home\". Pt alert and oriented x4. IV removed. Denies N/V or pain. Neck incision-closed with skin glue. No hematoma, swelling, bruising, or bleeding noted. Voided prior to discharge. Discharge instructions given to pt and son with pt permission. Pt and son verbalized understanding of all instructions. Left with all belongings, 2 prescriptions, and discharge instructions. Patient discharged to home/self care. Patient discharged via wheel chair by transporter to waiting family.       8/15/2022 3:55 PM

## 2022-08-15 NOTE — PROGRESS NOTES
PACU Transfer to [facility]    Vitals:    08/15/22 1513   BP: (!) 107/102   Pulse: 89   Resp: 17   Temp: 97.3 °F (36.3 °C)   SpO2: 96%     Pre-op /103 within 20%     Intake/Output Summary (Last 24 hours) at 8/15/2022 1524  Last data filed at 8/15/2022 1511  Gross per 24 hour   Intake 2301 ml   Output 0 ml   Net 2301 ml       Pain assessment:  present - adequately treated  Pain Level: 1    Patient transferred to care of WILLIS RN.    8/15/2022 3:24 PM

## 2022-08-15 NOTE — OP NOTE
Operative Report    PATIENT NAME: Rola Cueto OF BIRTH: 1960  MEDICAL RECORD# [id number]  SURGERY DATE: 8/15/2022  SURGEON:  Arsh Ortega MD, PhD  ASSISTANT:  Bryanna Ziegler PA-C., served as assistant on this surgery due to the complex nature of the surgery and the lack of a resident or fellow assistant. She provided assistance with critical tissue retraction at parts of the surgery, wound closure and assistance with protecting critical neuro elements  DICTATED BY:  Arsh Ortega MD        PREOPERATIVE DIAGNOSIS:  1. Cervical Degenerative Disc Disease and Herniated Cervical Disc at C4-5, adjacent to C5-C7 fusion. POSTOPERATIVE DIAGNOSIS:  1. Same    PROCEDURES PERFORMED:  1. Anterior cervical diskectomy C4-5 with decompression of spinal cord and nerve roots  2. Anterior cervical arthrodesis with 7 mm PEEK graft filled with Reinaldo DBM allograft C4-5  3. Anterior cervical plating C4-5 with Medtronic integrated plate and screws D9-0  4. Microscopic dissection  5. Intraoperative fluoroscopy  6. Intraoperative neuromonitoring (MEP, SSEP, EEG)     ANESTHESIA:  General    COMPLICATIONS:  None    INDICATIONS FOR SURGERY:  The patient is a 60 y.o. patient with neck and arm pain as well as early myelopathy. Their symptoms failed to respond to conservative intervention. An MRI scan was performed and this showed evidence of disk herniations at C4-5 level, adjacent to prior C5-7 fusion. I discussed the risks and benefits to include (but not limited to): Bleeding, infection, failure to relieve her pain, adjacent level degeneration, need for further surgery, spinal cord injury, numbness or weakness of the upper extremities, paralysis, esophageal injury, and hoarseness of voice. Having failed conservative management and experiencing persistent symptoms, the patient elected to proceed with anterior cervical discectomy with stabilization and fusion at C4-5.     DETAILS OF PROCEDURE:  Under universal basic protocol, the patient was brought to the operating room and placed under general anesthesia. Intravenous antibiotics were given by anesthesia, Ancef 2 g. They were then placed supine on the operating table. The anterior cervical area was prepped and draped in the usual sterile fashion. Using a #15 blade knife, the skin was incised in a horizontal fashion over the target interspaces. Sequential compression boots were placed on the patient's legs. All pressure points were adequately padded and the patient was secured to the operation table. The neck was cleansed with alcohol and a transverse incision was planned on the right side of the neck for exposure of the C4-5 disk. The operative field was then prepped and draped in the usual sterile fashion. An incision was made with the 15-blade knife through the skin and dermis. Using Bovie electrocautery, I dissected through the platysma muscle. The sternocleidomastoid muscle was identified and the loose connective tissue overlying this was sharply dissected. I then dissected bluntly along the medial border of the sternocleidomastoid muscle down to the anterior surface of the spine. A handheld retractor was inserted to retract the esophagus and trachea medially. Sharp dissection was used over the loose connective tissue on the anterior surface of the spine until the anterior surface of the spine was completely visible. A #4 Clifm Meth was then place over the disk and the lateral fluoroscopy was obtained showing this to be the C4-5 disk. Using Bovie electrocautery, I then elevated the longus colli muscle off the anterior surface of the spine. I dissected the anterior longitudinal ligament off of the endplates of C4 and C5 until the uncinate processes of C5 were identified bilaterally. Blackbelt self-retaining retractor blades were then placed with blades medial and lateral to provide appropriate retraction.   The microscope was then brought in for the purpose of microscopic dissection. The disk was incised with a 15-blade knife and the anterior portion of the disk was removed with the pituitary rongeur. I then used the Midas-Dawson drill bit to drill away the remainder of the disk down to the posterior longitudinal ligament as well as drilling away the bony spurs overlying the endplate. Once this was completed, I was able to use a nerve hook and slip underneath the posterior longitudinal ligament and create an opening through the ligament. A 2 mm punch was then used to punch away the ligament in its entirety from the right foramen to the left foramen. All free disk fragments as well as bony spurs were drilled and punched away until both neural foramina were well decompressed. Following this, I was able to palpate superiorly and inferiorly underneath the endplates and they felt well decompressed. Endplates were then prepared drilling away the cartilaginous endplates to the subchondral bleeding bone for preparation for fusion. At this point, the wound was copiously irrigated with antibiotic solution. Anesthesia performed mild cervical traction, and a 7 mm spacer was tapped into the disk space. This fit appropriately. I then rasped the disk space with a 7 mm rasp. I therefore prepared a 7 mm Medtronic PEEK graft filled with Fallon soaked in autologous BMA and tapped this into the disk space until approximately 1-2 mm countersunk. At this point Anesthesia released their traction and the graft fit very snugly. I then placed a 14 mm screws into each of the C4 and C5 vertebral bodies with appropriate angulation, through the integrated Medtronic plate. All screws were placed and checked with lateral fluoroscopy and confirmed to be in good position. The screws were then tightened down fully. The locking cams were then turned to fully tightened position with cam covering both screws .   Final lateral and AP fluoroscopy were obtained and confirmed the plate, screws and graft to be in good position. The fluoroscopy was then removed. Hemostasis was achieved with bipolar cautery and Floseal. Hemostasis was visually confirmed. The wound was copiously irrigated with antibiotic solution. The wound was then closed with 3-0 Vicryl suture to close the platysma and a 4-0 Monocryl running subcuticular stitch for the skin. Dermabond was applied to the skin edges. The patient was then awakened from anesthesia and taken to the recovery room in stable condition. The sponge, needle and instrument counts were correct at the end of the case. In conformance with CMS regulations, I affirm I was present in the operating room during the entire procedure. ESTIMATED BLOOD LOSS: 50 mL    COMPLICATIONS: No complications apparent. DISPOSITION: The patient was transferred to the PACU in stable condition, awake, alert, and moving all extremities on command.     Dictated by: Richa Campbell MD, PhD

## 2022-08-15 NOTE — ANESTHESIA POSTPROCEDURE EVALUATION
Department of Anesthesiology  Postprocedure Note    Patient: Joan Steve  MRN: [id number]  YOB: 1960  Date of evaluation: 8/15/2022      Procedure Summary     Date: 08/15/22 Room / Location: [facility]    Anesthesia Start: 7446 Anesthesia Stop: 5853    Procedure: C4-5  ANTERIOR CERVICAL DISCECTOMY AND FUSION (Neck) Diagnosis:       Cervical stenosis of spine      (Cervical stenosis of spine [M48.02])    Surgeons: Thom Rogers. Torey Byrnes MD Responsible Provider: Tamara Gorman DO    Anesthesia Type: general ASA Status: 3          Anesthesia Type: No value filed.     Christina Phase I: Christina Score: 10    Christina Phase II: Christina Score: 10      Anesthesia Post Evaluation    Patient location during evaluation: PACU  Patient participation: complete - patient participated  Level of consciousness: awake and alert  Pain score: 1  Airway patency: patent  Nausea & Vomiting: no nausea and no vomiting  Cardiovascular status: blood pressure returned to baseline  Respiratory status: acceptable  Hydration status: euvolemic

## 2022-08-15 NOTE — H&P
[facility] Update H & P  Department of General Surgery   Surgical Service   Pre-operative History and Physical  Last H & P within the last 30 days. DIAGNOSIS:   Cervical stenosis of spine [M48.02]    Procedure(s):  C3-4 ANTERIOR CERVICAL DISCECTOMY AND FUSION     History obtained from: Patient interview and EHR     HISTORY OF PRESENT ILLNESS:   The patient is a 60 y.o. male with c/o cervical stiffness and  bilateral UE numbness and tingling in the setting of MRI demonstrated severe central stenosis and adjacent segment disease at C4-C5. Their symptoms have been recalcitrant to conservative treatment and the patient presents today for the above procedure. Illness Screening: Patient denies fever, chills, worsening cough, or close contact with sick individuals.        Past Medical History:        Diagnosis Date    Acute, but ill-defined, cerebrovascular disease 05/31/2013    CAD in native artery 12/30/2020    Cerebral artery occlusion with cerebral infarction [facility]) 2013     X2 PERSONALITY CHANGE, ANGER OUTBURSTS    Cerebrovascular disease     Diplopia 07/11/2013    ED (erectile dysfunction) 01/23/2014    Elbow pain, chronic 12/16/2015    Hyperlipidemia     Hypertension     Irritable bowel syndrome     Obstructive sleep apnea (adult) (pediatric) 07/01/2013    NO CPAP, HAD PROBLEMS WITH INSURANCE AND STOPPED USING    Occlusion and stenosis of carotid artery without mention of cerebral infarction 01/13/2014    MINOR    Pain in joint, upper arm 05/14/2015    Polyneuropathy in diabetes(357.2) 07/01/2013    PONV (postoperative nausea and vomiting)     Skin abnormality     PRE-CANCEROUS LESIONS REMOVED FROM ARMS AND EARS    Type 2 diabetes mellitus with diabetic polyneuropathy, with long-term current use of insulin ([facility] [address] 07/01/2013    Type II or unspecified type diabetes mellitus without mention of complication, not stated as uncontrolled     Use of cane as ambulatory aid      Past Surgical History:        Procedure Laterality Date    CARPAL TUNNEL RELEASE Left 2008    CERVICAL DISCECTOMY  08/02/2017    ANTERIOR CERVICAL DISCECTOMY AND FUSION C5-6, C6-7 WITH MEDTRONIC PLATES, SCREWS, ALLOGRAFT, WITH EVOKES PARTIAL C6 CORPECTOMY    CERVICAL FUSION  12/06/2017    CERVICAL LAMINECTOMY AND POSTERIOR FUSION C5-T1 WITH MEDTRONIC RODS AND SCREWS WITH EVOKES AND O-ARM    CHOLECYSTECTOMY  1995    COLONOSCOPY N/A 8/14/2018    COLONOSCOPY POLYPECTOMY SNARE/COLD BIOPSY performed by Luis Bay MD at [address] Left 2008    ULNAR NERVE TRANSPOSITION, AT SAME TIME AS CTR    FINGER TRIGGER RELEASE Left 12/12/2019    LEFT RING TRIGGER FINGER RELEASE performed by Sylvester Enamorado MD at [place] [address] Right 11/23/2020    RIGHT LONG AND RING TRIGGER FINGER RELEASES -SLEEP APNEA- performed by Sylvester Enamorado MD at [address] [place] [address]    umbilical    PYLOROPLASTY      PYLORIC STENOSIS AS INFANT           Medications Prior to Admission:      Prior to Admission medications    Medication Sig Start Date End Date Taking?  Authorizing Provider   JARDIANCE 10 MG tablet TAKE 1 TABLET BY MOUTH ONE TIME A DAY 8/9/22   Kelsey Mcginnis MD   atorvastatin (LIPITOR) 80 MG tablet Take 1 tablet by mouth at bedtime 8/5/22   Ann Marie Wynn MD   desvenlafaxine succinate (PRISTIQ) 50 MG TB24 extended release tablet Take 1 tablet by mouth daily 7/14/22   Kathrine Fernandez MD   omega-3 acid ethyl esters (LOVAZA) 1 g capsule TAKE 2 CAPSULES BY MOUTH TWO TIMES A DAY 6/28/22   Kelsey Mcginnis MD   metFORMIN (GLUCOPHAGE) 1000 MG tablet One tab PO bid 6/28/22   Kelsey Mcginnis MD   insulin regular human (HUMULIN R U-500 KWIKPEN) 500 UNIT/ML SOPN concentrated injection pen INJECT 80 UNITS UNDER THE SKIN BEFORE BREAKFAST THEN 60 UNITS UNDER THE SKIN BEFORE LUNCH AND DINNER  Patient taking differently: INJECT 70 UNITS UNDER THE SKIN BEFORE BREAKFAST THEN 40 UNITS UNDER THE SKIN BEFORE LUNCH AND DINNER 6/14/22   Jefferson Lopez MD   Continuous Blood Gluc Sensor (DEXCOM G6 SENSOR) MISC USE AS DIRECTED AND CHANGE SENSORS EVERY 10 DAYS 6/6/22   Jefferson Lopez MD   clopidogrel (PLAVIX) 75 MG tablet Take 1 tablet by mouth daily 5/24/22   Syed Santamaria MD   metroNIDAZOLE (METROGEL) 1 % gel Apply to face daily. 5/23/22   Renita Araya MD   buPROPion (WELLBUTRIN XL) 150 MG extended release tablet TAKE ONE TABLET BY MOUTH EVERY MORNING 3/29/22   MADISON Parada CNP   Continuous Blood Gluc Transmit (DEXCOM G6 TRANSMITTER) MISC USE AS DIRECTED AND CHANGE EVERY 90 DAYS 2/28/22   Jefferson Lopez MD   atenolol (TENORMIN) 25 MG tablet TAKE 1 TABLET BY MOUTH EVERY NIGHT AT BEDTIME 2/8/22   MADISON Parada CNP   nystatin (MYCOSTATIN) 348116 UNIT/GM ointment Apply topically 2 times daily to corners of mouth.  1/12/22   MADISON Parada CNP   Insulin Pen Needle (B-D UF III MINI PEN NEEDLES) 31G X 5 MM MISC 1 each by Does not apply route 3 times daily 10/18/21   Jefferson Lopez MD   lisinopril (PRINIVIL;ZESTRIL) 20 MG tablet Take 1 tablet by mouth 2 times daily 10/13/21   Syed Santamaria MD   fenofibrate (TRICOR) 145 MG tablet TAKE 1 TABLET BY MOUTH ONE TIME A DAY 8/25/21   Jefferson Lopez MD   Continuous Blood Gluc  (Salima Goldberg) [address] As needed 8/17/21   Jefferson Lopez MD   blood glucose test strips (PRODIGY NO CODING BLOOD GLUC) strip Use as directed to test up to 3-4 times daily 8/17/21   Jefferson Lopez MD   vitamin D3 (CHOLECALCIFEROL) 25 MCG (1000 UT) TABS tablet TAKE 1 TABLET BY MOUTH ONE TIME A DAY 6/22/21   MADISON Parada CNP   ASPIRIN ADULT LOW STRENGTH 81 MG EC tablet TAKE 1 TABLET BY MOUTH ONE TIME A DAY 6/22/21   Caresse Cavanaugh, APRN - CNP   Cholecalciferol (VITAMIN D3) 50 MCG (2000 UT) CAPS Take 1 capsule by mouth daily    Historical

## 2022-08-15 NOTE — DISCHARGE INSTRUCTIONS
You can call Neurosurgery Nurse Practitioner at [phone] if you have questions Monday - Friday from [place] [address]. Otherwise if you have questions you can call     Neurosurgeon on call at [facility]. Call [facility] office for with any fevers or changes in incision. You may shower. Call with any redness or drainage. Call with any worsening pain, numbness, weakness, or any questions. Cervical Spinal Fusion: What to Expect at Home   Your Recovery     After surgery, you can expect your neck to feel stiff and sore. This should improve in the weeks after surgery. You may have trouble sitting or standing in one position for very long and may need pain medicine in the weeks after your surgery. You may need to wear a neck brace for a while. It may take 4 to 6 weeks to get back to your usual activities, but it may depend on what kind of surgery you had. Your doctor may advise you to work with a physical therapist to strengthen the muscles around your neck and back. This care sheet gives you a general idea about how long it will take for you to recover. But each person recovers at a different pace. Follow the steps below to get better as quickly as possible. How can you care for yourself at home? Activity   Rest when you feel tired. Getting enough sleep will help you recover. Try to walk each day. Start by walking a little more than you did the day before. Bit by bit, increase the amount you walk. Walking boosts blood flow and helps prevent pneumonia and constipation. Walking may also decrease your muscle soreness after surgery. Follow your doctor's directions about not lifting anything that would strain your neck and back. This may include a child, heavy grocery bags and milk containers, a heavy briefcase or backpack, cat litter or dog food bags, or a vacuum .    Avoid strenuous activities, such as bicycle riding, jogging, weightlifting, or aerobic exercise, until your doctor says it is okay.   Do not drive for 2 to 4 weeks after your surgery or until your doctor says it is okay. Avoid taking long car trips for 2 to 4 weeks after surgery. Your neck may become tired and painful from sitting too long in one position. You will probably need to take 4 to 6 weeks off from work. It depends on the type of work you do and how you feel. You may have sex as soon as you feel able, but avoid positions that put stress on your neck or cause pain. Diet   You can eat your normal diet. If your stomach is upset, try bland, low-fat foods like plain rice, broiled chicken, toast, and yogurt. Drink plenty of fluids. If you have kidney, heart, or liver disease and have to limit fluids, talk with your doctor before you increase the amount of fluids you drink. You may notice that your bowel movements are not regular right after your surgery. This is common. Try to avoid constipation and straining with bowel movements. You may want to take a fiber supplement every day. If you have not had a bowel movement after a couple of days, ask your doctor about taking a mild laxative. Medicines   Your doctor will tell you if and when you can restart your medicines. He or she will also give you instructions about taking any new medicines. If you take blood thinners, such as warfarin (Coumadin), clopidogrel (Plavix), or aspirin, be sure to talk to your doctor. He or she will tell you if and when to start taking those medicines again. Make sure that you understand exactly what your doctor wants you to do. Be safe with medicines. Take pain medicines exactly as directed. If the doctor gave you a prescription medicine for pain, take it as prescribed. If you are not taking a prescription pain medicine, ask your doctor if you can take an over-the-counter medicine. If your doctor prescribed antibiotics, take them as directed. Do not stop taking them just because you feel better.  You need to take the full course of antibiotics. If you think your pain pill is making you sick to your stomach: Take your pills after meals (unless your doctor has told you not to). Ask your doctor for a different pain pill. Incision care   You may shower and let water run over it. Pat dry with clean dry towel. Leave open to air. No creams lotions or powders, no swimming or submerging incision under water  EXERCISE  Do exercises as instructed by your doctor or physical therapist to improve your strength and flexibility. Other instructions   To reduce stiffness and help sore muscles, use a warm water bottle, a heating pad set on low, or a warm cloth on your neck. Do not put heat right over the incision. Do not go to sleep with a heating pad on your skin. Follow-up care is a key part of your treatment and safety. Be sure to make and go to all appointments, and call your doctor if you are having problems. It's also a good idea to know your test results and keep a list of the medicines you take. When should you call for help? Call 911 anytime you think you may need emergency care. For example, call if:   You passed out (lost consciousness). You have sudden chest pain and shortness of breath, or you cough up blood. You cannot swallow. You have severe pain in your neck or back. You are unable to move an arm or a leg at all. Call your doctor now or seek immediate medical care if:   You have pain that does not get better after you take pain pills. You have signs of infection, such as: Increased pain, swelling, warmth, or redness. Red streaks leading from the site. Pus draining from the site. A fever. You have new or worse symptoms in your arms, legs, chest, belly, or buttocks. Symptoms may include:   Numbness or tingling. Weakness. Pain. You lose bladder or bowel control. You have loose stitches, or your incision comes open. You have blood or fluid draining from the incision.   Watch closely for changes in your health, and be sure to contact your doctor if:   You do not have a bowel movement after taking a laxative. You are not getting better as expected. Where can you learn more? Go to https://addy.[employer]. org and sign in to your [employer] account. Enter E788 in the [facility] box to learn more about Cervical Spinal Fusion: What to Expect at Home.     If you do not have an account, please click on the Sign Up Now link. [address] [facility]    There are potential side effects of anesthesia or sedation you may experience for the first 24 hours. These side effects include:    Confusion or Memory loss, Dizziness, or Delayed Reaction Times   [x]A responsible person should be with you for the next 24 hours. Do not operate any vehicles (automobiles, bicycles, motorcycles) or power tools or machinery for 24 hours. Do not sign any legal documents or make any legal decisions for 24 hours. Do not drink alcohol for 24 hours or while taking narcotic pain medication. Nausea    [x]Start with light diet and progress to your normal diet as you feel like eating. However, if you experience nausea or repeated episodes of vomiting which persist beyond 12-24 hours, notify your physician. Once nausea has passed, remember to keep drinking fluids. Difficulty Passing Urine  [x]Drink extra amounts of fluid today. Notify your physician if you have not urinated within 8 hours after your procedure or you feel uncomfortable. Irritated Throat from a Breathing Tube  [x]Drink extra amounts of fluid today. Lozenges may help. Muscle Aches  [x]You may experience some generalized body aches as your muscles recover from medications used to relax them during surgery. These will gradually subside. MEDICATION INSTRUCTIONS:  [x]Prescription(S) x   2  sent with you. Use as directed.   When taking pain medications, you may experience the side effect of dizziness or drowsiness. Do not drink alcohol or drive when taking these medications. []Prescription(S) x          Called to Pharmacy Name and location:    [x]Give the list of your medications to your primary care physician on your next visit. Keep your med list updated and carry it with in case of emergencies. [x] Narcotic pain medications can cause the side effect of significant constipation. You may want to add a stool softener to your postoperative medication schedule or speak to your surgeon on how best to manage this side effect. NARCOTIC SAFETY:  Your pain medicine is only for you to take. Safely store your medicines. Store pills up high and out of reach of children and pets. Ensure safety caps are snapped tightly  Keep track of how many pills you have left    Unused medication can be disposed of by taking them to a drop-off box or take-back program that is authorized by the [facility]. Access to a site near you can be found on the [facility] [employer] website ([address] [facility].gov). If you have a CPAP machine, it is very important that you use it daily during all periods of sleep and daytime rest during your recovery at home. Surgery and Anesthesia place a significant amount of stress on your body. Using your CPAP will help keep you safe and lessen the negative effects of that stress. FOLLOW-UP RECOVERY CARE:  [x]Call the office at [phone]  for follow-up appointment and problems    Watch for these possible complications, symptoms, or side effects of anesthesia. Call physician if they or any other problems occur:  Signs of INFECTION   > Fever over 101°     > Redness, swelling, hardness or warmth at the operative site   >Foul smelling or cloudy drainage at the operative site   Unrelieved PAIN  Unrelieved NAUSEA  Blood soaked dressing.   (Some oozing may be normal)  Inability to urinate      Numb, pale, blue, cold or tingling extremity      Physician:  Dr Torey Byrnes    The above instructions were reviewed with patient/significant other. The following additional patient specific information was reviewed with the patient/significant other:  [x]Procedure/physician specific instructions  []Medication information sheet(S) including potential side effects  []Quianas egress test  []Pain Ball management  []FAQ Catheter associated blood stream infections  [x]FAQ Surgical Site Infections  []Other-    I have read and understand the instructions given to me: ____________________________________________   (Patient/S.O. Signature)            Date/time 8/15/2022 3:18 PM         PACU:  [phone]   M-F 700 AM - 7 PM      SAME DAY SERVICES:  [phone] M-F 7AM-6PM        If you smoke STOP. We care about your health! FAQs  (frequently asked questions)  About Surgical Site Infections     What is a Surgical Site Infection (SSI)? A surgical site infection is an infection that occurs after surgery in the part of the body   where the surgery took place. Most patients who have surgery do not develop an   infection. However, infections develop in about 1 to 3 out of every 100 patients who  have surgery. Some common symptoms of a surgical site infection are:   Redness and pain around the area where you had surgery  Drainage of cloudy fluid from your surgical wound   Fever     Can SSIs be treated? Yes. Most surgical site infections can be treated with antibiotics. The antibiotic given to  you depends on the bacteria (germs) causing the infection. Sometimes patients with  SSIs also need another surgery to treat the infection. What are some of the things that hospitals are doing to prevent SSIs? To prevent SSIs, doctors, nurses and other healthcare providers:   Clean their hands and arms up to their elbows with an antiseptic agent just before the surgery. Clean their hands with soap and water or an alcohol-based hand rub before and after caring for each patient.    May remove some of your hair immediately before your surgery using electric clippers if the hair is in the same area where the procedure will occur. They should not shave you with a razor. Wear special hair covers, masks, gowns, and gloves during surgery to keep the surgery area clean. Give you antibiotics before your surgery starts. In most cases, you should get antibiotics within 60 minutes before the surgery starts and the antibiotics should be stopped within 24 hours after surgery. Clean the skin at the site of your surgery with a special soap that kills germs. What can I do to help prevent SSIs? Before your surgery:  Tell your doctor about other medical problems you may have. Health problems such as allergies, diabetes, and obesity could affect your surgery and your treatment. Quit smoking. Patients who smoke get more infections. Talk to your doctor about how you can quit before your surgery. Do not shave near where you will have surgery. Shaving with a razor can irritate your skin and make it easier to develop an infection. At the time of your surgery:  Speak up if someone tries to shave you with a razor before surgery. Ask why you need to be shaved and talk with your surgeon if you have any concerns. Ask if you will get antibiotics before surgery. After your surgery:  Make sure that your healthcare providers clean their hands before examining you, either with soap and water or an alcohol-based hand rub. IF YOU DO NOT SEE YOUR PROVIDERS CLEAN THEIR HANDS, PLEASE ASK THEM TO DO SO. Family and friends who visit you should not touch the surgical wound or dressings. Family and friends should clean their hands with soap and water or an alcohol-based hand rub before and after visiting you. If you do not see them clean their hands, ask them to clean their hands.       What do I need to do when I go home from the hospital?  Before you go home, your doctor nurses should explain everything you need to know about taking care of your wound. Make sure you understand how to care for your wound before you leave the hospital.    Always clean your hands before and after caring for your wound. Before you go home, make sure you know who to contact if you have questions or problems after you get home. If you have any symptoms of an infection, such as redness and pain at the surgery site, drainage, or fever, call your doctor immediately. If you have additional questions, please ask your doctor or nurse.

## 2022-08-15 NOTE — PROGRESS NOTES
1407 Admitted to PACU from [address]. Connected to monitor. Report at bedside. Denies pain.

## 2022-08-17 ENCOUNTER — TELEPHONE (OUTPATIENT)
Dept: CARDIOLOGY CLINIC | Age: 62
End: 2022-08-17

## 2022-08-17 NOTE — TELEPHONE ENCOUNTER
You saw patient 8/15/2022 for pre op for C4-5  ANTERIOR CERVICAL DISCECTOMY AND FUSION. I spoke with patient's wife. She spoke with Dr. Miller Graff office. They would like patient to be off ASA and Plavix for another week, but will go with 5 days if needed. left heart cath with Dr. Christine Cruz on 12/30/2020- Successful IVUS-guided PCI of proximal to mid-LAD with overlapping Xience Mariluz 3.0 x 28 mm and 3.5 x 23 mm MING. patient has been taking Plavix prior to surgery, but it is discontinued off his list. Wife is also asking if he should be taking it.

## 2022-08-17 NOTE — TELEPHONE ENCOUNTER
Pt's wife Maralfred Mimes calling in to ask about pt's medication post surgery. Discharge instructions on medication for pt were not clear. Can pt resume ASA & Plavix now? Please advise & thank you.

## 2022-08-24 ENCOUNTER — HOSPITAL ENCOUNTER (EMERGENCY)
Age: 62
Discharge: HOME | End: 2022-08-24
Attending: EMERGENCY MEDICINE
Payer: COMMERCIAL

## 2022-08-24 VITALS
RESPIRATION RATE: 16 BRPM | HEIGHT: 71 IN | HEART RATE: 82 BPM | SYSTOLIC BLOOD PRESSURE: 136 MMHG | BODY MASS INDEX: 29.82 KG/M2 | OXYGEN SATURATION: 94 % | WEIGHT: 213 LBS | DIASTOLIC BLOOD PRESSURE: 72 MMHG | TEMPERATURE: 97.8 F

## 2022-08-24 DIAGNOSIS — Z48.89 ENCOUNTER FOR POSTOPERATIVE WOUND CHECK: Primary | ICD-10-CM

## 2022-08-24 LAB
ANION GAP SERPL CALCULATED.3IONS-SCNC: 17 MMOL/L (ref 3–16)
BASOPHILS ABSOLUTE: 0.2 K/UL (ref 0–0.2)
BASOPHILS RELATIVE PERCENT: 1.6 %
BUN BLDV-MCNC: 23 MG/DL (ref 7–20)
CALCIUM SERPL-MCNC: 10.1 MG/DL (ref 8.3–10.6)
CHLORIDE BLD-SCNC: 97 MMOL/L (ref 99–110)
CO2: 22 MMOL/L (ref 21–32)
CREAT SERPL-MCNC: 0.8 MG/DL (ref 0.8–1.3)
EOSINOPHILS ABSOLUTE: 0.4 K/UL (ref 0–0.6)
EOSINOPHILS RELATIVE PERCENT: 3.1 %
GFR AFRICAN AMERICAN: >60
GFR NON-AFRICAN AMERICAN: >60
GLUCOSE BLD-MCNC: 131 MG/DL (ref 70–99)
HCT VFR BLD CALC: 41.8 % (ref 40.5–52.5)
HEMOGLOBIN: 14.7 G/DL (ref 13.5–17.5)
LYMPHOCYTES ABSOLUTE: 2 K/UL (ref 1–5.1)
LYMPHOCYTES RELATIVE PERCENT: 17.5 %
MCH RBC QN AUTO: 30.6 PG (ref 26–34)
MCHC RBC AUTO-ENTMCNC: 35.1 G/DL (ref 31–36)
MCV RBC AUTO: 87.2 FL (ref 80–100)
MONOCYTES ABSOLUTE: 1.1 K/UL (ref 0–1.3)
MONOCYTES RELATIVE PERCENT: 9.9 %
NEUTROPHILS ABSOLUTE: 7.7 K/UL (ref 1.7–7.7)
NEUTROPHILS RELATIVE PERCENT: 67.9 %
PDW BLD-RTO: 13.6 % (ref 12.4–15.4)
PLATELET # BLD: 376 K/UL (ref 135–450)
PMV BLD AUTO: 8 FL (ref 5–10.5)
POTASSIUM SERPL-SCNC: 4 MMOL/L (ref 3.5–5.1)
RBC # BLD: 4.8 M/UL (ref 4.2–5.9)
SODIUM BLD-SCNC: 136 MMOL/L (ref 136–145)
WBC # BLD: 11.3 K/UL (ref 4–11)

## 2022-08-24 PROCEDURE — 99283 EMERGENCY DEPT VISIT LOW MDM: CPT

## 2022-08-24 PROCEDURE — 85025 COMPLETE CBC W/AUTO DIFF WBC: CPT

## 2022-08-24 PROCEDURE — 36415 COLL VENOUS BLD VENIPUNCTURE: CPT

## 2022-08-24 PROCEDURE — 80048 BASIC METABOLIC PNL TOTAL CA: CPT

## 2022-08-24 ASSESSMENT — PAIN - FUNCTIONAL ASSESSMENT: PAIN_FUNCTIONAL_ASSESSMENT: NONE - DENIES PAIN

## 2022-08-24 NOTE — DISCHARGE INSTRUCTIONS
You were seen for drainage from your neck incision from your recent surgery. This appears to be healing as expected. There are no signs of infection at this time. Please watch for increasing drainage from the incision, pus draining from the wound, fevers, worsening neck swelling, difficulty swallowing, difficulty breathing, changes in your voice, or redness/firmness spreading from the incision. Return to the ER immediately if you have any of these concerns or any others. Follow-up with Dr. Mayela Ulloa as already scheduled next week.

## 2022-08-24 NOTE — CONSULTS
NEUROSURGERY CONSULT NOTE    Jessica Glaser  [id number]   1960 8/24/2022    Requesting physician: No admitting provider for patient encounter. Reason for consultation:swelling in ACDF incision    History of present illness: Patient is a 60 y.o. male w/ PMH of ACDF on 8/15 per Dr. Torey Byrnes. He presented on 8/24/2022 for concerns of swelling at the acdf incision site. He said this morning it was leaking sero sang drainage from top of incision. Patient said it was not pus. He denies any fevers or chills. He said he has been coughing a lot since surgery. He was also having trouble getting his metformin down so he called the office and they sent him some steroids. He is not having any trouble breathing or swallowing liquids and soft food now. ROS:   GENERAL:  Denies fever or recent illness.  Denies weight changes   EYES:  Denies vision change or diplopia  EARS:  Denies hearing loss  CARDIAC:  Denies chest pain  RESPIRATORY:  Denies shortness of breath  SKIN:  Denies rash or lesions   HEM:  Denies excessive bruising  PSYCH:  Denies anxiety or depression  NEURO:  Denies headache, numbness or tingling or lateralizing weakness   :  Denies urinary difficulty  GI: Denies nausea, vomiting, diarrhea or constipation  MUSCULOSKELETAL:  No arthralgias    No Known Allergies    Past Medical History:   Diagnosis Date    Acute, but ill-defined, cerebrovascular disease 05/31/2013    CAD in native artery 12/30/2020    Cerebral artery occlusion with cerebral infarction [facility]) 2013     X2 PERSONALITY CHANGE, ANGER OUTBURSTS    Cerebrovascular disease     Diplopia 07/11/2013    ED (erectile dysfunction) 01/23/2014    Elbow pain, chronic 12/16/2015    Hyperlipidemia     Hypertension     Irritable bowel syndrome     Obstructive sleep apnea (adult) (pediatric) 07/01/2013    NO CPAP, HAD PROBLEMS WITH INSURANCE AND STOPPED USING    Occlusion and stenosis of carotid artery without mention of cerebral infarction 01/13/2014 MINOR    Pain in joint, upper arm 2015    Polyneuropathy in diabetes(357.2) 2013    PONV (postoperative nausea and vomiting)     Skin abnormality     PRE-CANCEROUS LESIONS REMOVED FROM ARMS AND EARS    Type 2 diabetes mellitus with diabetic polyneuropathy, with long-term current use of insulin ([facility] [address] 2013    Type II or unspecified type diabetes mellitus without mention of complication, not stated as uncontrolled     Use of cane as ambulatory aid         Past Surgical History:   Procedure Laterality Date    CARPAL TUNNEL RELEASE Left 2008    CERVICAL DISCECTOMY  2017    ANTERIOR CERVICAL DISCECTOMY AND FUSION C5-6, C6-7 WITH MEDTRONIC PLATES, SCREWS, ALLOGRAFT, WITH EVOKES PARTIAL C6 CORPECTOMY    CERVICAL FUSION  2017    CERVICAL LAMINECTOMY AND POSTERIOR FUSION C5-T1 WITH MEDTRONIC RODS AND SCREWS WITH EVOKES AND O-ARM    CERVICAL FUSION N/A 8/15/2022    C4-5  ANTERIOR CERVICAL DISCECTOMY AND FUSION performed by Cori Flor.  Torey Byrnes MD at [facility].    COLONOSCOPY N/A 2018    COLONOSCOPY POLYPECTOMY SNARE/COLD BIOPSY performed by Liliana England MD at [facility] Left     ULNAR NERVE TRANSPOSITION, AT SAME TIME AS CTR    FINGER TRIGGER RELEASE Left 2019    LEFT RING TRIGGER FINGER RELEASE performed by Saul Arroyo MD at [place] [address] Right 2020    RIGHT LONG AND RING TRIGGER FINGER RELEASES -SLEEP APNEA- performed by Saul Arroyo MD at [address] [place] [address]  330    umbilical    PYLOROPLASTY      PYLORIC STENOSIS AS INFANT       Social History     Occupational History    Occupation: disability   Tobacco Use    Smoking status: Former     Packs/day: 1.00     Years: 25.00     Pack years: 25.00     Types: Cigarettes     Quit date: 2000     Years since quittin.6    Smokeless tobacco: Never   Vaping Use    Vaping Use: Never used   Substance and Sexual Activity Alcohol use: No    Drug use: No    Sexual activity: Yes        Family History   Problem Relation Age of Onset    Cancer Father         melanoma, face    Cancer Paternal Uncle         melanoma, face    Cancer Brother         skin, NMSC    Depression Maternal Cousin     No Known Problems Mother         No outpatient medications have been marked as taking for the 8/24/22 encounter [facility] Encounter). No current facility-administered medications for this encounter. Current Outpatient Medications   Medication Sig Dispense Refill    cyclobenzaprine (FLEXERIL) 10 MG tablet Take 1 tablet by mouth 3 times daily as needed for Muscle spasms 21 tablet 0    JARDIANCE 10 MG tablet TAKE 1 TABLET BY MOUTH ONE TIME A DAY 90 tablet 0    atorvastatin (LIPITOR) 80 MG tablet Take 1 tablet by mouth at bedtime 90 tablet 3    desvenlafaxine succinate (PRISTIQ) 50 MG TB24 extended release tablet Take 1 tablet by mouth daily 90 tablet 3    omega-3 acid ethyl esters (LOVAZA) 1 g capsule TAKE 2 CAPSULES BY MOUTH TWO TIMES A  capsule 3    metFORMIN (GLUCOPHAGE) 1000 MG tablet One tab PO bid 180 tablet 1    insulin regular human (HUMULIN R U-500 KWIKPEN) 500 UNIT/ML SOPN concentrated injection pen INJECT 80 UNITS UNDER THE SKIN BEFORE BREAKFAST THEN 60 UNITS UNDER THE SKIN BEFORE LUNCH AND DINNER (Patient taking differently: INJECT 70 UNITS UNDER THE SKIN BEFORE BREAKFAST THEN 40 UNITS UNDER THE SKIN BEFORE LUNCH AND DINNER) 60 mL 0    Continuous Blood Gluc Sensor (DEXCOM G6 SENSOR) MISC USE AS DIRECTED AND CHANGE SENSORS EVERY 10 DAYS 9 each 0    metroNIDAZOLE (METROGEL) 1 % gel Apply to face daily.  60 g 3    buPROPion (WELLBUTRIN XL) 150 MG extended release tablet TAKE ONE TABLET BY MOUTH EVERY MORNING 90 tablet 3    Continuous Blood Gluc Transmit (DEXCOM G6 TRANSMITTER) MISC USE AS DIRECTED AND CHANGE EVERY 90 DAYS 1 each 0    atenolol (TENORMIN) 25 MG tablet TAKE 1 TABLET BY MOUTH EVERY NIGHT AT BEDTIME 90 tablet 2 nystatin (MYCOSTATIN) 763939 UNIT/GM ointment Apply topically 2 times daily to corners of mouth. 30 g 1    Insulin Pen Needle (B-D UF III MINI PEN NEEDLES) 31G X 5 MM MISC 1 each by Does not apply route 3 times daily 300 each 6    lisinopril (PRINIVIL;ZESTRIL) 20 MG tablet Take 1 tablet by mouth 2 times daily 180 tablet 3    fenofibrate (TRICOR) 145 MG tablet TAKE 1 TABLET BY MOUTH ONE TIME A DAY 90 tablet 3    Continuous Blood Gluc  (DEXCOM G6 ) KYLAH As needed 1 Device 0    blood glucose test strips (PRODIGY NO CODING BLOOD GLUC) strip Use as directed to test up to 3-4 times daily 400 each 3    PRODIGY LANCETS 28G MISC Use as directed to test up to 3-4 times daily 400 each 3        Objective:  BP (!) 177/78   Pulse 89   Temp 97.8 °F (36.6 °C) (Oral)   Resp 18   Ht 5' 11\" (1.803 m)   Wt 213 lb (96.6 kg)   SpO2 97%   BMI 29.71 kg/m²     Physical Exam:   Patient seen and examined    General: Well developed. Alert and cooperative in no acute distress. HENT: atraumatic, right sided acdf incision slightly swollen around it. Not leaking at this time. Eyes: Optic discs: Not tested  Pulmonary: unlabored respiratory effort  Cardiovascular:  Warm well perfused. No peripheral edema  Gastrointestinal: abdomen soft, NT, ND    Neurological:  Mental Status: Awake, alert, oriented x 4, speech clear and appropriate  Attention: Intact  Language: No aphasia or dysarthria noted  Sensation: Intact to all extremities to light touch  Coordination: Intact    Musculoskeletal:   Gait: Not tested   Assist devices: None   Tone: normal  Motor strength:    Right  Left    Right  Left    Deltoid  5 5  Hip Flex  5 5   Biceps  5 5  Knee Extensors  5 5   Triceps  5 5  Knee Flexors  5 5   Wrist Ext  5 5  Ankle Dorsiflex. 5 5   Wrist Flex  5 5  Ankle Plantarflex.   5 5   Handgrip  5 5  Ext Chris Longus  5 5   Thumb Ext  5 5         Radiological Findings:      Labs:  No results for input(s): WBC, HGB, HCT, PLT in the last 72 hours. No results for input(s): NA, K, CL, CO2, BUN, CREATININE, GLUCOSE, CALCIUM, PHOS, MG in the last 72 hours. No results for input(s): PROTIME, INR, APTT in the last 72 hours.     Patient Active Problem List    Diagnosis Date Noted    DDD (degenerative disc disease), cervical 08/04/2022    DDD (degenerative disc disease), lumbar 08/04/2022    Bulge of lumbar disc without myelopathy 08/04/2022    Neuropathy of upper extremity 05/05/2022    History of cervical spinal surgery 05/05/2022    Moderate episode of recurrent major depressive disorder ([address] 04/21/2022    Atrial fibrillation, unspecified type ([address] 04/21/2022    Primary osteoarthritis of left wrist 07/28/2021    Closed nondisplaced fracture of triquetral bone of right wrist 07/28/2021    CAD in native artery 12/30/2020    CAD S/P percutaneous coronary angioplasty 12/30/2020    Abnormal stress test     Bilateral carotid artery stenosis 11/20/2020    Trigger finger, right middle finger 11/02/2020    Trigger finger, right ring finger 11/02/2020    Dysthymia 04/15/2020    Former smoker 06/18/2019    Left lateral epicondylitis 05/17/2019    Acquired trigger finger of left ring finger 05/17/2019    Chronic neck and back pain 01/15/2019    Class 1 obesity due to excess calories with serious comorbidity and body mass index (BMI) of 32.0 to 32.9 in adult 01/15/2019    Polyp of rectum 08/14/2018    Vitamin D deficiency 07/11/2018    Osteoarthritis of cervical spine with myelopathy 12/06/2017    ED (erectile dysfunction) 01/23/2014    Occlusion and stenosis of carotid artery 01/13/2014    Diabetic polyneuropathy ([address] 07/01/2013    Type 2 diabetes mellitus with diabetic polyneuropathy, with long-term current use of insulin ([address] 07/01/2013    DARRICK (obstructive sleep apnea) 07/01/2013    Hyperlipidemia 02/02/2010    Hypertension 02/02/2010    Testosterone deficiency 02/02/2010    Hypertriglyceridemia 01/11/2010       Assessment:  Patient is a 60 y.o. male POD # 5 ACDF C4-5 with concern for swelling at ACDF incision site. Plan:  CAn be discharged and follow up with [place] next week. DISPO: discharge  Patient was discussed with Dr. Hernando Paez who agrees with above assessment and plan. Electronically signed by:  MADISON Buckner - CNP, MADISON-CNP, 8/24/2022 1:34 PM  [phone]

## 2022-08-24 NOTE — ED PROVIDER NOTES
[facility]          ATTENDING PHYSICIAN NOTE       Date of evaluation: 8/24/2022    Chief Complaint     Post-op Problem (Neck surgery on 8/15/2022 with Dr. Gerri Evans. Anterior incision is swollen and leaking blood and pus per the patient. Also states he has been having some difficulty swallowing pills and has been coughing a lot since the surgery.)      History of Present Illness     Esme Wilson is a 60 y.o. male with history of recent ACDF on 8/15 with Dr. Marycruz Gonzalez presenting to the emergency department today for neck swelling and drainage. Patient states this morning he woke with some drainage of bloody and yellow fluid from his anterior neck incision. He had noticed some worsening swelling over the past 1 to 2 days. Denies fevers. No worsening pain. No redness around the incision or pus draining from the wound. Has had some difficulty swallowing since the time of surgery, however this has not worsened. No vocal changes. Review of Systems     Pertinent positive and negative findings as documented in the HPI. Otherwise all other systems were reviewed and were negative. Physical Exam     INITIAL VITALS: BP: (!) 177/78, Temp: 97.8 °F (36.6 °C), Heart Rate: 89, Resp: 18, SpO2: 97 %     Nursing note and vitals reviewed. General:  Adult male, alert and appropriately interactive. In no distress. HENT: Normocephalic and atraumatic. External ears normal. Nose appears normal externally. Eyes: Conjunctivae normal. No scleral icterus. Neck: Neck supple. No tracheal deviation present. Right anterior neck incision well approximated without surrounding erythema, induration, no underlying fluctuance. There is scant serous sanguinous fluid noted when gauze is applied to the incision. No focal changes, airway posturing, mishandling of secretions, or stridor. CV: Normal rate. Regular rhythm. Pulm: Effort normal on room air.   Neurological: Alert and appropriately interactive. Face symmetric, speech without dysarthria or obvious aphasia. Moving all extremities spontaneously. Skin: Warm, dry. No rash. No diaphoresis or erythema. Psychiatric: Calm and cooperative with appropriate mood and affect. Procedures   Procedures    MEDICAL DECISION MAKING     MDM: Esme Wilson is a 60 y.o. male with history as above presenting to the emergency department today for neck swelling and drainage after recent ACDF. On arrival, he is in no distress and vital signs reassuring. He has no signs of airway compromise and overall his neck wound appears to be healing quite well. Neurosurgery was consulted and they evaluated the wound as well. They do feel it is healing well and likely had a small draining seroma, however the wound remains intact and without signs of infection. Labs are reassuring today. I am in agreement with neurosurgery that no imaging is indicated at this time. He has scheduled follow-up with Dr. Marycruz Gonzalez next week. He will return to the ER for any worsening symptoms or new concerns and we discussed strict return precautions. Discharged in stable condition with written and verbal instructions for which to return to the ED. Clinical Impression     1.  Encounter for postoperative wound check        Disposition     DISPOSITION Decision To Discharge 08/24/2022 01:56:36 PM        Larry Herzog MD  11:01 PM                     Past Medical, Surgical, Family, and Social History     He has a past medical history of Acute, but ill-defined, cerebrovascular disease, CAD in native artery, Cerebral artery occlusion with cerebral infarction [facility]), Cerebrovascular disease, Diplopia, ED (erectile dysfunction), Elbow pain, chronic, Hyperlipidemia, Hypertension, Irritable bowel syndrome, Obstructive sleep apnea (adult) (pediatric), Occlusion and stenosis of carotid artery without mention of cerebral infarction, Pain in joint, upper arm, Polyneuropathy in diabetes(357.2), PONV (postoperative nausea and vomiting), Skin abnormality, Type 2 diabetes mellitus with diabetic polyneuropathy, with long-term current use of insulin ([facility] [address] Type II or unspecified type diabetes mellitus without mention of complication, not stated as uncontrolled, and Use of cane as ambulatory aid. He has a past surgical history that includes Cholecystectomy (1995); Carpal tunnel release (Left, 2008); Elbow surgery (Left, 2008); hernia repair (2009); Pyloroplasty; Cervical discectomy (08/02/2017); cervical fusion (12/06/2017); Colonoscopy (N/A, 8/14/2018); Finger trigger release (Left, 12/12/2019); Finger trigger release (Right, 11/23/2020); and cervical fusion (N/A, 8/15/2022). His family history includes Cancer in his brother, father, and paternal uncle; Depression in his maternal cousin; No Known Problems in his mother. He reports that he quit smoking about 22 years ago. His smoking use included cigarettes. He has a 25.00 pack-year smoking history. He has never used smokeless tobacco. He reports that he does not drink alcohol and does not use drugs.     Medications     Discharge Medication List as of 8/24/2022  2:37 PM        CONTINUE these medications which have NOT CHANGED    Details   cyclobenzaprine (FLEXERIL) 10 MG tablet Take 1 tablet by mouth 3 times daily as needed for Muscle spasms, Disp-21 tablet, R-0Print      JARDIANCE 10 MG tablet TAKE 1 TABLET BY MOUTH ONE TIME A DAY, Disp-90 tablet, R-0Normal      atorvastatin (LIPITOR) 80 MG tablet Take 1 tablet by mouth at bedtime, Disp-90 tablet, R-3Normal      desvenlafaxine succinate (PRISTIQ) 50 MG TB24 extended release tablet Take 1 tablet by mouth daily, Disp-90 tablet, R-3Normal      omega-3 acid ethyl esters (LOVAZA) 1 g capsule TAKE 2 CAPSULES BY MOUTH TWO TIMES A DAY, Disp-360 capsule, R-3Normal      metFORMIN (GLUCOPHAGE) 1000 MG tablet One tab PO bid, Disp-180 tablet, R-1Normal      insulin regular human (HUMULIN R U-500 KWIKPEN) 500 UNIT/ML SOPN concentrated injection pen INJECT 80 UNITS UNDER THE SKIN BEFORE BREAKFAST THEN 60 UNITS UNDER THE SKIN BEFORE LUNCH AND DINNER, Disp-60 mL, R-0Normal      Continuous Blood Gluc Sensor (DEXCOM G6 SENSOR) MISC USE AS DIRECTED AND CHANGE SENSORS EVERY 10 DAYS, Disp-9 each, R-0Normal      metroNIDAZOLE (METROGEL) 1 % gel Apply to face daily. , Disp-60 g, R-3, Normal      buPROPion (WELLBUTRIN XL) 150 MG extended release tablet TAKE ONE TABLET BY MOUTH EVERY MORNING, Disp-90 tablet, R-3Normal      Continuous Blood Gluc Transmit (DEXCOM G6 TRANSMITTER) MISC USE AS DIRECTED AND CHANGE EVERY 90 DAYS, Disp-1 each, R-0Normal      atenolol (TENORMIN) 25 MG tablet TAKE 1 TABLET BY MOUTH EVERY NIGHT AT BEDTIME, Disp-90 tablet, R-2Normal      nystatin (MYCOSTATIN) 602182 UNIT/GM ointment Apply topically 2 times daily to corners of mouth., Disp-30 g, R-1, Normal      Insulin Pen Needle (B-D UF III MINI PEN NEEDLES) 31G X 5 MM MISC 3 TIMES DAILY Starting Mon 10/18/2021, Disp-300 each, R-6, Normal      lisinopril (PRINIVIL;ZESTRIL) 20 MG tablet Take 1 tablet by mouth 2 times daily, Disp-180 tablet, R-3Normal      fenofibrate (TRICOR) 145 MG tablet TAKE 1 TABLET BY MOUTH ONE TIME A DAY, Disp-90 tablet, R-3Normal      Continuous Blood Gluc  (DEXCOM G6 ) KYLAH As needed, Disp-1 Device, R-0Normal      blood glucose test strips (PRODIGY NO CODING BLOOD GLUC) strip Use as directed to test up to 3-4 times daily, Disp-400 each, R-3Normal      PRODIGY LANCETS 28G MISC Disp-400 each, R-3, NO PRINTUse as directed to test up to 3-4 times daily             Allergies     He has No Known Allergies. ED Course     Nursing Notes, Past Medical Hx, Past Surgical Hx, Social Hx,Allergies, and Family Hx were reviewed. Patient was given the following medications:  No orders of the defined types were placed in this encounter.       Diagnostic Results         RECENT VITALS:  BP: 136/72,Temp: 97.8 °F (36.6 °C), Heart Rate: 82, Resp: 16, SpO2: 94 %     RADIOLOGY:  No orders to display       LABS:   Results for orders placed or performed during the hospital encounter of 08/24/22   BMP   Result Value Ref Range    Sodium 136 136 - 145 mmol/L    Potassium 4.0 3.5 - 5.1 mmol/L    Chloride 97 (L) 99 - 110 mmol/L    CO2 22 21 - 32 mmol/L    Anion Gap 17 (H) 3 - 16    Glucose 131 (H) 70 - 99 mg/dL    BUN 23 (H) 7 - 20 mg/dL    Creatinine 0.8 0.8 - 1.3 mg/dL    GFR Non-African American >60 >60    GFR African American >60 >60    Calcium 10.1 8.3 - 10.6 mg/dL   CBC with Auto Differential   Result Value Ref Range    WBC 11.3 (H) 4.0 - 11.0 K/uL    RBC 4.80 4.20 - 5.90 M/uL    Hemoglobin 14.7 13.5 - 17.5 g/dL    Hematocrit 41.8 40.5 - 52.5 %    MCV 87.2 80.0 - 100.0 fL    MCH 30.6 26.0 - 34.0 pg    MCHC 35.1 31.0 - 36.0 g/dL    RDW 13.6 12.4 - 15.4 %    Platelets 097 271 - 937 K/uL    MPV 8.0 5.0 - 10.5 fL    Neutrophils % 67.9 %    Lymphocytes % 17.5 %    Monocytes % 9.9 %    Eosinophils % 3.1 %    Basophils % 1.6 %    Neutrophils Absolute 7.7 1.7 - 7.7 K/uL    Lymphocytes Absolute 2.0 1.0 - 5.1 K/uL    Monocytes Absolute 1.1 0.0 - 1.3 K/uL    Eosinophils Absolute 0.4 0.0 - 0.6 K/uL    Basophils Absolute 0.2 0.0 - 0.2 K/uL       CONSULTS:  IP CONSULT TO NEUROSURGERY    PATIENT REFERRED TO:  Inder Kline [id number]  Jesica Pore [address] [facility]  [phone]      as scheduled    [facility] Emergency Department  [address] [address] [address]  [address]  [phone]    If symptoms worsen    DISCHARGE MEDICATIONS:  Discharge Medication List as of 8/24/2022  2:37 PM             Cleo Shelton MD  08/24/22 0942

## 2022-08-25 ENCOUNTER — CARE COORDINATION (OUTPATIENT)
Dept: OTHER | Facility: CLINIC | Age: 62
End: 2022-08-25

## 2022-08-25 NOTE — CARE COORDINATION
[facility] ED Follow Up Call    2022    Patient: Amrit Nicholas Patient : 1960   MRN: [id number]  Reason for Admission: Post-op wound check  Discharge Date: 2022    ACM attempted to reach patient for Care Transitions call. HIPAA compliant message left requesting a return phone call at patients convenience. Will continue to follow. MEL Ocampo RN  Associate Care Manager  Phone: [phone]  Email: [email or web address]

## 2022-08-26 ENCOUNTER — CARE COORDINATION (OUTPATIENT)
Dept: OTHER | Facility: CLINIC | Age: 62
End: 2022-08-26

## 2022-08-26 NOTE — CARE COORDINATION
[facility] ED Follow Up Call    2022    Patient: Yusra Martinez Patient : 1960   MRN: [id number]  Reason for Admission: Post-op wound check  Discharge Date: 2022        Care Transitions ED Follow Up    Care Transitions Interventions  Do you have any ongoing symptoms?: No   Do you have a copy of your discharge instructions?: Yes   Do you understand what to report and when to return?: Yes   Are you following your discharge instructions?: Yes   Do you have all of your prescriptions and are they filled?: Yes   Have you scheduled your follow up appointment?: Yes   How are you going to get to your appointment?: Car - family or friend to transport   Were you discharged with any Home Care or Post Acute Services or do you currently have any active services?: No         Do you have any needs or concerns that I can assist you with?: No   Identified Barriers: None          Needs to be reviewed by the provider   Additional needs identified to be addressed with provider No  none           [facility] ([facility]) contacted the patient by telephone to perform post ED visit assessment. Call within 2 business days of discharge: Yes. Verified name and  with patient as identifiers. Patient reports op site now better; no longer draining. Denies pain. Has follow up with Dr. Grabiel Koch 22. States he called Dr. Gi Spann office on Monday, Aug 22 to report cough and difficulty swallowing. He was started on Prednisone taper - which he started that day. Will take last dose tomorrow. States Prednisone has helped symptoms. Denies issues with appetite or fluid intake; denies nausea or vomiting. Denies issues with bowels or urination. States uses cane for ambulation. Patient reports blood sugars were elevated when on higher doses of Prednisone earlier this week. Reports BS now improved and in 130s. States wife works from home and assists with caring for patient. Denies needs or concerns for [facility]. No needs identified per ACM; signing off. Interventions:    Counseling and education provided at today's visit on:   Red Flag symptoms to report  Medication compliance  Specialist appointment compliance  Reinforced Discharge instructions  Advanced Care Planning education    Medication reconciliation was performed with patient, who verbalizes understanding of administration of home medications. Advised obtaining a 90-day supply of all daily and as-needed medications. Reinforced resources available to patient including: PCP  Specialist  Benefits related nurse triage line. ACM encouraged outreach to Nurse Access Line and/or ACM for assessment and intervention guidance as needed. ACM encouraged patient to contact 911 for life threatening emergencies and PCP office 24/7 for non life-threatening symptoms. Reminded patient of alternatives to ED such as urgent care, walk in clinics and e-visits as available. Reviewed proper ED utilization using Right Care, Right Place, Right Time Flyer. Discussed follow-up appointments. If no appointment was previously scheduled, appointment scheduling offered: No, already scheduled  Is follow up appointment scheduled within 7 days of discharge? Yes  [address] follow up appointment(s):   Future Appointments   Date Time Provider Avelino Owens   10/12/2022 11:20 AM MD Preet Sevilla   12/1/2022  9:00 AM Catalina Olivo MD [facility]   12/5/2022  7:30 AM Epifania Dancer, MD [facility]   Maddie James MD   Post-op follow up 8/30/2022  Neurosurgery   [phone]             Plan:  No further follow up needed. ACM signing off. Patient verbalized understanding and is agreeable. Cira Skinner, MSN RN  Associate Care Manager  Phone: [phone]  Email: [email or web address]

## 2022-08-29 RX ORDER — BLOOD-GLUCOSE SENSOR
EACH MISCELLANEOUS
Qty: 9 EACH | Refills: 0 | Status: SHIPPED | OUTPATIENT
Start: 2022-08-29

## 2022-08-29 RX ORDER — FENOFIBRATE 145 MG/1
TABLET, COATED ORAL
Qty: 90 TABLET | Refills: 3 | Status: SHIPPED | OUTPATIENT
Start: 2022-08-29

## 2022-08-29 NOTE — TELEPHONE ENCOUNTER
Medication:   Requested Prescriptions     Pending Prescriptions Disp Refills    fenofibrate (TRICOR) 145 MG tablet [Pharmacy Med Name: FENOFIBRATE 145MG TABS] 90 tablet 3     Sig: TAKE 1 TABLET BY MOUTH ONE TIME A DAY    Continuous Blood Gluc Sensor ([address]) [facility] [Pharmacy Med Name: [address] [place] (3= 1BOX)] 9 each 0     Sig: USE AS DIRECTED AND CHANGE SENSORS EVERY 10 DAYS       Last Filled:      Patient Phone Number: [phone] (home)     Last appt: 6/28/2022   Next appt: 10/12/2022    Last Labs DM:   Lab Results   Component Value Date/Time    LABA1C 6.6 06/28/2022 05:24 PM

## 2022-09-06 DIAGNOSIS — E11.42 TYPE 2 DIABETES MELLITUS WITH DIABETIC POLYNEUROPATHY, WITH LONG-TERM CURRENT USE OF INSULIN (HCC): Chronic | ICD-10-CM

## 2022-09-06 DIAGNOSIS — Z79.4 TYPE 2 DIABETES MELLITUS WITH DIABETIC POLYNEUROPATHY, WITH LONG-TERM CURRENT USE OF INSULIN (HCC): Chronic | ICD-10-CM

## 2022-09-06 RX ORDER — CLOPIDOGREL BISULFATE 75 MG/1
75 TABLET ORAL DAILY
Qty: 90 TABLET | Refills: 3 | Status: SHIPPED | OUTPATIENT
Start: 2022-09-06

## 2022-09-06 NOTE — TELEPHONE ENCOUNTER
Pts wife called and stated they went to get plavix refilled and pharmacy stated it was discontinued, pts wife wanting some clarification on this, please advise

## 2022-09-06 NOTE — TELEPHONE ENCOUNTER
Was discontinued by DOMITILA Zambrano with [facility].  Don't know why  We recommend he continue   Needs to call Rachel to see why they stopped it

## 2022-09-07 RX ORDER — INSULIN HUMAN 500 [IU]/ML
INJECTION, SOLUTION SUBCUTANEOUS
Qty: 60 ML | Refills: 0 | Status: SHIPPED | OUTPATIENT
Start: 2022-09-07

## 2022-09-07 NOTE — TELEPHONE ENCOUNTER
Medication:   Requested Prescriptions     Pending Prescriptions Disp Refills    HUMULIN R U-500 KWIKPEN 500 UNIT/ML SOPN concentrated injection pen [Pharmacy Med Name: HUMULIN R U-500 KWIKPEN 500 SOPN] 60 mL 0     Sig: INJECT 80 UNITS UNDER THE SKIN BEFORE BREAKFAST THEN 60 UNITS UNDER THE SKIN BEFORE LUNCH AND DINNER       Last Filled:      Patient Phone Number: [phone] (home)     Last appt: 6/28/2022   Next appt: 10/12/2022    Last Labs DM:   Lab Results   Component Value Date/Time    LABA1C 6.6 06/28/2022 05:24 PM

## 2022-09-09 RX ORDER — BLOOD SUGAR DIAGNOSTIC
STRIP MISCELLANEOUS
Qty: 400 STRIP | Refills: 3 | Status: SHIPPED | OUTPATIENT
Start: 2022-09-09

## 2022-09-26 ENCOUNTER — TELEPHONE (OUTPATIENT)
Dept: ENDOCRINOLOGY | Age: 62
End: 2022-09-26

## 2022-09-26 RX ORDER — BLOOD-GLUCOSE METER
EACH MISCELLANEOUS
Qty: 1 EACH | Refills: 0 | Status: SHIPPED | OUTPATIENT
Start: 2022-09-26

## 2022-09-26 NOTE — TELEPHONE ENCOUNTER
Medication:   Requested Prescriptions     Signed Prescriptions Disp Refills    Blood Glucose Monitoring Suppl (PRODIGY AUTOCODE BLOOD GLUCOSE) KYLAH 1 each 0     Sig: Use to test BS as directed     Authorizing Provider: Pool Magallanes     Ordering User: Horace Emmanuel       Last Filled:      Patient Phone Number: [phone] (home)     Last appt: 6/28/2022   Next appt: 10/12/2022    Last Labs DM:   Lab Results   Component Value Date/Time    LABA1C 6.6 06/28/2022 05:24 PM

## 2022-09-26 NOTE — TELEPHONE ENCOUNTER
[facility] calling to request a new Prodigy glucometer for this patient due to it being broken (per patient).             [address], [facility], [address], [address] [place] [address], [facility] [place] [address]   Phone:  [phone]  Fax:  [phone]

## 2022-10-11 ENCOUNTER — TELEPHONE (OUTPATIENT)
Dept: CARDIOLOGY CLINIC | Age: 62
End: 2022-10-11

## 2022-10-11 NOTE — LETTER
[address] Cardiology - [address] [place] [address] [facility]  Phone: [phone]  Fax: [phone]    Ronda Roche MD      Cardiac Clearance Letter  October 11, 2022    [address]  [facility] [place] [place] [address]   1960        Patient is cleared for upcoming back surgery. He may hold the Plavix for 5 days prior to the procedure. Recommend not stopping ASA, unless required by surgeon. If required hold for 7 days. If you have any questions or concerns, please don't hesitate to call.     Sincerely,        Ronda Roche MD

## 2022-10-11 NOTE — TELEPHONE ENCOUNTER
CARDIAC CLEARANCE REQUEST    What type of procedure are you having: lower back surgery     Are you taking any blood thinners: yes     Type on anesthesia: unsure     When is your procedure scheduled for: 11/07/2022    What physician is performing your procedure: Dr. Antoine Cisneros     Phone Number: [phone]    Fax number to send the letter:

## 2022-10-11 NOTE — TELEPHONE ENCOUNTER
Stewart Amato for surgery  Hold plavix x 5 days  Recommend not stopping ASA, unless required by surgeon. Then hold x 7 days.

## 2022-10-11 NOTE — TELEPHONE ENCOUNTER
Spoke with pt relayed message per [address]. Pt v/u and would like a letter faxed to [phone]. Faxed as requested.

## 2022-10-12 ENCOUNTER — OFFICE VISIT (OUTPATIENT)
Dept: ENDOCRINOLOGY | Age: 62
End: 2022-10-12

## 2022-10-12 VITALS
OXYGEN SATURATION: 96 % | DIASTOLIC BLOOD PRESSURE: 63 MMHG | WEIGHT: 218.6 LBS | HEART RATE: 67 BPM | SYSTOLIC BLOOD PRESSURE: 128 MMHG | BODY MASS INDEX: 30.6 KG/M2 | HEIGHT: 71 IN

## 2022-10-12 DIAGNOSIS — E11.42 TYPE 2 DIABETES MELLITUS WITH DIABETIC POLYNEUROPATHY, WITH LONG-TERM CURRENT USE OF INSULIN (HCC): Primary | ICD-10-CM

## 2022-10-12 DIAGNOSIS — Z79.4 TYPE 2 DIABETES MELLITUS WITH DIABETIC POLYNEUROPATHY, WITH LONG-TERM CURRENT USE OF INSULIN (HCC): Primary | ICD-10-CM

## 2022-10-12 LAB — HBA1C MFR BLD: 7.3 %

## 2022-10-12 NOTE — PROGRESS NOTES
Seen as  Patient for DM    Interim:    Not Using dexcom as insurance did not cover  Used it in September  Reviewed readings    Has seen Dr. Jose Alejandro Bergman    Patient has a PMH of Type 2 DM, hypertension, hyperlipidemia, obesity , Spinal cord compression in cervical spine. Diagnosed with Diabetes Mellitus type 2 in 2004. Course has been variable . Microvascular complications: No known retinopathy (Last eye exam: 2016)   No known Nephropathy :  Has Peripheral neuropathy: Some numbness and tingling in feet. Home regimen:   Metformin 1000 mg BID  Jardiance   Humulin R U-500 70 units before breakfast , 40 units before lunch and dinner      Previous Meds  Lantus 50  units BID  humalog 25  25 30      Reviewed Dexcom download on patient device    Blood glucose trend  9/3-9/16  Average 177  57 % in range  28% high    No significant lows    A1c 12.2 % ---> 9.1 % ---> 10.7 % ---> 11.1 % ---> 9.7%---> 7.3%--->6.9%---> 6.6%---- >7.3%    Diet: Eats 3 meals/day   Has been non-compliant with diet  Nutrition education:Yes  Exercise:     Severe, uncontrolled     Hypertension:  He has essentail HTN for many yrs. On Lisinopril/Atenolol. No dizziness, SOB , chest pain. Hypertriglyceridemia : has h/o high TGD  Has  h/o pancreatitis many yrs back.     on fenofibrate 145 mg daily, fish oil 2 gram BID.     ROS: Scanned,reviewed    Past Medical History:   Diagnosis Date    Acute, but ill-defined, cerebrovascular disease 05/31/2013    CAD in native artery 12/30/2020    Cerebral artery occlusion with cerebral infarction [facility]) 2013     X2 PERSONALITY CHANGE, ANGER OUTBURSTS    Cerebrovascular disease     Diplopia 07/11/2013    ED (erectile dysfunction) 01/23/2014    Elbow pain, chronic 12/16/2015    Hyperlipidemia     Hypertension     Irritable bowel syndrome     Obstructive sleep apnea (adult) (pediatric) 07/01/2013    NO CPAP, HAD PROBLEMS WITH INSURANCE AND STOPPED USING    Occlusion and stenosis of carotid artery without mention of cerebral infarction 01/13/2014    MINOR    Pain in joint, upper arm 05/14/2015    Polyneuropathy in diabetes(357.2) 07/01/2013    PONV (postoperative nausea and vomiting)     Skin abnormality     PRE-CANCEROUS LESIONS REMOVED FROM ARMS AND EARS    Type 2 diabetes mellitus with diabetic polyneuropathy, with long-term current use of insulin ([facility] [address] 07/01/2013    Type II or unspecified type diabetes mellitus without mention of complication, not stated as uncontrolled     Use of cane as ambulatory aid      Past Surgical History:   Procedure Laterality Date    CARPAL TUNNEL RELEASE Left 2008    CERVICAL DISCECTOMY  08/02/2017    ANTERIOR CERVICAL DISCECTOMY AND FUSION C5-6, C6-7 WITH MEDTRONIC PLATES, SCREWS, ALLOGRAFT, WITH EVOKES PARTIAL C6 CORPECTOMY    CERVICAL FUSION  12/06/2017    CERVICAL LAMINECTOMY AND POSTERIOR FUSION C5-T1 WITH MEDTRONIC RODS AND SCREWS WITH EVOKES AND O-ARM    CERVICAL FUSION N/A 8/15/2022    C4-5  ANTERIOR CERVICAL DISCECTOMY AND FUSION performed by Elina Kowalski.  Diann Barksdale MD at [address] [place] [address] N/A 8/14/2018    COLONOSCOPY POLYPECTOMY SNARE/COLD BIOPSY performed by Rosario Chamberlain MD at [address] [place] [address] Left 2008    ULNAR NERVE TRANSPOSITION, AT SAME TIME AS CTR    FINGER TRIGGER RELEASE Left 12/12/2019    LEFT RING TRIGGER FINGER RELEASE performed by Kelley Swan MD at [place] [address] Right 11/23/2020    RIGHT LONG AND RING TRIGGER FINGER RELEASES -SLEEP APNEA- performed by Kelley Swan MD at [place] [address]    umbilical    PYLOROPLASTY      PYLORIC STENOSIS AS INFANT     Current Outpatient Medications   Medication Sig Dispense Refill    Blood Glucose Monitoring Suppl (PRODIGY AUTOCODE BLOOD GLUCOSE) KYLAH Use to test BS as directed 1 each 0    blood glucose test strips (PRODIGY NO CODING BLOOD GLUC) strip USE AS DIRECTED TO TEST UP TO 3-4 TIMES A  strip 3    HUMULIN R U-500 KWIKPEN 500 UNIT/ML SOPN concentrated injection pen INJECT 80 UNITS UNDER THE SKIN BEFORE BREAKFAST, THEN 60 UNITS UNDER THE SKIN BEFORE LUNCH AND DINNER 60 mL 0    clopidogrel (PLAVIX) 75 MG tablet Take 1 tablet by mouth daily 90 tablet 3    fenofibrate (TRICOR) 145 MG tablet TAKE 1 TABLET BY MOUTH ONE TIME A DAY 90 tablet 3    Continuous Blood Gluc Sensor (DEXCOM G6 SENSOR) MISC USE AS DIRECTED AND CHANGE SENSORS EVERY 10 DAYS 9 each 0    JARDIANCE 10 MG tablet TAKE 1 TABLET BY MOUTH ONE TIME A DAY 90 tablet 0    atorvastatin (LIPITOR) 80 MG tablet Take 1 tablet by mouth at bedtime 90 tablet 3    desvenlafaxine succinate (PRISTIQ) 50 MG TB24 extended release tablet Take 1 tablet by mouth daily 90 tablet 3    omega-3 acid ethyl esters (LOVAZA) 1 g capsule TAKE 2 CAPSULES BY MOUTH TWO TIMES A  capsule 3    metFORMIN (GLUCOPHAGE) 1000 MG tablet One tab PO bid 180 tablet 1    metroNIDAZOLE (METROGEL) 1 % gel Apply to face daily. 60 g 3    buPROPion (WELLBUTRIN XL) 150 MG extended release tablet TAKE ONE TABLET BY MOUTH EVERY MORNING 90 tablet 3    Continuous Blood Gluc Transmit (DEXCOM G6 TRANSMITTER) MISC USE AS DIRECTED AND CHANGE EVERY 90 DAYS 1 each 0    atenolol (TENORMIN) 25 MG tablet TAKE 1 TABLET BY MOUTH EVERY NIGHT AT BEDTIME 90 tablet 2    nystatin (MYCOSTATIN) 742875 UNIT/GM ointment Apply topically 2 times daily to corners of mouth. 30 g 1    Insulin Pen Needle (B-D UF III MINI PEN NEEDLES) 31G X 5 MM MISC 1 each by Does not apply route 3 times daily 300 each 6    lisinopril (PRINIVIL;ZESTRIL) 20 MG tablet Take 1 tablet by mouth 2 times daily 180 tablet 3    Continuous Blood Gluc  (DEXCOM G6 ) KYLAH As needed 1 Device 0    PRODIGY LANCETS 28G MISC Use as directed to test up to 3-4 times daily 400 each 3     No current facility-administered medications for this visit.      Vitals:    10/12/22 1119   BP: 128/63   Pulse: 67   SpO2: 96%     Constitutional: Well-developed, appears - 36.0 g/dL Final    RDW 08/24/2022 13.6  12.4 - 15.4 % Final    Platelets 62/75/3041 376  135 - 450 K/uL Final    MPV 08/24/2022 8.0  5.0 - 10.5 fL Final    Neutrophils % 08/24/2022 67.9  % Final    Lymphocytes % 08/24/2022 17.5  % Final    Monocytes % 08/24/2022 9.9  % Final    Eosinophils % 08/24/2022 3.1  % Final    Basophils % 08/24/2022 1.6  % Final    Neutrophils Absolute 08/24/2022 7.7  1.7 - 7.7 K/uL Final    Lymphocytes Absolute 08/24/2022 2.0  1.0 - 5.1 K/uL Final    Monocytes Absolute 08/24/2022 1.1  0.0 - 1.3 K/uL Final    Eosinophils Absolute 08/24/2022 0.4  0.0 - 0.6 K/uL Final    Basophils Absolute 08/24/2022 0.2  0.0 - 0.2 K/uL Final   Admission on 08/15/2022, Discharged on 08/15/2022   Component Date Value Ref Range Status    aPTT 08/15/2022 26.6  23.0 - 34.3 sec Final    Comment: Therapeutic range: 73.0 - 102.0 sec    Effective 5-25-22 9:00am EST  Please note reference ranges have  changed for PTT Testing. Protime 08/15/2022 13.4  11.7 - 14.5 sec Final    Comment: Effective 5-25-22 09:00am EST  Please note reference ranges have  changed for PT and INR Testing. INR 08/15/2022 1.03  0.87 - 1.14 Final    Comment: Effective 5/25/22 at 09:00am EST    Normal: 0.87 - 1.14  Therapeutic: 2.0 - 3.0  Pros.  Valve: 2.5 - 3.5  AMI: 2.0 - 3.0      ABO/Rh 08/15/2022 O POS   Final    Antibody Screen 08/15/2022 NEG   Final    POC Glucose 08/15/2022 160 (A)  70 - 99 mg/dl Final    Performed on 08/15/2022 ACCU-CHEK   Final    POC Glucose 08/15/2022 133 (A)  70 - 99 mg/dl Final    Performed on 08/15/2022 [facility] Outpatient Visit on 08/05/2022   Component Date Value Ref Range Status    Left Ventricular Ejection Fraction 08/05/2022 71   Final-Edited    LVEF MODALITY 08/05/2022 Nuclear   Final-Edited   Orders Only on 08/04/2022   Component Date Value Ref Range Status    WBC 08/04/2022 7.3  4.0 - 11.0 K/uL Final    RBC 08/04/2022 4.96  4.20 - 5.90 M/uL Final    Hemoglobin 08/04/2022 15.0 13.5 - 17.5 g/dL Final    Hematocrit 08/04/2022 44.5  40.5 - 52.5 % Final    MCV 08/04/2022 89.8  80.0 - 100.0 fL Final    MCH 08/04/2022 30.2  26.0 - 34.0 pg Final    MCHC 08/04/2022 33.7  31.0 - 36.0 g/dL Final    RDW 08/04/2022 14.0  12.4 - 15.4 % Final    Platelets 11/81/3380 274  135 - 450 K/uL Final    MPV 08/04/2022 8.1  5.0 - 10.5 fL Final    Neutrophils % 08/04/2022 62.8  % Final    Lymphocytes % 08/04/2022 20.8  % Final    Monocytes % 08/04/2022 9.1  % Final    Eosinophils % 08/04/2022 5.7  % Final    Basophils % 08/04/2022 1.6  % Final    Neutrophils Absolute 08/04/2022 4.6  1.7 - 7.7 K/uL Final    Lymphocytes Absolute 08/04/2022 1.5  1.0 - 5.1 K/uL Final    Monocytes Absolute 08/04/2022 0.7  0.0 - 1.3 K/uL Final    Eosinophils Absolute 08/04/2022 0.4  0.0 - 0.6 K/uL Final    Basophils Absolute 08/04/2022 0.1  0.0 - 0.2 K/uL Final    Sodium 08/04/2022 141  136 - 145 mmol/L Final    Potassium 08/04/2022 4.6  3.5 - 5.1 mmol/L Final    Chloride 08/04/2022 101  99 - 110 mmol/L Final    CO2 08/04/2022 18 (A)  21 - 32 mmol/L Final    Anion Gap 08/04/2022 22 (A)  3 - 16 Final    Glucose 08/04/2022 249 (A)  70 - 99 mg/dL Final    BUN 08/04/2022 22 (A)  7 - 20 mg/dL Final    Creatinine 08/04/2022 1.1  0.8 - 1.3 mg/dL Final    GFR Non- 08/04/2022 >60  >60 Final    Comment: >60 mL/min/1.73m2 EGFR, calc. for ages 25 and older using the  MDRD formula (not corrected for weight), is valid for stable  renal function. GFR  08/04/2022 >60  >60 Final    Comment: Chronic Kidney Disease: less than 60 ml/min/1.73 sq.m. Kidney Failure: less than 15 ml/min/1.73 sq.m. Results valid for patients 18 years and older.       Calcium 08/04/2022 9.8  8.3 - 10.6 mg/dL Final    Total Protein 08/04/2022 7.1  6.4 - 8.2 g/dL Final    Albumin 08/04/2022 4.8  3.4 - 5.0 g/dL Final    Albumin/Globulin Ratio 08/04/2022 2.1  1.1 - 2.2 Final    Total Bilirubin 08/04/2022 0.6  0.0 - 1.0 mg/dL Final Alkaline Phosphatase 08/04/2022 95  40 - 129 U/L Final    ALT 08/04/2022 52 (A)  10 - 40 U/L Final    AST 08/04/2022 39 (A)  15 - 37 U/L Final    TSH 08/04/2022 1.63  0.27 - 4.20 uIU/mL Final    PSA 08/04/2022 0.65  0.00 - 4.00 ng/mL Final   Hospital Outpatient Visit on 08/03/2022   Component Date Value Ref Range Status    Cholesterol, Total 08/03/2022 154  0 - 199 mg/dL Final    Triglycerides 08/03/2022 149  0 - 150 mg/dL Final    HDL 08/03/2022 42  40 - 60 mg/dL Final    LDL Calculated 08/03/2022 82  <100 mg/dL Final    VLDL Cholesterol Calculated 08/03/2022 30  Not Established mg/dL Final   Office Visit on 06/28/2022   Component Date Value Ref Range Status    Hemoglobin A1C 06/28/2022 6.6  % Final           Assessment/Plan      1. Type 2 DM     Thao Norton is a 60 y.o. male has Type 2 DM with obesity and insulin resistance. uncontrolled DM. Complicated by neuropathy.     - Change  Humulin R U-500 75 units before breakfast , 45 units before lunch and dinner due to high glucose during the day     SSI 5 for 50 > 150  - 20 < 100    jardiance  Metformin    Using dexcom     recommend low carb diet ( 40-45 grams with each meal)    Referred to [address] [place] [address] for dietary modification. Avoid GLP-1 given high TGD and h/o pancreatitis. Advised follow-up with the ophthalmologist once BS better. Due  Urine microalbumin/cr ratio normal in 071/8, 12/19 , 9/21  On ace-inhibitor. Discussed foot care. Has neuropathy on exam. Seen podiatry      Pt on ASA. Normal TSH in 12/19     2. Hypertension. BP at goal    3. Hyperlipidemia. TGD have improved. Has h/o pancreatitis   on fenofibrate 145 mg daily, fish oil 2 gram BID.   lipitor 80mg    TGD 1560 ---> 506---> 1051--> 544---> 220  LDL 71---> 112---> 114---> 112---> 38  HDL 31---> 31    Continue same regimen. 4. Obesity. Advised life style changes.     Patient is on high dose of insulin which has a narrow therapeutic index and risk of complications including severe hypoglycemia

## 2022-10-12 NOTE — TELEPHONE ENCOUNTER
Pts pharmacy called to have the request for Lisinopril 20mg resent. Pharmacy is [facility] [phone]. [place] [facility] [place]20[place] [facility] [place]

## 2022-10-13 RX ORDER — LISINOPRIL 20 MG/1
TABLET ORAL
Qty: 180 TABLET | Refills: 3 | Status: SHIPPED | OUTPATIENT
Start: 2022-10-13

## 2022-10-19 ENCOUNTER — OFFICE VISIT (OUTPATIENT)
Dept: FAMILY MEDICINE CLINIC | Age: 62
End: 2022-10-19
Payer: MEDICARE

## 2022-10-19 VITALS
HEART RATE: 70 BPM | WEIGHT: 223 LBS | SYSTOLIC BLOOD PRESSURE: 124 MMHG | OXYGEN SATURATION: 97 % | DIASTOLIC BLOOD PRESSURE: 58 MMHG | BODY MASS INDEX: 33.03 KG/M2 | HEIGHT: 69 IN

## 2022-10-19 DIAGNOSIS — Z01.818 PREOP EXAMINATION: Primary | ICD-10-CM

## 2022-10-19 LAB
ABO/RH: NORMAL
ANION GAP SERPL CALCULATED.3IONS-SCNC: 15 MMOL/L (ref 3–16)
ANTIBODY SCREEN: NORMAL
APTT: 28.6 SEC (ref 23–34.3)
BUN BLDV-MCNC: 18 MG/DL (ref 7–20)
CALCIUM SERPL-MCNC: 9.9 MG/DL (ref 8.3–10.6)
CHLORIDE BLD-SCNC: 104 MMOL/L (ref 99–110)
CO2: 24 MMOL/L (ref 21–32)
CREAT SERPL-MCNC: 0.9 MG/DL (ref 0.8–1.3)
GFR SERPL CREATININE-BSD FRML MDRD: >60 ML/MIN/{1.73_M2}
GLUCOSE BLD-MCNC: 168 MG/DL (ref 70–99)
HCT VFR BLD CALC: 42.1 % (ref 40.5–52.5)
HEMOGLOBIN: 14.4 G/DL (ref 13.5–17.5)
INR BLD: 0.95 (ref 0.87–1.14)
MCH RBC QN AUTO: 30.3 PG (ref 26–34)
MCHC RBC AUTO-ENTMCNC: 34.2 G/DL (ref 31–36)
MCV RBC AUTO: 88.7 FL (ref 80–100)
PDW BLD-RTO: 14.7 % (ref 12.4–15.4)
PLATELET # BLD: 255 K/UL (ref 135–450)
PMV BLD AUTO: 8.4 FL (ref 5–10.5)
POTASSIUM SERPL-SCNC: 5.1 MMOL/L (ref 3.5–5.1)
PROTHROMBIN TIME: 12.6 SEC (ref 11.7–14.5)
RBC # BLD: 4.75 M/UL (ref 4.2–5.9)
SODIUM BLD-SCNC: 143 MMOL/L (ref 136–145)
WBC # BLD: 7.6 K/UL (ref 4–11)

## 2022-10-19 PROCEDURE — 1036F TOBACCO NON-USER: CPT | Performed by: NURSE PRACTITIONER

## 2022-10-19 PROCEDURE — G8484 FLU IMMUNIZE NO ADMIN: HCPCS | Performed by: NURSE PRACTITIONER

## 2022-10-19 PROCEDURE — G8427 DOCREV CUR MEDS BY ELIG CLIN: HCPCS | Performed by: NURSE PRACTITIONER

## 2022-10-19 PROCEDURE — 99214 OFFICE O/P EST MOD 30 MIN: CPT | Performed by: NURSE PRACTITIONER

## 2022-10-19 PROCEDURE — 3017F COLORECTAL CA SCREEN DOC REV: CPT | Performed by: NURSE PRACTITIONER

## 2022-10-19 PROCEDURE — G8417 CALC BMI ABV UP PARAM F/U: HCPCS | Performed by: NURSE PRACTITIONER

## 2022-10-19 SDOH — ECONOMIC STABILITY: FOOD INSECURITY: WITHIN THE PAST 12 MONTHS, THE FOOD YOU BOUGHT JUST DIDN'T LAST AND YOU DIDN'T HAVE MONEY TO GET MORE.: NEVER TRUE

## 2022-10-19 SDOH — ECONOMIC STABILITY: FOOD INSECURITY: WITHIN THE PAST 12 MONTHS, YOU WORRIED THAT YOUR FOOD WOULD RUN OUT BEFORE YOU GOT MONEY TO BUY MORE.: NEVER TRUE

## 2022-10-19 ASSESSMENT — SOCIAL DETERMINANTS OF HEALTH (SDOH): HOW HARD IS IT FOR YOU TO PAY FOR THE VERY BASICS LIKE FOOD, HOUSING, MEDICAL CARE, AND HEATING?: NOT HARD AT ALL

## 2022-10-19 NOTE — PROGRESS NOTES
Preoperative Consultation      Deb Kaye  YOB: 1960    Date of Service:  10/19/2022    Vitals:    10/19/22 0829   BP: (!) 124/58   Site: Right Upper Arm   Position: Sitting   Cuff Size: Large Adult   Pulse: 70   SpO2: 97%   Weight: 223 lb (101.2 kg)   Height: 5' 9\" (1.753 m)      Wt Readings from Last 2 Encounters:   10/19/22 223 lb (101.2 kg)   10/12/22 218 lb 9.6 oz (99.2 kg)     BP Readings from Last 3 Encounters:   10/19/22 (!) 124/58   10/12/22 128/63   08/24/22 136/72        Chief Complaint   Patient presents with    Pre-op Exam     11/07/2022 lower back surgery Dr. Torey Nietofield procedure being done at [address] [place] [address] already has cardiac clearance.  Fax [phone]    Discuss Medications     No Known Allergies  Outpatient Medications Marked as Taking for the 10/19/22 encounter (Office Visit) with MADISON Mercedes - CNP   Medication Sig Dispense Refill    lisinopril (PRINIVIL;ZESTRIL) 20 MG tablet TAKE 1 TABLET BY MOUTH 2 TIMES A  tablet 3    Blood Glucose Monitoring Suppl (PRODIGY AUTOCODE BLOOD GLUCOSE) KYLAH Use to test BS as directed 1 each 0    blood glucose test strips (PRODIGY NO CODING BLOOD GLUC) strip USE AS DIRECTED TO TEST UP TO 3-4 TIMES A  strip 3    HUMULIN R U-500 KWIKPEN 500 UNIT/ML SOPN concentrated injection pen INJECT 80 UNITS UNDER THE SKIN BEFORE BREAKFAST, THEN 60 UNITS UNDER THE SKIN BEFORE LUNCH AND DINNER 60 mL 0    clopidogrel (PLAVIX) 75 MG tablet Take 1 tablet by mouth daily 90 tablet 3    fenofibrate (TRICOR) 145 MG tablet TAKE 1 TABLET BY MOUTH ONE TIME A DAY 90 tablet 3    Continuous Blood Gluc Sensor (DEXCOM G6 SENSOR) MISC USE AS DIRECTED AND CHANGE SENSORS EVERY 10 DAYS 9 each 0    JARDIANCE 10 MG tablet TAKE 1 TABLET BY MOUTH ONE TIME A DAY 90 tablet 0    atorvastatin (LIPITOR) 80 MG tablet Take 1 tablet by mouth at bedtime 90 tablet 3    desvenlafaxine succinate (PRISTIQ) 50 MG TB24 extended release tablet Take 1 tablet by mouth daily 90 tablet 3    omega-3 acid ethyl esters (LOVAZA) 1 g capsule TAKE 2 CAPSULES BY MOUTH TWO TIMES A  capsule 3    metFORMIN (GLUCOPHAGE) 1000 MG tablet One tab PO bid 180 tablet 1    metroNIDAZOLE (METROGEL) 1 % gel Apply to face daily. 60 g 3    buPROPion (WELLBUTRIN XL) 150 MG extended release tablet TAKE ONE TABLET BY MOUTH EVERY MORNING 90 tablet 3    Continuous Blood Gluc Transmit (DEXCOM G6 TRANSMITTER) MISC USE AS DIRECTED AND CHANGE EVERY 90 DAYS 1 each 0    atenolol (TENORMIN) 25 MG tablet TAKE 1 TABLET BY MOUTH EVERY NIGHT AT BEDTIME 90 tablet 2    nystatin (MYCOSTATIN) 709914 UNIT/GM ointment Apply topically 2 times daily to corners of mouth. 30 g 1    Insulin Pen Needle (B-D UF III MINI PEN NEEDLES) 31G X 5 MM MISC 1 each by Does not apply route 3 times daily 300 each 6    Continuous Blood Gluc  (DEXCOM G6 ) KYLAH As needed 1 Device 0    PRODIGY LANCETS 28G MISC Use as directed to test up to 3-4 times daily 400 each 3       This patient presents to the office today for a preoperative consultation at the request of surgeon, Dr. Tomasz Teran, who plans on performing Laminectomy L4-5-S1 on November 7 at [place] [address].  The current problem began 5 months ago, and symptoms have been worsening with time. Conservative therapy: N/A.     Planned anesthesia: General   Known anesthesia problems: None   Bleeding risk: No recent or remote history of abnormal bleeding  Personal or FH of DVT/PE: No    Patient objection to receiving blood products: No    Patient Active Problem List   Diagnosis    Hypertriglyceridemia    Hyperlipidemia    Hypertension    Testosterone deficiency    Diabetic polyneuropathy ([facility] [address]    Type 2 diabetes mellitus with diabetic polyneuropathy, with long-term current use of insulin (HCC)    DARRICK (obstructive sleep apnea)    Occlusion and stenosis of carotid artery    ED (erectile dysfunction)    Osteoarthritis of cervical spine with myelopathy    Vitamin D deficiency    Polyp of rectum    Chronic neck and back pain    Class 1 obesity due to excess calories with serious comorbidity and body mass index (BMI) of 32.0 to 32.9 in adult    Left lateral epicondylitis    Acquired trigger finger of left ring finger    Former smoker    Dysthymia    Trigger finger, right middle finger    Trigger finger, right ring finger    Bilateral carotid artery stenosis    CAD in native artery    CAD S/P percutaneous coronary angioplasty    Abnormal stress test    Primary osteoarthritis of left wrist    Closed nondisplaced fracture of triquetral bone of right wrist    Moderate episode of recurrent major depressive disorder (HCC)    Atrial fibrillation, unspecified type (HCC)    Neuropathy of upper extremity    History of cervical spinal surgery    DDD (degenerative disc disease), cervical    DDD (degenerative disc disease), lumbar    Bulge of lumbar disc without myelopathy       Past Medical History:   Diagnosis Date    Acute, but ill-defined, cerebrovascular disease 05/31/2013    CAD in native artery 12/30/2020    Cerebral artery occlusion with cerebral infarction [facility]) 2013     X2 PERSONALITY CHANGE, ANGER OUTBURSTS    Cerebrovascular disease     Diplopia 07/11/2013    ED (erectile dysfunction) 01/23/2014    Elbow pain, chronic 12/16/2015    Hyperlipidemia     Hypertension     Irritable bowel syndrome     Obstructive sleep apnea (adult) (pediatric) 07/01/2013    NO CPAP, HAD PROBLEMS WITH INSURANCE AND STOPPED USING    Occlusion and stenosis of carotid artery without mention of cerebral infarction 01/13/2014    MINOR    Pain in joint, upper arm 05/14/2015    Polyneuropathy in diabetes(357.2) 07/01/2013    PONV (postoperative nausea and vomiting)     Skin abnormality     PRE-CANCEROUS LESIONS REMOVED FROM ARMS AND EARS    Type 2 diabetes mellitus with diabetic polyneuropathy, with long-term current use of insulin ([facility] [address] 07/01/2013    Type II or unspecified type diabetes mellitus without Substance and Sexual Activity    Alcohol use: No    Drug use: No    Sexual activity: Yes   Other Topics Concern    Not on file   Social History Narrative    Not on file     Social Determinants of Health     Financial Resource Strain: Not on file   Food Insecurity: Not on file   Transportation Needs: Not on file   Physical Activity: Sufficiently Active    Days of Exercise per Week: 2 days    Minutes of Exercise per Session: 90 min   Stress: Not on file   Social Connections: Not on file   Intimate Partner Violence: Not on file   Housing Stability: Not on file       Review of Systems  A comprehensive review of systems was negative except for what was noted in the HPI. Physical Exam   Constitutional: He is oriented to person, place, and time. He appears well-developed and well-nourished. No distress. HENT:   Head: Normocephalic and atraumatic. Mouth/Throat: Uvula is midline, oropharynx is clear and moist and mucous membranes are normal.   Eyes: Conjunctivae and EOM are normal. Pupils are equal, round, and reactive to light. Neck: Trachea normal and normal range of motion. Neck supple. No JVD present. Carotid bruit is not present. No mass and no thyromegaly present. Cardiovascular: Normal rate, regular rhythm, normal heart sounds and intact distal pulses. Exam reveals no gallop and no friction rub. No murmur heard. Pulmonary/Chest: Effort normal and breath sounds normal. No respiratory distress. He has no wheezes. He has no rales. Abdominal: Soft. Normal aorta and bowel sounds are normal. He exhibits no distension and no mass. There is no hepatosplenomegaly. No tenderness. Musculoskeletal: He exhibits no edema and no tenderness. Using cane. Neurological: He is alert and oriented to person, place, and time. He has normal strength. No cranial nerve deficit or sensory deficit. Coordination and gait normal.   Skin: Skin is warm and dry. No rash noted. No erythema.    Psychiatric: He has a normal mood and affect. His behavior is normal.     EKG Interpretation:  Dr. Parul Huggins gave cardiac clearance, see encounter note 10/11/22. Lab Review-Labs are ordered per surgeon. Assessment:       60 y.o. patient with planned surgery as above. Known risk factors for perioperative complications: Diabetes mellitus, Hypertension, Obstructive sleep apnea, obesity, former smoker, CAD-cardiac stents-on asa/plavix. Current medications which may produce withdrawal symptoms if withheld perioperatively: none      Plan:     1. Preoperative workup as follows: Ordered by surgeon. 2. Change in medication regimen before surgery: Discontinue NSAIDs, Fish oil, MV, Fish Oil7 days before surgery. Hold plavix x 5 days  Recommend not stopping ASA, unless required by surgeon. Then hold x 7 days. 3. Prophylaxis for cardiac events with perioperative beta-blockers: Currently taking  atenolol  ACC/AHA indications for pre-operative beta-blocker use:    Vascular surgery with history of postitive stress test  Intermediate or high risk surgery with history of CAD   Intermediate or high risk surgery with multiple clinical predictors of CAD- 2 of the following: history of compensated or prior heart failure, history of cerebrovascular disease, DM, or renal insufficiency    Routine administration of higher-dose, long-acting metoprolol in beta-blocker-naïve patients on the day of surgery, and in the absence of dose titration is associated with an overall increase in mortality. Beta-blockers should be started days to weeks prior to surgery and titrated to pulse < 70.  4. Deep vein thrombosis prophylaxis: regimen to be chosen by surgical team  5. No contraindications to planned surgery, contact Rachel regarding Aspirin. \"Per Encounter note from   Dr. Parul Huggins 10/11. Hold plavix x 5 days-Recommend not stopping ASA, unless required by surgeon. Then hold x 7 days.  \"

## 2022-10-22 LAB
CREATININE URINE: 65.9 MG/DL (ref 39–259)
MICROALBUMIN UR-MCNC: <1.2 MG/DL
MICROALBUMIN/CREAT UR-RTO: NORMAL MG/G (ref 0–30)

## 2022-11-04 NOTE — PROGRESS NOTES
[facility] PRE-SURGICAL TESTING INSTRUCTIONS                      PRIOR TO PROCEDURE DATE:    1. PLEASE FOLLOW ANY INSTRUCTIONS GIVEN TO YOU PER YOUR SURGEON. 2. Arrange for someone to drive you home and be with you for the first 24 hours after discharge for your safety after your procedure for which you received sedation. Ensure it is someone we can share information with regarding your discharge. NOTE: At this time ONLY 2 ADULTS may accompany you   One person MUST stay at hospital entire time if outpatient surgery      3. You must contact your surgeon for instructions IF:  You are taking any blood thinners, aspirin, anti-inflammatory or vitamins. There is a change in your physical condition such as a cold, fever, rash, cuts, sores, or any other infection, especially near your surgical site. 4. Do not drink alcohol the day before or day of your procedure. Do not use any recreational marijuana at least 24 hours or street drugs (heroin, cocaine) at minimum 5 days prior to your procedure. 5. A Pre-Surgical History and Physical MUST be completed WITHIN 30 DAYS OR LESS prior to your procedure. by your Physician or an Urgent Care        THE DAY OF YOUR PROCEDURE:  1. Follow instructions for ARRIVAL TIME as DIRECTED BY YOUR SURGEON. 2. Enter the MAIN entrance from [address] and follow the signs to the [employer] or [employer] (offered free of charge 7 am-5pm). 3. Enter the Main Entrance of the hospital (do not enter from the lower level of the parking garage). Upon entrance, check in with the  at the surgical information desk on your LEFT. Bring your insurance card and photo ID to register      4. DO NOT EAT ANYTHING 8 hours prior to arrival for surgery. You may have up to 8 ounces of water 4 hours prior to your arrival for surgery.    NOTE: ALL Gastric, Bariatric & Bowel surgery patients - you MUST follow your surgeon's instructions regarding eating/ drinking as you will have very specific instructions to follow. If you did not receive these, call your surgeon's office immediately. 5. MEDICATIONS:  Take the following medications with a SMALL sip of water:     Use your usual dose of inhalers the morning of surgery. BRING your rescue inhaler with you to hospital.   Anesthesia does NOT want you to take insulin the morning of surgery. They will control your blood sugar while you are at the hospital. Please contact your ordering physician for instructions regarding your insulin the night before your procedure. If you have an insulin pump, please keep it set on basal rate. Bariatric patient's call your surgeon if on diabetic medications as some may need to be stopped 1 week prior to surgery    6. Do not swallow additional water when brushing teeth. No gum, candy, mints, or ice chips. Refrain from smoking or at least decrease the amount on day of surgery. 7. Morning of surgery:   Take a shower with an antibacterial soap (i.e., Safeguard or Dial) OR your physician may have instructed you to use Hibiclens. Dress in loose, comfortable clothing appropriate for redressing after your procedure. Do not wear jewelry (including body piercings), make-up (especially NO eye make-up), fingernail polish (NO toenail polish if foot/leg surgery), lotion, powders, or metal hairclips. Do not shave or wax for 72 hours prior to procedure near your operative site. Shaving with a razor can irritate your skin and make it easier to develop an infection. On the day of your procedure, any hair that needs to be removed near the surgical site will be 'clipped' by a healthcare worker using a special clipper designed to avoid skin irritation. 8. Dentures, glasses, or contacts will need to be removed before your procedure. Bring cases for your glasses, contacts, dentures, or hearing aids to protect them while you are in surgery.       9. If you use a CPAP, please bring it with you on the day of your procedure. 10. We recommend that valuable personal belongings such as cash, cell phones, e-tablets, or jewelry, be left at home during your stay. The hospital will not be responsible for valuables that are not secured in the hospital safe. However, if your insurance requires a co-pay, you may want to bring a method of payment, i.e., Check or credit card, if you wish to pay your co-pay the day of surgery. 11. If you are to stay overnight, you may bring a bag with personal items. Please have any large items you may need brought in by your family after your arrival to your hospital room. 12. If you have a Living Will or Durable Power of , please bring a copy on the day of your procedure. How we keep you safe and work to prevent surgical site infections:   1. Health care workers should always check your ID bracelet to verify your name and birth date. You will be asked many times to state your name, date of birth, and allergies. 2. Health care workers should always clean their hands with soap or alcohol gel before providing care to you. It is okay to ask anyone if they cleaned their hands before they touch you. 3. You will be actively involved in verifying the type of procedure you are having and ensuring the correct surgical site. This will be confirmed multiple times prior to your procedure. Do NOT edda your surgery site UNLESS instructed to by your surgeon. 4. When you are in the operating room, your surgical site will be cleansed with a special soap, and in most cases, you will be given an antibiotic before the surgery begins. What to expect AFTER your procedure? 1. Immediately following your procedure, your will be taken to the PACU for the first phase of your recovery. Your nurse will help you recover from any potential side effects of anesthesia, such as extreme drowsiness, changes in your vital signs or breathing patterns.  Nausea, headache, muscle aches, or sore throat may also occur after anesthesia. Your nurse will help you manage these potential side effects. 2. For comfort and safety, arrange to have someone at home with you for the first 24 hours after discharge. 3. You and your family will be given written instructions about your diet, activity, dressing care, medications, and return visits. 4. Once at home, should issues with nausea, pain, or bleeding occur, or should you notice any signs of infection, you should call your surgeon. 5. Always clean your hands before and after caring for your wound. Do not let your family touch your surgery site without cleaning their hands. 6. Narcotic pain medications can cause significant constipation. You may want to add a stool softener to your postoperative medication schedule or speak to your surgeon on how best to manage this SIDE EFFECT. SPECIAL INSTRUCTIONS       Thank you for allowing us to care for you. We strive to exceed your expectations in the delivery of care and service provided to you and your family. If you need to contact the [place] [address] staff for any reason, please call us at [phone]    Instructions reviewed with patient during preadmission testing phone interview.   Venu Davenport RN.11/4/2022 .12:35 PM      ADDITIONAL EDUCATIONAL INFORMATION REVIEWED PER PHONE WITH YOU AND/OR YOUR FAMILY:  Yes Hibiclens® Bathing Instructions   No Antibacterial Soap

## 2022-11-04 NOTE — PROGRESS NOTES
Place patient label inside box (if no patient label, complete below)  Name:  :  MR#:     Marcel Bain / PROCEDURE  I (we)Jeana authorize DR HANSEL Martinez  and/or such assistants as may be selected by him/her, to perform the following operation/procedure(s): LEFT L4-5, L5-S1 HEMILAMINECTOMY AND FORAMINOTOMY        Note: If unable to obtain consent prior to an emergent procedure, document the emergent reason in the medical record. This procedure has been explained to my (our) satisfaction and included in the explanation was: The intended benefit, nature, and extent of the procedure to be performed; The significant risks involved and the probability of success; Alternative procedures and methods of treatment; The dangers and probable consequences of such alternatives (including no procedure or treatment); The expected consequences of the procedure on my future health; Whether other qualified individuals would be performing important surgical tasks and/or whether  would be present to advise or support the procedure. I (we) understand that there are other risks of infection and other serious complications in the pre-operative/procedural and postoperative/procedural stages of my (our) care. I (we) have asked all of the questions which I (we) thought were important in deciding whether or not to undergo treatment or diagnosis. These questions have been answered to my (our) satisfaction. I (we) understand that no assurance can be given that the procedure will be a success, and no guarantee or warranty of success has been given to me (us). It has been explained to me (us) that during the course of the operation/procedure, unforeseen conditions may be revealed that necessitate extension of the original procedure(s) or different procedure(s) than those set forth in Paragraph 1.  I (we) authorize and request that the above-named physician, his/her assistants or his/her designees, perform procedures as necessary and desirable if deemed to be in my (our) best interest.     Revised 8/2/2021                                                                          Page 1 of 2           I acknowledge that health care personnel may be observing this procedure for the purpose of medical education or other specified purposes as may be necessary as requested and/or approved by my (our) physician. I (we) consent to the disposal by the hospital Pathologist of the removed tissue, parts or organs in accordance with hospital policy. I do ____ do not ____ consent to the use of a local infiltration pain blocking agent that will be used by my provider/surgical provider to help alleviate pain during my procedure. I do ____ do not ____ consent to an emergent blood transfusion in the case of a life-threatening situation that requires blood components to be administered. This consent is valid for 24 hours from the beginning of the procedure. This patient does ____ or does not ____ currently have a DNR status/order. If DNR order is in place, obtain Addendum to the Surgical Consent for ALL Patients with a DNR Order to address dinora-operative status for limited intervention or DNR suspension.      I have read and fully understand the above Consent for Operation/Procedure and that all blanks were completed before I signed the consent.   _____________________________       _____________________      ____/____am/pm  Signature of Patient or legal representative      Printed Name / Relationship            Date / Time   ____________________________       _____________________      ____/____am/pm  Witness to Signature                                    Printed Name                    Date / Time    If patient is unable to sign or is a minor, complete the following)  Patient is a minor, ____ years of age, or unable to sign because: ______________________________________________________________________________________________    If a phone consent is obtained, consent will be documented by using two health care professionals, each affirming that the consenting party has no questions and gives consent for the procedure discussed with the physician/provider.   _____________________          ____________________       _____/_____am/pm   2nd witness to phone consent        Printed name           Date / Time    Informed Consent:  I have provided the explanation described above in section 1 to the patient and/or legal representative.  I have provided the patient and/or legal representative with an opportunity to ask any questions about the proposed operation/procedure.   ___________________________          ____________________         ____/____am/pm  Provider / Proceduralist                            Printed name            Date / Time  Revised 8/2/2021                                                                      Page 2 of 2

## 2022-11-07 ENCOUNTER — HOSPITAL ENCOUNTER (OUTPATIENT)
Age: 62
Setting detail: SURGERY ADMIT
Discharge: HOME | End: 2022-11-07
Attending: NEUROLOGICAL SURGERY | Admitting: NEUROLOGICAL SURGERY
Payer: COMMERCIAL

## 2022-11-07 ENCOUNTER — ANESTHESIA EVENT (OUTPATIENT)
Dept: OPERATING ROOM | Age: 62
End: 2022-11-07
Payer: COMMERCIAL

## 2022-11-07 ENCOUNTER — APPOINTMENT (OUTPATIENT)
Dept: GENERAL RADIOLOGY | Age: 62
End: 2022-11-07
Attending: NEUROLOGICAL SURGERY
Payer: COMMERCIAL

## 2022-11-07 ENCOUNTER — ANESTHESIA (OUTPATIENT)
Dept: OPERATING ROOM | Age: 62
End: 2022-11-07
Payer: COMMERCIAL

## 2022-11-07 VITALS
TEMPERATURE: 97.2 F | DIASTOLIC BLOOD PRESSURE: 88 MMHG | RESPIRATION RATE: 16 BRPM | BODY MASS INDEX: 30.41 KG/M2 | OXYGEN SATURATION: 95 % | HEART RATE: 81 BPM | WEIGHT: 212.4 LBS | SYSTOLIC BLOOD PRESSURE: 157 MMHG | HEIGHT: 70 IN

## 2022-11-07 DIAGNOSIS — M51.36 BULGE OF LUMBAR DISC WITHOUT MYELOPATHY: Primary | ICD-10-CM

## 2022-11-07 LAB
ABO/RH: NORMAL
ANTIBODY SCREEN: NORMAL
GLUCOSE BLD-MCNC: 155 MG/DL (ref 70–99)
GLUCOSE BLD-MCNC: 171 MG/DL (ref 70–99)
PERFORMED ON: ABNORMAL
PERFORMED ON: ABNORMAL

## 2022-11-07 PROCEDURE — 3700000001 HC ADD 15 MINUTES (ANESTHESIA): Performed by: NEUROLOGICAL SURGERY

## 2022-11-07 PROCEDURE — 2500000003 HC RX 250 WO HCPCS

## 2022-11-07 PROCEDURE — A4217 STERILE WATER/SALINE, 500 ML: HCPCS | Performed by: NEUROLOGICAL SURGERY

## 2022-11-07 PROCEDURE — 6360000002 HC RX W HCPCS

## 2022-11-07 PROCEDURE — 86850 RBC ANTIBODY SCREEN: CPT

## 2022-11-07 PROCEDURE — 6360000002 HC RX W HCPCS: Performed by: NEUROLOGICAL SURGERY

## 2022-11-07 PROCEDURE — 7100000010 HC PHASE II RECOVERY - FIRST 15 MIN: Performed by: NEUROLOGICAL SURGERY

## 2022-11-07 PROCEDURE — 7100000001 HC PACU RECOVERY - ADDTL 15 MIN: Performed by: NEUROLOGICAL SURGERY

## 2022-11-07 PROCEDURE — 7100000011 HC PHASE II RECOVERY - ADDTL 15 MIN: Performed by: NEUROLOGICAL SURGERY

## 2022-11-07 PROCEDURE — 86901 BLOOD TYPING SEROLOGIC RH(D): CPT

## 2022-11-07 PROCEDURE — 2580000003 HC RX 258: Performed by: NEUROLOGICAL SURGERY

## 2022-11-07 PROCEDURE — 3209999900 FLUORO FOR SURGICAL PROCEDURES

## 2022-11-07 PROCEDURE — 6360000002 HC RX W HCPCS: Performed by: ANESTHESIOLOGY

## 2022-11-07 PROCEDURE — 2580000003 HC RX 258

## 2022-11-07 PROCEDURE — 2580000003 HC RX 258: Performed by: ANESTHESIOLOGY

## 2022-11-07 PROCEDURE — 72020 X-RAY EXAM OF SPINE 1 VIEW: CPT

## 2022-11-07 PROCEDURE — 3700000000 HC ANESTHESIA ATTENDED CARE: Performed by: NEUROLOGICAL SURGERY

## 2022-11-07 PROCEDURE — 3600000004 HC SURGERY LEVEL 4 BASE: Performed by: NEUROLOGICAL SURGERY

## 2022-11-07 PROCEDURE — 2709999900 HC NON-CHARGEABLE SUPPLY: Performed by: NEUROLOGICAL SURGERY

## 2022-11-07 PROCEDURE — 86900 BLOOD TYPING SEROLOGIC ABO: CPT

## 2022-11-07 PROCEDURE — 2720000010 HC SURG SUPPLY STERILE: Performed by: NEUROLOGICAL SURGERY

## 2022-11-07 PROCEDURE — 7100000000 HC PACU RECOVERY - FIRST 15 MIN: Performed by: NEUROLOGICAL SURGERY

## 2022-11-07 PROCEDURE — 3600000014 HC SURGERY LEVEL 4 ADDTL 15MIN: Performed by: NEUROLOGICAL SURGERY

## 2022-11-07 RX ORDER — SODIUM CHLORIDE 0.9 % (FLUSH) 0.9 %
5-40 SYRINGE (ML) INJECTION EVERY 12 HOURS SCHEDULED
Status: DISCONTINUED | OUTPATIENT
Start: 2022-11-07 | End: 2022-11-07 | Stop reason: HOSPADM

## 2022-11-07 RX ORDER — LIDOCAINE HYDROCHLORIDE 10 MG/ML
1 INJECTION, SOLUTION EPIDURAL; INFILTRATION; INTRACAUDAL; PERINEURAL
Status: DISCONTINUED | OUTPATIENT
Start: 2022-11-07 | End: 2022-11-07 | Stop reason: HOSPADM

## 2022-11-07 RX ORDER — SODIUM CHLORIDE, SODIUM LACTATE, POTASSIUM CHLORIDE, CALCIUM CHLORIDE 600; 310; 30; 20 MG/100ML; MG/100ML; MG/100ML; MG/100ML
INJECTION, SOLUTION INTRAVENOUS CONTINUOUS
Status: DISCONTINUED | OUTPATIENT
Start: 2022-11-07 | End: 2022-11-07 | Stop reason: HOSPADM

## 2022-11-07 RX ORDER — LIDOCAINE HYDROCHLORIDE 20 MG/ML
INJECTION, SOLUTION INTRAVENOUS PRN
Status: DISCONTINUED | OUTPATIENT
Start: 2022-11-07 | End: 2022-11-07 | Stop reason: SDUPTHER

## 2022-11-07 RX ORDER — DEXAMETHASONE SODIUM PHOSPHATE 4 MG/ML
INJECTION, SOLUTION INTRA-ARTICULAR; INTRALESIONAL; INTRAMUSCULAR; INTRAVENOUS; SOFT TISSUE PRN
Status: DISCONTINUED | OUTPATIENT
Start: 2022-11-07 | End: 2022-11-07 | Stop reason: SDUPTHER

## 2022-11-07 RX ORDER — METHOCARBAMOL 750 MG/1
750 TABLET, FILM COATED ORAL 4 TIMES DAILY
Qty: 40 TABLET | Refills: 0 | Status: SHIPPED | OUTPATIENT
Start: 2022-11-07 | End: 2022-11-17

## 2022-11-07 RX ORDER — ONDANSETRON 2 MG/ML
INJECTION INTRAMUSCULAR; INTRAVENOUS PRN
Status: DISCONTINUED | OUTPATIENT
Start: 2022-11-07 | End: 2022-11-07 | Stop reason: SDUPTHER

## 2022-11-07 RX ORDER — MIDAZOLAM HYDROCHLORIDE 1 MG/ML
INJECTION INTRAMUSCULAR; INTRAVENOUS PRN
Status: DISCONTINUED | OUTPATIENT
Start: 2022-11-07 | End: 2022-11-07 | Stop reason: SDUPTHER

## 2022-11-07 RX ORDER — SUCCINYLCHOLINE/SOD CL,ISO/PF 200MG/10ML
SYRINGE (ML) INTRAVENOUS PRN
Status: DISCONTINUED | OUTPATIENT
Start: 2022-11-07 | End: 2022-11-07 | Stop reason: SDUPTHER

## 2022-11-07 RX ORDER — FENTANYL CITRATE 50 UG/ML
INJECTION, SOLUTION INTRAMUSCULAR; INTRAVENOUS PRN
Status: DISCONTINUED | OUTPATIENT
Start: 2022-11-07 | End: 2022-11-07 | Stop reason: SDUPTHER

## 2022-11-07 RX ORDER — MEPERIDINE HYDROCHLORIDE 25 MG/ML
12.5 INJECTION INTRAMUSCULAR; INTRAVENOUS; SUBCUTANEOUS EVERY 5 MIN PRN
Status: DISCONTINUED | OUTPATIENT
Start: 2022-11-07 | End: 2022-11-07 | Stop reason: HOSPADM

## 2022-11-07 RX ORDER — PHENYLEPHRINE HYDROCHLORIDE 10 MG/ML
INJECTION INTRAVENOUS PRN
Status: DISCONTINUED | OUTPATIENT
Start: 2022-11-07 | End: 2022-11-07 | Stop reason: SDUPTHER

## 2022-11-07 RX ORDER — ROCURONIUM BROMIDE 10 MG/ML
INJECTION, SOLUTION INTRAVENOUS PRN
Status: DISCONTINUED | OUTPATIENT
Start: 2022-11-07 | End: 2022-11-07 | Stop reason: SDUPTHER

## 2022-11-07 RX ORDER — SODIUM CHLORIDE 0.9 % (FLUSH) 0.9 %
5-40 SYRINGE (ML) INJECTION PRN
Status: DISCONTINUED | OUTPATIENT
Start: 2022-11-07 | End: 2022-11-07 | Stop reason: HOSPADM

## 2022-11-07 RX ORDER — OXYCODONE HYDROCHLORIDE 5 MG/1
5 TABLET ORAL EVERY 6 HOURS PRN
Qty: 28 TABLET | Refills: 0 | Status: SHIPPED | OUTPATIENT
Start: 2022-11-07 | End: 2022-11-14

## 2022-11-07 RX ORDER — SODIUM CHLORIDE 9 MG/ML
INJECTION, SOLUTION INTRAVENOUS PRN
Status: DISCONTINUED | OUTPATIENT
Start: 2022-11-07 | End: 2022-11-07 | Stop reason: HOSPADM

## 2022-11-07 RX ORDER — HYDRALAZINE HYDROCHLORIDE 20 MG/ML
10 INJECTION INTRAMUSCULAR; INTRAVENOUS
Status: DISCONTINUED | OUTPATIENT
Start: 2022-11-07 | End: 2022-11-07 | Stop reason: HOSPADM

## 2022-11-07 RX ORDER — PROPOFOL 10 MG/ML
INJECTION, EMULSION INTRAVENOUS PRN
Status: DISCONTINUED | OUTPATIENT
Start: 2022-11-07 | End: 2022-11-07 | Stop reason: SDUPTHER

## 2022-11-07 RX ORDER — PROCHLORPERAZINE EDISYLATE 5 MG/ML
5 INJECTION INTRAMUSCULAR; INTRAVENOUS
Status: DISCONTINUED | OUTPATIENT
Start: 2022-11-07 | End: 2022-11-07 | Stop reason: HOSPADM

## 2022-11-07 RX ORDER — SODIUM CHLORIDE 9 MG/ML
25 INJECTION, SOLUTION INTRAVENOUS PRN
Status: DISCONTINUED | OUTPATIENT
Start: 2022-11-07 | End: 2022-11-07 | Stop reason: HOSPADM

## 2022-11-07 RX ADMIN — CEFAZOLIN 2000 MG: 2 INJECTION, POWDER, FOR SOLUTION INTRAMUSCULAR; INTRAVENOUS at 11:01

## 2022-11-07 RX ADMIN — PHENYLEPHRINE HYDROCHLORIDE 200 MCG: 10 INJECTION INTRAVENOUS at 11:30

## 2022-11-07 RX ADMIN — Medication 160 MG: at 10:51

## 2022-11-07 RX ADMIN — ROCURONIUM BROMIDE 5 MG: 10 INJECTION INTRAVENOUS at 10:51

## 2022-11-07 RX ADMIN — FENTANYL CITRATE 50 MCG: 50 INJECTION, SOLUTION INTRAMUSCULAR; INTRAVENOUS at 12:10

## 2022-11-07 RX ADMIN — PHENYLEPHRINE HYDROCHLORIDE 200 MCG: 10 INJECTION INTRAVENOUS at 11:22

## 2022-11-07 RX ADMIN — ROCURONIUM BROMIDE 35 MG: 10 INJECTION INTRAVENOUS at 11:11

## 2022-11-07 RX ADMIN — PHENYLEPHRINE HYDROCHLORIDE 30 MCG/MIN: 10 INJECTION, SOLUTION INTRAMUSCULAR; INTRAVENOUS; SUBCUTANEOUS at 11:45

## 2022-11-07 RX ADMIN — ROCURONIUM BROMIDE 10 MG: 10 INJECTION INTRAVENOUS at 11:24

## 2022-11-07 RX ADMIN — PHENYLEPHRINE HYDROCHLORIDE 200 MCG: 10 INJECTION INTRAVENOUS at 11:36

## 2022-11-07 RX ADMIN — PROPOFOL 200 MG: 10 INJECTION, EMULSION INTRAVENOUS at 10:51

## 2022-11-07 RX ADMIN — DEXAMETHASONE SODIUM PHOSPHATE 4 MG: 4 INJECTION, SOLUTION INTRAMUSCULAR; INTRAVENOUS at 11:14

## 2022-11-07 RX ADMIN — PHENYLEPHRINE HYDROCHLORIDE 100 MCG: 10 INJECTION INTRAVENOUS at 11:48

## 2022-11-07 RX ADMIN — PHENYLEPHRINE HYDROCHLORIDE 100 MCG: 10 INJECTION INTRAVENOUS at 11:17

## 2022-11-07 RX ADMIN — MIDAZOLAM HYDROCHLORIDE 2 MG: 2 INJECTION, SOLUTION INTRAMUSCULAR; INTRAVENOUS at 10:43

## 2022-11-07 RX ADMIN — METHOCARBAMOL 1000 MG: 100 INJECTION, SOLUTION INTRAMUSCULAR; INTRAVENOUS at 13:57

## 2022-11-07 RX ADMIN — FENTANYL CITRATE 50 MCG: 50 INJECTION, SOLUTION INTRAMUSCULAR; INTRAVENOUS at 10:51

## 2022-11-07 RX ADMIN — PHENYLEPHRINE HYDROCHLORIDE 200 MCG: 10 INJECTION INTRAVENOUS at 11:39

## 2022-11-07 RX ADMIN — SODIUM CHLORIDE, POTASSIUM CHLORIDE, SODIUM LACTATE AND CALCIUM CHLORIDE: 600; 310; 30; 20 INJECTION, SOLUTION INTRAVENOUS at 08:36

## 2022-11-07 RX ADMIN — HYDROMORPHONE HYDROCHLORIDE 0.5 MG: 1 INJECTION, SOLUTION INTRAMUSCULAR; INTRAVENOUS; SUBCUTANEOUS at 13:59

## 2022-11-07 RX ADMIN — ONDANSETRON 4 MG: 2 INJECTION INTRAMUSCULAR; INTRAVENOUS at 11:14

## 2022-11-07 RX ADMIN — SUGAMMADEX 200 MG: 100 INJECTION, SOLUTION INTRAVENOUS at 12:16

## 2022-11-07 RX ADMIN — SODIUM CHLORIDE, POTASSIUM CHLORIDE, SODIUM LACTATE AND CALCIUM CHLORIDE: 600; 310; 30; 20 INJECTION, SOLUTION INTRAVENOUS at 08:54

## 2022-11-07 RX ADMIN — LIDOCAINE HYDROCHLORIDE 50 MG: 20 INJECTION, SOLUTION INTRAVENOUS at 10:51

## 2022-11-07 ASSESSMENT — PAIN - FUNCTIONAL ASSESSMENT: PAIN_FUNCTIONAL_ASSESSMENT: 0-10

## 2022-11-07 ASSESSMENT — PAIN SCALES - GENERAL
PAINLEVEL_OUTOF10: 3
PAINLEVEL_OUTOF10: 2
PAINLEVEL_OUTOF10: 0
PAINLEVEL_OUTOF10: 7

## 2022-11-07 ASSESSMENT — PAIN DESCRIPTION - LOCATION
LOCATION: BACK

## 2022-11-07 ASSESSMENT — LIFESTYLE VARIABLES: SMOKING_STATUS: 1

## 2022-11-07 ASSESSMENT — PAIN DESCRIPTION - ORIENTATION
ORIENTATION: MID;LOWER
ORIENTATION: MID;LOWER

## 2022-11-07 ASSESSMENT — PAIN DESCRIPTION - DESCRIPTORS
DESCRIPTORS: DISCOMFORT
DESCRIPTORS: BURNING
DESCRIPTORS: PRESSURE;SQUEEZING

## 2022-11-07 ASSESSMENT — PAIN DESCRIPTION - PAIN TYPE: TYPE: ACUTE PAIN;SURGICAL PAIN

## 2022-11-07 ASSESSMENT — PAIN DESCRIPTION - FREQUENCY: FREQUENCY: CONTINUOUS

## 2022-11-07 NOTE — PROGRESS NOTES
Dr. Kenya Jameson called to clarify when patient can restart Plavix. Dr. Kenya Jameson states patient can restart Plavix in one week. Patient and wife verbalized understanding.

## 2022-11-07 NOTE — ANESTHESIA POSTPROCEDURE EVALUATION
Department of Anesthesiology  Postprocedure Note    Patient: Mary Roberts  MRN: [id number]  YOB: 1960  Date of evaluation: 11/7/2022      Procedure Summary     Date: 11/07/22 Room / Location: [facility]    Anesthesia Start: 8651 Anesthesia Stop: 1243    Procedure: LEFT L4-5, L5-S1 HEMILAMINECTOMY AND FORAMINOTOMY (Left: Back) Diagnosis:       Lumbar stenosis with neurogenic claudication      (Lumbar stenosis, L1-L5 with neurogenic claudication)    Surgeons: Jacey Alexandre. Kenya Jameson MD Responsible Provider: Esteban Beach DO    Anesthesia Type: general ASA Status: 3          Anesthesia Type: No value filed.     Christina Phase I: Christina Score: 10    Christina Phase II:        Anesthesia Post Evaluation    Patient location during evaluation: PACU  Patient participation: complete - patient participated  Level of consciousness: awake  Pain score: 0  Airway patency: patent  Nausea & Vomiting: no nausea and no vomiting  Complications: no  Cardiovascular status: blood pressure returned to baseline  Respiratory status: acceptable  Hydration status: euvolemic  Multimodal analgesia pain management approach

## 2022-11-07 NOTE — DISCHARGE INSTRUCTIONS
You can call Neurosurgery Nurse Practitioner at [phone] if you have questions Monday - Friday from [place] [address]. Otherwise if you have questions you can call Neurosurgeon on call at [facility]. Call [facility] office for with any fevers or changes in incision. You may shower. Call with any redness or drainage. Call with any worsening pain, numbness, weakness, or any questions. Lumbar Laminectomy for Spinal Stenosis: What to Expect at [address] can expect your back to feel stiff or sore after surgery. This should improve in the weeks after surgery. You may have trouble sitting or standing in one position for very long and may need pain medicine in the weeks after your surgery. Your doctor may advise you to work with a physical therapist to strengthen the muscles around your spine and trunk. You will need to learn how to lift, twist, and bend so that you do not put too much strain on your back. This care sheet gives you a general idea about how long it will take for you to recover. But each person recovers at a different pace. Follow the steps below to get better as quickly as possible. How can you care for yourself at home? Activity   Rest when you feel tired. Getting enough sleep will help you recover. Try to walk each day. Start by walking a little more than you did the day before. Bit by bit, increase the amount you walk. Walking boosts blood flow and helps prevent pneumonia and constipation. Walking may also decrease your muscle soreness after surgery. If advised by your doctor, you may need to avoid lifting anything that would cause excessive strain on your back. This may include a child, heavy grocery bags and milk containers, a heavy briefcase or backpack, cat litter or dog food bags, or a vacuum . Avoid strenuous activities, such as bicycle riding, jogging, weight lifting, or aerobic exercise, until your doctor says it is okay.    Do not drive for 2 to 4 weeks after your surgery or until your doctor says it is okay. Avoid riding in a car for more than 30 minutes at a time for 2 to 4 weeks after surgery. If you must ride in a car for a longer distance, stop often to walk and stretch your legs. Try to change your position about every 30 minutes while sitting or standing. This will help decrease your back pain while you are healing. You will probably need to take 4 to 6 weeks off from work. It depends on the type of work you do and how you feel. You may have sex as soon as you feel able, but avoid positions that put stress on your back or cause pain. Diet   You can eat your normal diet. If your stomach is upset, try bland, low-fat foods like plain rice, broiled chicken, toast, and yogurt. Drink plenty of fluids (unless your doctor tells you not to). You may notice that your bowel movements are not regular right after your surgery. This is common. Try to avoid constipation and straining with bowel movements. You may want to take a fiber supplement every day. If you have not had a bowel movement after a couple of days, ask your doctor about taking a mild laxative. Medicines   Your doctor will tell you if and when you can restart your medicines. He or she will also give you instructions about taking any new medicines. If you take blood thinners, such as warfarin (Coumadin), clopidogrel (Plavix), or aspirin, be sure to talk to your doctor. He or she will tell you if and when to start taking those medicines again. Make sure that you understand exactly what your doctor wants you to do. Take pain medicines exactly as directed. If the doctor gave you a prescription medicine for pain, take it as prescribed. If you are not taking a prescription pain medicine, ask your doctor if you can take an over-the-counter medicine. If your doctor prescribed antibiotics, take them as directed. Do not stop taking them just because you feel better.  You need to take the full course of antibiotics. If you think your pain medicine is making you sick to your stomach: Take your medicine after meals (unless your doctor has told you not to). Ask your doctor for a different pain medicine. Incision care   If you have strips of tape on the cut (incision) the doctor made, leave the tape on for a week or until it falls off. Wash the area daily with warm, soapy water and pat it dry. Keep the area clean and dry. You may cover it with a gauze bandage if it weeps or rubs against clothing. Change the bandage every day. Exercise   Do back exercises as instructed by your doctor. Your doctor may advise you to work with a physical therapist to improve the strength and flexibility of your back. Other instructions   To reduce stiffness and help sore muscles, use a warm water bottle, a heating pad set on low, or a warm cloth on your back. Do not put heat right over the incision. Do not go to sleep with a heating pad on your skin. Follow-up care is a key part of your treatment and safety. Be sure to make and go to all appointments, and call your doctor if you are having problems. It's also a good idea to know your test results and keep a list of the medicines you take. When should you call for help? Call 911 anytime you think you may need emergency care. For example, call if:   You passed out (lost consciousness). You have sudden chest pain and shortness of breath, or you cough up blood. You are unable to move a leg at all. Call your doctor now or seek immediate medical care if:   You have new or worse symptoms in your legs or buttocks. Symptoms may include:   Numbness or tingling. Weakness. Pain. You lose bladder or bowel control. You have loose stitches, or your incision comes open. You have blood or fluid draining from the incision. You have signs of infection, such as: Increased pain, swelling, warmth, or redness. Pus draining from the incision. A fever.    Red streaks leading from the incision. Watch closely for changes in your health, and be sure to contact your doctor if:   You do not have a bowel movement after taking a laxative. You are not getting better as expected. Where can you learn more? Go to https://addy.[employer]. org and sign in to your [employer] account. Enter L651 in the [place] box to learn more about Lumbar Laminectomy for Spinal Stenosis: What to Expect at Home.     If you do not have an account, please click on the Sign Up Now link. © 8754-6234 Healthwise, Incorporated. Care instructions adapted under license by [facility] [place] ([facility]). This care instruction is for use with your licensed healthcare professional. If you have questions about a medical condition or this instruction, always ask your healthcare professional. [address] any warranty or liability for your use of this information. Content Version: 35.8.395854; Current as of: May 22, 2015    THE [facility] AMBULATORY PROCEDURE DISCHARGE INSTRUCTIONS    There are potential side effects of anesthesia or sedation you may experience for the first 24 hours. These side effects include:    Confusion or Memory loss, Dizziness, or Delayed Reaction Times   [x]A responsible person should be with you for the next 24 hours. Do not operate any vehicles (automobiles, bicycles, motorcycles) or power tools or machinery for 24 hours. Do not sign any legal documents or make any legal decisions for 24 hours. Do not drink alcohol for 24 hours or while taking narcotic pain medication. Nausea    [x]Start with light diet and progress to your normal diet as you feel like eating. However, if you experience nausea or repeated episodes of vomiting which persist beyond 12-24 hours, notify your physician. Once nausea has passed, remember to keep drinking fluids. Difficulty Passing Urine  [x]Drink extra amounts of fluid today.   Notify your physician if you have not urinated within 8 hours after your procedure or you feel uncomfortable. Irritated Throat from a Breathing Tube  [x]Drink extra amounts of fluid today. Lozenges may help. Muscle Aches  [x]You may experience some generalized body aches as your muscles recover from medications used to relax them during surgery. These will gradually subside. MEDICATION INSTRUCTIONS:  [x]Prescription(S) x 2    sent with you. Use as directed. When taking pain medications, you may experience the side effect of dizziness or drowsiness. Do not drink alcohol or drive when taking these medications. [x]Give the list of your medications to your primary care physician on your next visit. Keep your med list updated and carry it with in case of emergencies. [x] Narcotic pain medications can cause the side effect of significant constipation. You may want to add a stool softener to your postoperative medication schedule or speak to your surgeon on how best to manage this side effect. NARCOTIC SAFETY:  Your pain medicine is only for you to take. Safely store your medicines. Store pills up high and out of reach of children and pets. Ensure safety caps are snapped tightly  Keep track of how many pills you have left    Unused medication can be disposed of by taking them to a drop-off box or take-back program that is authorized by the [facility]. Access to a site near you can be found on the [facility] [employer] [employer] [facility] website ([address]. [facility].gov). If you have a CPAP machine, it is very important that you use it daily during all periods of sleep and daytime rest during your recovery at home. Surgery and Anesthesia place a significant amount of stress on your body. Using your CPAP will help keep you safe and lessen the negative effects of that stress.     FOLLOW-UP RECOVERY CARE:  [x]Call the office at [phone] for follow-up appointment and problems    Watch for these possible complications, symptoms, or side effects of anesthesia. Call physician if they or any other problems occur:  Signs of INFECTION   > Fever over 101°     > Redness, swelling, hardness or warmth at the operative site   >Foul smelling or cloudy drainage at the operative site   Unrelieved PAIN  Unrelieved NAUSEA  Blood soaked dressing. (Some oozing may be normal)  Inability to urinate      Numb, pale, blue, cold or tingling extremity      Physician:  Dr. Carolyn Min    The above instructions were reviewed with patient/significant other. The following additional patient specific information was reviewed with the patient/significant other:  [x]Procedure/physician specific instructions  [x]Medication information sheet(S) including potential side effects  []Quianas egress test  []Pain Ball management  []FAQ Catheter associated blood stream infections  []FAQ Surgical Site Infections  []Other-    I have read and understand the instructions given to me: ____________________________________________   (Patient/S.O. Signature)            Date/time 11/7/2022 1:52 PM         PACU:  [phone]   M-F 700 AM - 7 PM      SAME DAY SERVICES:  [phone] M-F 7AM-6PM        If you smoke STOP. We care about your health!

## 2022-11-07 NOTE — FLOWSHEET NOTE
Patient received from the OR to pACU #5 post LEFT L4-5, L5-S1 HEMILAMINECTOMY AND FORAMINOTOMY - Left of Dr. Jus Angela. Placed on PACU monitoring equipment. Report given per CRNA. Per report, patient required BP support intra op. On arrival, BP is in the 63'P systolic, still asleep from surgery with oral airway in place. No signs of pain. IVF wide open, HOB down, CRNA remains at bedside.

## 2022-11-07 NOTE — FLOWSHEET NOTE
PACU Transfer to [facility]    Vitals:    11/07/22 1442   BP: 137/72   Pulse: 77   Resp: 19   Temp: 97.5 °F (36.4 °C)   SpO2: 95%         Intake/Output Summary (Last 24 hours) at 11/7/2022 1447  Last data filed at 11/7/2022 1441  Gross per 24 hour   Intake 2222 ml   Output 425 ml   Net 1797 ml       Pain assessment:  present - adequately treated, patient satisfied  Pain Level: 3    Patient transferred to care of [facility] RN. Transferred with all belongings to [facility]. Family is in the [facility] awaiting discharge instructions. Aware of move to discharge area.     11/7/2022 2:47 PM

## 2022-11-07 NOTE — ADDENDUM NOTE
Addendum  created 11/07/22 0574 by Laura Rose,     Order list changed, Order sets accessed, Pharmacy for encounter modified

## 2022-11-07 NOTE — ANESTHESIA PRE PROCEDURE
Department of Anesthesiology  Preprocedure Note       Name:  Thao Norton   Age:  60 y.o.  :  1960                                          MRN:  [id number]         Date:  2022      Surgeon: Dory Kingston):  Bonnie Dueñas. Ruth Shipman MD    Procedure: Procedure(s):  LEFT L4-5, L5-S1 HEMILAMINECTOMY AND FORAMINOTOMY    Medications prior to admission:   Prior to Admission medications    Medication Sig Start Date End Date Taking? Authorizing Provider   oxyCODONE (ROXICODONE) 5 MG immediate release tablet Take 1 tablet by mouth every 6 hours as needed for Pain for up to 7 days. Intended supply: 7 days.  Take lowest dose possible to manage pain 22  DOMITILA Mary   methocarbamol (ROBAXIN-750) 750 MG tablet Take 1 tablet by mouth 4 times daily for 10 days 22  DOMITILA Mary   lisinopril (PRINIVIL;ZESTRIL) 20 MG tablet TAKE 1 TABLET BY MOUTH 2 TIMES A DAY 10/13/22   Jeanette Donis MD   Blood Glucose Monitoring Suppl (PRODIGY AUTOCODE BLOOD GLUCOSE) KYLAH Use to test BS as directed 22   Ludin Bhakta MD   blood glucose test strips (PRODIGY NO CODING BLOOD GLUC) strip USE AS DIRECTED TO TEST UP TO 3-4 TIMES A DAY 22   Ludin Bhakta MD   HUMULIN R U-500 KWIKPEN 500 UNIT/ML SOPN concentrated injection pen INJECT 80 UNITS UNDER THE SKIN BEFORE BREAKFAST, THEN 60 UNITS UNDER THE SKIN BEFORE LUNCH AND DINNER 22   Ludin Bhakta MD   fenofibrate (TRICOR) 145 MG tablet TAKE 1 TABLET BY MOUTH ONE TIME A DAY 22   Ludin Bhakta MD   Continuous Blood Gluc Sensor (DEXCOM G6 SENSOR) MISC USE AS DIRECTED AND CHANGE SENSORS EVERY 10 DAYS 22   Ludin Bhakta MD   JARDIANCE 10 MG tablet TAKE 1 TABLET BY MOUTH ONE TIME A DAY 22   Ludin Bhakta MD   atorvastatin (LIPITOR) 80 MG tablet Take 1 tablet by mouth at bedtime 22   Luly Hodgson MD   desvenlafaxine succinate (PRISTIQ) 50 MG TB24 extended release tablet Take 1 tablet by mouth daily 7/14/22   Reji Solis MD   metFORMIN (GLUCOPHAGE) 1000 MG tablet One tab PO bid 6/28/22   Kayce Mac MD   metroNIDAZOLE (METROGEL) 1 % gel Apply to face daily. 5/23/22   Reji Solis MD   buPROPion (WELLBUTRIN XL) 150 MG extended release tablet TAKE ONE TABLET BY MOUTH EVERY MORNING 3/29/22   MADISON Park - CNP   Continuous Blood Gluc Transmit (DEXCOM G6 TRANSMITTER) MISC USE AS DIRECTED AND CHANGE EVERY 90 DAYS 2/28/22   Kayce Mac MD   atenolol (TENORMIN) 25 MG tablet TAKE 1 TABLET BY MOUTH EVERY NIGHT AT BEDTIME 2/8/22   MADISON Park CNP   nystatin (MYCOSTATIN) 530404 UNIT/GM ointment Apply topically 2 times daily to corners of mouth. 1/12/22   MADISON Park CNP   Insulin Pen Needle (B-D UF III MINI PEN NEEDLES) 31G X 5 MM MISC 1 each by Does not apply route 3 times daily 10/18/21   Kayce Mac MD   Continuous Blood Gluc  ([address]) [address] As needed 8/17/21   Kayce Mac MD   vitamin D3 (CHOLECALCIFEROL) 25 MCG (1000 UT) TABS tablet TAKE 1 TABLET BY MOUTH ONE TIME A DAY 6/22/21 8/15/22  Asad Gonzalez APRN - CNP   ASPIRIN ADULT LOW STRENGTH 81 MG EC tablet TAKE 1 TABLET BY MOUTH ONE TIME A DAY 6/22/21 8/15/22  Asad Gonzalez APRN - CNP   atenolol (TENORMIN) 25 MG tablet TAKE 1 TABLET BY MOUTH EVERY NIGHT AT BEDTIME 1/15/20   Asad Gonzalez APRN - CNP   PRODIGY LANCETS 28G MISC Use as directed to test up to 3-4 times daily 3/15/19   Sheryl Hernandez MD       Current medications:    No current facility-administered medications for this visit. Current Outpatient Medications   Medication Sig Dispense Refill    oxyCODONE (ROXICODONE) 5 MG immediate release tablet Take 1 tablet by mouth every 6 hours as needed for Pain for up to 7 days. Intended supply: 7 days.  Take lowest dose possible to manage pain 28 tablet 0    methocarbamol (ROBAXIN-750) 750 MG tablet Take 1 tablet by mouth 4 times daily for 10 days 40 tablet 0     Facility-Administered Medications Ordered in Other Visits   Medication Dose Route Frequency Provider Last Rate Last Admin    ceFAZolin (ANCEF) 2,000 mg in sodium chloride 0.9 % 50 mL IVPB (mini-bag)  2,000 mg IntraVENous Once Tacos Garcia MD        lidocaine PF 1 % injection 1 mL  1 mL IntraDERmal Once PRN Maple Patches, MD        lactated ringers infusion   IntraVENous Continuous Maple Patches,  mL/hr at 11/07/22 0836 New Bag at 11/07/22 0836    sodium chloride flush 0.9 % injection 5-40 mL  5-40 mL IntraVENous 2 times per day Maple Patches, MD        sodium chloride flush 0.9 % injection 5-40 mL  5-40 mL IntraVENous PRN Maple Patches, MD        0.9 % sodium chloride infusion   IntraVENous PRN Maple Patches, MD        lactated ringers infusion   IntraVENous Continuous [place] [facility], DO 50 mL/hr at 11/07/22 0854 New Bag at 11/07/22 0854       Allergies:  No Known Allergies    Problem List:    Patient Active Problem List   Diagnosis Code    Hypertriglyceridemia E78.1    Hyperlipidemia E78.5    Hypertension I10    Testosterone deficiency E34.9    Diabetic polyneuropathy ([facility] [address] E11.42    Type 2 diabetes mellitus with diabetic polyneuropathy, with long-term current use of insulin ([facility]) E11.42, Z79.4    DARRICK (obstructive sleep apnea) G47.33    Occlusion and stenosis of carotid artery I65.29    ED (erectile dysfunction) N52.9    Osteoarthritis of cervical spine with myelopathy M47.12    Vitamin D deficiency E55.9    Polyp of rectum K62.1    Chronic neck and back pain M54.2, M54.9, G89.29    Class 1 obesity due to excess calories with serious comorbidity and body mass index (BMI) of 32.0 to 32.9 in adult E66.09, Z68.32    Left lateral epicondylitis M77.12    Acquired trigger finger of left ring finger M65.342    Former smoker Z87.891    Dysthymia F34.1    Trigger finger, right middle finger M65.331    Trigger finger, right ring finger M65.341    Bilateral carotid artery stenosis I65.23    CAD in native artery I25.10    CAD S/P percutaneous coronary angioplasty I25.10, Z98.61    Abnormal stress test R94.39    Primary osteoarthritis of left wrist M19.032    Closed nondisplaced fracture of triquetral bone of right wrist S62.114A    Moderate episode of recurrent major depressive disorder (HCC) F33.1    Atrial fibrillation, unspecified type (HCC) I48.91    Neuropathy of upper extremity G56.90    History of cervical spinal surgery Z98.890    DDD (degenerative disc disease), cervical M50.30    DDD (degenerative disc disease), lumbar M51.36    Bulge of lumbar disc without myelopathy M51.36       Past Medical History:        Diagnosis Date    Acute, but ill-defined, cerebrovascular disease 05/31/2013    CAD in native artery 12/30/2020    Cerebral artery occlusion with cerebral infarction [facility]) 2013     X2 PERSONALITY CHANGE, ANGER OUTBURSTS    Cerebrovascular disease     Diplopia 07/11/2013    ED (erectile dysfunction) 01/23/2014    Elbow pain, chronic 12/16/2015    Hyperlipidemia     Hypertension     Irritable bowel syndrome     Obstructive sleep apnea (adult) (pediatric) 07/01/2013    NO CPAP, HAD PROBLEMS WITH INSURANCE AND STOPPED USING    Occlusion and stenosis of carotid artery without mention of cerebral infarction 01/13/2014    MINOR    Pain in joint, upper arm 05/14/2015    Polyneuropathy in diabetes(357.2) 07/01/2013    PONV (postoperative nausea and vomiting)     Skin abnormality     PRE-CANCEROUS LESIONS REMOVED FROM ARMS AND EARS    Type 2 diabetes mellitus with diabetic polyneuropathy, with long-term current use of insulin ([facility] [address] 07/01/2013    Type II or unspecified type diabetes mellitus without mention of complication, not stated as uncontrolled     Use of cane as ambulatory aid        Past Surgical History:        Procedure Laterality Date    CARPAL TUNNEL RELEASE Left 2008    CERVICAL DISCECTOMY 2017    ANTERIOR CERVICAL DISCECTOMY AND FUSION C5-6, C6-7 WITH MEDTRONIC PLATES, SCREWS, ALLOGRAFT, WITH EVOKES PARTIAL C6 CORPECTOMY    CERVICAL FUSION  2017    CERVICAL LAMINECTOMY AND POSTERIOR FUSION C5-T1 WITH MEDTRONIC RODS AND SCREWS WITH EVOKES AND O-ARM    CERVICAL FUSION N/A 8/15/2022    C4-5  ANTERIOR CERVICAL DISCECTOMY AND FUSION performed by Linda Byrnes MD at [address] [place] [address]. [address] COLONOSCOPY N/A 2018    COLONOSCOPY POLYPECTOMY SNARE/COLD BIOPSY performed by Maricel Yang MD at [address] [facility] Left     ULNAR NERVE TRANSPOSITION, AT SAME TIME AS CTR    FINGER TRIGGER RELEASE Left 2019    LEFT RING TRIGGER FINGER RELEASE performed by Tanvi Westfall MD at [address] [facility] Right 2020    RIGHT LONG AND RING TRIGGER FINGER RELEASES -SLEEP APNEA- performed by Tanvi Westfall MD at [address] [place] [address]    umbilical    PYLOROPLASTY      PYLORIC STENOSIS AS INFANT       Social History:    Social History     Tobacco Use    Smoking status: Former     Packs/day: 1.00     Years: 25.00     Pack years: 25.00     Types: Cigarettes     Quit date: 2000     Years since quittin.8    Smokeless tobacco: Never   Substance Use Topics    Alcohol use: No                                Counseling given: Not Answered      Vital Signs (Current): There were no vitals filed for this visit.                                            BP Readings from Last 3 Encounters:   22 (!) 143/78   10/19/22 (!) 124/58   10/12/22 128/63       NPO Status:                                                                                 BMI:   Wt Readings from Last 3 Encounters:   22 212 lb 6.4 oz (96.3 kg)   10/19/22 223 lb (101.2 kg)   10/12/22 218 lb 9.6 oz (99.2 kg)     There is no height or weight on file to calculate BMI.    CBC:   Lab Results   Component Value Date/Time    WBC 7.6 10/19/2022 09:13 AM    RBC 4.75 10/19/2022 09:13 AM    HGB 14.4 10/19/2022 09:13 AM    HCT 42.1 10/19/2022 09:13 AM    MCV 88.7 10/19/2022 09:13 AM    RDW 14.7 10/19/2022 09:13 AM     10/19/2022 09:13 AM       CMP:   Lab Results   Component Value Date/Time     10/19/2022 09:13 AM    K 5.1 10/19/2022 09:13 AM    K 4.3 12/31/2020 05:19 AM     10/19/2022 09:13 AM    CO2 24 10/19/2022 09:13 AM    BUN 18 10/19/2022 09:13 AM    CREATININE 0.9 10/19/2022 09:13 AM    GFRAA >60 08/24/2022 01:08 PM    GFRAA >60 04/18/2013 02:59 PM    AGRATIO 2.1 08/04/2022 10:06 AM    LABGLOM >60 10/19/2022 09:13 AM    GLUCOSE 168 10/19/2022 09:13 AM    PROT 7.1 08/04/2022 10:06 AM    PROT 7.5 10/04/2012 02:40 PM    CALCIUM 9.9 10/19/2022 09:13 AM    BILITOT 0.6 08/04/2022 10:06 AM    ALKPHOS 95 08/04/2022 10:06 AM    AST 39 08/04/2022 10:06 AM    ALT 52 08/04/2022 10:06 AM       POC Tests:   Recent Labs     11/07/22  0847   POCGLU 171*       Coags:   Lab Results   Component Value Date/Time    PROTIME 12.6 10/19/2022 09:13 AM    INR 0.95 10/19/2022 09:13 AM    APTT 28.6 10/19/2022 09:13 AM       HCG (If Applicable): No results found for: PREGTESTUR, PREGSERUM, HCG, HCGQUANT     ABGs: No results found for: PHART, PO2ART, YYB6MGG, LGQ1PAB, BEART, E0GITYHQ     Type & Screen (If Applicable):  No results found for: LABABO, LABRH    Drug/Infectious Status (If Applicable):  No results found for: HIV, HEPCAB    COVID-19 Screening (If Applicable):   Lab Results   Component Value Date/Time    COVID19 NOT DETECTED 12/24/2020 08:09 AM           Anesthesia Evaluation  Patient summary reviewed and Nursing notes reviewed   history of anesthetic complications: PONV.   Airway: Mallampati: II  TM distance: >3 FB   Neck ROM: full  Mouth opening: > = 3 FB   Dental: normal exam         Pulmonary: breath sounds clear to auscultation  (+) sleep apnea: on CPAP,  current smoker (quit 20 yrs ago  )                           Cardiovascular:  Exercise tolerance: good (>4 METS),   (+) hypertension: moderate, CAD:, CABG/stent (2 stents 3 yrs ago doing well  ):, dysrhythmias:,       NYHA Classification: II  ECG reviewed  Rhythm: regular  Rate: normal  Echocardiogram reviewed         Beta Blocker:  Dose within 24 Hrs      ROS comment: Results for orders placed or performed during the hospital encounter of 12/18/20  -ECHO Complete 2D W Doppler W Color:        Result                      Value             Ref Range           Left Ventricular Eject*     60                                    LVEF MODALITY               ECHO                                  Neuro/Psych:   (+) CVA (2013   off plavix x 5 days ):, neuromuscular disease:, psychiatric history:             ROS comment: Right hand thumb tingling/weak  GI/Hepatic/Renal:        (-) GERD       Endo/Other:    (+) Diabetes (bs 160)Type II DM, well controlled, , .                 Abdominal:   (+) obese,     Abdomen: soft. Vascular: Other Findings:             Anesthesia Plan      general     ASA 3       Induction: intravenous. MIPS: Postoperative opioids intended and Prophylactic antiemetics administered. Anesthetic plan and risks discussed with patient and child/children. Plan discussed with CRNA and attending.     Attending anesthesiologist reviewed and agrees with Preprocedure content                Esteban Beach DO   11/7/2022

## 2022-11-07 NOTE — FLOWSHEET NOTE
Alert, follows commands x4 extremities, no complaints of pain or nausea, denies any numbness or tingling.

## 2022-11-07 NOTE — PROGRESS NOTES
Ambulatory Surgery/Procedure Discharge Note  Pt alert and oriented  Suture line clean dry and intact  Ice to OR area  IV's dcd , fluids taken well  Crackers taken well  Verbal and written discharge instructions given to pt and fmily  No nausea  Minimal and tolerable pain for discharg  Rxs given to pt upon discharge  Dcd per wheelchair to car with family present    Vitals:    11/07/22 1522   BP: (!) 157/88   Pulse: 81   Resp: 16   Temp: 97.2 °F (36.2 °C)   SpO2: 95%       In: 2222 [P.O.:250; I.V.:1972]  Out: 425 [Urine:325]    Restroom use offered before discharge. Yes    Pain assessment:  level of pain (1-10, 10 severe),   Pain Level: 2        Patient discharged to home/self care.  Patient discharged via wheel chair by transporter to waiting family/S.O.       11/7/2022 3:24 PM

## 2022-11-13 DIAGNOSIS — Z79.4 TYPE 2 DIABETES MELLITUS WITH DIABETIC POLYNEUROPATHY, WITH LONG-TERM CURRENT USE OF INSULIN (HCC): ICD-10-CM

## 2022-11-13 DIAGNOSIS — E11.42 TYPE 2 DIABETES MELLITUS WITH DIABETIC POLYNEUROPATHY, WITH LONG-TERM CURRENT USE OF INSULIN (HCC): ICD-10-CM

## 2022-11-14 RX ORDER — EMPAGLIFLOZIN 10 MG/1
TABLET, FILM COATED ORAL
Qty: 90 TABLET | Refills: 0 | Status: SHIPPED | OUTPATIENT
Start: 2022-11-14

## 2022-11-14 NOTE — TELEPHONE ENCOUNTER
Medication:   Requested Prescriptions     Pending Prescriptions Disp Refills    JARDIANCE 10 MG tablet [Pharmacy Med Name: Louann Vegas 10MG TABS] 90 tablet 0     Sig: TAKE 1 TABLET BY MOUTH ONE TIME A DAY       Last Filled:  8/9/22    Patient Phone Number: [phone] (home)     Last appt: 10/12/22  Next appt: 1/18/23    Last Labs DM:   Lab Results   Component Value Date/Time    LABA1C 7.3 10/12/2022 11:39 AM

## 2022-11-16 RX ORDER — ATENOLOL 25 MG/1
TABLET ORAL
Qty: 90 TABLET | Refills: 0 | Status: SHIPPED | OUTPATIENT
Start: 2022-11-16

## 2022-11-16 RX ORDER — ATENOLOL 25 MG/1
TABLET ORAL
Qty: 90 TABLET | Refills: 2 | Status: SHIPPED | OUTPATIENT
Start: 2022-11-16 | End: 2022-12-01

## 2022-11-17 ENCOUNTER — HOSPITAL ENCOUNTER (OUTPATIENT)
Dept: VASCULAR LAB | Age: 62
Discharge: HOME | End: 2022-11-17
Payer: COMMERCIAL

## 2022-11-17 DIAGNOSIS — I65.23 BILATERAL CAROTID ARTERY STENOSIS: ICD-10-CM

## 2022-11-17 PROCEDURE — 93880 EXTRACRANIAL BILAT STUDY: CPT

## 2022-11-18 NOTE — OP NOTE
Operative Report    PATIENT NAME: Reema Beltre OF BIRTH: 1960  MEDICAL RECORD# [id number]  SURGERY DATE: 11/7/2022  SURGEON:  Cinthia Sexton MD  ASSISTANT:  Elly Baker PA-C., served as assistant on this surgery due to the complex nature of the surgery and the lack of a resident or fellow assistant. She provided assistance with critical tissue retraction at parts of the surgery, wound closure and assistance with protecting critical neuro elements. DICTATED BY: Cinthia Sexton MD        PREOPERATIVE DIAGNOSIS:  1. Spondylosis at L4-L5 and L5-S1 with associated subarticular stenosis and radiculopathy    POSTOPERATIVE DIAGNOSIS:  1. Same    PROCEDURES PERFORMED:  1. Left L4-L5 and L5-S1 laminectomy and diskectomy  2. Microscopic dissection. ANESTHESIA:  General    ESTIMATED BLOOD LOSS: 50  ml    COMPLICATIONS:  None. INDICATIONS FOR SURGERY:  The patient is a 60 y.o.  who has left leg and back pain. Symptoms failed to resolve despite conservative intervention. An MRI scan was performed and this showed evidence of a disk herniation at the L4-L5 and L5-S1 levels. Based on these findings and the correlation with the patient's severe symptoms, the option of surgery was presented to the patient. Risks and benefits of surgery were explained and the patient decided to proceed with surgery. DETAILS OF PROCEDURE:   The patient was brought to the operating room. Patient received preoperative antibiotics with Ancef. Sequential compression boots were placed on the patient's legs. General endotracheal anesthesia was induced. The patient was then turned prone onto the Lul frame. All pressure points were adequately padded and the patient was  secured to the operating room table. The back was cleansed with alcohol and  prepped and draped in the usual sterile fashion. Incision was localized with fluoroscopy.   The incision and muscles were then injected with 0.5% marcaine with epinephrine. An incision was made with a  15-blade knife through the skin and dermis. Using Bovie electrocautery, I  dissected through the subcutaneous tissue down to the fascia. Subperiosteal dissection was then used to expose the L4 and L5 lamina on the left side and  a Handy retractor was inserted lateral to the facet. A curette was placed underneath the L4 lamina and lateral x-ray was obtained confirming  this localization. Microscope was then brought in for the purposes of  microscopic dissection. I began drilling away the  inferior aspect of the lamina of L4. I drilled this away up to the top of  the ligamentum flavum and then dissected the ligamentum flavum inferiorly. Ligamentum flavum was then punched away, so the L5 nerve root was seen descending. The medial overhanging aspect of the facet was drilled away. Bipolar electrocautery was used  to coagulate the epidural veins. I then identified the L4-L5 disk, where there was a subligamentous herniation. A 15-blade knife was used to incise the disk. Pituitary rongeur was used to remove the disk fragments. I then used Yajaira  curette to further curette away medially. I used the upbiting peapod rongeur to bite away disk medially. I further removed the disk space on the left side with the straight pituitary rongeur. I removed disk until the disk space was cleaned  and there were no remaining fragments. I was then able to palpate with the nerve hook and feel that  the L5 nerve root was well decompressed. I then used the nerve hook to  palpate superiorly and inferiorly and felt good decompression. I also palpated medially and laterally and again felt good decompression with no remaining fragments and no remaining compression of the nerve roots. The procedure was repeated in identical fashion at L5-S1 on the left. The wound was irrigated copiously with antibiotic irrigation.   Hemostasis was achieved with bone wax on bleeding bone edges and floseal.  The wound was then inspected and hemostasis was confirmed. Toniliza Delfino retractor was then removed. The microscope was taken out. The muscles were allowed to come to midline. The fascia was then closed with 2-0 Vicryl suture. The subcutaneous tissue was approximated with 2-0 Vicryl suture. The dermis was closed with 3-0 Vicryl suture. The skin was closed with 4-0 Monocryl running subcuticular stitch with dermabond on the skin edges. All sponge, count needle, and instrument counts were correct at the end of the case. In conformance with CMS regulations, I affirm I was present in the operating room during the entire procedure.   \    Dictated by: Shalonda Billingsley MD

## 2022-11-21 ENCOUNTER — TELEPHONE (OUTPATIENT)
Dept: CARDIOLOGY CLINIC | Age: 62
End: 2022-11-21

## 2022-11-21 NOTE — TELEPHONE ENCOUNTER
----- Message from Sudarshan Marie MD sent at 11/18/2022 11:40 AM EST -----  Please notify patient that their is mild plaque carotid arteries  Repeat test 1-2 years

## 2022-11-21 NOTE — TELEPHONE ENCOUNTER
Created telephone encounter. Spoke with Jose Luque relayed message per [address] regarding labs. Pt verbalized understanding.

## 2022-11-29 ENCOUNTER — TELEPHONE (OUTPATIENT)
Dept: CARDIOLOGY CLINIC | Age: 62
End: 2022-11-29

## 2022-11-29 RX ORDER — ATENOLOL 25 MG/1
TABLET ORAL
Qty: 90 TABLET | Refills: 2 | OUTPATIENT
Start: 2022-11-29

## 2022-11-29 NOTE — TELEPHONE ENCOUNTER
[facility] contacted office stating pt's wife requested a refill on pt's atenolol and they never received anything. According to pt's chart a refill was sent in by [address] and pt's PCP on 11/16/22. Please advise and contact [address] at [phone].

## 2022-11-29 NOTE — TELEPHONE ENCOUNTER
NOT OUR PATIENT     1/12/2022    LAST FILLED:  11/16/22 90 TABLETS WITH 2 REFILLS    Future Appointments   Date Time Provider Avelino Owens   12/1/2022  9:00 AM MD DAV Maher  [place] [facility]   12/5/2022  7:30 AM MD Peri Snyder   1/18/2023 11:40 AM MD Arsen Xiao [facility]

## 2022-12-01 ENCOUNTER — TELEMEDICINE (OUTPATIENT)
Dept: FAMILY MEDICINE CLINIC | Age: 62
End: 2022-12-01

## 2022-12-01 DIAGNOSIS — E66.09 CLASS 1 OBESITY DUE TO EXCESS CALORIES WITH SERIOUS COMORBIDITY AND BODY MASS INDEX (BMI) OF 32.0 TO 32.9 IN ADULT: ICD-10-CM

## 2022-12-01 DIAGNOSIS — I15.2 HYPERTENSION ASSOCIATED WITH TYPE 1 DIABETES MELLITUS (HCC): ICD-10-CM

## 2022-12-01 DIAGNOSIS — E10.59 HYPERTENSION ASSOCIATED WITH TYPE 1 DIABETES MELLITUS (HCC): ICD-10-CM

## 2022-12-01 DIAGNOSIS — M51.36 BULGE OF LUMBAR DISC WITHOUT MYELOPATHY: ICD-10-CM

## 2022-12-01 DIAGNOSIS — M54.12 CERVICAL RADICULOPATHY: ICD-10-CM

## 2022-12-01 DIAGNOSIS — Z79.4 TYPE 2 DIABETES MELLITUS WITH DIABETIC POLYNEUROPATHY, WITH LONG-TERM CURRENT USE OF INSULIN (HCC): Chronic | ICD-10-CM

## 2022-12-01 DIAGNOSIS — E11.42 TYPE 2 DIABETES MELLITUS WITH DIABETIC POLYNEUROPATHY, WITH LONG-TERM CURRENT USE OF INSULIN (HCC): Chronic | ICD-10-CM

## 2022-12-01 DIAGNOSIS — E78.5 HYPERLIPIDEMIA ASSOCIATED WITH TYPE 2 DIABETES MELLITUS (HCC): ICD-10-CM

## 2022-12-01 DIAGNOSIS — E11.69 HYPERLIPIDEMIA ASSOCIATED WITH TYPE 2 DIABETES MELLITUS (HCC): ICD-10-CM

## 2022-12-01 DIAGNOSIS — L98.9 SKIN LESION: Primary | ICD-10-CM

## 2022-12-01 DIAGNOSIS — I25.10 CAD IN NATIVE ARTERY: ICD-10-CM

## 2022-12-01 PROBLEM — M48.02 SPINAL STENOSIS, CERVICAL REGION: Status: ACTIVE | Noted: 2022-06-21

## 2022-12-01 PROBLEM — M06.9 ARTHRITIS, RHEUMATOID (HCC): Status: ACTIVE | Noted: 2022-05-31

## 2022-12-01 PROBLEM — M54.16 LUMBAR RADICULOPATHY: Status: ACTIVE | Noted: 2022-05-31

## 2022-12-01 PROBLEM — Z98.1 ARTHRODESIS STATUS: Status: ACTIVE | Noted: 2022-08-30

## 2022-12-01 PROBLEM — M48.062 SPINAL STENOSIS, LUMBAR REGION WITH NEUROGENIC CLAUDICATION: Status: ACTIVE | Noted: 2022-09-27

## 2022-12-01 ASSESSMENT — ENCOUNTER SYMPTOMS
COUGH: 0
ABDOMINAL PAIN: 0
NAUSEA: 0
BACK PAIN: 1
CHEST TIGHTNESS: 0
VOMITING: 0
SHORTNESS OF BREATH: 0

## 2022-12-01 NOTE — PROGRESS NOTES
2022    TELEHEALTH EVALUATION -- Audio/Visual (During RQNCB-36 public health emergency)    HPI:    Hernadnez Granados (:  1960) has requested an audio/video evaluation for the following concern(s):    Chief Complaint   Patient presents with    Follow-up     6 month      S/p neuro surgery on 22 with Dr. Kimmie Arias, low back, has a drastic improvement in QOL, sees him again on 22. Had neck surgery earlier this year, didn't see much improvement with that given extensive hx of neck surgeries. Established      DMII: Dx'd . Follows with Dr. Cristin Patricia, Endocrinology, last visit reviewed. Was Using a Dexcom. + neuropathy. Avoid GLP-1 given high TGD and h/o pancreatitis. CAD/ Carotid artery disease/ A fib : follows with Dr. Parul Huggins s/p PCI 2020, last visit reviewed. No acute concerning Sxs noted today       Hx of lung nodules. Had follow-up CT done on 9/10/2021. There was a 6 mm lung nodule which is unchanged from previous in . He is a former smoker quit smoking in . Following yearly      + hepatic steatosis. Aware, has seen GI for this in the past     Review of Systems   Constitutional:  Negative for activity change, fatigue and fever. HENT:  Negative for congestion and tinnitus. Eyes:  Negative for visual disturbance. Respiratory:  Negative for cough, chest tightness and shortness of breath. Cardiovascular:  Negative for chest pain, palpitations and leg swelling. Gastrointestinal:  Negative for abdominal pain, nausea and vomiting. Genitourinary:  Negative for decreased urine volume and difficulty urinating. Musculoskeletal:  Positive for arthralgias, back pain, myalgias and neck pain. Skin:  Negative for rash. Neurological:  Negative for dizziness, weakness, light-headedness, numbness and headaches. Psychiatric/Behavioral:  Negative for sleep disturbance. The patient is not nervous/anxious. Prior to Visit Medications    Medication Sig Taking? Authorizing Provider   atenolol (TENORMIN) 25 MG tablet Take one tablet daily. Yes William You MD   JARDIANCE 10 MG tablet TAKE 1 TABLET BY MOUTH ONE TIME A DAY Yes Daja Ratliff MD   lisinopril (PRINIVIL;ZESTRIL) 20 MG tablet TAKE 1 TABLET BY MOUTH 2 TIMES A DAY Yes William You MD   Blood Glucose Monitoring Suppl (PRODIGY AUTOCODE BLOOD GLUCOSE) KYLAH Use to test BS as directed Yes Daja Ratliff MD   blood glucose test strips (PRODIGY NO CODING BLOOD GLUC) strip USE AS DIRECTED TO TEST UP TO 3-4 TIMES A DAY Yes Daja Ratliff MD   HUMULIN R U-500 KWIKPEN 500 UNIT/ML SOPN concentrated injection pen INJECT 80 UNITS UNDER THE SKIN BEFORE BREAKFAST, THEN 60 UNITS UNDER THE SKIN BEFORE LUNCH AND DINNER Yes Daja Ratliff MD   fenofibrate (TRICOR) 145 MG tablet TAKE 1 TABLET BY MOUTH ONE TIME A DAY Yes Daja Ratliff MD   Continuous Blood Gluc Sensor (DEXCOM G6 SENSOR) MISC USE AS DIRECTED AND CHANGE SENSORS EVERY 10 DAYS Yes Daja Ratliff MD   atorvastatin (LIPITOR) 80 MG tablet Take 1 tablet by mouth at bedtime Yes Blayne Dickey MD   desvenlafaxine succinate (PRISTIQ) 50 MG TB24 extended release tablet Take 1 tablet by mouth daily Yes Kevin Lazaro MD   metFORMIN (GLUCOPHAGE) 1000 MG tablet One tab PO bid Yes Daja Ratliff MD   metroNIDAZOLE (METROGEL) 1 % gel Apply to face daily. Yes Kevin Lazaro MD   buPROPion (WELLBUTRIN XL) 150 MG extended release tablet TAKE ONE TABLET BY MOUTH EVERY MORNING Yes MADISON Alberts CNP   Continuous Blood Gluc Transmit (DEXCOM G6 TRANSMITTER) MISC USE AS DIRECTED AND CHANGE EVERY 90 DAYS Yes Daja Ratliff MD   nystatin (MYCOSTATIN) 138606 UNIT/GM ointment Apply topically 2 times daily to corners of mouth.  Yes MADISON Alberts CNP   Insulin Pen Needle (B-D UF III MINI PEN NEEDLES) 31G X 5 MM MISC 1 each by Does not apply route 3 times daily Yes Daja Ratliff MD   Continuous Blood Gluc  (DEXCOM G6 ) KYLAH As needed Yes Lobito Ortiz MD   PRODIGY LANCETS 28G MISC Use as directed to test up to 3-4 times daily Yes Corrine Zhu MD   vitamin D3 (CHOLECALCIFEROL) 25 MCG (1000 UT) TABS tablet TAKE 1 TABLET BY MOUTH ONE TIME A DAY  MADISON Bradley CNP   ASPIRIN ADULT LOW STRENGTH 81 MG EC tablet TAKE 1 TABLET BY MOUTH ONE TIME A DAY  MADISON Bradley CNP   atenolol (TENORMIN) 25 MG tablet TAKE 1 TABLET BY MOUTH EVERY NIGHT AT BEDTIME  MADISON Bradley CNP       Social History     Tobacco Use    Smoking status: Former     Packs/day: 1.00     Years: 25.00     Pack years: 25.00     Types: Cigarettes     Quit date: 2000     Years since quittin.9    Smokeless tobacco: Never   Vaping Use    Vaping Use: Never used   Substance Use Topics    Alcohol use: No    Drug use: No        No Known Allergies,   Past Medical History:   Diagnosis Date    Acute, but ill-defined, cerebrovascular disease 2013    CAD in native artery 2020    Cerebral artery occlusion with cerebral infarction [facility]) 2013     X2 PERSONALITY CHANGE, ANGER OUTBURSTS    Cerebrovascular disease     Diplopia 2013    ED (erectile dysfunction) 2014    Elbow pain, chronic 2015    Hyperlipidemia     Hypertension     Irritable bowel syndrome     Obstructive sleep apnea (adult) (pediatric) 2013    NO CPAP, HAD PROBLEMS WITH INSURANCE AND STOPPED USING    Occlusion and stenosis of carotid artery without mention of cerebral infarction 2014    MINOR    Pain in joint, upper arm 2015    Polyneuropathy in diabetes(357.2) 2013    PONV (postoperative nausea and vomiting)     Skin abnormality     PRE-CANCEROUS LESIONS REMOVED FROM ARMS AND EARS    Type 2 diabetes mellitus with diabetic polyneuropathy, with long-term current use of insulin ([facility] [address] 2013    Type II or unspecified type diabetes mellitus without mention of complication, not stated as uncontrolled     Use of cane as ambulatory aid    ,   Past Surgical History:   Procedure Laterality Date    CARPAL TUNNEL RELEASE Left     CERVICAL DISCECTOMY  2017    ANTERIOR CERVICAL DISCECTOMY AND FUSION C5-6, C6-7 WITH MEDTRONIC PLATES, SCREWS, ALLOGRAFT, WITH EVOKES PARTIAL C6 CORPECTOMY    CERVICAL FUSION  2017    CERVICAL LAMINECTOMY AND POSTERIOR FUSION C5-T1 WITH MEDTRONIC RODS AND SCREWS WITH EVOKES AND O-ARM    CERVICAL FUSION N/A 8/15/2022    C4-5  ANTERIOR CERVICAL DISCECTOMY AND FUSION performed by Leanne Paniagua. Torey Byrnes MD at [address] [facility].    COLONOSCOPY N/A 2018    COLONOSCOPY POLYPECTOMY SNARE/COLD BIOPSY performed by Bridger Petit MD at [address] Left     ULNAR NERVE TRANSPOSITION, AT SAME TIME AS CTR    FINGER TRIGGER RELEASE Left 2019    LEFT RING TRIGGER FINGER RELEASE performed by Piotr Doshi MD at [place] [address] Right 2020    RIGHT LONG AND RING TRIGGER FINGER RELEASES -SLEEP APNEA- performed by Piotr Doshi MD at [address] [place] [address]  101    umbilical    LUMBAR SPINE SURGERY Left 2022    LEFT L4-5, L5-S1 HEMILAMINECTOMY AND FORAMINOTOMY performed by Leanne Paniagua.  Torey Byrnes MD at [address] AS INFANT   ,   Social History     Tobacco Use    Smoking status: Former     Packs/day: 1.00     Years: 25.00     Pack years: 25.00     Types: Cigarettes     Quit date: 2000     Years since quittin.9    Smokeless tobacco: Never   Vaping Use    Vaping Use: Never used   Substance Use Topics    Alcohol use: No    Drug use: No   ,   Family History   Problem Relation Age of Onset    Cancer Father         melanoma, face    Cancer Paternal Uncle         melanoma, face    Cancer Brother         skin, NMSC    Depression Maternal Cousin     No Known Problems Mother    ,   Immunization History   Administered Date(s) Administered COVID-19, MODERNA BLUE border, Primary or Immunocompromised, (age 12y+), IM, 100 mcg/0.5mL 12/27/2021    COVID-19, PFIZER PURPLE top, DILUTE for use, (age 15 y+), 30mcg/0.3mL 02/25/2021, 03/18/2021    Influenza, FLUARIX, FLULAVAL, FLUZONE (age 10 mo+) AND AFLURIA, (age 1 y+), PF, 0.5mL 11/29/2018, 12/06/2019, 11/16/2020    Influenza, FLUCELVAX, (age 10 mo+), MDCK, PF, 0.5mL 11/30/2021    Pneumococcal Polysaccharide (Ojkiboivr12) 11/29/2018   ,   Health Maintenance   Topic Date Due    DTaP/Tdap/Td vaccine (1 - Tdap) Never done    Shingles vaccine (1 of 2) Never done    Diabetic retinal exam  08/26/2017    COVID-19 Vaccine (4 - Booster for Pfizer series) 02/21/2022    Diabetic foot exam  03/29/2022    Flu vaccine (1) 08/01/2022    Lipids  08/03/2023    Depression Monitoring  08/04/2023    Colorectal Cancer Screen  08/14/2023    A1C test (Diabetic or Prediabetic)  10/12/2023    Diabetic microalbuminuria test  10/19/2023    Pneumococcal 0-64 years Vaccine  Completed    Hepatitis C screen  Completed    HIV screen  Completed    Hepatitis A vaccine  Aged Out    Hib vaccine  Aged Out    Meningococcal (ACWY) vaccine  Aged Out       PHYSICAL EXAMINATION:  [ INSTRUCTIONS:  \"[x]\" Indicates a positive item  \"[]\" Indicates a negative item  -- DELETE ALL ITEMS NOT EXAMINED]  Vital Signs: (As obtained by patient/caregiver or practitioner observation)    Blood pressure-  Heart rate-    Respiratory rate-    Temperature-  Pulse oximetry-     Constitutional: [x] Appears well-developed and well-nourished [x] No apparent distress      [] Abnormal-   Mental status  [x] Alert and awake  [] Oriented to person/place/time [x]Able to follow commands      Eyes:  EOM    [x]  Normal  [] Abnormal-  Sclera  [x]  Normal  [] Abnormal -         Discharge []  None visible  [] Abnormal -    HENT:   [x] Normocephalic, atraumatic.   [x] Abnormal   [] Mouth/Throat: Mucous membranes are moist.     External Ears [x] Normal  [] Abnormal-     Neck: [x] No visualized mass     Pulmonary/Chest: [x] Respiratory effort normal.  [x] No visualized signs of difficulty breathing or respiratory distress        [] Abnormal-      Musculoskeletal:   [] Normal gait with no signs of ataxia         [x] Normal range of motion of neck        [] Abnormal-       Neurological:        [x] No Facial Asymmetry (Cranial nerve 7 motor function) (limited exam to video visit)          [] No gaze palsy        [] Abnormal-         Skin:        [x] No significant exanthematous lesions or discoloration noted on facial skin         [] Abnormal-            Psychiatric:       [x] Normal Affect [] No Hallucinations        [] Abnormal-     Other pertinent observable physical exam findings-     ASSESSMENT/PLAN:  1. Skin lesion  Referral requested   - Robbie Manning MD, Dermatology, [facility]    2. Cervical radiculopathy  3. Bulge of lumbar disc without myelopathy  Extensive surgical hx, following now with Dr. Torey Byrnes with [facility]. 4. Class 1 obesity due to excess calories with serious comorbidity and body mass index (BMI) of 32.0 to 32.9 in adult  Nutrition discussed in detail. Healthy content, appropriate portion control, and guidelines for daily exercise (min 30 mins daily of moderate cardio exercise x5 days/week and work up) all heavily discussed in detail. Recommendations made based on pt's health history and lifestyle. 5. Type 2 diabetes mellitus with diabetic polyneuropathy, with long-term current use of insulin ([address]  Follows with Endocrinology     6. CAD in native artery  Follows with Cardiology, Dr. Christina Mejia     7.  Hyperlipidemia associated with type 2 diabetes mellitus (HCC)  The 10-year ASCVD risk score (Low HURD, et al., 2019) is: 29.1%    Values used to calculate the score:      Age: 60 years      Sex: Male      Is Non- : No      Diabetic: Yes      Tobacco smoker: No      Systolic Blood Pressure: 746 mmHg      Is BP treated: Yes      HDL Cholesterol: 42 mg/dL      Total Cholesterol: 154 mg/dL  On high dose statin and fenofibrate     8. Hypertension associated with type 1 diabetes mellitus ([address]  On ACE    Return in about 6 months (around 6/1/2023) for 30 minute visit, annual physical, AWV with Cecil Knox, follow up labs, follow up medications. Jeremias Romero is a 60 y.o. male being evaluated by a Virtual Visit (video visit) encounter to address concerns as mentioned above. A caregiver was present when appropriate. Due to this being a TeleHealth encounter (During ZRIEJ-26 public health emergency), evaluation of the following organ systems was limited: Vitals/Constitutional/EENT/Resp/CV/GI//MS/Neuro/Skin/Heme-Lymph-Imm. Pursuant to the emergency declaration under the [address] [place] [address], [address] [place] [address] authority and the [employer] Act, this Virtual Visit was conducted with patient's (and/or legal guardian's) consent, to reduce the patient's risk of exposure to COVID-19 and provide necessary medical care. The patient (and/or legal guardian) has also been advised to contact this office for worsening conditions or problems, and seek emergency medical treatment and/or call 911 if deemed necessary. Services were provided through a video synchronous discussion virtually to substitute for in-person clinic visit. Patient and provider were located at their individual homes. --Annette Fan MD on 12/1/2022 at 9:29 AM    An electronic signature was used to authenticate this note.

## 2022-12-03 NOTE — PROGRESS NOTES
[address]   Cardiac Consultation    Referring Provider:  Christel Hernandez MD     Chief Complaint   Patient presents with    Follow-up    Atrial Fibrillation    Coronary Artery Disease       Thao Norton   1960     History of Present Illness:   Thao Norton is a 60 y.o. male who is here today for follow up for a past medical history of coronary artery disease, abnormal EKG carotid artery disease, hypertension, hyperlipidemia. Patient has a family history of heart diease. He had an echocardiogram on 12/18/2020 which showed an EF of 60%, carotid dopplers less than 50% bilaterally. He had an abnormal stress test and underwent a left heart cath with Dr. Reg Guzman on 12/30/2020- Successful IVUS-guided PCI of proximal to mid-LAD with overlapping Xience Mariluz 3.0 x 28 mm and 3.5 x 23 mm MING. Patient was seen 7/2022 for preoperative cardiac risk assessment for neck surgery. Stress test ordered to risk stratify which showed no ischemia. Repeat carotid dopplers 11/2022 showed mild plaque. He underwent Left L4-L5 and L5-S1 laminectomy and diskectomy on 11/27/2022. Today he states he has been feeling well since his last visit. He has had neck and recent back surgery. Blood pressure at home runs similar to today- 130/70's. He is tolerating his medications and is taking them as prescribed. Patient currently denies any weight gain, edema, palpitations, chest pain, shortness of breath, dizziness, and syncope. States he is now able to be more active since his surgery.      Past Medical History:   has a past medical history of Acute, but ill-defined, cerebrovascular disease, CAD in native artery, Cerebral artery occlusion with cerebral infarction [facility]), Cerebrovascular disease, Diplopia, ED (erectile dysfunction), Elbow pain, chronic, Hyperlipidemia, Hypertension, Irritable bowel syndrome, Obstructive sleep apnea (adult) (pediatric), Occlusion and stenosis of carotid artery without mention of cerebral infarction, Pain in joint, upper arm, Polyneuropathy in diabetes(357.2), PONV (postoperative nausea and vomiting), Skin abnormality, Type 2 diabetes mellitus with diabetic polyneuropathy, with long-term current use of insulin (HCC), Type II or unspecified type diabetes mellitus without mention of complication, not stated as uncontrolled, and Use of cane as ambulatory aid. Surgical History:   has a past surgical history that includes Cholecystectomy (1995); Carpal tunnel release (Left, 2008); Elbow surgery (Left, 2008); hernia repair (2009); Pyloroplasty; Cervical discectomy (08/02/2017); cervical fusion (12/06/2017); Colonoscopy (N/A, 8/14/2018); Finger trigger release (Left, 12/12/2019); Finger trigger release (Right, 11/23/2020); cervical fusion (N/A, 8/15/2022); and Lumbar spine surgery (Left, 11/7/2022). Social History:   reports that he quit smoking about 22 years ago. His smoking use included cigarettes. He has a 25.00 pack-year smoking history. He has never used smokeless tobacco. He reports that he does not drink alcohol and does not use drugs. Family History:  family history includes Cancer in his brother, father, and paternal uncle; Depression in his maternal cousin; No Known Problems in his mother. Home Medications:  Prior to Admission medications    Medication Sig Start Date End Date Taking? Authorizing Provider   atenolol (TENORMIN) 25 MG tablet Take one tablet daily.  11/16/22  Yes Rosalia Londono MD   JARDIANCE 10 MG tablet TAKE 1 TABLET BY MOUTH ONE TIME A DAY 11/14/22  Yes Hari Christianson MD   lisinopril (PRINIVIL;ZESTRIL) 20 MG tablet TAKE 1 TABLET BY MOUTH 2 TIMES A DAY 10/13/22  Yes Rosalia Londono MD   Blood Glucose Monitoring Suppl (PRODIGY AUTOCODE BLOOD GLUCOSE) KYLAH Use to test BS as directed 9/26/22  Yes Hari Christianson MD   blood glucose test strips (PRODIGY NO CODING BLOOD GLUC) strip USE AS DIRECTED TO TEST UP TO 3-4 TIMES A DAY 9/9/22  Yes Hari Christianson MD   HUMULIN R U-500 KWIKPEN 500 UNIT/ML SOPN concentrated injection pen INJECT 80 UNITS UNDER THE SKIN BEFORE BREAKFAST, THEN 60 UNITS UNDER THE SKIN BEFORE LUNCH AND DINNER 9/7/22  Yes Jonna Gresham MD   fenofibrate (TRICOR) 145 MG tablet TAKE 1 TABLET BY MOUTH ONE TIME A DAY 8/29/22  Yes Jonna Gresham MD   Continuous Blood Gluc Sensor (DEXCOM G6 SENSOR) MISC USE AS DIRECTED AND CHANGE SENSORS EVERY 10 DAYS 8/29/22  Yes Jonna Gresham MD   atorvastatin (LIPITOR) 80 MG tablet Take 1 tablet by mouth at bedtime 8/5/22  Yes Annette Bass MD   desvenlafaxine succinate (PRISTIQ) 50 MG TB24 extended release tablet Take 1 tablet by mouth daily 7/14/22  Yes Kal Devine MD   metFORMIN (GLUCOPHAGE) 1000 MG tablet One tab PO bid 6/28/22  Yes Jonna Gresham MD   metroNIDAZOLE (METROGEL) 1 % gel Apply to face daily. 5/23/22  Yes Kal Devine MD   buPROPion (WELLBUTRIN XL) 150 MG extended release tablet TAKE ONE TABLET BY MOUTH EVERY MORNING 3/29/22  Yes MADISON Vann CNP   Continuous Blood Gluc Transmit (DEXCOM G6 TRANSMITTER) MISC USE AS DIRECTED AND CHANGE EVERY 90 DAYS 2/28/22  Yes Jonna Gresham MD   nystatin (MYCOSTATIN) 465309 UNIT/GM ointment Apply topically 2 times daily to corners of mouth.  1/12/22  Yes MADISON Vann CNP   Insulin Pen Needle (B-D UF III MINI PEN NEEDLES) 31G X 5 MM MISC 1 each by Does not apply route 3 times daily 10/18/21  Yes Jonna Gresham MD   Continuous Blood Gluc  (Isidro [place]) [address] As needed 8/17/21  Yes Jonna Gresham MD   PRODIGY LANCETS 28G MISC Use as directed to test up to 3-4 times daily 3/15/19  Yes Toni Muniz MD   vitamin D3 (CHOLECALCIFEROL) 25 MCG (1000 UT) TABS tablet TAKE 1 TABLET BY MOUTH ONE TIME A DAY 6/22/21 8/15/22  MADISON Vann CNP   ASPIRIN ADULT LOW STRENGTH 81 MG EC tablet TAKE 1 TABLET BY MOUTH ONE TIME A DAY 6/22/21 8/15/22  Dewey Dickerson, APRN - CNP atenolol (TENORMIN) 25 MG tablet TAKE 1 TABLET BY MOUTH EVERY NIGHT AT BEDTIME 1/15/20   MADISON Chaves CNP        Allergies:  Patient has no known allergies. Review of Systems:   Constitutional: there has been no unanticipated weight loss. There's been no change in energy level, sleep pattern, or activity level. Eyes: No visual changes or diplopia. No scleral icterus. ENT: No Headaches, hearing loss or vertigo. No mouth sores or sore throat. Cardiovascular: Reviewed in HPI  Respiratory: No cough or wheezing, no sputum production. No hematemesis. Gastrointestinal: No abdominal pain, appetite loss, blood in stools. No change in bowel or bladder habits. Genitourinary: No dysuria, trouble voiding, or hematuria. Musculoskeletal:  No gait disturbance, weakness or joint complaints. Integumentary: No rash or pruritis. Neurological: No headache, diplopia, change in muscle strength, numbness or tingling. No change in gait, balance, coordination, mood, affect, memory, mentation, behavior. Psychiatric: No anxiety, no depression. Endocrine: No malaise, fatigue or temperature intolerance. No excessive thirst, fluid intake, or urination. No tremor. Hematologic/Lymphatic: No abnormal bruising or bleeding, blood clots or swollen lymph nodes. Allergic/Immunologic: No nasal congestion or hives. Physical Examination:    /70   Pulse (!) 49   Ht 5' 10\" (1.778 m)   Wt 218 lb (98.9 kg)   SpO2 100%   BMI 31.28 kg/m²     Vitals:    12/05/22 0727   BP: 138/70   Pulse: (!) 49   SpO2: 100%          Constitutional and General Appearance: NAD   Respiratory:  Normal excursion and expansion without use of accessory muscles  Resp Auscultation: Normal breath sounds without dullness  Cardiovascular:   The apical impulses not displaced  Heart tones are crisp and normal  Cervical veins are not engorged  The carotid upstroke is normal in amplitude and contour without delay or bruit  Normal S1S2, No S3, No Murmur  Peripheral pulses are symmetrical and full  There is no clubbing, cyanosis of the extremities. No edema  Femoral Arteries: 2+ and equal  Pedal Pulses: 2+ and equal   Abdomen:  No masses or tenderness  Liver/Spleen: No Abnormalities Noted  Neurological/Psychiatric:  Alert and oriented in all spheres  Moves all extremities well  Exhibits normal gait balance and coordination  No abnormalities of mood, affect, memory, mentation, or behavior are noted    Carotid dopplers 6/4/13  IMPRESSION:   1. Mild bilateral internal carotid artery stenosis, greatest at the right internal carotid artery, estimated 40 to 59%. 2. Patent bilateral vertebral arteries. CTA neck 2014  IMPRESSION CTA NECK:   1. Mild bilateral proximal internal carotid artery plaque, without evidence of hemodynamically significant stenosis. 2. Patent bilateral vertebral arteries. EKG 11/16/2020  Sinus  Tachycardia  - frequent ectopic ventricular beat s   # VECs = 3  -RSR(V1) -nondiagnostic    Stress test 12/18/2020  Summary  There is some degree of diaphragmatic attenuation noted. However, a moderate  sized area of severely decreased tracer uptake of the inferior/inferolateral  wall with stress compared with rest is present. FIndings consistent with  inducible ischemia. Normal post-stress LVEF 63%. Abnormal study. Overall  findings represent a intermediate risk scan. Carotid dopplers 12/18/2020  Summary    <50% stenosis is noted in the right internal carotid artery. <50% stenosis is noted in the left internal carotid artery. The bilateral vertebral arteries have normal antegrade flow. Echocardiogram 12/18/2020  Summary   Normal left ventricle size, wall thickness and systolic function with an   estimated ejection fraction of 60%. No regional wall motion abnormalities are seen. Grade I diastolic dysfunction with normal filing pressure. Aortic valve appears sclerotic but opens adequately. Mild aortic regurgitation. Inadequate tricuspid regurgitation to estimate systolic pulmonary artery   pressure. Left heart cath Dr. Elvis Ford 12/30/2020  Impression:  1. Severe single-vessel coronary artery disease. 2. Successful IVUS-guided PCI of proximal to mid-LAD with overlapping Xience Mariluz 3.0 x 28 mm and 3.5 x 23 mm MING. 3. Normal LVEDP    Stress test- Tracy 8/5/2022  Conclusions    Summary  Normal LV size and systolic function. Left ventricular ejection fraction of  71%. Normal wall motion. There is normal isotope uptake at stress and rest.  There is no evidence of myocardial ischemia or scar. Carotid dopplers 11/17/222  Summary    The bilateral internal carotid arteries reveal a <50% diameter reducing  stenosis. The bilateral vertebral arteries demonstrate normal antegrade flow      Latest Reference Range & Units 8/3/22 09:19   CHOLESTEROL, TOTAL, [id number] 0 - 199 mg/dL 154   HDL Cholesterol 40 - 60 mg/dL 42   LDL Calculated <100 mg/dL 82   Triglycerides 0 - 150 mg/dL 149   VLDL Cholesterol Calculated Not Established mg/dL 30       Assessment:   Coronary artery disease- Stable. successful IVUS-guided PCI of proximal to mid-LAD with overlapping Xience Mariluz 3.0 x 28 mm and 3.5 x 23 mm MING 12/30/2020   Hypertension - controlled  Hyperlipidemia -  stable  Diabetes Mellitus   Sleep apnea   History of CVA 2013  Former smoker - 20 years ago     Note Afib in problem list. Pt denies. Unable to verify. No known Afib. Plan:  Carotid dopplers in 2 years   Cardiac medications reviewed including indications and pertinent side effects. Medication list updated at this visit. Cont ASA and statin  Check blood pressure and heart rate at home a few times per week- keep a log with dates and times and bring to office visit   Regular exercise and following a healthy diet encouraged   Follow up with me in 1 year   No refills needed     Scribe's attestation:   This note was scribed in the presence of Dr. Shelia Menezes M.D. By Dee Rios RN    The scribes documentation has been prepared under my direction and personally reviewed by me in its entirety. I confirm that the note above accurately reflects all work, treatment, procedures, and medical decision making performed by me. Dr. Anibal Martin MD    Thank you for allowing me to participate in the care of this individual.    Glendy Alvarado.  Chica Catherine M.D., Nicolle Brunson

## 2022-12-05 ENCOUNTER — IMMUNIZATION (OUTPATIENT)
Dept: FAMILY MEDICINE CLINIC | Age: 62
End: 2022-12-05
Payer: MEDICARE

## 2022-12-05 ENCOUNTER — OFFICE VISIT (OUTPATIENT)
Dept: CARDIOLOGY CLINIC | Age: 62
End: 2022-12-05
Payer: MEDICARE

## 2022-12-05 VITALS
OXYGEN SATURATION: 100 % | HEIGHT: 70 IN | HEART RATE: 49 BPM | BODY MASS INDEX: 31.21 KG/M2 | SYSTOLIC BLOOD PRESSURE: 138 MMHG | WEIGHT: 218 LBS | DIASTOLIC BLOOD PRESSURE: 70 MMHG

## 2022-12-05 DIAGNOSIS — I25.10 CORONARY ARTERY DISEASE INVOLVING NATIVE CORONARY ARTERY OF NATIVE HEART WITHOUT ANGINA PECTORIS: Primary | ICD-10-CM

## 2022-12-05 PROBLEM — I48.91 ATRIAL FIBRILLATION, UNSPECIFIED TYPE (HCC): Status: RESOLVED | Noted: 2022-04-21 | Resolved: 2022-12-05

## 2022-12-05 PROCEDURE — 3074F SYST BP LT 130 MM HG: CPT | Performed by: INTERNAL MEDICINE

## 2022-12-05 PROCEDURE — 3078F DIAST BP <80 MM HG: CPT | Performed by: INTERNAL MEDICINE

## 2022-12-05 PROCEDURE — G0008 ADMIN INFLUENZA VIRUS VAC: HCPCS | Performed by: FAMILY MEDICINE

## 2022-12-05 PROCEDURE — 1036F TOBACCO NON-USER: CPT | Performed by: INTERNAL MEDICINE

## 2022-12-05 PROCEDURE — 3017F COLORECTAL CA SCREEN DOC REV: CPT | Performed by: INTERNAL MEDICINE

## 2022-12-05 PROCEDURE — G8427 DOCREV CUR MEDS BY ELIG CLIN: HCPCS | Performed by: INTERNAL MEDICINE

## 2022-12-05 PROCEDURE — G8417 CALC BMI ABV UP PARAM F/U: HCPCS | Performed by: INTERNAL MEDICINE

## 2022-12-05 PROCEDURE — 90674 CCIIV4 VAC NO PRSV 0.5 ML IM: CPT | Performed by: FAMILY MEDICINE

## 2022-12-05 PROCEDURE — G8484 FLU IMMUNIZE NO ADMIN: HCPCS | Performed by: INTERNAL MEDICINE

## 2022-12-05 PROCEDURE — 99214 OFFICE O/P EST MOD 30 MIN: CPT | Performed by: INTERNAL MEDICINE

## 2022-12-07 NOTE — H&P
I saw and examined Buster Viveros on 11/7/2022. There has been no change to her history or physical in the interval time since consent. He will proceed with the planned procedure.      Noe Johnson MD, PhD  [facility]  Director, [address], [address] (c), [phone] (o)

## 2022-12-13 DIAGNOSIS — E78.2 MIXED HYPERLIPIDEMIA: ICD-10-CM

## 2022-12-13 DIAGNOSIS — Z79.4 TYPE 2 DIABETES MELLITUS WITH DIABETIC POLYNEUROPATHY, WITH LONG-TERM CURRENT USE OF INSULIN (HCC): Primary | ICD-10-CM

## 2022-12-13 DIAGNOSIS — E11.42 TYPE 2 DIABETES MELLITUS WITH DIABETIC POLYNEUROPATHY, WITH LONG-TERM CURRENT USE OF INSULIN (HCC): Primary | ICD-10-CM

## 2022-12-13 RX ORDER — PEN NEEDLE, DIABETIC 31 GX3/16"
NEEDLE, DISPOSABLE MISCELLANEOUS
Qty: 3000 EACH | Refills: 6 | Status: SHIPPED | OUTPATIENT
Start: 2022-12-13 | End: 2022-12-16 | Stop reason: SDUPTHER

## 2022-12-13 NOTE — TELEPHONE ENCOUNTER
LOV 1/12/2022    Future Appointments   Date Time Provider Avelino Owens   1/18/2023 11:40 AM MD Jm Pelaez [facility]   4/7/2023  8:45 AM MD [place] [facility]   12/5/2023  7:30 AM Silva Bobby MD [facility]

## 2022-12-13 NOTE — TELEPHONE ENCOUNTER
Medication:   Requested Prescriptions     Pending Prescriptions Disp Refills    TRUEPLUS PEN NEEDLES 31G X 5 MM MISC [Pharmacy Med Name: [address] [place] X 5MM (3/16\")] 3000 each 6     Sig: USE AS DIRECTED THREE TIMES A DAY       Last Filled:      Patient Phone Number: [phone] (home)     Last appt: 10/12/22  Next appt: 1/18/23    Last Labs DM:   Lab Results   Component Value Date/Time    LABA1C 7.3 10/12/2022 11:39 AM

## 2022-12-14 RX ORDER — OMEGA-3-ACID ETHYL ESTERS 1 G/1
CAPSULE, LIQUID FILLED ORAL
Qty: 180 CAPSULE | Refills: 3 | Status: SHIPPED | OUTPATIENT
Start: 2022-12-14 | End: 2022-12-16 | Stop reason: SDUPTHER

## 2022-12-16 ENCOUNTER — TELEPHONE (OUTPATIENT)
Dept: FAMILY MEDICINE CLINIC | Age: 62
End: 2022-12-16

## 2022-12-16 DIAGNOSIS — Z79.4 TYPE 2 DIABETES MELLITUS WITH DIABETIC POLYNEUROPATHY, WITH LONG-TERM CURRENT USE OF INSULIN (HCC): ICD-10-CM

## 2022-12-16 DIAGNOSIS — E78.2 MIXED HYPERLIPIDEMIA: ICD-10-CM

## 2022-12-16 DIAGNOSIS — E11.42 TYPE 2 DIABETES MELLITUS WITH DIABETIC POLYNEUROPATHY, WITH LONG-TERM CURRENT USE OF INSULIN (HCC): ICD-10-CM

## 2022-12-16 RX ORDER — PEN NEEDLE, DIABETIC 31 GX3/16"
NEEDLE, DISPOSABLE MISCELLANEOUS
Qty: 300 EACH | Refills: 6 | Status: SHIPPED | OUTPATIENT
Start: 2022-12-16

## 2022-12-16 RX ORDER — OMEGA-3-ACID ETHYL ESTERS 1 G/1
CAPSULE, LIQUID FILLED ORAL
Qty: 360 CAPSULE | Refills: 3 | Status: SHIPPED | OUTPATIENT
Start: 2022-12-16

## 2022-12-16 NOTE — TELEPHONE ENCOUNTER
[address] is requesting clarification on the Lovaza. Is the 180 capsules correct? Or should it be 360? Due to the Sig saying 2 caps by mouth BID. Please advise.

## 2022-12-16 NOTE — TELEPHONE ENCOUNTER
Pharmacy needs quantity changed to a 90 day supply, they cant cover 3,000  [facility] X 5  [place] [address], [address] [place] [address] Avenue   [address] [place] [address], [address] [place] [address], [address] [place] [address]   Phone:  [phone]  Fax:  [phone]

## 2022-12-19 NOTE — TELEPHONE ENCOUNTER
Pts pharmacy is requesting a refill of Plavix 75mg. Last ov 12/05/2022 mgh. Upcoming ov 12/05/2023 mgh.

## 2022-12-19 NOTE — TELEPHONE ENCOUNTER
Dr. Carrillo Forth. I don't see this on his med list.  Just checking to see if he should be on this.

## 2022-12-19 NOTE — TELEPHONE ENCOUNTER
Medication:   Requested Prescriptions     Pending Prescriptions Disp Refills    metFORMIN (GLUCOPHAGE) 1000 MG tablet [Pharmacy Med Name: METFORMIN HCL 1000MG TABS] 180 tablet 1     Sig: TAKE 1 TABLET BY MOUTH 2 TIMES A DAY       Last Filled:      Patient Phone Number: [phone] (home)     Last appt: 10/12/2022   Next appt: 1/18/2023    Last Labs DM:   Lab Results   Component Value Date/Time    LABA1C 7.3 10/12/2022 11:39 AM

## 2022-12-20 RX ORDER — CLOPIDOGREL BISULFATE 75 MG/1
75 TABLET ORAL DAILY
Qty: 90 TABLET | Refills: 3 | Status: SHIPPED | OUTPATIENT
Start: 2022-12-20

## 2023-01-13 ENCOUNTER — TELEPHONE (OUTPATIENT)
Dept: PHARMACY | Facility: CLINIC | Age: 63
End: 2023-01-13

## 2023-01-13 NOTE — TELEPHONE ENCOUNTER
2023 Annual Pharmacist Visit  Patient is [facility]    Called patient to schedule 2023 yearly pharmacist appointment to discuss medications for Diabetes Management Program.    Left message to call us back at [phone] Option 3.       [place], [address] [place] [address]   Department, toll free: [phone] Option #3

## 2023-01-18 ENCOUNTER — OFFICE VISIT (OUTPATIENT)
Dept: ENDOCRINOLOGY | Age: 63
End: 2023-01-18
Payer: MEDICARE

## 2023-01-18 VITALS
RESPIRATION RATE: 14 BRPM | HEIGHT: 70 IN | HEART RATE: 88 BPM | BODY MASS INDEX: 30.92 KG/M2 | DIASTOLIC BLOOD PRESSURE: 64 MMHG | TEMPERATURE: 98 F | SYSTOLIC BLOOD PRESSURE: 136 MMHG | WEIGHT: 216 LBS

## 2023-01-18 DIAGNOSIS — E78.5 HYPERLIPIDEMIA ASSOCIATED WITH TYPE 2 DIABETES MELLITUS (HCC): ICD-10-CM

## 2023-01-18 DIAGNOSIS — E11.69 HYPERLIPIDEMIA ASSOCIATED WITH TYPE 2 DIABETES MELLITUS (HCC): ICD-10-CM

## 2023-01-18 DIAGNOSIS — E11.9 CONTROLLED TYPE 2 DIABETES MELLITUS WITHOUT COMPLICATION, WITH LONG-TERM CURRENT USE OF INSULIN (HCC): Primary | ICD-10-CM

## 2023-01-18 DIAGNOSIS — Z79.4 CONTROLLED TYPE 2 DIABETES MELLITUS WITHOUT COMPLICATION, WITH LONG-TERM CURRENT USE OF INSULIN (HCC): Primary | ICD-10-CM

## 2023-01-18 DIAGNOSIS — I10 PRIMARY HYPERTENSION: ICD-10-CM

## 2023-01-18 LAB — HBA1C MFR BLD: 7.6 %

## 2023-01-18 PROCEDURE — 3046F HEMOGLOBIN A1C LEVEL >9.0%: CPT | Performed by: INTERNAL MEDICINE

## 2023-01-18 PROCEDURE — 99214 OFFICE O/P EST MOD 30 MIN: CPT | Performed by: INTERNAL MEDICINE

## 2023-01-18 PROCEDURE — 83036 HEMOGLOBIN GLYCOSYLATED A1C: CPT | Performed by: INTERNAL MEDICINE

## 2023-01-18 PROCEDURE — 2022F DILAT RTA XM EVC RTNOPTHY: CPT | Performed by: INTERNAL MEDICINE

## 2023-01-18 PROCEDURE — G8417 CALC BMI ABV UP PARAM F/U: HCPCS | Performed by: INTERNAL MEDICINE

## 2023-01-18 PROCEDURE — 1036F TOBACCO NON-USER: CPT | Performed by: INTERNAL MEDICINE

## 2023-01-18 PROCEDURE — G8482 FLU IMMUNIZE ORDER/ADMIN: HCPCS | Performed by: INTERNAL MEDICINE

## 2023-01-18 PROCEDURE — G8427 DOCREV CUR MEDS BY ELIG CLIN: HCPCS | Performed by: INTERNAL MEDICINE

## 2023-01-18 PROCEDURE — 3075F SYST BP GE 130 - 139MM HG: CPT | Performed by: INTERNAL MEDICINE

## 2023-01-18 PROCEDURE — 3017F COLORECTAL CA SCREEN DOC REV: CPT | Performed by: INTERNAL MEDICINE

## 2023-01-18 PROCEDURE — 3078F DIAST BP <80 MM HG: CPT | Performed by: INTERNAL MEDICINE

## 2023-01-18 RX ORDER — BLOOD SUGAR DIAGNOSTIC
STRIP MISCELLANEOUS
Qty: 400 STRIP | Refills: 3 | Status: SHIPPED | OUTPATIENT
Start: 2023-01-18

## 2023-01-18 NOTE — PROGRESS NOTES
Seen as  Patient for DM    Interim:    Forgot meter    CGM not covered    Has seen Dr. Covington    Patient has a PMH of Type 2 DM, hypertension, hyperlipidemia, obesity , Spinal cord compression in cervical spine.    Diagnosed with Diabetes Mellitus type 2 in 2004.   Course has been variable .  Microvascular complications: No known retinopathy (Last eye exam: 2016)   No known Nephropathy :  Has Peripheral neuropathy: Some numbness and tingling in feet.     Home regimen:   Metformin 1000 mg BID  Jardiance   Humulin R U-500 75 units before breakfast , 45 units before lunch and dinner      Previous Meds  Lantus 50  units BID  humalog 25  25 30        A1c 12.2 % ---> 9.1 % ---> 10.7 % ---> 11.1 % ---> 9.7%---> 7.3%--->6.9%---> 6.6%---- >7.3%---> 7.6%    Diet: Eats 3 meals/day   Has been non-compliant with diet  Nutrition education:Yes  Exercise:     Severe, uncontrolled     Hypertension:  He has essentail HTN for many yrs.   On Lisinopril/Atenolol.     No dizziness, SOB , chest pain.     Hypertriglyceridemia : has h/o high TGD  Has  h/o pancreatitis many yrs back.    on fenofibrate 145 mg daily, fish oil 2 gram BID.     ROS: Scanned,reviewed    Past Medical History:   Diagnosis Date    Acute, but ill-defined, cerebrovascular disease 05/31/2013    CAD in native artery 12/30/2020    Cerebral artery occlusion with cerebral infarction (HCC) 2013     X2 PERSONALITY CHANGE, ANGER OUTBURSTS    Cerebrovascular disease     Diplopia 07/11/2013    ED (erectile dysfunction) 01/23/2014    Elbow pain, chronic 12/16/2015    Hyperlipidemia     Hypertension     Irritable bowel syndrome     Obstructive sleep apnea (adult) (pediatric) 07/01/2013    NO CPAP, HAD PROBLEMS WITH INSURANCE AND STOPPED USING    Occlusion and stenosis of carotid artery without mention of cerebral infarction 01/13/2014    MINOR    Pain in joint, upper arm 05/14/2015    Polyneuropathy in diabetes(357.2) 07/01/2013    PONV (postoperative nausea and vomiting)      Skin abnormality     PRE-CANCEROUS LESIONS REMOVED FROM ARMS AND EARS    Type 2 diabetes mellitus with diabetic polyneuropathy, with long-term current use of insulin ([facility]) 07/01/2013    Type II or unspecified type diabetes mellitus without mention of complication, not stated as uncontrolled     Use of cane as ambulatory aid      Past Surgical History:   Procedure Laterality Date    CARPAL TUNNEL RELEASE Left 2008    CERVICAL DISCECTOMY  08/02/2017    ANTERIOR CERVICAL DISCECTOMY AND FUSION C5-6, C6-7 WITH MEDTRONIC PLATES, SCREWS, ALLOGRAFT, WITH EVOKES PARTIAL C6 CORPECTOMY    CERVICAL FUSION  12/06/2017    CERVICAL LAMINECTOMY AND POSTERIOR FUSION C5-T1 WITH MEDTRONIC RODS AND SCREWS WITH EVOKES AND O-ARM    CERVICAL FUSION N/A 8/15/2022    C4-5  ANTERIOR CERVICAL DISCECTOMY AND FUSION performed by Tacos Garcia MD at [facility] OR    CHOLECYSTECTOMY  1995    COLONOSCOPY N/A 8/14/2018    COLONOSCOPY POLYPECTOMY SNARE/COLD BIOPSY performed by Balbir Coronado MD at [facility] ENDOSCOPY    ELBOW SURGERY Left 2008    ULNAR NERVE TRANSPOSITION, AT SAME TIME AS CTR    FINGER TRIGGER RELEASE Left 12/12/2019    LEFT RING TRIGGER FINGER RELEASE performed by Kervin Monzon MD at [facility] OR    FINGER TRIGGER RELEASE Right 11/23/2020    RIGHT LONG AND RING TRIGGER FINGER RELEASES -SLEEP APNEA- performed by Kervin Monzon MD at [facility] OR    HERNIA REPAIR  2009    umbilical    LUMBAR SPINE SURGERY Left 11/7/2022    LEFT L4-5, L5-S1 HEMILAMINECTOMY AND FORAMINOTOMY performed by Tacos Garcia MD at [facility] OR    PYLOROPLASTY      PYLORIC STENOSIS AS INFANT     Current Outpatient Medications   Medication Sig Dispense Refill    blood glucose test strips (PRODIGY NO CODING BLOOD GLUC) strip USE AS DIRECTED TO TEST UP TO 3-4 TIMES A  strip 3    clopidogrel (PLAVIX) 75 MG tablet Take 1 tablet by mouth daily 90 tablet 3    metFORMIN (GLUCOPHAGE) 1000 MG tablet TAKE 1 TABLET BY MOUTH 2 TIMES A   tablet 1    omega-3 acid ethyl esters (LOVAZA) 1 g capsule Take 2 capsules by mouth two times daily 360 capsule 3    Insulin Pen Needle (TRUEPLUS PEN NEEDLES) 31G X 5 MM MISC USE AS DIRECTED THREE TIMES A  each 6    atenolol (TENORMIN) 25 MG tablet Take one tablet daily. 90 tablet 0    JARDIANCE 10 MG tablet TAKE 1 TABLET BY MOUTH ONE TIME A DAY 90 tablet 0    lisinopril (PRINIVIL;ZESTRIL) 20 MG tablet TAKE 1 TABLET BY MOUTH 2 TIMES A  tablet 3    Blood Glucose Monitoring Suppl (PRODIGY AUTOCODE BLOOD GLUCOSE) KYLAH Use to test BS as directed 1 each 0    HUMULIN R U-500 KWIKPEN 500 UNIT/ML SOPN concentrated injection pen INJECT 80 UNITS UNDER THE SKIN BEFORE BREAKFAST, THEN 60 UNITS UNDER THE SKIN BEFORE LUNCH AND DINNER (Patient taking differently: INJECT 75 UNITS UNDER THE SKIN BEFORE BREAKFAST THEN 45 UNITS UNDER THE SKIN BEFORE LUNCH AND DINNER) 60 mL 0    fenofibrate (TRICOR) 145 MG tablet TAKE 1 TABLET BY MOUTH ONE TIME A DAY 90 tablet 3    atorvastatin (LIPITOR) 80 MG tablet Take 1 tablet by mouth at bedtime 90 tablet 3    desvenlafaxine succinate (PRISTIQ) 50 MG TB24 extended release tablet Take 1 tablet by mouth daily 90 tablet 3    metroNIDAZOLE (METROGEL) 1 % gel Apply to face daily. 60 g 3    buPROPion (WELLBUTRIN XL) 150 MG extended release tablet TAKE ONE TABLET BY MOUTH EVERY MORNING 90 tablet 3    nystatin (MYCOSTATIN) 930328 UNIT/GM ointment Apply topically 2 times daily to corners of mouth.  30 g 1    PRODIGY LANCETS 28G MISC Use as directed to test up to 3-4 times daily 400 each 3    Continuous Blood Gluc Sensor (DEXCOM G6 SENSOR) MISC USE AS DIRECTED AND CHANGE SENSORS EVERY 10 DAYS (Patient not taking: Reported on 1/18/2023) 9 each 0    Continuous Blood Gluc Transmit (DEXCOM G6 TRANSMITTER) MISC USE AS DIRECTED AND CHANGE EVERY 90 DAYS (Patient not taking: Reported on 1/18/2023) 1 each 0    Continuous Blood Gluc  ([address]) YKLAH As needed (Patient not taking: Reported on 1/18/2023) 1 Device 0     No current facility-administered medications for this visit. Vitals:    01/18/23 1143   BP: 136/64   Pulse: 88   Resp: 14   Temp: 98 °F (36.7 °C)     Constitutional: Well-developed, appears stated age, cooperative, in no acute distress  H/E/N/M/T:atraumatic, normocephalic, external ears, nose, lips normal without lesions  Eyes: Lids, lashes, conjunctivae and sclerae normal, No proptosis, no redness  Neck: supple, symmetrical, no swelling  Skin: No obvious rashes or lesions present.   Skin and hair texture normal  Psychiatric: Judgement and Insight:  judgement and insight appear normal  Neuro: Normal without focal findings, speech is normal normal, speech is spontaneous  Chest: No labored breathing, no chest deformity, no stridor  Musculoskeletal: No joint deformity, swelling      Lab Results   Component Value Date    LABA1C 7.3 10/12/2022     Admission on 11/07/2022, Discharged on 11/07/2022   Component Date Value Ref Range Status    ABO/Rh 11/07/2022 O POS   Final    Antibody Screen 11/07/2022 NEG   Final    POC Glucose 11/07/2022 171 (A)  70 - 99 mg/dl Final    Performed on 11/07/2022 ACCU-CHEK   Final    POC Glucose 11/07/2022 155 (A)  70 - 99 mg/dl Final    Performed on 11/07/2022 ACCU-CHEK   Final   Orders Only on 10/19/2022   Component Date Value Ref Range Status    WBC 10/19/2022 7.6  4.0 - 11.0 K/uL Final    RBC 10/19/2022 4.75  4.20 - 5.90 M/uL Final    Hemoglobin 10/19/2022 14.4  13.5 - 17.5 g/dL Final    Hematocrit 10/19/2022 42.1  40.5 - 52.5 % Final    MCV 10/19/2022 88.7  80.0 - 100.0 fL Final    MCH 10/19/2022 30.3  26.0 - 34.0 pg Final    MCHC 10/19/2022 34.2  31.0 - 36.0 g/dL Final    RDW 10/19/2022 14.7  12.4 - 15.4 % Final    Platelets 44/59/2126 255  135 - 450 K/uL Final    MPV 10/19/2022 8.4  5.0 - 10.5 fL Final   Orders Only on 10/19/2022   Component Date Value Ref Range Status    ABO/Rh 10/19/2022 O POS   Final    Antibody Screen 10/19/2022 NEG   Final Orders Only on 10/19/2022   Component Date Value Ref Range Status    Sodium 10/19/2022 143  136 - 145 mmol/L Final    Potassium 10/19/2022 5.1  3.5 - 5.1 mmol/L Final    Chloride 10/19/2022 104  99 - 110 mmol/L Final    CO2 10/19/2022 24  21 - 32 mmol/L Final    Anion Gap 10/19/2022 15  3 - 16 Final    Glucose 10/19/2022 168 (A)  70 - 99 mg/dL Final    BUN 10/19/2022 18  7 - 20 mg/dL Final    Creatinine 10/19/2022 0.9  0.8 - 1.3 mg/dL Final    Est, Glom Filt Rate 10/19/2022 >60  >60 Final    Comment: Pediatric calculator link  Son.[email or web address]. org/professionals/kdoqi/gfr_calculatorped  Effective Oct 3, 2022  These results are not intended for use in patients  <25years of age. eGFR results are calculated without  a race factor using the 2021 CKD-EPI equation. Careful  clinical correlation is recommended, particularly when  comparing to results calculated using previous equations. The CKD-EPI equation is less accurate in patients with  extremes of muscle mass, extra-renal metabolism of  creatinine, excessive creatinine ingestion, or following  therapy that affects renal tubular secretion. Calcium 10/19/2022 9.9  8.3 - 10.6 mg/dL Final   Orders Only on 10/19/2022   Component Date Value Ref Range Status    aPTT 10/19/2022 28.6  23.0 - 34.3 sec Final    Comment: Therapeutic range: 73.0 - 102.0 sec    Effective 5-25-22 9:00am EST  Please note reference ranges have  changed for PTT Testing. Orders Only on 10/19/2022   Component Date Value Ref Range Status    Protime 10/19/2022 12.6  11.7 - 14.5 sec Final    Comment: Effective 5-25-22 09:00am EST  Please note reference ranges have  changed for PT and INR Testing. INR 10/19/2022 0.95  0.87 - 1.14 Final    Comment: Effective 5/25/22 at 09:00am EST    Normal: 0.87 - 1.14  Therapeutic: 2.0 - 3.0  Pros.  Valve: 2.5 - 3.5  AMI: 2.0 - 3.0     Office Visit on 10/12/2022   Component Date Value Ref Range Status    Hemoglobin A1C 10/12/2022 7.3  % Final Microalbumin, Random Urine 10/19/2022 <1.20  <2.0 mg/dL Final    Creatinine, Ur 10/19/2022 65.9  39.0 - 259.0 mg/dL Final    Microalbumin Creatinine Ratio 10/19/2022 see below  0.0 - 30.0 mg/g Final    Comment: Ratio cannot be calculated since microalbumin level is below  the lower detection limit. Assessment/Plan      1. Type 2 DM     Rosalba Bacon is a 60 y.o. male has Type 2 DM with obesity and insulin resistance. uncontrolled DM. Needs to bring log    Complicated by neuropathy.     - Change  Humulin R U-500 80 units before breakfast , 50 units before lunch and dinner due to high glucose during the day     SSI 5 for 50 > 150  - 20 < 100    jardiance  Metformin       recommend low carb diet ( 40-45 grams with each meal)    Referred to [address] [place] [address] for dietary modification. Avoid GLP-1 given high TGD and h/o pancreatitis. Advised follow-up with the ophthalmologist once BS better. Due , advised  Urine microalbumin/cr ratio normal in 071/8, 12/19 , 9/21 , 10/22  On ace-inhibitor. Discussed foot care. Has neuropathy on exam. Seen podiatry      Pt on ASA. Normal TSH in 12/19     2. Hypertension. BP at goal    3. Hyperlipidemia. TGD have improved. Has h/o pancreatitis   on fenofibrate 145 mg daily, fish oil 2 gram BID.   lipitor 80mg    TGD 1560 ---> 506---> 1051--> 544---> 220---> 149  LDL 71---> 112---> 114---> 112---> 38---> 82  HDL 31---> 31    Continue same regimen. 4. Obesity. Advised life style changes.     Patient is on high dose of insulin which has a narrow therapeutic index and risk of complications including severe hypoglycemia

## 2023-01-20 NOTE — TELEPHONE ENCOUNTER
Second attempt made to contact patient to schedule 2023 annual pharmacist appointment to discuss medications for Diabetes Management Program.    Left message to call us back at [phone] Option 3. Mychart message sent.       Jessica Chao, [address] [facility]   Department, toll free: [phone] Option #3        ===================================================================    For Pharmacy Admin Tracking Only    Program: [address] in place:  No  Gap Closed?: No   Time Spent (min): 10

## 2023-01-22 NOTE — PROGRESS NOTES
2021     Chief Complaint   Patient presents with    Diabetes     check     Fifi Rivera (:  1960) is a 57 y.o. male, here for evaluation of the following medical concerns:    HPIHPI  Treatment Adherence:   Medication compliance:  compliant most of the time  Diet compliance:  noncompliant  Weight trend: stable  Current exercise: no regular exercise  Barriers:      Diabetes Mellitus Type 2: Diagnosed in . Managed by his endocrinologist (Dr. Luz Shaver a new appointment with Raina Frye next month) Recently switched to Humulin u-500 (takes 80 units with breakfast and 60 units with lunch and dinner.) Hemoglobin a1c 10.5. Admittedly has been poorly compliant with diet. His father passed away and has been dealing with grief.     Nephropathy screenin2020-- normal   Diabetic eye exam:   Diabetic foot examination: 2021-- + peripheral neuropathy  ACE-I: yes  Daily ASA: yes     Current symptoms/problems include none and neuropathy.     Home blood sugar records: fasting range: 200  Any episodes of hypoglycemia? no  Tobacco history: He  reports that he quit smoking about 20 years ago. His smoking use included cigarettes. He has a 25.00 pack-year smoking history. He has never used smokeless tobacco.      Hypertension:  Diagnosed \"years ago. \" Controlled, current regimen atenolol, lisinopril-HCTZ. Goal BP < 130/80. He is not monitoring his blood pressure at home.      Home blood pressure monitoring: No.  He is not adherent to a low sodium diet. Patient denies chest pain.  Antihypertensive medication side effects: no medication side effects noted.  Use of agents associated with hypertension: none.      Hyperlipidemia:    No new myalgias or GI upset on fenofibrate (Tricor, Lipitor 80mg and Lovaza)        Depression: has good days and bad days, was seeing a therapist and that was really helpful but his insurance will not cover any more sessions. Taking Cymbalta 60 mg BID and Wellbutrin.  Feels Home to rest. Continue tylenol for pain as needed. Follow up with your primary care provider in 1-2 days if no improvement in symptoms for reevaluation. Return for worsening symptoms.   Medical Condition (MA)   Reason for Exam: low blood pressure and increased heart rate   Acuity: Acute   Type of Exam: Initial       FINDINGS:   Pulmonary Arteries: Pulmonary arteries are adequately opacified for   evaluation.  No evidence of intraluminal filling defect to suggest pulmonary   embolism.  Main pulmonary artery is normal in caliber.       Mediastinum: No evidence of mediastinal lymphadenopathy.  The heart and   pericardium demonstrate no acute abnormality.  Th there is mild calcified   plaque throughout the aorta which is mildly dilated throughout with no   aneurysm or dissection.  There are extensive coronary artery calcifications. There is no mediastinal mass or adenopathy.       Lungs/pleura: The lungs are mildly hyperinflated.  There is mild biapical   pleural thickening and scarring some hazy subpleural opacities along the lung   bases posteriorly.  There is a 5 mm subpleural nodule left lower lobe   posteriorly and laterally with minimal focal opacity along the right upper   lobe posteriorly extending along the fissure.  No effusion is seen.       Upper Abdomen: Limited images of the upper abdomen are unremarkable.       Soft Tissues/Bones: No acute bone or soft tissue abnormality.           Impression   No evidence of a pulmonary embolus       Mildly dilated and atherosclerotic thoracic aorta with extensive coronary   artery calcifications.  Suggest cardiology follow-up       5 mm nodule left lower lobe posteriorly which is indeterminate.  Recommend   follow-up to assure stability.       Mild chronic obstructive lung changes with probable subpleural atelectasis or   hazy infiltrates along the right upper lobe posteriorly.  Recommend   short-term follow-up.       RECOMMENDATIONS:   Fleischner Society guidelines for follow-up and management of incidentally   detected pulmonary nodules:       Single Solid Nodule:       Nodule size less than 6 mm   In a low-risk patient, no routine follow-up.    In a high-risk patient, optional CT at 12 months.       Nodule size equals 6-8 mm   In a low-risk patient, CT at 6-12 months, then consider CT at 18-24 months. In a high-risk patient, CT at 6-12 months, then CT at 18-24 months.       Nodule size greater than 8 mm           In a low-risk patient, consider CT at 3 months, PET/CT, or tissue sampling.       In a high-risk patient, consider CT at 3 months, PET/CT, or tissue sampling.       Multiple Solid Nodules:       Nodule size less than 6 mm   In a low-risk patient, no routine follow-up. In a high-risk patient, optional CT at 12 months.       Nodule size equals 6-8 mm   In a low-risk patient, CT at 3-6 months, then consider CT at 18-24 months. In a high-risk patient, CT at 3-6 months, then CT at 18-24 months.       Nodule size greater than 8 mm   In a low-risk patient, CT at 3-6 months, then consider CT at 18-24 months. In a high-risk patient, CT at 3-6 months, then CT at 18-24 months.       - Low risk patients include individuals with minimal or absent history of   smoking and other known risk factors.       - High risk patients include individuals with a history or smoking or known   risk factors.       Radiology 2017 http://pubs. rsna.org/doi/full/10.1148/radiol. 0080534758         Review of Systems   Constitutional: Negative for fatigue and fever. Respiratory: Negative for cough and shortness of breath. Cardiovascular: Negative for chest pain and leg swelling. Gastrointestinal: Negative for diarrhea, nausea and vomiting. Musculoskeletal: Positive for arthralgias, back pain (chronic) and joint swelling. Negative for gait problem. Neurological: Positive for numbness (tingling- neuroathy bilateral feet). Negative for dizziness and headaches. Psychiatric/Behavioral: Positive for dysphoric mood. Negative for self-injury, sleep disturbance and suicidal ideas. The patient is not nervous/anxious. Prior to Visit Medications    Medication Sig Taking? Authorizing Provider   desvenlafaxine succinate (PRISTIQ) 50 MG TB24 extended release tablet Take 1 tablet by mouth daily Yes MADISON Garcia CNP   vitamin D3 (CHOLECALCIFEROL) 25 MCG (1000 UT) TABS tablet TAKE 1 TABLET BY MOUTH ONE TIME A DAY Yes MADISON Garcia CNP   ASPIRIN ADULT LOW STRENGTH 81 MG EC tablet TAKE 1 TABLET BY MOUTH ONE TIME A DAY Yes MADISON Garcia CNP   omega-3 acid ethyl esters (LOVAZA) 1 g capsule TAKE TWO CAPSULES BY MOUTH TWICE A DAY Yes MADISON Garcia CNP   lisinopril (PRINIVIL;ZESTRIL) 20 MG tablet Take 1 tablet by mouth 2 times daily Yes Tyrone Torre MD   metFORMIN (GLUCOPHAGE) 1000 MG tablet TAKE ONE TABLET BY MOUTH TWICE A DAY Yes MADISON Garcia CNP   buPROPion (WELLBUTRIN XL) 150 MG extended release tablet TAKE ONE TABLET BY MOUTH EVERY MORNING Yes MADISON Garcia CNP   clopidogrel (PLAVIX) 75 MG tablet Take 1 tablet by mouth daily Yes Rishi Lau MD   atorvastatin (LIPITOR) 80 MG tablet Take 1 tablet by mouth daily Yes Rihsi Lau MD   Cholecalciferol (VITAMIN D3) 50 MCG (2000 UT) CAPS Take 1 capsule by mouth daily Yes Historical Provider, MD   atenolol (TENORMIN) 25 MG tablet TAKE 1 TABLET BY MOUTH EVERY NIGHT AT BEDTIME Yes Isidro Montez DO   nystatin (MYCOSTATIN) 591682 UNIT/GM ointment Apply topically 2 times daily to corners of mouth. Yes Catalina Uribe MD   insulin regular human (HUMULIN R U-500 KWIKPEN) 500 UNIT/ML SOPN concentrated injection pen INJECT 80 UNITS BEFORE BREAKFAST, INJECT 60 UNITS BEFORE LUNCH AND INJECT 60 UNITS BEFORE DINNER Yes Cherelle Alan MD   metroNIDAZOLE (METROGEL) 1 % gel Apply to face daily.  Yes Catalina Uribe MD   fenofibrate (TRICOR) 145 MG tablet TAKE 1 TABLET BY MOUTH ONE TIME A DAY Yes Cherelle Alan MD   Insulin Pen Needle (B-D UF III MINI PEN NEEDLES) 31G X 5 MM MISC 1 each by Does not apply route 3 times daily Yes Cherelle Alan MD PRODIGY LANCETS 28G MISC Use as directed to test up to 3-4 times daily Yes Brendan Alvarez MD   blood glucose test strips (PRODIGY NO CODING BLOOD GLUC) strip Use as directed to test up to 3-4 times daily Yes Brendan Alvarez MD   lisinopril-hydroCHLOROthiazide (PRINZIDE;ZESTORETIC) 20-12.5 MG per tablet TAKE 1 TABLET BY MOUTH ONE TIME A DAY  MADISON Kraus CNP   atenolol (TENORMIN) 25 MG tablet TAKE 1 TABLET BY MOUTH EVERY NIGHT AT BEDTIME  MADISON Kraus CNP        Social History     Tobacco Use    Smoking status: Former Smoker     Packs/day: 1.00     Years: 25.00     Pack years: 25.00     Types: Cigarettes     Quit date: 2000     Years since quittin.4    Smokeless tobacco: Never Used   Substance Use Topics    Alcohol use: No        Vitals:    21 0801   BP: 132/78   Site: Left Upper Arm   Position: Sitting   Cuff Size: Medium Adult   Pulse: 72   SpO2: 98%   Weight: 218 lb (98.9 kg)     Estimated body mass index is 31.28 kg/m² as calculated from the following:    Height as of 21: 5' 10\" (1.778 m). Weight as of this encounter: 218 lb (98.9 kg). Physical Exam  Vitals and nursing note reviewed. Constitutional:       General: He is not in acute distress. Appearance: Normal appearance. He is well-developed. He is obese. He is not ill-appearing, toxic-appearing or diaphoretic. HENT:      Head: Normocephalic and atraumatic. Eyes:      Extraocular Movements: Extraocular movements intact. Conjunctiva/sclera: Conjunctivae normal.      Pupils: Pupils are equal, round, and reactive to light. Cardiovascular:      Rate and Rhythm: Normal rate and regular rhythm. Heart sounds: Normal heart sounds. No murmur heard. No friction rub. No gallop. Pulmonary:      Effort: Pulmonary effort is normal. No respiratory distress. Breath sounds: Normal breath sounds. No stridor. No wheezing, rhonchi or rales.    Neurological:      General: No focal deficit present. Mental Status: He is alert and oriented to person, place, and time. Mental status is at baseline. Cranial Nerves: No cranial nerve deficit. ASSESSMENT/PLAN:  1. Type 2 diabetes mellitus with diabetic polyneuropathy, with long-term current use of insulin (HCC)-uncontrolled hemoglobin A1c is 10.5; has follow-up with Endo next month    2. Essential hypertension-controlled continue lisinopril and atenolol    3. Mixed hyperlipidemia-triglycerides were a little high and HDL slightly low. I he will continue Lipitor 80 mg, Lovaza and TriCor    4. Dysthymia-uncontrolled, we will stop the Cymbalta and switch to Pristiq  - desvenlafaxine succinate (PRISTIQ) 50 MG TB24 extended release tablet; Take 1 tablet by mouth daily  Dispense: 90 tablet; Refill: 3    5. Lung nodule < 6cm on CT-repeat chest CT in October  - CT CHEST WO CONTRAST; Future    6. Pain of right thumb-refer to Ortho for this problem. He can try Voltaren gel over-the-counter 4 times daily as needed  - Rebecca Perera MD, Orthopedic Surgery, [facility]    7. Left wrist pain- refer to Ortho for this problem. He can try Voltaren gel over-the-counter 4 times daily as needed  - Rebecca Perera MD, Orthopedic Surgery, [facility]      Return in about 4 months (around 10/30/2021). An electronic signature was used to authenticate this note.     --Dagoberto Moss, MADISON - CNP on 6/30/2021 at 9:46 AM

## 2023-01-29 DIAGNOSIS — E11.42 TYPE 2 DIABETES MELLITUS WITH DIABETIC POLYNEUROPATHY, WITH LONG-TERM CURRENT USE OF INSULIN (HCC): ICD-10-CM

## 2023-01-29 DIAGNOSIS — Z79.4 TYPE 2 DIABETES MELLITUS WITH DIABETIC POLYNEUROPATHY, WITH LONG-TERM CURRENT USE OF INSULIN (HCC): ICD-10-CM

## 2023-01-30 RX ORDER — EMPAGLIFLOZIN 10 MG/1
TABLET, FILM COATED ORAL
Qty: 90 TABLET | Refills: 1 | Status: SHIPPED | OUTPATIENT
Start: 2023-01-30

## 2023-01-30 NOTE — TELEPHONE ENCOUNTER
Medication:   Requested Prescriptions     Pending Prescriptions Disp Refills    JARDIANCE 10 MG tablet [Pharmacy Med Name: Sharron Manning 10MG TABS] 90 tablet 1     Sig: TAKE ONE TABLET BY MOUTH ONE TIME A DAY       Last Filled:      Patient Phone Number: [phone] (home)     Last appt: 1/18/2023   Next appt: 5/3/2023    Last Labs DM:   Lab Results   Component Value Date/Time    LABA1C 7.6 01/18/2023 12:22 PM

## 2023-02-03 ENCOUNTER — TELEPHONE (OUTPATIENT)
Dept: PHARMACY | Facility: CLINIC | Age: 63
End: 2023-02-03

## 2023-02-03 NOTE — LETTER
[place]  [address], [address]        [address] [place] [address]         02/10/23     Dear Theodora Quesada,    Congratulations! You have completed the 2022 requirements for the [facility] Be Well With Diabetes Program. You have been automatically re-enrolled into the [facility] Diabetes Program for 2023. One of the requirements to participate in the [facility] Diabetes Program is to complete a Clinical Pharmacist Telephone appointment yearly. The [place] [address] Team has attempted to contact you to schedule your 2023 Diabetes Management telephone appointment but was unable to reach you. We would like to work with you and your doctor to:  - Review your medications, including over-the-counter and herbal medications  - Answer questions about your medications and how to get the most benefit from them  - Identify potential drug interactions or side effects and help fix them  - Identify preferred medications that are equally effective, but available at a lower cost to you  - Help you reach the necessary requirements to remain enrolled in the Diabetes Management Program offered by [facility]     Please call toll free [phone], option #3 to schedule this appointment to take advantage of this service. Telephone appointments are available Monday thru Friday from 7:30 AM till 5:30 PM.     This is a courtesy reminder. If you have already scheduled your 2023 pharmacist telephone appointment, please disregard this message.       Sincerely,   [address]  Phone: toll free [phone], option #3

## 2023-02-03 NOTE — TELEPHONE ENCOUNTER
2023 Annual Pharmacist Visit  Patient is [facility]    Called patient to schedule 2023 yearly pharmacist appointment to discuss medications for Diabetes Management Program.    Left message to call us back at [phone] Option 3.       Nabeel Nye, [address] [facility]   Department, toll free: [phone] Option #3

## 2023-02-07 DIAGNOSIS — Z79.4 TYPE 2 DIABETES MELLITUS WITH DIABETIC POLYNEUROPATHY, WITH LONG-TERM CURRENT USE OF INSULIN (HCC): Chronic | ICD-10-CM

## 2023-02-07 DIAGNOSIS — E11.42 TYPE 2 DIABETES MELLITUS WITH DIABETIC POLYNEUROPATHY, WITH LONG-TERM CURRENT USE OF INSULIN (HCC): Chronic | ICD-10-CM

## 2023-02-07 RX ORDER — INSULIN HUMAN 500 [IU]/ML
INJECTION, SOLUTION SUBCUTANEOUS
Qty: 60 ML | Refills: 3 | Status: SHIPPED | OUTPATIENT
Start: 2023-02-07

## 2023-02-07 NOTE — TELEPHONE ENCOUNTER
Medication:   Requested Prescriptions     Pending Prescriptions Disp Refills    insulin regular human (HUMULIN R U-500 KWIKPEN) 500 UNIT/ML SOPN concentrated injection pen [Pharmacy Med Name: HUMULIN R U-500 KWIKPEN 500 SOPN] 60 mL 3     Sig: INJECT 80 UNITS UNDER THE SKIN BEFORE BREAKFAST AND 50 UNITS UNDER THE SKIN BEFORE LUNCH AND DINNER       Last Filled:      Patient Phone Number: [phone] (home)     Last appt: 1/18/2023   Next appt: 5/3/2023    Last Labs DM:   Lab Results   Component Value Date/Time    LABA1C 7.6 01/18/2023 12:22 PM

## 2023-02-10 NOTE — TELEPHONE ENCOUNTER
Second attempt made to contact patient to schedule 2023 annual pharmacist appointment to discuss medications for Diabetes Management Program.    Left message to call us back at [phone] Option 3.  Letter mailed to home address.      YUMIKO Tellez [facility]  Clinical Pharmacy   Department, toll free: [phone] Option #3        ===================================================================    For Pharmacy Admin Tracking Only    Program: [facility]  CPA in place:  No  Gap Closed?: No   Time Spent (min): 10

## 2023-03-02 ENCOUNTER — HOSPITAL ENCOUNTER (OUTPATIENT)
Dept: MRI IMAGING | Age: 63
Discharge: HOME | End: 2023-03-02
Payer: MEDICARE

## 2023-03-02 DIAGNOSIS — M48.062 SPINAL STENOSIS, LUMBAR REGION, WITH NEUROGENIC CLAUDICATION: ICD-10-CM

## 2023-03-02 PROCEDURE — 72148 MRI LUMBAR SPINE W/O DYE: CPT

## 2023-03-17 ENCOUNTER — TELEPHONE (OUTPATIENT)
Dept: PHARMACY | Facility: CLINIC | Age: 63
End: 2023-03-17

## 2023-03-17 NOTE — TELEPHONE ENCOUNTER
**Patient is [facility]**  Called patient to schedule 2023 yearly pharmacist appointment to discuss medications for Diabetes Management Program.    No answer. Left VM on ome TAD: Please call back at [phone] Option #3.      Ayan Hurst, [address]   Department, toll free: [phone] Option #3

## 2023-03-21 NOTE — TELEPHONE ENCOUNTER
Second attempt made to contact patient to schedule 2022 yearly pharmacist appointment to discuss medications for Diabetes Management Program.    No answer. Left VM on home TAD: Please call back at [phone] Option #3. Tap[employer] message sent to patient. No further patient outreach planned at this time.     Tianna Moreno, [address] [employer]   Department, toll free: [phone] Option #3      For Pharmacy Admin Tracking Only    Program: [address] in place:  No  Gap Closed?: No   Time Spent (min): 5

## 2023-04-03 DIAGNOSIS — F34.1 DYSTHYMIA: ICD-10-CM

## 2023-04-03 RX ORDER — BUPROPION HYDROCHLORIDE 150 MG/1
TABLET ORAL
Qty: 90 TABLET | Refills: 0 | Status: SHIPPED | OUTPATIENT
Start: 2023-04-03

## 2023-04-07 ENCOUNTER — OFFICE VISIT (OUTPATIENT)
Dept: DERMATOLOGY | Age: 63
End: 2023-04-07

## 2023-04-07 DIAGNOSIS — L81.4 LENTIGINES: ICD-10-CM

## 2023-04-07 DIAGNOSIS — D48.5 NEOPLASM OF UNCERTAIN BEHAVIOR OF SKIN: Primary | ICD-10-CM

## 2023-04-07 DIAGNOSIS — L57.0 ACTINIC KERATOSIS: ICD-10-CM

## 2023-04-07 DIAGNOSIS — D22.60 MULTIPLE BENIGN MELANOCYTIC NEVI OF UPPER EXTREMITY, LOWER EXTREMITY, AND TRUNK: ICD-10-CM

## 2023-04-07 DIAGNOSIS — D22.70 MULTIPLE BENIGN MELANOCYTIC NEVI OF UPPER EXTREMITY, LOWER EXTREMITY, AND TRUNK: ICD-10-CM

## 2023-04-07 DIAGNOSIS — L71.9 ROSACEA: ICD-10-CM

## 2023-04-07 DIAGNOSIS — L82.1 OTHER SEBORRHEIC KERATOSIS: ICD-10-CM

## 2023-04-07 DIAGNOSIS — D22.5 MULTIPLE BENIGN MELANOCYTIC NEVI OF UPPER EXTREMITY, LOWER EXTREMITY, AND TRUNK: ICD-10-CM

## 2023-04-07 RX ORDER — METRONIDAZOLE 10 MG/G
GEL TOPICAL
Qty: 60 G | Refills: 3 | Status: SHIPPED | OUTPATIENT
Start: 2023-04-07

## 2023-04-07 NOTE — PATIENT INSTRUCTIONS
Thank you for visiting [address] [place] [address] Dermatology today! Please follow the instructions below as we discussed in clinic:      Shave Biopsy Wound Care Instructions  Keep the bandage in place for at least 24 hours  After 24 hours, start cleansing the wound with mild soapy water 1-2 times per day. (Can use regular body wash or face wash)  Gently dry the area. Apply Aquaphor or Vaseline (petroleum jelly) to the wound using a clean cotton tipped applicator twice a day. Cover with a clean bandage if still having any bleeding. Repeat this process until the biopsy site is healed (can take 1-3 weeks)  You may shower and bathe as usual.   For pain, you may take acetaminophen/tylenol (extra or regular strength) the first 24 hours. Do not go over the recommended limit on the bottle. ** Biopsy results generally take around 7-10 business days to come back. If you have not heard from us by then, please call the office at [phone]. *Please note that biopsy results are released to both the patient and physician at the same time in [address] Please allow time for your physician to review the results. One of our staff members will reach out to you with the results and plan. Cryosurgery (Freezing) Wound Care Instructions    AFTER THE PROCEDURE:   You will notice swelling and redness around the site. This is normal.   You may experience a sharp or sore feeling for the next several days. For this discomfort, you may take acetaminophen (Tylenol©). A blister may develop at the treated area, sometimes as soon as by the end of the day. After several days, the blister will subside and a scab will form. If the area is bumped or traumatized during the first few days following freezing, you may develop bleeding into the blister, forming a blood blister. This is nothing to be alarmed about.   If the blister is tense, uncomfortable, or much larger than the site that was frozen, you may pop the blister along its edge with a sterile

## 2023-04-07 NOTE — PROGRESS NOTES
[facility] Dermatology  Derick Stuart MD  [phone]    Date of Visit: 4/7/2023  Junior is a 58 y.o. male who presents for skin lesions. Chief Complaint:   Chief Complaint   Patient presents with    Skin Lesion        History of Present Illness:    Patient denies any other new or changing skin lesions. They would like all of their skin lesions to be evaluated for skin cancer. Derm history:  -Angular cheilitis -Nystatin oint  -Mild ET/PP rosacea - metrogel--needs refill, helps    *Personal history of skin cancer: AKs  *Family history of skin cancer: Dad and uncle has hx melanoma    Review of Systems:  Gen: Feels well, good sense of health. Skin: No new or changing moles, no history of keloids or hypertrophic scars. Past Medical History, Family History, Surgical History, Medications and Allergies reviewed.     Past Medical History:   Diagnosis Date    Acute, but ill-defined, cerebrovascular disease 05/31/2013    CAD in native artery 12/30/2020    Cerebral artery occlusion with cerebral infarction [facility]) 2013     X2 PERSONALITY CHANGE, ANGER OUTBURSTS    Cerebrovascular disease     Diplopia 07/11/2013    ED (erectile dysfunction) 01/23/2014    Elbow pain, chronic 12/16/2015    Hyperlipidemia     Hypertension     Irritable bowel syndrome     Obstructive sleep apnea (adult) (pediatric) 07/01/2013    NO CPAP, HAD PROBLEMS WITH INSURANCE AND STOPPED USING    Occlusion and stenosis of carotid artery without mention of cerebral infarction 01/13/2014    MINOR    Pain in joint, upper arm 05/14/2015    Polyneuropathy in diabetes(357.2) 07/01/2013    PONV (postoperative nausea and vomiting)     Skin abnormality     PRE-CANCEROUS LESIONS REMOVED FROM ARMS AND EARS    Type 2 diabetes mellitus with diabetic polyneuropathy, with long-term current use of insulin ([facility] [address] 07/01/2013    Type II or unspecified type diabetes mellitus without mention of complication, not stated as uncontrolled

## 2023-04-24 ENCOUNTER — TELEPHONE (OUTPATIENT)
Dept: CARDIOLOGY CLINIC | Age: 63
End: 2023-04-24

## 2023-04-24 ENCOUNTER — OFFICE VISIT (OUTPATIENT)
Dept: DERMATOLOGY | Age: 63
End: 2023-04-24
Payer: MEDICARE

## 2023-04-24 DIAGNOSIS — D04.62 SQUAMOUS CELL CARCINOMA IN SITU (SCCIS) OF SKIN OF LEFT UPPER ARM: Primary | ICD-10-CM

## 2023-04-24 PROCEDURE — 17262 DSTRJ MAL LES T/A/L 1.1-2.0: CPT | Performed by: INTERNAL MEDICINE

## 2023-04-24 NOTE — PATIENT INSTRUCTIONS
Thank you for visiting [facility] Dermatology today! Please follow the instructions below as we discussed in clinic:   Wound Care Instructions  Keep the bandage in place for at least 24 hours  After 24 hours, start cleansing the wound with mild soapy water 1-2 times per day. (Can use regular body wash or face wash)  Gently dry the area. Apply Aquaphor or Vaseline (petroleum jelly) to the wound using a clean cotton tipped applicator twice a day. Cover with a clean bandage if still having any bleeding. Repeat this process until the biopsy site is healed (can take 1-3 weeks)  You may shower and bathe as usual.   For pain, you may take acetaminophen/tylenol (extra or regular strength) the first 24 hours. Do not go over the recommended limit on the bottle.

## 2023-04-24 NOTE — PROGRESS NOTES
The provider discussed the risks, benefits, and alternatives to [facility] including pain, bleeding, infection, scar, and damage to either sensory or motor nerves. These are inherent with any surgery, and everything possible will be done to minimize these risks with this procedure. The benefits include removal of the skin cancer with the fewest possible risks and the most cost effective treatment for this particular cancer and area. PRE PROCEDURE TIME OUT:  Site confirmed using dermpath report, photographs and residual biopsy scar  with patient by:  Dr. Estela Mahmood MD      *Pathology results:  LEFT FOREARM-   Squamous cell carcinoma in-situ     Pre-procedure Size:       1.8 x 1 cm  Post-treatment size after 1st pass: 2cm x 1.3 cm        Verbal informed consent was obtained. The area was not photographed. The area was cleaned with alcohol and infiltrated with 1% lidocaine with epinephrine. After adequate analgesia had been confirmed, the area was treated with curettage followed by electrodesiccation. There were 3 passes total.  Hemostasis was achieved with electrodesiccation. 100% petroleum jelly and a sterile dressing were applied and written wound care instructions were given to the patient verbally and in writing. The patient tolerated the procedure well.         RTC 4-6 mo for Jose Angel Rose MD

## 2023-04-26 ENCOUNTER — TELEPHONE (OUTPATIENT)
Dept: PHARMACY | Facility: CLINIC | Age: 63
End: 2023-04-26

## 2023-05-15 NOTE — TELEPHONE ENCOUNTER
Second attempt made to contact patient to schedule 2023 yearly pharmacist appointment to discuss medications for Diabetes Management Program.    No answer. Left VM. Please call back at [phone], option #3. [employer] message sent.         Honey Koyanagi, [address]   Phone: [phone], option #3       For Pharmacy Admin Tracking Only    Program: [address] in place:  No  Gap Closed?: No   Time Spent (min): 10

## 2023-05-31 ENCOUNTER — OFFICE VISIT (OUTPATIENT)
Dept: FAMILY MEDICINE CLINIC | Age: 63
End: 2023-05-31

## 2023-05-31 VITALS
HEIGHT: 70 IN | TEMPERATURE: 97.5 F | DIASTOLIC BLOOD PRESSURE: 60 MMHG | BODY MASS INDEX: 32.3 KG/M2 | WEIGHT: 225.6 LBS | OXYGEN SATURATION: 97 % | SYSTOLIC BLOOD PRESSURE: 138 MMHG | HEART RATE: 79 BPM

## 2023-05-31 DIAGNOSIS — E11.42 DIABETIC POLYNEUROPATHY ASSOCIATED WITH TYPE 2 DIABETES MELLITUS (HCC): Chronic | ICD-10-CM

## 2023-05-31 DIAGNOSIS — M51.36 DDD (DEGENERATIVE DISC DISEASE), LUMBAR: ICD-10-CM

## 2023-05-31 DIAGNOSIS — Z79.4 TYPE 2 DIABETES MELLITUS WITH DIABETIC POLYNEUROPATHY, WITH LONG-TERM CURRENT USE OF INSULIN (HCC): Chronic | ICD-10-CM

## 2023-05-31 DIAGNOSIS — I15.2 HYPERTENSION ASSOCIATED WITH TYPE 1 DIABETES MELLITUS (HCC): ICD-10-CM

## 2023-05-31 DIAGNOSIS — E66.09 CLASS 1 OBESITY DUE TO EXCESS CALORIES WITH SERIOUS COMORBIDITY AND BODY MASS INDEX (BMI) OF 32.0 TO 32.9 IN ADULT: ICD-10-CM

## 2023-05-31 DIAGNOSIS — E10.59 HYPERTENSION ASSOCIATED WITH TYPE 1 DIABETES MELLITUS (HCC): ICD-10-CM

## 2023-05-31 DIAGNOSIS — M06.9 RHEUMATOID ARTHRITIS INVOLVING MULTIPLE SITES, UNSPECIFIED WHETHER RHEUMATOID FACTOR PRESENT (HCC): ICD-10-CM

## 2023-05-31 DIAGNOSIS — Z98.61 CAD S/P PERCUTANEOUS CORONARY ANGIOPLASTY: ICD-10-CM

## 2023-05-31 DIAGNOSIS — Z01.818 PREOP EXAMINATION: Primary | ICD-10-CM

## 2023-05-31 DIAGNOSIS — M48.062 SPINAL STENOSIS, LUMBAR REGION WITH NEUROGENIC CLAUDICATION: ICD-10-CM

## 2023-05-31 DIAGNOSIS — I25.10 CAD S/P PERCUTANEOUS CORONARY ANGIOPLASTY: ICD-10-CM

## 2023-05-31 DIAGNOSIS — E11.42 TYPE 2 DIABETES MELLITUS WITH DIABETIC POLYNEUROPATHY, WITH LONG-TERM CURRENT USE OF INSULIN (HCC): Chronic | ICD-10-CM

## 2023-05-31 RX ORDER — ATENOLOL 25 MG/1
TABLET ORAL
COMMUNITY
Start: 2023-05-14

## 2023-05-31 SDOH — ECONOMIC STABILITY: FOOD INSECURITY: WITHIN THE PAST 12 MONTHS, THE FOOD YOU BOUGHT JUST DIDN'T LAST AND YOU DIDN'T HAVE MONEY TO GET MORE.: NEVER TRUE

## 2023-05-31 SDOH — ECONOMIC STABILITY: INCOME INSECURITY: HOW HARD IS IT FOR YOU TO PAY FOR THE VERY BASICS LIKE FOOD, HOUSING, MEDICAL CARE, AND HEATING?: NOT HARD AT ALL

## 2023-05-31 SDOH — ECONOMIC STABILITY: HOUSING INSECURITY
IN THE LAST 12 MONTHS, WAS THERE A TIME WHEN YOU DID NOT HAVE A STEADY PLACE TO SLEEP OR SLEPT IN A SHELTER (INCLUDING NOW)?: NO

## 2023-05-31 SDOH — ECONOMIC STABILITY: FOOD INSECURITY: WITHIN THE PAST 12 MONTHS, YOU WORRIED THAT YOUR FOOD WOULD RUN OUT BEFORE YOU GOT MONEY TO BUY MORE.: NEVER TRUE

## 2023-05-31 ASSESSMENT — PATIENT HEALTH QUESTIONNAIRE - PHQ9
2. FEELING DOWN, DEPRESSED OR HOPELESS: 1
1. LITTLE INTEREST OR PLEASURE IN DOING THINGS: 0
9. THOUGHTS THAT YOU WOULD BE BETTER OFF DEAD, OR OF HURTING YOURSELF: 0
SUM OF ALL RESPONSES TO PHQ QUESTIONS 1-9: 2
3. TROUBLE FALLING OR STAYING ASLEEP: 0
SUM OF ALL RESPONSES TO PHQ9 QUESTIONS 1 & 2: 1
5. POOR APPETITE OR OVEREATING: 0
8. MOVING OR SPEAKING SO SLOWLY THAT OTHER PEOPLE COULD HAVE NOTICED. OR THE OPPOSITE, BEING SO FIGETY OR RESTLESS THAT YOU HAVE BEEN MOVING AROUND A LOT MORE THAN USUAL: 0
7. TROUBLE CONCENTRATING ON THINGS, SUCH AS READING THE NEWSPAPER OR WATCHING TELEVISION: 0
4. FEELING TIRED OR HAVING LITTLE ENERGY: 0
SUM OF ALL RESPONSES TO PHQ QUESTIONS 1-9: 2
SUM OF ALL RESPONSES TO PHQ QUESTIONS 1-9: 2
6. FEELING BAD ABOUT YOURSELF - OR THAT YOU ARE A FAILURE OR HAVE LET YOURSELF OR YOUR FAMILY DOWN: 1
SUM OF ALL RESPONSES TO PHQ QUESTIONS 1-9: 2
10. IF YOU CHECKED OFF ANY PROBLEMS, HOW DIFFICULT HAVE THESE PROBLEMS MADE IT FOR YOU TO DO YOUR WORK, TAKE CARE OF THINGS AT HOME, OR GET ALONG WITH OTHER PEOPLE: 0

## 2023-05-31 ASSESSMENT — ANXIETY QUESTIONNAIRES
5. BEING SO RESTLESS THAT IT IS HARD TO SIT STILL: 0
2. NOT BEING ABLE TO STOP OR CONTROL WORRYING: 0
IF YOU CHECKED OFF ANY PROBLEMS ON THIS QUESTIONNAIRE, HOW DIFFICULT HAVE THESE PROBLEMS MADE IT FOR YOU TO DO YOUR WORK, TAKE CARE OF THINGS AT HOME, OR GET ALONG WITH OTHER PEOPLE: NOT DIFFICULT AT ALL
7. FEELING AFRAID AS IF SOMETHING AWFUL MIGHT HAPPEN: 0
3. WORRYING TOO MUCH ABOUT DIFFERENT THINGS: 0
1. FEELING NERVOUS, ANXIOUS, OR ON EDGE: 0
GAD7 TOTAL SCORE: 0
4. TROUBLE RELAXING: 0
6. BECOMING EASILY ANNOYED OR IRRITABLE: 0

## 2023-05-31 NOTE — PATIENT INSTRUCTIONS
No NSAIDs, ASA, plavix, Vitamin E, and fish oil 1 week before surgery unless the surgical team specifies otherwise

## 2023-05-31 NOTE — PROGRESS NOTES
Faxed pre op information
Cardiac clearance on 6/2/23    2. Change in medication regimen before surgery: discussed in detail   Pt instructed to avoid NSAIDs, ASA, MV, Vitamin E 1 week prior to surgery to decrease bleeding risk. 3. Prophylaxis for cardiac events with perioperative beta-blockers: not indicated   4. Invasive hemodynamic monitoring perioperatively: not indicated   5. Deep vein thrombosis prophylaxis postoperatively:regimen to be chosen by surgical team   6. Other measures: none    1. Preop examination  Had labs per his surgeon this morning  Sees Dagoberto Lara Fee for Cardiac clearance on 6/2/23  2. Spinal stenosis, lumbar region with neurogenic claudication  3. DDD (degenerative disc disease), lumbar          4. Class 1 obesity due to excess calories with serious comorbidity and body mass index (BMI) of 32.0 to 32.9 in adult  5. Rheumatoid arthritis involving multiple sites, unspecified whether rheumatoid factor present ([address]  6. CAD S/P percutaneous coronary angioplasty  7. Hypertension associated with type 1 diabetes mellitus ([address]  8. Type 2 diabetes mellitus with diabetic polyneuropathy, with long-term current use of insulin ([address]  9. Diabetic polyneuropathy associated with type 2 diabetes mellitus ([address]      While assessing care for this patient, I have reviewed all pertinent lab work/imaging/ specialist notes and care in reference to those problems addressed above in detail. Appropriate medical decision making was based on this. Please note that portions of this note may have been completed with a voice recognition program. Efforts were made to edit the dictations but occasionally words are mis-transcribed.       Pt is at an acceptable risk for planned procedure    Please call with any questions  Ishmael Peters MD/MS

## 2023-06-01 RX ORDER — ASPIRIN 81 MG/1
81 TABLET ORAL DAILY
Status: ON HOLD | COMMUNITY
End: 2023-06-13 | Stop reason: HOSPADM

## 2023-06-01 NOTE — PROGRESS NOTES
[address]   Cardiac Consultation    Referring Provider:  Tamica Escamilla MD     Chief Complaint   Patient presents with    Cardiac Clearance    Coronary Artery Disease    Hypertension    Hyperlipidemia       Rhona Barksdale   1960     History of Present Illness:   Rhona Barksdale is a 58 y.o. male who is here today for preoperative cardiac risk assessment for back surgery scheduled on 6/12/2023. He has a past medical history of coronary artery disease, abnormal EKG carotid artery disease, hypertension, hyperlipidemia. Patient has a family history of heart diease. He had an echocardiogram on 12/18/2020 which showed an EF of 60%, carotid dopplers less than 50% bilaterally. He had an abnormal stress test and underwent a left heart cath with Dr. Aliyn Tran on 12/30/2020- Successful IVUS-guided PCI of proximal to mid-LAD with overlapping Xience Mariluz 3.0 x 28 mm and 3.5 x 23 mm MING. Patient was seen 7/2022 for preoperative cardiac risk assessment for neck surgery. Stress test ordered to risk stratify which showed no ischemia. Repeat carotid dopplers 11/2022 showed mild plaque. He underwent Left L4-L5 and L5-S1 laminectomy and diskectomy on 11/27/2022. Today she states he has been feeling well since his last visit. He is scheduled for spinal fusion surgery on 6/12/2023. Blood pressure at home is running 120's/70's. A few high readings recently. He is tolerating his medications and is taking them as prescribed. Activity is limited by back pain. Patient currently denies any weight gain, edema, palpitations, chest pain, shortness of breath, dizziness, and syncope.         Past Medical History:   has a past medical history of Acute, but ill-defined, cerebrovascular disease, CAD in native artery, Cerebral artery occlusion with cerebral infarction [facility]), Cerebrovascular disease, Diplopia, ED (erectile dysfunction), Elbow pain, chronic, Hyperlipidemia, Hypertension, Irritable bowel syndrome, Obstructive

## 2023-06-02 ENCOUNTER — OFFICE VISIT (OUTPATIENT)
Dept: CARDIOLOGY CLINIC | Age: 63
End: 2023-06-02

## 2023-06-02 VITALS
SYSTOLIC BLOOD PRESSURE: 154 MMHG | WEIGHT: 221 LBS | BODY MASS INDEX: 31.64 KG/M2 | HEART RATE: 56 BPM | HEIGHT: 70 IN | OXYGEN SATURATION: 96 % | DIASTOLIC BLOOD PRESSURE: 78 MMHG

## 2023-06-02 DIAGNOSIS — I65.23 BILATERAL CAROTID ARTERY STENOSIS: ICD-10-CM

## 2023-06-02 DIAGNOSIS — R01.1 MURMUR: ICD-10-CM

## 2023-06-02 DIAGNOSIS — I25.10 CAD IN NATIVE ARTERY: Primary | ICD-10-CM

## 2023-06-02 DIAGNOSIS — I35.1 NONRHEUMATIC AORTIC VALVE INSUFFICIENCY: ICD-10-CM

## 2023-06-02 DIAGNOSIS — Z01.810 PREOP CARDIOVASCULAR EXAM: ICD-10-CM

## 2023-06-02 NOTE — PATIENT INSTRUCTIONS
Plan:  ~Ok for surgery- you can hold Asprin and Plavix for 7 days- Risk discussed and understood by patient   ~Recommend an echocardiogram which is an ultrasound of your heart to evaluate heart function, structures and valves. Carotid dopplers in 2 years- 2024  Cardiac medications reviewed including indications and pertinent side effects. Medication list updated at this visit.    Check blood pressure and heart rate at home a few times per week- keep a log with dates and times and bring to office visit- goal is for blood pressure to be less than 140/90  Regular exercise and following a healthy diet encouraged   Follow up with me as scheduled in December

## 2023-06-12 ENCOUNTER — HOSPITAL ENCOUNTER (INPATIENT)
Age: 63
LOS: 2 days | Discharge: HOME | DRG: 455 | End: 2023-06-14
Attending: NEUROLOGICAL SURGERY | Admitting: NEUROLOGICAL SURGERY
Payer: COMMERCIAL

## 2023-06-12 ENCOUNTER — APPOINTMENT (OUTPATIENT)
Dept: GENERAL RADIOLOGY | Age: 63
DRG: 455 | End: 2023-06-12
Attending: NEUROLOGICAL SURGERY
Payer: COMMERCIAL

## 2023-06-12 DIAGNOSIS — Z98.1 S/P LUMBAR AND LUMBOSACRAL FUSION BY ANTERIOR TECHNIQUE: Primary | ICD-10-CM

## 2023-06-12 PROBLEM — M48.062 LUMBAR STENOSIS WITH NEUROGENIC CLAUDICATION: Status: ACTIVE | Noted: 2023-06-12

## 2023-06-12 LAB
ABO + RH BLD: NORMAL
APTT BLD: 28.4 SEC (ref 22.7–35.9)
BLD GP AB SCN SERPL QL: NORMAL
GLUCOSE BLD-MCNC: 152 MG/DL (ref 70–99)
GLUCOSE BLD-MCNC: 165 MG/DL (ref 70–99)
GLUCOSE BLD-MCNC: 188 MG/DL (ref 70–99)
GLUCOSE BLD-MCNC: 291 MG/DL (ref 70–99)
INR PPP: 0.92 (ref 0.84–1.16)
PERFORMED ON: ABNORMAL
PROTHROMBIN TIME: 12.4 SEC (ref 11.5–14.8)

## 2023-06-12 PROCEDURE — 2700000000 HC OXYGEN THERAPY PER DAY

## 2023-06-12 PROCEDURE — 0SG30K1 FUSION OF LUMBOSACRAL JOINT WITH NONAUTOLOGOUS TISSUE SUBSTITUTE, POSTERIOR APPROACH, POSTERIOR COLUMN, OPEN APPROACH: ICD-10-PCS | Performed by: NEUROLOGICAL SURGERY

## 2023-06-12 PROCEDURE — 8E0WXBF COMPUTER ASSISTED PROCEDURE OF TRUNK REGION, WITH FLUOROSCOPY: ICD-10-PCS | Performed by: NEUROLOGICAL SURGERY

## 2023-06-12 PROCEDURE — 2580000003 HC RX 258: Performed by: NEUROLOGICAL SURGERY

## 2023-06-12 PROCEDURE — 01NR0ZZ RELEASE SACRAL NERVE, OPEN APPROACH: ICD-10-PCS | Performed by: NEUROLOGICAL SURGERY

## 2023-06-12 PROCEDURE — 7100000000 HC PACU RECOVERY - FIRST 15 MIN: Performed by: NEUROLOGICAL SURGERY

## 2023-06-12 PROCEDURE — A4217 STERILE WATER/SALINE, 500 ML: HCPCS | Performed by: NEUROLOGICAL SURGERY

## 2023-06-12 PROCEDURE — 3600000014 HC SURGERY LEVEL 4 ADDTL 15MIN: Performed by: NEUROLOGICAL SURGERY

## 2023-06-12 PROCEDURE — 72100 X-RAY EXAM L-S SPINE 2/3 VWS: CPT

## 2023-06-12 PROCEDURE — 0SB20ZZ EXCISION OF LUMBAR VERTEBRAL DISC, OPEN APPROACH: ICD-10-PCS | Performed by: NEUROLOGICAL SURGERY

## 2023-06-12 PROCEDURE — C9290 INJ, BUPIVACAINE LIPOSOME: HCPCS | Performed by: NEUROLOGICAL SURGERY

## 2023-06-12 PROCEDURE — 1200000000 HC SEMI PRIVATE

## 2023-06-12 PROCEDURE — 2720000010 HC SURG SUPPLY STERILE: Performed by: NEUROLOGICAL SURGERY

## 2023-06-12 PROCEDURE — 2580000003 HC RX 258: Performed by: FAMILY MEDICINE

## 2023-06-12 PROCEDURE — 3209999900 FLUORO FOR SURGICAL PROCEDURES

## 2023-06-12 PROCEDURE — 94761 N-INVAS EAR/PLS OXIMETRY MLT: CPT

## 2023-06-12 PROCEDURE — 6360000002 HC RX W HCPCS: Performed by: NEUROLOGICAL SURGERY

## 2023-06-12 PROCEDURE — 0SB40ZZ EXCISION OF LUMBOSACRAL DISC, OPEN APPROACH: ICD-10-PCS | Performed by: NEUROLOGICAL SURGERY

## 2023-06-12 PROCEDURE — 86901 BLOOD TYPING SEROLOGIC RH(D): CPT

## 2023-06-12 PROCEDURE — 85730 THROMBOPLASTIN TIME PARTIAL: CPT

## 2023-06-12 PROCEDURE — 6370000000 HC RX 637 (ALT 250 FOR IP): Performed by: PHYSICIAN ASSISTANT

## 2023-06-12 PROCEDURE — 6360000002 HC RX W HCPCS: Performed by: PHYSICIAN ASSISTANT

## 2023-06-12 PROCEDURE — 2500000003 HC RX 250 WO HCPCS: Performed by: NEUROLOGICAL SURGERY

## 2023-06-12 PROCEDURE — 01NB0ZZ RELEASE LUMBAR NERVE, OPEN APPROACH: ICD-10-PCS | Performed by: NEUROLOGICAL SURGERY

## 2023-06-12 PROCEDURE — C1821 INTERSPINOUS IMPLANT: HCPCS | Performed by: NEUROLOGICAL SURGERY

## 2023-06-12 PROCEDURE — 0SG00A0 FUSION OF LUMBAR VERTEBRAL JOINT WITH INTERBODY FUSION DEVICE, ANTERIOR APPROACH, ANTERIOR COLUMN, OPEN APPROACH: ICD-10-PCS | Performed by: NEUROLOGICAL SURGERY

## 2023-06-12 PROCEDURE — C9359 IMPLNT,BON VOID FILLER-PUTTY: HCPCS | Performed by: NEUROLOGICAL SURGERY

## 2023-06-12 PROCEDURE — 3600000004 HC SURGERY LEVEL 4 BASE: Performed by: NEUROLOGICAL SURGERY

## 2023-06-12 PROCEDURE — 86900 BLOOD TYPING SEROLOGIC ABO: CPT

## 2023-06-12 PROCEDURE — 0SG30A0 FUSION OF LUMBOSACRAL JOINT WITH INTERBODY FUSION DEVICE, ANTERIOR APPROACH, ANTERIOR COLUMN, OPEN APPROACH: ICD-10-PCS | Performed by: NEUROLOGICAL SURGERY

## 2023-06-12 PROCEDURE — 6360000002 HC RX W HCPCS: Performed by: ANESTHESIOLOGY

## 2023-06-12 PROCEDURE — 3700000001 HC ADD 15 MINUTES (ANESTHESIA): Performed by: NEUROLOGICAL SURGERY

## 2023-06-12 PROCEDURE — 7100000001 HC PACU RECOVERY - ADDTL 15 MIN: Performed by: NEUROLOGICAL SURGERY

## 2023-06-12 PROCEDURE — 2580000003 HC RX 258: Performed by: PHYSICIAN ASSISTANT

## 2023-06-12 PROCEDURE — 3700000000 HC ANESTHESIA ATTENDED CARE: Performed by: NEUROLOGICAL SURGERY

## 2023-06-12 PROCEDURE — 86850 RBC ANTIBODY SCREEN: CPT

## 2023-06-12 PROCEDURE — 0SG00K1 FUSION OF LUMBAR VERTEBRAL JOINT WITH NONAUTOLOGOUS TISSUE SUBSTITUTE, POSTERIOR APPROACH, POSTERIOR COLUMN, OPEN APPROACH: ICD-10-PCS | Performed by: NEUROLOGICAL SURGERY

## 2023-06-12 PROCEDURE — C1713 ANCHOR/SCREW BN/BN,TIS/BN: HCPCS | Performed by: NEUROLOGICAL SURGERY

## 2023-06-12 PROCEDURE — 2709999900 HC NON-CHARGEABLE SUPPLY: Performed by: NEUROLOGICAL SURGERY

## 2023-06-12 PROCEDURE — 85610 PROTHROMBIN TIME: CPT

## 2023-06-12 PROCEDURE — 00NY0ZZ RELEASE LUMBAR SPINAL CORD, OPEN APPROACH: ICD-10-PCS | Performed by: NEUROLOGICAL SURGERY

## 2023-06-12 DEVICE — BONE GRFT SUB L 12.5CC BIOACTIVE GLS MTRX: Type: IMPLANTABLE DEVICE | Site: SPINE LUMBAR | Status: FUNCTIONAL

## 2023-06-12 DEVICE — SCREW SPNL L45MM OD7MM CORT FIX TI POLYAX FEN EXT TAB MIS: Type: IMPLANTABLE DEVICE | Site: SPINE LUMBAR | Status: FUNCTIONAL

## 2023-06-12 DEVICE — SPACER SPNL M H13.5X7.4MM 14DEG 3.4CC ANTR LUM INTBDY FUS: Type: IMPLANTABLE DEVICE | Site: SPINE LUMBAR | Status: FUNCTIONAL

## 2023-06-12 DEVICE — BONE GRAFT KIT 7510200 INFUSE SMALL
Type: IMPLANTABLE DEVICE | Site: SPINE LUMBAR | Status: FUNCTIONAL
Brand: INFUSE® BONE GRAFT

## 2023-06-12 DEVICE — SET SCR SPNL TI SGL INNR FOR VIPER 2 MINIMALLY INVASIVE: Type: IMPLANTABLE DEVICE | Site: SPINE LUMBAR | Status: FUNCTIONAL

## 2023-06-12 DEVICE — ROD SPNL L65MM TI LORDOSED FOR MINIMALLY INVASIVE SURG SYS: Type: IMPLANTABLE DEVICE | Site: SPINE LUMBAR | Status: FUNCTIONAL

## 2023-06-12 DEVICE — ROD SPNL L70MM DIA5.5MM POST PEDCL TI LORDOSED VIPER 2: Type: IMPLANTABLE DEVICE | Site: SPINE LUMBAR | Status: FUNCTIONAL

## 2023-06-12 DEVICE — SCREW SPNL L50MM OD7MM CORT FIX TI POLYAX FEN EXT TAB MIS: Type: IMPLANTABLE DEVICE | Site: SPINE LUMBAR | Status: FUNCTIONAL

## 2023-06-12 DEVICE — SPACER SPNL M H15X10.9MM 10DEG 4.3CC ANTR LUM INTBDY FUS: Type: IMPLANTABLE DEVICE | Site: SPINE LUMBAR | Status: FUNCTIONAL

## 2023-06-12 DEVICE — SCREW SPNL L20MM DIA4MM SCLEROTIC TI ALLY ST LOK FN TIP: Type: IMPLANTABLE DEVICE | Site: SPINE LUMBAR | Status: FUNCTIONAL

## 2023-06-12 RX ORDER — METHOCARBAMOL 750 MG/1
750 TABLET, FILM COATED ORAL EVERY 8 HOURS PRN
Status: DISCONTINUED | OUTPATIENT
Start: 2023-06-12 | End: 2023-06-13

## 2023-06-12 RX ORDER — SODIUM CHLORIDE 9 MG/ML
INJECTION, SOLUTION INTRAVENOUS PRN
Status: DISCONTINUED | OUTPATIENT
Start: 2023-06-12 | End: 2023-06-14 | Stop reason: HOSPADM

## 2023-06-12 RX ORDER — FENOFIBRATE 54 MG/1
54 TABLET ORAL DAILY
Status: DISCONTINUED | OUTPATIENT
Start: 2023-06-12 | End: 2023-06-14 | Stop reason: HOSPADM

## 2023-06-12 RX ORDER — ATENOLOL 25 MG/1
25 TABLET ORAL DAILY
Status: DISCONTINUED | OUTPATIENT
Start: 2023-06-12 | End: 2023-06-14 | Stop reason: HOSPADM

## 2023-06-12 RX ORDER — ONDANSETRON 2 MG/ML
4 INJECTION INTRAMUSCULAR; INTRAVENOUS
Status: DISCONTINUED | OUTPATIENT
Start: 2023-06-12 | End: 2023-06-12 | Stop reason: HOSPADM

## 2023-06-12 RX ORDER — ONDANSETRON 4 MG/1
4 TABLET, ORALLY DISINTEGRATING ORAL EVERY 8 HOURS PRN
Status: DISCONTINUED | OUTPATIENT
Start: 2023-06-12 | End: 2023-06-14 | Stop reason: HOSPADM

## 2023-06-12 RX ORDER — VANCOMYCIN HYDROCHLORIDE 1 G/20ML
INJECTION, POWDER, LYOPHILIZED, FOR SOLUTION INTRAVENOUS PRN
Status: DISCONTINUED | OUTPATIENT
Start: 2023-06-12 | End: 2023-06-12 | Stop reason: HOSPADM

## 2023-06-12 RX ORDER — FENTANYL CITRATE 50 UG/ML
25 INJECTION, SOLUTION INTRAMUSCULAR; INTRAVENOUS EVERY 5 MIN PRN
Status: DISCONTINUED | OUTPATIENT
Start: 2023-06-12 | End: 2023-06-12 | Stop reason: HOSPADM

## 2023-06-12 RX ORDER — SODIUM CHLORIDE 9 MG/ML
INJECTION, SOLUTION INTRAVENOUS CONTINUOUS
Status: DISCONTINUED | OUTPATIENT
Start: 2023-06-12 | End: 2023-06-13

## 2023-06-12 RX ORDER — ONDANSETRON 2 MG/ML
4 INJECTION INTRAMUSCULAR; INTRAVENOUS EVERY 6 HOURS PRN
Status: DISCONTINUED | OUTPATIENT
Start: 2023-06-12 | End: 2023-06-14 | Stop reason: HOSPADM

## 2023-06-12 RX ORDER — SODIUM CHLORIDE, SODIUM LACTATE, POTASSIUM CHLORIDE, CALCIUM CHLORIDE 600; 310; 30; 20 MG/100ML; MG/100ML; MG/100ML; MG/100ML
INJECTION, SOLUTION INTRAVENOUS CONTINUOUS
Status: DISCONTINUED | OUTPATIENT
Start: 2023-06-12 | End: 2023-06-12 | Stop reason: HOSPADM

## 2023-06-12 RX ORDER — HYDRALAZINE HYDROCHLORIDE 20 MG/ML
10 INJECTION INTRAMUSCULAR; INTRAVENOUS
Status: DISCONTINUED | OUTPATIENT
Start: 2023-06-12 | End: 2023-06-12 | Stop reason: HOSPADM

## 2023-06-12 RX ORDER — DIAZEPAM 5 MG/1
5 TABLET ORAL EVERY 6 HOURS PRN
Status: DISCONTINUED | OUTPATIENT
Start: 2023-06-12 | End: 2023-06-14 | Stop reason: HOSPADM

## 2023-06-12 RX ORDER — SODIUM CHLORIDE 0.9 % (FLUSH) 0.9 %
5-40 SYRINGE (ML) INJECTION EVERY 12 HOURS SCHEDULED
Status: DISCONTINUED | OUTPATIENT
Start: 2023-06-12 | End: 2023-06-14 | Stop reason: HOSPADM

## 2023-06-12 RX ORDER — HYDROMORPHONE HYDROCHLORIDE 1 MG/ML
0.5 INJECTION, SOLUTION INTRAMUSCULAR; INTRAVENOUS; SUBCUTANEOUS EVERY 5 MIN PRN
Status: DISCONTINUED | OUTPATIENT
Start: 2023-06-12 | End: 2023-06-12 | Stop reason: HOSPADM

## 2023-06-12 RX ORDER — LISINOPRIL 20 MG/1
20 TABLET ORAL 2 TIMES DAILY
Status: DISCONTINUED | OUTPATIENT
Start: 2023-06-12 | End: 2023-06-14 | Stop reason: HOSPADM

## 2023-06-12 RX ORDER — ATORVASTATIN CALCIUM 80 MG/1
80 TABLET, FILM COATED ORAL NIGHTLY
Status: DISCONTINUED | OUTPATIENT
Start: 2023-06-12 | End: 2023-06-14 | Stop reason: HOSPADM

## 2023-06-12 RX ORDER — PROCHLORPERAZINE EDISYLATE 5 MG/ML
5 INJECTION INTRAMUSCULAR; INTRAVENOUS
Status: DISCONTINUED | OUTPATIENT
Start: 2023-06-12 | End: 2023-06-12 | Stop reason: HOSPADM

## 2023-06-12 RX ORDER — SODIUM CHLORIDE 9 MG/ML
INJECTION, SOLUTION INTRAVENOUS PRN
Status: DISCONTINUED | OUTPATIENT
Start: 2023-06-12 | End: 2023-06-12 | Stop reason: HOSPADM

## 2023-06-12 RX ORDER — BUPROPION HYDROCHLORIDE 150 MG/1
150 TABLET ORAL EVERY MORNING
Status: DISCONTINUED | OUTPATIENT
Start: 2023-06-13 | End: 2023-06-14 | Stop reason: HOSPADM

## 2023-06-12 RX ORDER — OXYCODONE HYDROCHLORIDE 5 MG/1
5 TABLET ORAL EVERY 4 HOURS PRN
Status: DISCONTINUED | OUTPATIENT
Start: 2023-06-12 | End: 2023-06-14 | Stop reason: HOSPADM

## 2023-06-12 RX ORDER — MORPHINE SULFATE 2 MG/ML
4 INJECTION, SOLUTION INTRAMUSCULAR; INTRAVENOUS
Status: DISCONTINUED | OUTPATIENT
Start: 2023-06-12 | End: 2023-06-14 | Stop reason: HOSPADM

## 2023-06-12 RX ORDER — ACETAMINOPHEN 325 MG/1
650 TABLET ORAL EVERY 6 HOURS
Status: DISCONTINUED | OUTPATIENT
Start: 2023-06-12 | End: 2023-06-14 | Stop reason: HOSPADM

## 2023-06-12 RX ORDER — SODIUM CHLORIDE 0.9 % (FLUSH) 0.9 %
5-40 SYRINGE (ML) INJECTION PRN
Status: DISCONTINUED | OUTPATIENT
Start: 2023-06-12 | End: 2023-06-14 | Stop reason: HOSPADM

## 2023-06-12 RX ORDER — LABETALOL HYDROCHLORIDE 5 MG/ML
10 INJECTION, SOLUTION INTRAVENOUS
Status: DISCONTINUED | OUTPATIENT
Start: 2023-06-12 | End: 2023-06-12 | Stop reason: HOSPADM

## 2023-06-12 RX ORDER — SODIUM CHLORIDE 0.9 % (FLUSH) 0.9 %
5-40 SYRINGE (ML) INJECTION EVERY 12 HOURS SCHEDULED
Status: DISCONTINUED | OUTPATIENT
Start: 2023-06-12 | End: 2023-06-12 | Stop reason: HOSPADM

## 2023-06-12 RX ORDER — SODIUM CHLORIDE 0.9 % (FLUSH) 0.9 %
5-40 SYRINGE (ML) INJECTION PRN
Status: DISCONTINUED | OUTPATIENT
Start: 2023-06-12 | End: 2023-06-12 | Stop reason: HOSPADM

## 2023-06-12 RX ORDER — ENOXAPARIN SODIUM 100 MG/ML
40 INJECTION SUBCUTANEOUS DAILY
Status: DISCONTINUED | OUTPATIENT
Start: 2023-06-13 | End: 2023-06-14 | Stop reason: HOSPADM

## 2023-06-12 RX ORDER — OXYCODONE HYDROCHLORIDE 5 MG/1
10 TABLET ORAL EVERY 4 HOURS PRN
Status: DISCONTINUED | OUTPATIENT
Start: 2023-06-12 | End: 2023-06-14 | Stop reason: HOSPADM

## 2023-06-12 RX ORDER — MORPHINE SULFATE 2 MG/ML
2 INJECTION, SOLUTION INTRAMUSCULAR; INTRAVENOUS
Status: DISCONTINUED | OUTPATIENT
Start: 2023-06-12 | End: 2023-06-14 | Stop reason: HOSPADM

## 2023-06-12 RX ADMIN — SODIUM CHLORIDE, POTASSIUM CHLORIDE, SODIUM LACTATE AND CALCIUM CHLORIDE: 600; 310; 30; 20 INJECTION, SOLUTION INTRAVENOUS at 06:15

## 2023-06-12 RX ADMIN — FENOFIBRATE 54 MG: 54 TABLET, FILM COATED ORAL at 20:23

## 2023-06-12 RX ADMIN — OXYCODONE HYDROCHLORIDE 10 MG: 5 TABLET ORAL at 14:06

## 2023-06-12 RX ADMIN — LISINOPRIL 20 MG: 20 TABLET ORAL at 20:14

## 2023-06-12 RX ADMIN — SODIUM CHLORIDE, POTASSIUM CHLORIDE, SODIUM LACTATE AND CALCIUM CHLORIDE: 600; 310; 30; 20 INJECTION, SOLUTION INTRAVENOUS at 06:00

## 2023-06-12 RX ADMIN — SODIUM CHLORIDE, PRESERVATIVE FREE 10 ML: 5 INJECTION INTRAVENOUS at 20:18

## 2023-06-12 RX ADMIN — ATORVASTATIN CALCIUM 80 MG: 80 TABLET, FILM COATED ORAL at 20:14

## 2023-06-12 RX ADMIN — FENTANYL CITRATE 25 MCG: 50 INJECTION, SOLUTION INTRAMUSCULAR; INTRAVENOUS at 12:00

## 2023-06-12 RX ADMIN — HYDROMORPHONE HYDROCHLORIDE 0.5 MG: 1 INJECTION, SOLUTION INTRAMUSCULAR; INTRAVENOUS; SUBCUTANEOUS at 12:00

## 2023-06-12 RX ADMIN — SODIUM CHLORIDE: 9 INJECTION, SOLUTION INTRAVENOUS at 17:02

## 2023-06-12 RX ADMIN — OXYCODONE HYDROCHLORIDE 10 MG: 5 TABLET ORAL at 22:28

## 2023-06-12 RX ADMIN — ACETAMINOPHEN 650 MG: 325 TABLET ORAL at 20:13

## 2023-06-12 RX ADMIN — HYDROMORPHONE HYDROCHLORIDE 0.5 MG: 1 INJECTION, SOLUTION INTRAMUSCULAR; INTRAVENOUS; SUBCUTANEOUS at 12:06

## 2023-06-12 RX ADMIN — CEFAZOLIN 2000 MG: 2 INJECTION, POWDER, FOR SOLUTION INTRAMUSCULAR; INTRAVENOUS at 20:21

## 2023-06-12 RX ADMIN — ATENOLOL 25 MG: 25 TABLET ORAL at 20:13

## 2023-06-12 RX ADMIN — FENTANYL CITRATE 25 MCG: 50 INJECTION, SOLUTION INTRAMUSCULAR; INTRAVENOUS at 12:25

## 2023-06-12 RX ADMIN — FENTANYL CITRATE 25 MCG: 50 INJECTION, SOLUTION INTRAMUSCULAR; INTRAVENOUS at 12:06

## 2023-06-12 ASSESSMENT — PAIN SCALES - GENERAL
PAINLEVEL_OUTOF10: 7
PAINLEVEL_OUTOF10: 7
PAINLEVEL_OUTOF10: 4
PAINLEVEL_OUTOF10: 3
PAINLEVEL_OUTOF10: 1
PAINLEVEL_OUTOF10: 6
PAINLEVEL_OUTOF10: 7
PAINLEVEL_OUTOF10: 3
PAINLEVEL_OUTOF10: 2
PAINLEVEL_OUTOF10: 4
PAINLEVEL_OUTOF10: 4

## 2023-06-12 ASSESSMENT — PAIN - FUNCTIONAL ASSESSMENT
PAIN_FUNCTIONAL_ASSESSMENT: PREVENTS OR INTERFERES SOME ACTIVE ACTIVITIES AND ADLS
PAIN_FUNCTIONAL_ASSESSMENT: PREVENTS OR INTERFERES SOME ACTIVE ACTIVITIES AND ADLS
PAIN_FUNCTIONAL_ASSESSMENT: ACTIVITIES ARE NOT PREVENTED
PAIN_FUNCTIONAL_ASSESSMENT: 0-10
PAIN_FUNCTIONAL_ASSESSMENT: PREVENTS OR INTERFERES SOME ACTIVE ACTIVITIES AND ADLS
PAIN_FUNCTIONAL_ASSESSMENT: ACTIVITIES ARE NOT PREVENTED
PAIN_FUNCTIONAL_ASSESSMENT: ACTIVITIES ARE NOT PREVENTED

## 2023-06-12 ASSESSMENT — PAIN DESCRIPTION - PAIN TYPE
TYPE: SURGICAL PAIN

## 2023-06-12 ASSESSMENT — PAIN DESCRIPTION - ORIENTATION
ORIENTATION: LOWER;MID
ORIENTATION: MID
ORIENTATION: MID;LOWER
ORIENTATION: MID

## 2023-06-12 ASSESSMENT — PAIN DESCRIPTION - LOCATION
LOCATION: BACK

## 2023-06-12 ASSESSMENT — PAIN DESCRIPTION - DESCRIPTORS
DESCRIPTORS: DISCOMFORT
DESCRIPTORS: ACHING
DESCRIPTORS: ACHING
DESCRIPTORS: ACHING;THROBBING
DESCRIPTORS: ACHING

## 2023-06-12 ASSESSMENT — PAIN DESCRIPTION - FREQUENCY
FREQUENCY: CONTINUOUS

## 2023-06-12 ASSESSMENT — PAIN DESCRIPTION - ONSET
ONSET: ON-GOING

## 2023-06-12 ASSESSMENT — PAIN SCALES - WONG BAKER: WONGBAKER_NUMERICALRESPONSE: 0

## 2023-06-12 NOTE — OP NOTE
Operative Report    PATIENT NAME: Adilene Garcia OF BIRTH: 1960  MEDICAL RECORD# [id number]  SURGERY DATE: 6/12/2023  SURGEON:  Felice Galloway MD, PhD  ASSISTANT:  Saw Hitchcock PA-C., served as assistant on this surgery due to the complex nature of the surgery and the lack of a resident or fellow assistant. She provided assistance with critical tissue retraction at parts of the surgery, wound closure and assistance with protecting critical neuro elements  DICTATED BY: Felice Galloway MD        CO-SURGEON: Pramod Peralta MD, He provided assistance with exposure, placement of implants and manipulation of the great vessels during the anterior portion of the operation. PREOPERATIVE DIAGNOSIS:  1. Spondylolisthesis L4-5 and L5-S1 associated with central, foraminal and subarticular stenosis and radiculopathy     POSTOPERATIVE DIAGNOSIS:  1.   Same. PROCEDURES PERFORMED:  1. Anterior diskectomy at L4-5, retroperitoneal approach performed by Dr. Haseeb Garcia with interbody fusion   2. Placement of Synthes SynFix ALIF lordosed cage with a medium footprint, packed with Infuse/Fibergraft, into the L4-5 interspace  3. Anterior diskectomy at L5-S1, retroperitoneal approach performed by Dr. Haseeb Garcia with interbody fusion   4. Placement of Synthes SynFix ALIF lordosed cage with a medium footprint, packed with Infuse/Fibergraft, into the L5-S1 interspace  4. Placement of percutaneous Viper pedicle screws and rods into the pedicles of L4-S1 bilaterally. 6.  Posterolateral facet arthrodesis at L4-S1  7. Intraoperative monitoring for motor and sensory potentials, stimulation of lumbosacral plexus, stimulation of pedicle screws  8. Stereotactic computer-aided navigation using Brainlab AIRO and curve, intraoperative fluoroscopy   9. Right L4-5 facetectomy and decompression  10.  Microdissection     ANESTHESIA:  General     IMPLANTS:  Synthes SynFix ALIF grafts at L4-5 and L5-S1  L4-5: Medium ALIF SynFix cage,

## 2023-06-12 NOTE — OP NOTE
Operative Note      Patient: Ranny Castleman  YOB: 1960  MRN: [id number]    Date of Procedure: 6/12/2023    Pre-Op Diagnosis Codes:     * Lumbar spondylosis [M47.816]    Post-Op Diagnosis: Same       Procedure(s):  LUMBAR 4 - SACRAL 1 ANTERIOR LUMBAR INTERBODY FUSION WITH PEDICLE SCREW FIXATION/ LUMBAR 5 - SACRAL 1 LEFT FACETECTOMY  . Surgeon(s):  MD Tory Paz MD    Assistant:   Surgical Assistant: Ashli Tolliver    Anesthesia: General    Estimated Blood Loss (mL): Minimal    Complications: None    Specimens:   * No specimens in log *    Implants:  * No implants in log *      Drains:   Urinary Catheter 06/12/23 Martins (Active)         Detailed Description of Procedure: The patient is a 58-year-old male with  longstanding history of chronic back and leg pain. He was evaluated by  Dr. Amilcar Reagan and was felt to require anterior fusion of the L4-L5 and  L5-S1 disk spaces. I met the patient preoperatively and had an  extensive discussion with him regarding the risks related to exposure. Risks discussed included bleeding; infection; the possibility of bowel,  bladder, or ureteral injuries; possibility of nerve damage;  paresthesias; hernias or lymph leaks; the possibility of arterial or  venous thrombosis requiring thrombectomy or bypass, and the possibility  of retroperitoneal fluid collection requiring drainage were all  extensively discussed. The patient understood these risks and wished to  proceed. PROCEDURE IN DETAIL:  The patient was taken to the operating room,  placed on the operating table in supine position. General endotracheal  anesthesia was induced. IV antibiotics were administered and Martins  catheter was placed. Preoperative localization of disk space was  carried out with fluoroscopy. The abdomen was then prepped and draped  in the usual sterile fashion. A midline incision was made extending  from the umbilicus to just above the pubic symphysis.

## 2023-06-12 NOTE — H&P
I saw and examined Christopher Martinez on 6/12/2023. I have reviewed the pre-operative history and physical and discussed the surgical procedure including the risks. There has been no change to the history or physical in the interval time since consent. They wish to proceed with the planned procedure.      Elie Hendricks MD, PhD  Glynn Slade  Director, [address], [facility] [address] (c), [phone] (o)

## 2023-06-12 NOTE — PLAN OF CARE
Problem: Pain  Goal: Verbalizes/displays adequate comfort level or baseline comfort level  Outcome: Progressing  Flowsheets (Taken 6/12/2023 1518)  Verbalizes/displays adequate comfort level or baseline comfort level:   Encourage patient to monitor pain and request assistance   Assess pain using appropriate pain scale  Note: Patient's pain will be monitored and controlled by utilizing both non pharmacological and pharmacological  measures     Problem: Safety - Adult  Goal: Free from fall injury  Outcome: Progressing  Note: Patient will remain free from falls by utilizing all safety measures

## 2023-06-13 ENCOUNTER — PATIENT MESSAGE (OUTPATIENT)
Dept: PHARMACY | Facility: CLINIC | Age: 63
End: 2023-06-13

## 2023-06-13 LAB
DEPRECATED RDW RBC AUTO: 13.9 % (ref 12.4–15.4)
GLUCOSE BLD-MCNC: 207 MG/DL (ref 70–99)
GLUCOSE BLD-MCNC: 235 MG/DL (ref 70–99)
GLUCOSE BLD-MCNC: 237 MG/DL (ref 70–99)
GLUCOSE BLD-MCNC: 278 MG/DL (ref 70–99)
HCT VFR BLD AUTO: 36.1 % (ref 40.5–52.5)
HGB BLD-MCNC: 12.3 G/DL (ref 13.5–17.5)
MCH RBC QN AUTO: 30.1 PG (ref 26–34)
MCHC RBC AUTO-ENTMCNC: 34 G/DL (ref 31–36)
MCV RBC AUTO: 88.5 FL (ref 80–100)
PERFORMED ON: ABNORMAL
PLATELET # BLD AUTO: 242 K/UL (ref 135–450)
PMV BLD AUTO: 8 FL (ref 5–10.5)
RBC # BLD AUTO: 4.08 M/UL (ref 4.2–5.9)
WBC # BLD AUTO: 11.1 K/UL (ref 4–11)

## 2023-06-13 PROCEDURE — 1200000000 HC SEMI PRIVATE

## 2023-06-13 PROCEDURE — 85027 COMPLETE CBC AUTOMATED: CPT

## 2023-06-13 PROCEDURE — 2580000003 HC RX 258: Performed by: PHYSICIAN ASSISTANT

## 2023-06-13 PROCEDURE — 6370000000 HC RX 637 (ALT 250 FOR IP): Performed by: NURSE PRACTITIONER

## 2023-06-13 PROCEDURE — 99024 POSTOP FOLLOW-UP VISIT: CPT | Performed by: NURSE PRACTITIONER

## 2023-06-13 PROCEDURE — 6360000002 HC RX W HCPCS: Performed by: NURSE PRACTITIONER

## 2023-06-13 PROCEDURE — 97530 THERAPEUTIC ACTIVITIES: CPT

## 2023-06-13 PROCEDURE — 97161 PT EVAL LOW COMPLEX 20 MIN: CPT

## 2023-06-13 PROCEDURE — 97165 OT EVAL LOW COMPLEX 30 MIN: CPT

## 2023-06-13 PROCEDURE — 97116 GAIT TRAINING THERAPY: CPT

## 2023-06-13 PROCEDURE — 97535 SELF CARE MNGMENT TRAINING: CPT

## 2023-06-13 PROCEDURE — 6370000000 HC RX 637 (ALT 250 FOR IP): Performed by: PHYSICIAN ASSISTANT

## 2023-06-13 PROCEDURE — 6360000002 HC RX W HCPCS: Performed by: PHYSICIAN ASSISTANT

## 2023-06-13 PROCEDURE — 6370000000 HC RX 637 (ALT 250 FOR IP): Performed by: INTERNAL MEDICINE

## 2023-06-13 PROCEDURE — 36415 COLL VENOUS BLD VENIPUNCTURE: CPT

## 2023-06-13 PROCEDURE — APPNB30 APP NON BILLABLE TIME 0-30 MINS: Performed by: NURSE PRACTITIONER

## 2023-06-13 RX ORDER — OXYCODONE HYDROCHLORIDE 5 MG/1
5 TABLET ORAL EVERY 6 HOURS PRN
Qty: 28 TABLET | Refills: 0 | Status: SHIPPED | OUTPATIENT
Start: 2023-06-13 | End: 2023-06-20

## 2023-06-13 RX ORDER — DEXTROSE MONOHYDRATE 100 MG/ML
INJECTION, SOLUTION INTRAVENOUS CONTINUOUS PRN
Status: DISCONTINUED | OUTPATIENT
Start: 2023-06-13 | End: 2023-06-14 | Stop reason: HOSPADM

## 2023-06-13 RX ORDER — SENNA AND DOCUSATE SODIUM 50; 8.6 MG/1; MG/1
2 TABLET, FILM COATED ORAL 2 TIMES DAILY
COMMUNITY
Start: 2023-06-13

## 2023-06-13 RX ORDER — POLYETHYLENE GLYCOL 3350 17 G/17G
17 POWDER, FOR SOLUTION ORAL DAILY
Refills: 1 | COMMUNITY
Start: 2023-06-13 | End: 2023-07-13

## 2023-06-13 RX ORDER — METHOCARBAMOL 750 MG/1
750 TABLET, FILM COATED ORAL EVERY 6 HOURS
Status: DISCONTINUED | OUTPATIENT
Start: 2023-06-13 | End: 2023-06-14 | Stop reason: HOSPADM

## 2023-06-13 RX ORDER — GLUCAGON 1 MG/ML
1 KIT INJECTION PRN
Status: DISCONTINUED | OUTPATIENT
Start: 2023-06-13 | End: 2023-06-14 | Stop reason: HOSPADM

## 2023-06-13 RX ORDER — POLYETHYLENE GLYCOL 3350 17 G/17G
17 POWDER, FOR SOLUTION ORAL DAILY
Status: DISCONTINUED | OUTPATIENT
Start: 2023-06-13 | End: 2023-06-14 | Stop reason: HOSPADM

## 2023-06-13 RX ORDER — METHOCARBAMOL 750 MG/1
750 TABLET, FILM COATED ORAL EVERY 6 HOURS PRN
Qty: 40 TABLET | Refills: 0 | Status: SHIPPED | OUTPATIENT
Start: 2023-06-13 | End: 2023-06-23

## 2023-06-13 RX ORDER — SENNA AND DOCUSATE SODIUM 50; 8.6 MG/1; MG/1
2 TABLET, FILM COATED ORAL 2 TIMES DAILY
Status: DISCONTINUED | OUTPATIENT
Start: 2023-06-13 | End: 2023-06-14 | Stop reason: HOSPADM

## 2023-06-13 RX ORDER — METOCLOPRAMIDE HYDROCHLORIDE 5 MG/ML
10 INJECTION INTRAMUSCULAR; INTRAVENOUS EVERY 6 HOURS
Status: DISCONTINUED | OUTPATIENT
Start: 2023-06-13 | End: 2023-06-14 | Stop reason: HOSPADM

## 2023-06-13 RX ORDER — DIAZEPAM 5 MG/1
5 TABLET ORAL EVERY 6 HOURS PRN
Qty: 20 TABLET | Refills: 0 | Status: SHIPPED | OUTPATIENT
Start: 2023-06-13 | End: 2023-06-23

## 2023-06-13 RX ADMIN — CEFAZOLIN 2000 MG: 2 INJECTION, POWDER, FOR SOLUTION INTRAMUSCULAR; INTRAVENOUS at 04:48

## 2023-06-13 RX ADMIN — LISINOPRIL 20 MG: 20 TABLET ORAL at 21:19

## 2023-06-13 RX ADMIN — DOCUSATE SODIUM 50 MG AND SENNOSIDES 8.6 MG 2 TABLET: 8.6; 5 TABLET, FILM COATED ORAL at 07:55

## 2023-06-13 RX ADMIN — DIAZEPAM 5 MG: 5 TABLET ORAL at 11:07

## 2023-06-13 RX ADMIN — ACETAMINOPHEN 650 MG: 325 TABLET ORAL at 07:55

## 2023-06-13 RX ADMIN — OXYCODONE HYDROCHLORIDE 10 MG: 5 TABLET ORAL at 13:20

## 2023-06-13 RX ADMIN — METHOCARBAMOL 750 MG: 750 TABLET ORAL at 13:15

## 2023-06-13 RX ADMIN — MORPHINE SULFATE 4 MG: 2 INJECTION, SOLUTION INTRAMUSCULAR; INTRAVENOUS at 22:07

## 2023-06-13 RX ADMIN — ACETAMINOPHEN 650 MG: 325 TABLET ORAL at 13:15

## 2023-06-13 RX ADMIN — METHOCARBAMOL 750 MG: 750 TABLET ORAL at 01:40

## 2023-06-13 RX ADMIN — METHOCARBAMOL 750 MG: 750 TABLET ORAL at 21:19

## 2023-06-13 RX ADMIN — ACETAMINOPHEN 650 MG: 325 TABLET ORAL at 21:18

## 2023-06-13 RX ADMIN — METHOCARBAMOL 750 MG: 750 TABLET ORAL at 07:55

## 2023-06-13 RX ADMIN — INSULIN HUMAN 35 UNITS: 500 INJECTION, SOLUTION SUBCUTANEOUS at 17:27

## 2023-06-13 RX ADMIN — OXYCODONE HYDROCHLORIDE 10 MG: 5 TABLET ORAL at 17:38

## 2023-06-13 RX ADMIN — POLYETHYLENE GLYCOL 3350 17 G: 17 POWDER, FOR SOLUTION ORAL at 07:57

## 2023-06-13 RX ADMIN — METFORMIN HYDROCHLORIDE 1000 MG: 1000 TABLET ORAL at 07:56

## 2023-06-13 RX ADMIN — METOCLOPRAMIDE HYDROCHLORIDE 10 MG: 5 INJECTION INTRAMUSCULAR; INTRAVENOUS at 21:24

## 2023-06-13 RX ADMIN — BUPROPION HYDROCHLORIDE 150 MG: 150 TABLET, FILM COATED, EXTENDED RELEASE ORAL at 07:56

## 2023-06-13 RX ADMIN — ACETAMINOPHEN 650 MG: 325 TABLET ORAL at 02:50

## 2023-06-13 RX ADMIN — SODIUM CHLORIDE, PRESERVATIVE FREE 10 ML: 5 INJECTION INTRAVENOUS at 21:24

## 2023-06-13 RX ADMIN — SODIUM CHLORIDE, PRESERVATIVE FREE 10 ML: 5 INJECTION INTRAVENOUS at 07:57

## 2023-06-13 RX ADMIN — FENOFIBRATE 54 MG: 54 TABLET, FILM COATED ORAL at 09:40

## 2023-06-13 RX ADMIN — ATORVASTATIN CALCIUM 80 MG: 80 TABLET, FILM COATED ORAL at 21:19

## 2023-06-13 RX ADMIN — SODIUM CHLORIDE: 9 INJECTION, SOLUTION INTRAVENOUS at 01:43

## 2023-06-13 RX ADMIN — ENOXAPARIN SODIUM 40 MG: 100 INJECTION SUBCUTANEOUS at 07:56

## 2023-06-13 RX ADMIN — DOCUSATE SODIUM 50 MG AND SENNOSIDES 8.6 MG 2 TABLET: 8.6; 5 TABLET, FILM COATED ORAL at 21:18

## 2023-06-13 RX ADMIN — ATENOLOL 25 MG: 25 TABLET ORAL at 07:55

## 2023-06-13 RX ADMIN — LISINOPRIL 20 MG: 20 TABLET ORAL at 07:55

## 2023-06-13 ASSESSMENT — PAIN - FUNCTIONAL ASSESSMENT
PAIN_FUNCTIONAL_ASSESSMENT: PREVENTS OR INTERFERES SOME ACTIVE ACTIVITIES AND ADLS

## 2023-06-13 ASSESSMENT — PAIN DESCRIPTION - DESCRIPTORS
DESCRIPTORS: ACHING

## 2023-06-13 ASSESSMENT — PAIN SCALES - GENERAL
PAINLEVEL_OUTOF10: 3
PAINLEVEL_OUTOF10: 6
PAINLEVEL_OUTOF10: 5
PAINLEVEL_OUTOF10: 4
PAINLEVEL_OUTOF10: 5
PAINLEVEL_OUTOF10: 7
PAINLEVEL_OUTOF10: 4
PAINLEVEL_OUTOF10: 5
PAINLEVEL_OUTOF10: 2
PAINLEVEL_OUTOF10: 3

## 2023-06-13 ASSESSMENT — PAIN DESCRIPTION - FREQUENCY
FREQUENCY: CONTINUOUS

## 2023-06-13 ASSESSMENT — PAIN DESCRIPTION - ONSET
ONSET: ON-GOING

## 2023-06-13 ASSESSMENT — PAIN DESCRIPTION - ORIENTATION
ORIENTATION: MID

## 2023-06-13 ASSESSMENT — PAIN DESCRIPTION - PAIN TYPE
TYPE: SURGICAL PAIN

## 2023-06-13 ASSESSMENT — PAIN DESCRIPTION - LOCATION
LOCATION: BACK

## 2023-06-13 NOTE — CONSULTS
Consult Note            Date:6/13/2023        Patient Name:José Miguel Antonio     YOB: 1960     Age:63 y.o. Reason for Consult:  Management of DM     Chief Complaint   Admitted for elective spine surgery        History Obtained From   patient    History of Present Illness      56-year-old male with a past medical history of diabetes mellitus on insulin (U-500), admitted for elective lumbosacral spinal fusion surgery. Patient is postop day 1. His pain is controlled. Patient denies any chest pain, shortness of breath. Patient denies any nausea or vomiting. Patient has ambulated to the bathroom. He is expecting he will be able to be go home by tomorrow. He denies any other complaints. Neurosurgery team has consulted us for management of hyperglycemia secondary to diabetes mellitus. At home he takes U-500 80 units at breakfast, 50 units at lunch and 50 units at dinner.     Past Medical History     Past Medical History:   Diagnosis Date    Acute, but ill-defined, cerebrovascular disease 05/31/2013    CAD in native artery 12/30/2020    Cerebral artery occlusion with cerebral infarction [facility]) 2013     X2 PERSONALITY CHANGE, ANGER OUTBURSTS    Cerebrovascular disease     Diplopia 07/11/2013    ED (erectile dysfunction) 01/23/2014    Elbow pain, chronic 12/16/2015    Hyperlipidemia     Hypertension     Irritable bowel syndrome     Obstructive sleep apnea (adult) (pediatric) 07/01/2013    NO CPAP, HAD PROBLEMS WITH INSURANCE AND STOPPED USING    Occlusion and stenosis of carotid artery without mention of cerebral infarction 01/13/2014    MINOR    Pain in joint, upper arm 05/14/2015    Polyneuropathy in diabetes(357.2) 07/01/2013    PONV (postoperative nausea and vomiting)     Skin abnormality     PRE-CANCEROUS LESIONS REMOVED FROM ARMS AND EARS    Type 2 diabetes mellitus with diabetic polyneuropathy, with long-term current use of insulin ([facility] [address] 07/01/2013    Type II or unspecified type diabetes

## 2023-06-13 NOTE — PLAN OF CARE
Problem: Pain  Goal: Verbalizes/displays adequate comfort level or baseline comfort level  6/13/2023 0125 by Renetta Christine RN  Outcome: Progressing  Note: Pain managed via nonpharm measures and medication per [facility]. Problem: Safety - Adult  Goal: Free from fall injury  6/13/2023 0125 by Renetta Christine RN  Outcome: Progressing  Note: Fall precautions in place. Bed alarm on, bed in lowest position, call light within reach, non slip socks, hourly rounding.

## 2023-06-13 NOTE — CARE COORDINATION
Patient is from home with family, independent pta, has needed DME at home. Patient denies any CM needs at this time. Family to transport home at discharge.     Jess Drew, RN, BSN,    Ortho/Neuro   [phone]

## 2023-06-13 NOTE — PLAN OF CARE
Problem: Chronic Conditions and Co-morbidities  Goal: Patient's chronic conditions and co-morbidity symptoms are monitored and maintained or improved  Outcome: Progressing     Problem: Discharge Planning  Goal: Discharge to home or other facility with appropriate resources  Outcome: Progressing     Problem: Pain  Goal: Verbalizes/displays adequate comfort level or baseline comfort level  6/13/2023 0911 by Anna Zepeda RN  Outcome: Progressing

## 2023-06-13 NOTE — CONSULTS
Clinical Pharmacy Progress Note    Admit date: 6/12/2023     Subjective/Objective:  Gisselle Hurst is a 58 y.o. y/o male with a PMHx that includes DM2, peripheral neuropathy, HTN, DARRICK who is admitted with s/p lumbar-sacral interbody fusion 6/12. Home DM medications include:   Jardiance 10mg daily  Metformin 1000mg BID  Insulin U-500 kwikpen - 80 units with breakfast, 50 units with lunch and 50 units with dinner - confirmed dosing with pt       Discussed with Dr. Derick Macias - reducing home insulin dose by 30% while admitted. See orders.     Please call with questions--  Uyen Pal PharmD, BCPS  Wireless: [id number]   6/13/2023 11:33 AM

## 2023-06-14 VITALS
HEIGHT: 70 IN | BODY MASS INDEX: 31.78 KG/M2 | WEIGHT: 222 LBS | SYSTOLIC BLOOD PRESSURE: 132 MMHG | RESPIRATION RATE: 18 BRPM | OXYGEN SATURATION: 92 % | TEMPERATURE: 98 F | HEART RATE: 90 BPM | DIASTOLIC BLOOD PRESSURE: 74 MMHG

## 2023-06-14 LAB
GLUCOSE BLD-MCNC: 238 MG/DL (ref 70–99)
GLUCOSE BLD-MCNC: 242 MG/DL (ref 70–99)
PERFORMED ON: ABNORMAL
PERFORMED ON: ABNORMAL

## 2023-06-14 PROCEDURE — 99024 POSTOP FOLLOW-UP VISIT: CPT | Performed by: NURSE PRACTITIONER

## 2023-06-14 PROCEDURE — 6370000000 HC RX 637 (ALT 250 FOR IP): Performed by: INTERNAL MEDICINE

## 2023-06-14 PROCEDURE — 6360000002 HC RX W HCPCS: Performed by: NURSE PRACTITIONER

## 2023-06-14 PROCEDURE — APPNB30 APP NON BILLABLE TIME 0-30 MINS: Performed by: NURSE PRACTITIONER

## 2023-06-14 PROCEDURE — 2580000003 HC RX 258: Performed by: PHYSICIAN ASSISTANT

## 2023-06-14 PROCEDURE — 6360000002 HC RX W HCPCS: Performed by: PHYSICIAN ASSISTANT

## 2023-06-14 PROCEDURE — 6370000000 HC RX 637 (ALT 250 FOR IP): Performed by: PHYSICIAN ASSISTANT

## 2023-06-14 PROCEDURE — 6370000000 HC RX 637 (ALT 250 FOR IP): Performed by: NURSE PRACTITIONER

## 2023-06-14 RX ADMIN — LISINOPRIL 20 MG: 20 TABLET ORAL at 08:50

## 2023-06-14 RX ADMIN — ACETAMINOPHEN 650 MG: 325 TABLET ORAL at 08:51

## 2023-06-14 RX ADMIN — METOCLOPRAMIDE HYDROCHLORIDE 10 MG: 5 INJECTION INTRAMUSCULAR; INTRAVENOUS at 03:45

## 2023-06-14 RX ADMIN — METOCLOPRAMIDE HYDROCHLORIDE 10 MG: 5 INJECTION INTRAMUSCULAR; INTRAVENOUS at 08:51

## 2023-06-14 RX ADMIN — BUPROPION HYDROCHLORIDE 150 MG: 150 TABLET, FILM COATED, EXTENDED RELEASE ORAL at 08:51

## 2023-06-14 RX ADMIN — ENOXAPARIN SODIUM 40 MG: 100 INJECTION SUBCUTANEOUS at 08:51

## 2023-06-14 RX ADMIN — ACETAMINOPHEN 650 MG: 325 TABLET ORAL at 02:08

## 2023-06-14 RX ADMIN — OXYCODONE HYDROCHLORIDE 10 MG: 5 TABLET ORAL at 07:39

## 2023-06-14 RX ADMIN — ATENOLOL 25 MG: 25 TABLET ORAL at 08:51

## 2023-06-14 RX ADMIN — METHOCARBAMOL 750 MG: 750 TABLET ORAL at 08:50

## 2023-06-14 RX ADMIN — METHOCARBAMOL 750 MG: 750 TABLET ORAL at 02:08

## 2023-06-14 RX ADMIN — METFORMIN HYDROCHLORIDE 1000 MG: 1000 TABLET ORAL at 08:50

## 2023-06-14 RX ADMIN — SODIUM CHLORIDE, PRESERVATIVE FREE 10 ML: 5 INJECTION INTRAVENOUS at 08:52

## 2023-06-14 RX ADMIN — INSULIN HUMAN 56 UNITS: 500 INJECTION, SOLUTION SUBCUTANEOUS at 10:35

## 2023-06-14 RX ADMIN — FENOFIBRATE 54 MG: 54 TABLET, FILM COATED ORAL at 08:50

## 2023-06-14 ASSESSMENT — PAIN SCALES - GENERAL
PAINLEVEL_OUTOF10: 2
PAINLEVEL_OUTOF10: 2
PAINLEVEL_OUTOF10: 7
PAINLEVEL_OUTOF10: 5
PAINLEVEL_OUTOF10: 5
PAINLEVEL_OUTOF10: 7

## 2023-06-14 ASSESSMENT — PAIN DESCRIPTION - ONSET
ONSET: ON-GOING

## 2023-06-14 ASSESSMENT — PAIN DESCRIPTION - DESCRIPTORS
DESCRIPTORS: ACHING
DESCRIPTORS: ACHING
DESCRIPTORS: ACHING;DISCOMFORT

## 2023-06-14 ASSESSMENT — PAIN SCALES - WONG BAKER: WONGBAKER_NUMERICALRESPONSE: 2

## 2023-06-14 ASSESSMENT — PAIN DESCRIPTION - ORIENTATION
ORIENTATION: MID

## 2023-06-14 ASSESSMENT — PAIN DESCRIPTION - PAIN TYPE
TYPE: SURGICAL PAIN

## 2023-06-14 ASSESSMENT — PAIN - FUNCTIONAL ASSESSMENT
PAIN_FUNCTIONAL_ASSESSMENT: PREVENTS OR INTERFERES SOME ACTIVE ACTIVITIES AND ADLS
PAIN_FUNCTIONAL_ASSESSMENT: PREVENTS OR INTERFERES WITH MANY ACTIVE NOT PASSIVE ACTIVITIES
PAIN_FUNCTIONAL_ASSESSMENT: PREVENTS OR INTERFERES SOME ACTIVE ACTIVITIES AND ADLS

## 2023-06-14 ASSESSMENT — PAIN DESCRIPTION - LOCATION
LOCATION: BACK

## 2023-06-14 ASSESSMENT — PAIN DESCRIPTION - FREQUENCY
FREQUENCY: CONTINUOUS
FREQUENCY: INTERMITTENT
FREQUENCY: CONTINUOUS

## 2023-06-14 NOTE — PLAN OF CARE
Problem: Discharge Planning  Goal: Discharge to home or other facility with appropriate resources  6/14/2023 1010 by Jairo Monte RN  Outcome: Progressing  Flowsheets (Taken 6/14/2023 1010)  Discharge to home or other facility with appropriate resources:   Identify barriers to discharge with patient and caregiver   Identify discharge learning needs (meds, wound care, etc)   Arrange for interpreters to assist at discharge as needed   Refer to discharge planning if patient needs post-hospital services based on physician order or complex needs related to functional status, cognitive ability or social support system   Arrange for needed discharge resources and transportation as appropriate  6/14/2023 0027 by Franck Myers RN  Outcome: Progressing     Problem: Pain  Goal: Verbalizes/displays adequate comfort level or baseline comfort level  6/14/2023 1010 by Jairo Monte RN  Outcome: Progressing  Flowsheets (Taken 6/14/2023 1010)  Verbalizes/displays adequate comfort level or baseline comfort level:   Encourage patient to monitor pain and request assistance   Assess pain using appropriate pain scale   Administer analgesics based on type and severity of pain and evaluate response   Implement non-pharmacological measures as appropriate and evaluate response   Consider cultural and social influences on pain and pain management   Notify Licensed Independent Practitioner if interventions unsuccessful or patient reports new pain  6/14/2023 0027 by Franck Myers RN  Outcome: Progressing     Problem: Safety - Adult  Goal: Free from fall injury  6/14/2023 1010 by Jairo Monte RN  Outcome: Progressing  Flowsheets (Taken 6/14/2023 1010)  Free From Fall Injury:   Instruct family/caregiver on patient safety   Based on caregiver fall risk screen, instruct family/caregiver to ask for assistance with transferring infant if caregiver noted to have fall risk factors  6/14/2023 0027 by Franck Myers RN  Outcome: Progressing

## 2023-06-14 NOTE — DISCHARGE SUMMARY
Discharge Summary    Date of Admission: 6/12/2023  5:30 AM  Date of Discharge: 6/14/23  Admission Diagnosis: Lumbar spondylosis [M47.816]  Discharge Diagnosis: Same   Condition on Discharge: good  Attending for Admission: Jan Chamorro. Peggy Ayala MD  Procedures: Procedure(s) (LRB):  LUMBAR 4 - SACRAL 1 ANTERIOR LUMBAR INTERBODY FUSION WITH PEDICLE SCREW FIXATION/ LUMBAR 5 - SACRAL 1 LEFT FACETECTOMY (N/A)  . (Left)  Consults: IP CONSULT TO HOSPITALIST  IP CONSULT TO PHARMACY    Reason for Admission:  Anand Anderson is a 58 y.o. male patient who was admitted to the hospital for complaints of leg pain. He underwent the procedure listed above on 6/12/23. Hospital Course:  After surgery, His pre-operative leg pain was Absent . He complained of incisional pain. The pain was well-controlled on oral medications. His brace was in place. The incision was clean, dry and intact. There was no erythema or edema around the surgical site. Prior to discharge He was eating well, urinating and ambulating with a steady gait.     Discharge Vitals/Labs:  /72   Pulse 76   Temp 98 °F (36.7 °C) (Oral)   Resp 18   Ht 5' 10\" (1.778 m)   Wt 222 lb (100.7 kg)   SpO2 92%   BMI 31.85 kg/m²   CBC:   Lab Results   Component Value Date/Time    WBC 11.1 06/13/2023 05:30 AM    RBC 4.08 06/13/2023 05:30 AM    HGB 12.3 06/13/2023 05:30 AM    HCT 36.1 06/13/2023 05:30 AM    MCV 88.5 06/13/2023 05:30 AM    MCH 30.1 06/13/2023 05:30 AM    MCHC 34.0 06/13/2023 05:30 AM    RDW 13.9 06/13/2023 05:30 AM     06/13/2023 05:30 AM    MPV 8.0 06/13/2023 05:30 AM     CMP:    Lab Results   Component Value Date/Time     05/31/2023 07:38 AM    K 5.1 05/31/2023 07:38 AM    K 4.3 12/31/2020 05:19 AM     05/31/2023 07:38 AM    CO2 25 05/31/2023 07:38 AM    BUN 21 05/31/2023 07:38 AM    CREATININE 1.0 05/31/2023 07:38 AM    GFRAA >60 08/24/2022 01:08 PM    GFRAA >60 04/18/2013 02:59 PM    AGRATIO 2.1 08/04/2022 10:06 AM    LABGLOM

## 2023-06-14 NOTE — CARE COORDINATION
CM spoke with patient at bedside. He plans to return home with spouse, has RW, declined needs for bedside commode. Patient's wife to transport him home today.     Halyey Blair RN, BSN,    Ortho/Neuro   [phone]

## 2023-06-15 NOTE — PROGRESS NOTES
4 Eyes Skin Assessment       NAME:  Alma Robert  YOB: 1960  MEDICAL RECORD NUMBER:  [id number]       The patient is being assessed for   Admission       I agree that One RN has performed a thorough Head to Toe Skin Assessment on the patient. ALL assessment sites listed below have been assessed. Areas assessed by both nurses:       Head, Face, Ears, Shoulders, Back, Chest, Arms, Elbows, Hands, Sacrum. Buttock, Coccyx, Ischium, Legs. Feet and Heels, and Under Medical Devices    -scattered bruising and abrasions                             Does the Patient have a Wound? Yes wound(s) were present on assessment.  LDA wound assessment was Initiated and completed by RN        Sherif Prevention initiated by RN: Yes   Wound Care Orders initiated by RN: No       Pressure Injury (Stage 3,4, Unstageable, DTI, NWPT, and Complex wounds) if present, place referral order by RN under : No       New and Established Ostomies, if present place, referral order under : No        Nurse 1 eSignature: Electronically signed by Inez Angelo RN on 6/12/23 at 3:17 PM EDT       **SHARE this note so that the co-signing nurse can place an eSignature**       Nurse 2 eSignature: Electronically signed by Flaquita Skinner RN on 6/12/23 at 3:18 PM EDT
Ancef 2 grams IVPB sent to OR with pt  PT/PTT type and screen x 2 drawn and sent to Lab
NEUROSURGERY POST-OP PROGRESS NOTE    Patient Name: Alex Torres YOB: 1960   Sex: Male Age: 58 yrs     Medical Record Number: [id number] Acct Number: [de-identified]   Room Number: 6705/8184-01 Hospital Day: Hospital Day: 2     Interval History:  Post-operative Day# 1 s/p Procedure(s) (LRB):  LUMBAR 4 - SACRAL 1 ANTERIOR LUMBAR INTERBODY FUSION WITH PEDICLE SCREW FIXATION/ LUMBAR 5 - SACRAL 1 LEFT FACETECTOMY (N/A)  . (Left)    Subjective: PT states he is sore but doing well. He can't tell if his leg pain is gone cause he  has not been up yet,     Objective:    VITAL SIGNS   /68   Pulse 75   Temp 98.3 °F (36.8 °C) (Oral)   Resp 18   Ht 5' 10\" (1.778 m)   Wt 222 lb (100.7 kg)   SpO2 94%   BMI 31.85 kg/m²    Height Height: 5' 10\" (177.8 cm)   Weight Weight - Scale: 222 lb (100.7 kg)        Allergies No Known Allergies   NPO Status ADULT DIET; Regular; 4 carb choices (60 gm/meal)   Isolation No active isolations     LABS   Basic Metabolic Profile No results for input(s): NA, POTASSIUM, CL, CO2, BUN, CREATININE, GLUCOSE, CA, ALB, PHOS, MG in the last 72 hours.    Complete Blood Count Recent Labs     06/13/23  0530   WBC 11.1*   RBC 4.08*      Coagulation Studies Recent Labs     06/12/23  0630   INR 0.92        MEDICATIONS   Inpatient Medications     methocarbamol, 750 mg, Oral, 4 times per day    sennosides-docusate sodium, 2 tablet, Oral, BID    polyethylene glycol, 17 g, Oral, Daily    atenolol, 25 mg, Oral, Daily    atorvastatin, 80 mg, Oral, Nightly    buPROPion, 150 mg, Oral, QAM    fenofibrate, 54 mg, Oral, Daily    lisinopril, 20 mg, Oral, BID    metFORMIN, 1,000 mg, Oral, Daily with breakfast    sodium chloride flush, 5-40 mL, IntraVENous, 2 times per day    acetaminophen, 650 mg, Oral, Q6H    enoxaparin, 40 mg, SubCUTAneous,
NEUROSURGERY POST-OP PROGRESS NOTE    Patient Name: Karli Rosa YOB: 1960   Sex: Male Age: 58 yrs     Medical Record Number: [id number] Acct Number: [de-identified]   Room Number: 5607/0234-23 Hospital Day: Hospital Day: 3     Interval History:  Post-operative Day# 2 s/p Procedure(s) (LRB):  LUMBAR 4 - SACRAL 1 ANTERIOR LUMBAR INTERBODY FUSION WITH PEDICLE SCREW FIXATION/ LUMBAR 5 - SACRAL 1 LEFT FACETECTOMY (N/A)  . (Left)    Subjective: Pt has no pain in legs when she gets up but has soreness in back when he moves. Objective:    VITAL SIGNS   /72   Pulse 76   Temp 98 °F (36.7 °C) (Oral)   Resp 18   Ht 5' 10\" (1.778 m)   Wt 222 lb (100.7 kg)   SpO2 92%   BMI 31.85 kg/m²    Height Height: 5' 10\" (177.8 cm)   Weight Weight - Scale: 222 lb (100.7 kg)        Allergies No Known Allergies   NPO Status ADULT DIET; Regular; 4 carb choices (60 gm/meal)   Isolation No active isolations     LABS   Basic Metabolic Profile No results for input(s): NA, POTASSIUM, CL, CO2, BUN, CREATININE, GLUCOSE, CA, ALB, PHOS, MG in the last 72 hours.    Complete Blood Count Recent Labs     06/13/23  0530   WBC 11.1*   RBC 4.08*      Coagulation Studies Recent Labs     06/12/23  0630   INR 0.92        MEDICATIONS   Inpatient Medications     methocarbamol, 750 mg, Oral, Q6H    sennosides-docusate sodium, 2 tablet, Oral, BID    polyethylene glycol, 17 g, Oral, Daily    metoclopramide, 10 mg, IntraVENous, Q6H    insulin regular human, 56 Units, SubCUTAneous, Daily with breakfast    insulin regular human, 35 Units, SubCUTAneous, Lunch    insulin regular human, 35 Units, SubCUTAneous, Dinner    atenolol, 25 mg, Oral, Daily    atorvastatin, 80 mg, Oral, Nightly    buPROPion, 150 mg, Oral, QAM    fenofibrate, 54 mg, Oral, Daily    lisinopril, 20 mg, Oral, BID
Occupational Therapy  [facility]  Occupational Therapy Initial Assessment/Treatment    Name: Tomy Cummings  : 1960  MRN: [id number]  Date of Service: 2023    Discharge Recommendations: Tomy Cummings scored a 19/24 on the AM-PAC ADL Inpatient form. Current research shows that an AM-PAC score of 18 or greater is typically associated with a discharge to the patient's home setting. Based on the patient's AM-PAC score, and their current ADL deficits, it is recommended that the patient have 2-3 sessions per week of Occupational Therapy at d/c to increase the patient's independence. At this time, this patient demonstrates the endurance and safety to discharge home with 24hr supervision/assistance and a follow up treatment frequency of 2-3x/wk. Please see assessment section for further patient specific details. If patient discharges prior to next session this note will serve as a discharge summary. Please see below for the latest assessment towards goals. OT Equipment Recommendations  Equipment Needed: Yes  Other: pt already owns recommended 2WW and shower chair; will need bedside commode       Patient Diagnosis(es): The encounter diagnosis was S/P lumbar and lumbosacral fusion by anterior technique.   Past Medical History:  has a past medical history of Acute, but ill-defined, cerebrovascular disease, CAD in native artery, Cerebral artery occlusion with cerebral infarction [facility]), Cerebrovascular disease, Diplopia, ED (erectile dysfunction), Elbow pain, chronic, Hyperlipidemia, Hypertension, Irritable bowel syndrome, Obstructive sleep apnea (adult) (pediatric), Occlusion and stenosis of carotid artery without mention of cerebral infarction, Pain in joint, upper arm, Polyneuropathy in diabetes(357.2), PONV (postoperative nausea and vomiting), Skin abnormality, Type 2 diabetes mellitus with diabetic polyneuropathy, with long-term current use of insulin ([facility] [address] Type II or
PACU Transfer to Floor Note    Procedure(s):  LUMBAR 4 - SACRAL 1 ANTERIOR LUMBAR INTERBODY FUSION WITH PEDICLE SCREW FIXATION/ LUMBAR 5 - SACRAL 1 LEFT FACETECTOMY  . Current Allergies: Patient has no known allergies. Pt meets criteria as per Marcela Score and ASPAN Standards to transfer to next phase of care. Recent Labs     06/12/23  0613 06/12/23  1145   POCGLU 152* 165*       Vitals:    06/12/23 1240   BP: (!) 161/89   Pulse: 97   Resp: 11   Temp: 97.1 °F (36.2 °C)   SpO2: 97%     Vitals within 20% of pt's admission vitals as per MARCELA SCORE    SpO2: 97 %    O2 Flow Rate (L/min): 2 L/min      Intake/Output Summary (Last 24 hours) at 6/12/2023 1249  Last data filed at 6/12/2023 1225  Gross per 24 hour   Intake 2530 ml   Output 925 ml   Net 1605 ml       Pain assessment:  present - adequately treated    Pain Level: 4 (patient resting with eyes closed)          Handoff report given at bedside. Family updated and directed to pt room.  Belonging bag x1 sent with patient      6/12/2023 12:49 PM
Patient alert and oriented x4. Complains of pain to lower back. PRN oxycodone administered with relief. Up x1 standby assist with TLSO brace, gait belt and walker. Urinating adequately after macario removal. Incisions to lower back clean dry and intact. All needs met at this time. Will continue to monitor.
Physical Therapy  Attempt Note/No Treatment  Pt politely refused PT at 0830 & then again at 0940 d/t being tired & needing to sleep. Will follow up later as schedule allows.     Torey Sarmiento
Pt A&Ox4. VSS on RA. No acute changes this shift. Pain managed via medication per MAR. Martins care completed. Voiding adequately. Tolerating fluids and diet. Denies needs at this time. Fall precautions in place, call light within reach.
Pt is A&Ox4. Stable vitas on RA. Pain controlling per MAR. Denies nausea and vomiting. Voiding adequately. Incision site CDI. All fall precaution are in place. call light within a reach. bed is in lowest position. Bed Alarm is on for safety. Will continue monitor . Christina Socks ...
Pt received from OR to PACU # 10 via bed. Post:  Lumbar 4-Sacral 1 Anterior lumbar interbody fusion with pedicle screw fixation/ lumbar 5- Sacral 1 L facetectomy     Report received from OR RN and Remedios Jeter CRNA. Per report pt did well. Respirations reg and easy. Pt is drowsy. Attached to PACU monitoring system. Alarms and parameters set    Pain none noted and no complaints of nausea.
Pt. Was discharged to home with all his belongings.
Received report in PACU from [address] [place] [address]. Patient is alert and oriented x 4. Pleasant demeanor, pain upon admission is a 4/10 on a 0-10 scale. Wife at bedside. VSS on 2L nasal cannula. Voiding via macario catheter. LR running at 125 ml/hr. Currently resting in bed with eyes closed. RR at 16. No current concerns.
95 %  O2 Device: None (Room air)              AROM RLE (degrees)  RLE AROM: WNL  AROM LLE (degrees)  LLE AROM : WNL  Strength RLE  Strength RLE: WFL  Strength LLE  Strength LLE: WFL        Bed Mobility Training  Bed Mobility Training: Yes  Supine to Sit: Stand-by assistance (HOB flat, log roll, pt using rail. effortful. verbal cues required.)  Sit to Supine: Moderate assistance (HOB flat, log roll. pt using rail. verbal cues required. assist for LEs.)  Scooting: Stand-by assistance (seated & supine using rails)  Balance  Sitting: Intact  Standing:  (static: SBA with RW; dynamic: CGA with RW)  Transfer Training  Transfer Training: Yes  Sit to Stand: Contact-guard assistance (from bed)  Stand to Sit: Contact-guard assistance (verbal cues required)  Gait Training  Gait Training: Yes  Gait  Overall Level of Assistance: Contact-guard assistance (pt amb 300 ft with RW CGA. slow & guarded. no LOB.)  Distance (ft):  (pt ambulated to/from bathroom and to recliner chair)  Assistive Device: Gait belt;Walker, rolling (LSO donned)  Curbs/Ramps: Contact-guard assistance;Minimum assistance (up/down 6-in curb step using RW)  Wheelchair Management  Wheelchair Management: No                                                                   AM-PAC Score  AM-PAC Inpatient Mobility Raw Score : 18 (06/13/23 1215)  AM-PAC Inpatient T-Scale Score : 43.63 (06/13/23 1215)  Mobility Inpatient CMS 0-100% Score: 46.58 (06/13/23 1215)  Mobility Inpatient CMS G-Code Modifier : CK (06/13/23 1215)        Goals  Short Term Goals  Time Frame for Short Term Goals: D/C  Short Term Goal 1: supine<->sit min A, HOB flat, rail down, log roll.   Short Term Goal 2: sit<->stand SBA  Short Term Goal 3: Amb >150 ft with RW SBA  Short Term Goal 4: up/down platform step with RW CGA  Patient Goals   Patient Goals : to go home       Education  Patient Education  Education Given To: Patient  Education Provided: Role of Therapy;Plan of Care;Transfer

## 2023-06-15 NOTE — PLAN OF CARE
Problem: Discharge Planning  Goal: Discharge to home or other facility with appropriate resources  6/14/2023 2003 by Em Valderrama RN  Outcome: Adequate for Discharge  6/14/2023 1010 by Em Valderrama RN  Outcome: Progressing  Flowsheets (Taken 6/14/2023 1010)  Discharge to home or other facility with appropriate resources:   Identify barriers to discharge with patient and caregiver   Identify discharge learning needs (meds, wound care, etc)   Arrange for interpreters to assist at discharge as needed   Refer to discharge planning if patient needs post-hospital services based on physician order or complex needs related to functional status, cognitive ability or social support system   Arrange for needed discharge resources and transportation as appropriate     Problem: Pain  Goal: Verbalizes/displays adequate comfort level or baseline comfort level  6/14/2023 2003 by Em Valderrama RN  Outcome: Adequate for Discharge  6/14/2023 1010 by Em Valderrama RN  Outcome: Progressing  Flowsheets (Taken 6/14/2023 1010)  Verbalizes/displays adequate comfort level or baseline comfort level:   Encourage patient to monitor pain and request assistance   Assess pain using appropriate pain scale   Administer analgesics based on type and severity of pain and evaluate response   Implement non-pharmacological measures as appropriate and evaluate response   Consider cultural and social influences on pain and pain management   Notify Licensed Independent Practitioner if interventions unsuccessful or patient reports new pain     Problem: Safety - Adult  Goal: Free from fall injury  6/14/2023 2003 by Em Valderrama RN  Outcome: Adequate for Discharge  6/14/2023 1010 by Em Valderrama RN  Outcome: Progressing  Flowsheets (Taken 6/14/2023 1010)  Free From Fall Injury:   Instruct family/caregiver on patient safety   Based on caregiver fall risk screen, instruct family/caregiver to ask for assistance with transferring infant if caregiver

## 2023-06-21 ENCOUNTER — CARE COORDINATION (OUTPATIENT)
Dept: OTHER | Facility: CLINIC | Age: 63
End: 2023-06-21

## 2023-06-21 NOTE — CARE COORDINATION
[facility] Transitions Follow Up Call    Patient Current Location:  Home: [address] [place] [id number]    Care Transition Nurse contacted the patient by telephone to follow up after admission on 23. Verified name and  with patient as identifiers. Patient: Indiana Jackson  Patient : 1960   MRN: [id number]  Reason for Admission: Lumbar Spine Fusion   Discharge Date: 23 RARS: Readmission Risk Score: 7.1      Needs to be reviewed by the provider   Additional needs identified to be addressed with provider: No  none             Method of communication with provider: none. Pt states he is doing well. Rates pain at a tolerable level of 4/10 with pain medication. His son is checking his back incision for him, his wife is in the hospital right now coming home today he said. He has support for transportation and whatever he needs until he is allowed to drive again. Pt is eating and drinking well, denies fever He has his post op discharge instructions and is following them He said his son told him his back dressing looks fine. No signs of infection. Follow up is next Tuesday with Dr Leena Nye. Reminded patient to call pharmacist to remain in the diabetes program before   No questions or concerns voiced for me today. Will follow     Addressed changes since last contact:  none  Discussed follow-up appointments. If no appointment was previously scheduled, appointment scheduling offered: n/a. Is follow up appointment scheduled within 7 days of discharge?  No.Post Op     Follow Up  Future Appointments   Date Time Provider Avelino Owens   2023  4:00 PM MD Jm Cruz [facility]   9/15/2023  8:30 AM Alin Pruitt MD [facility] [place] - D[facility]D   10/16/2023  9:00 AM MD KRUNAL Croft   2023  7:30 AM Vila Opitz, MD Elodie Candela [facility]     Non-[facility] follow up appointment(s): 2023     Care Transition Nurse reviewed discharge instructions with

## 2023-06-22 NOTE — TELEPHONE ENCOUNTER
Pt returned call and made an appt on 06/26/23 8:00am. This will be a  and wife appt     For Pharmacy Admin Tracking Only    Program: [address] in place:  No  Recommendation Provided To: Patient/Caregiver: 1 via Telephone  Intervention Detail: Scheduled Appointment  Intervention Accepted By: Patient/Caregiver: 1  Gap Closed?: Yes   Time Spent (min): 15

## 2023-06-25 DIAGNOSIS — F34.1 DYSTHYMIA: ICD-10-CM

## 2023-06-26 ENCOUNTER — TELEPHONE (OUTPATIENT)
Dept: PHARMACY | Facility: CLINIC | Age: 63
End: 2023-06-26

## 2023-06-26 RX ORDER — DESVENLAFAXINE SUCCINATE 50 MG/1
TABLET, EXTENDED RELEASE ORAL
Qty: 90 TABLET | Refills: 0 | Status: SHIPPED | OUTPATIENT
Start: 2023-06-26

## 2023-06-26 NOTE — TELEPHONE ENCOUNTER
.Refill Request     CONFIRM preferred pharmacy with the patient. If Mail Order Rx - Pend for 90 day refill. Last Seen: Last Seen Department: 5/31/2023  Last Seen by PCP: 5/31/2023    Last Written: 7-14-22 90 with 3     If no future appointment scheduled:  Review the last OV with PCP and review information for follow-up visit,  Route STAFF MESSAGE with patient name to the [facility] for scheduling with the following information:            -  Timing of next visit           -  Visit type ie Physical, OV, etc           -  Diagnoses/Reason ie. COPD, HTN - Do not use MEDICATION, Follow-up or CHECK UP - Give reason for visit      Next Appointment:   Future Appointments   Date Time Provider [address]   8/1/2023  4:00 PM Peggy Leong MD [facility]   8/10/2023  8:00 AM [facility] ECHO ROOM [facility] AND CAR Catina Clay   9/15/2023  8:30 AM Gwen Malin MD [facility] [place] - [facility]D   10/16/2023  9:00 AM Yee Foster MD [facility] MMA   12/5/2023  7:30 AM MD Nia Dumont [place]       Message sent to [facility] to schedule appt with patient?   N/A      Requested Prescriptions     Pending Prescriptions Disp Refills    desvenlafaxine succinate (PRISTIQ) 50 MG TB24 extended release tablet [Pharmacy Med Name: DESVENLAFAXINE SUCCINATE ER 50MG TB24] 90 tablet 1     Sig: TAKE 1 TABLET BY MOUTH ONE TIME A DAY

## 2023-06-27 ENCOUNTER — CARE COORDINATION (OUTPATIENT)
Dept: OTHER | Facility: CLINIC | Age: 63
End: 2023-06-27

## 2023-06-27 SDOH — ECONOMIC STABILITY: INCOME INSECURITY: IN THE LAST 12 MONTHS, WAS THERE A TIME WHEN YOU WERE NOT ABLE TO PAY THE MORTGAGE OR RENT ON TIME?: NO

## 2023-06-27 SDOH — ECONOMIC STABILITY: TRANSPORTATION INSECURITY
IN THE PAST 12 MONTHS, HAS LACK OF TRANSPORTATION KEPT YOU FROM MEETINGS, WORK, OR FROM GETTING THINGS NEEDED FOR DAILY LIVING?: NO

## 2023-06-27 SDOH — ECONOMIC STABILITY: HOUSING INSECURITY: IN THE LAST 12 MONTHS, HOW MANY PLACES HAVE YOU LIVED?: 1

## 2023-06-27 SDOH — ECONOMIC STABILITY: TRANSPORTATION INSECURITY
IN THE PAST 12 MONTHS, HAS THE LACK OF TRANSPORTATION KEPT YOU FROM MEDICAL APPOINTMENTS OR FROM GETTING MEDICATIONS?: NO

## 2023-06-28 ENCOUNTER — TELEPHONE (OUTPATIENT)
Dept: PHARMACY | Facility: CLINIC | Age: 63
End: 2023-06-28

## 2023-06-28 RX ORDER — OMEGA-3-ACID ETHYL ESTERS 1 G/1
2 CAPSULE, LIQUID FILLED ORAL 2 TIMES DAILY
COMMUNITY

## 2023-06-28 RX ORDER — CLOPIDOGREL BISULFATE 75 MG/1
75 TABLET ORAL DAILY
COMMUNITY

## 2023-06-28 RX ORDER — ASPIRIN 81 MG/1
81 TABLET ORAL DAILY
COMMUNITY

## 2023-06-29 RX ORDER — BLOOD-GLUCOSE SENSOR
EACH MISCELLANEOUS
Qty: 6 EACH | Refills: 3 | Status: SHIPPED | OUTPATIENT
Start: 2023-06-29

## 2023-07-02 PROBLEM — Z01.810 PREOP CARDIOVASCULAR EXAM: Status: RESOLVED | Noted: 2022-07-07 | Resolved: 2023-07-02

## 2023-07-05 DIAGNOSIS — F34.1 DYSTHYMIA: ICD-10-CM

## 2023-07-05 RX ORDER — BUPROPION HYDROCHLORIDE 150 MG/1
TABLET ORAL
Qty: 90 TABLET | Refills: 0 | OUTPATIENT
Start: 2023-07-05

## 2023-07-10 DIAGNOSIS — F34.1 DYSTHYMIA: ICD-10-CM

## 2023-07-10 RX ORDER — BUPROPION HYDROCHLORIDE 150 MG/1
150 TABLET ORAL EVERY MORNING
Qty: 90 TABLET | Refills: 3 | Status: SHIPPED | OUTPATIENT
Start: 2023-07-10

## 2023-07-10 NOTE — TELEPHONE ENCOUNTER
Medication:   Requested Prescriptions     Pending Prescriptions Disp Refills    buPROPion (WELLBUTRIN XL) 150 MG extended release tablet 90 tablet 3     Sig: Take 1 tablet by mouth every morning       Last Filled:      Patient Phone Number: [phone] (home)     Last appt: 1/18/2023   Next appt: 8/1/2023    Last Labs DM:   Lab Results   Component Value Date/Time    LABA1C 7.6 01/18/2023 12:22 PM     Last Lipid:   Lab Results   Component Value Date/Time    CHOL 154 08/03/2022 09:19 AM    TRIG 149 08/03/2022 09:19 AM    HDL 42 08/03/2022 09:19 AM    HDL 43 11/03/2011 06:45 AM    LDLCALC 82 08/03/2022 09:19 AM     Last PSA:   Lab Results   Component Value Date/Time    PSA 0.65 08/04/2022 10:06 AM     Last Thyroid:   Lab Results   Component Value Date/Time    TSH 1.54 11/16/2020 03:49 PM

## 2023-07-11 ENCOUNTER — CARE COORDINATION (OUTPATIENT)
Dept: OTHER | Facility: CLINIC | Age: 63
End: 2023-07-11

## 2023-07-11 NOTE — CARE COORDINATION
Care Planning:   not on file. Offered patient enrollment in the Remote Patient Monitoring (RPM) program for in-home monitoring: NA.     Care Transitions Subsequent and Final Call    Subsequent and Final Calls  Do you have any ongoing symptoms?: No  Have your medications changed?: No  Do you have any questions related to your medications?: No  Do you currently have any active services?: No  Do you have any needs or concerns that I can assist you with?: No  Identified Barriers: None  Care Transitions Interventions  Other Interventions:             Care Transition Nurse provided contact information for future needs. No further follow-up call indicated based on severity of symptoms and risk factors.       Choco Hook RN

## 2023-07-20 DIAGNOSIS — I25.10 CAD IN NATIVE ARTERY: Primary | ICD-10-CM

## 2023-07-20 NOTE — TELEPHONE ENCOUNTER
Pts wife called and stated pts pharmacy stated [facility] canceled Clopidogrel 75mg refill. Pts wants to know why.

## 2023-07-21 RX ORDER — CLOPIDOGREL BISULFATE 75 MG/1
75 TABLET ORAL DAILY
Qty: 90 TABLET | Refills: 3 | Status: SHIPPED | OUTPATIENT
Start: 2023-07-21

## 2023-07-21 NOTE — TELEPHONE ENCOUNTER
Spoke with patient's wife. It appears from last OV with [address], that patient is still supposed to be taking this. JEROME ([address]) please review and sign off on refills if appropriate.

## 2023-07-25 NOTE — TELEPHONE ENCOUNTER
Refill Request     CONFIRM preferred pharmacy with the patient. If Mail Order Rx - Pend for 90 day refill. Last Seen: Last Seen Department: 5/31/2023  Last Seen by PCP: 5/31/2023    Last Written: omega-3 acid- n/a    If no future appointment scheduled:  Review the last OV with PCP and review information for follow-up visit,  Route STAFF MESSAGE with patient name to the [facility] for scheduling with the following information:            -  Timing of next visit           -  Visit type ie Physical, OV, etc           -  Diagnoses/Reason ie. COPD, HTN - Do not use MEDICATION, Follow-up or CHECK UP - Give reason for visit      Next Appointment:   Future Appointments   Date Time Provider [address]   8/1/2023  4:00 PM MD Birdie Bolton [facility]   8/10/2023  8:00 AM [facility] ECHO ROOM [facility]   9/19/2023 12:30 PM Toño Ojeda MD [facility]   12/5/2023  7:30 AM Main Ford MD [facility]       Message sent to [address] [place] [address] to schedule appt with patient?   NO      Requested Prescriptions     Pending Prescriptions Disp Refills    omega-3 acid ethyl esters (LOVAZA) 1 g capsule 60 capsule      Sig: Take 2 capsules by mouth 2 times daily

## 2023-07-26 RX ORDER — OMEGA-3-ACID ETHYL ESTERS 1 G/1
2 CAPSULE, LIQUID FILLED ORAL 2 TIMES DAILY
Qty: 60 CAPSULE | OUTPATIENT
Start: 2023-07-26

## 2023-07-30 DIAGNOSIS — E11.42 TYPE 2 DIABETES MELLITUS WITH DIABETIC POLYNEUROPATHY, WITH LONG-TERM CURRENT USE OF INSULIN (HCC): ICD-10-CM

## 2023-07-30 DIAGNOSIS — Z79.4 TYPE 2 DIABETES MELLITUS WITH DIABETIC POLYNEUROPATHY, WITH LONG-TERM CURRENT USE OF INSULIN (HCC): ICD-10-CM

## 2023-07-31 RX ORDER — EMPAGLIFLOZIN 10 MG/1
TABLET, FILM COATED ORAL
Qty: 90 TABLET | Refills: 1 | Status: SHIPPED | OUTPATIENT
Start: 2023-07-31

## 2023-08-01 ENCOUNTER — OFFICE VISIT (OUTPATIENT)
Dept: ENDOCRINOLOGY | Age: 63
End: 2023-08-01
Payer: MEDICARE

## 2023-08-01 VITALS
TEMPERATURE: 98 F | SYSTOLIC BLOOD PRESSURE: 132 MMHG | BODY MASS INDEX: 30.81 KG/M2 | HEART RATE: 64 BPM | OXYGEN SATURATION: 98 % | RESPIRATION RATE: 14 BRPM | DIASTOLIC BLOOD PRESSURE: 84 MMHG | WEIGHT: 215.2 LBS | HEIGHT: 70 IN

## 2023-08-01 DIAGNOSIS — E11.42 TYPE 2 DIABETES MELLITUS WITH DIABETIC POLYNEUROPATHY, WITH LONG-TERM CURRENT USE OF INSULIN (HCC): Primary | ICD-10-CM

## 2023-08-01 DIAGNOSIS — Z79.4 TYPE 2 DIABETES MELLITUS WITH DIABETIC POLYNEUROPATHY, WITH LONG-TERM CURRENT USE OF INSULIN (HCC): Primary | ICD-10-CM

## 2023-08-01 LAB — HBA1C MFR BLD: 6.4 %

## 2023-08-01 PROCEDURE — 2022F DILAT RTA XM EVC RTNOPTHY: CPT | Performed by: INTERNAL MEDICINE

## 2023-08-01 PROCEDURE — 83037 HB GLYCOSYLATED A1C HOME DEV: CPT | Performed by: INTERNAL MEDICINE

## 2023-08-01 PROCEDURE — 3075F SYST BP GE 130 - 139MM HG: CPT | Performed by: INTERNAL MEDICINE

## 2023-08-01 PROCEDURE — 99214 OFFICE O/P EST MOD 30 MIN: CPT | Performed by: INTERNAL MEDICINE

## 2023-08-01 PROCEDURE — G8417 CALC BMI ABV UP PARAM F/U: HCPCS | Performed by: INTERNAL MEDICINE

## 2023-08-01 PROCEDURE — G8428 CUR MEDS NOT DOCUMENT: HCPCS | Performed by: INTERNAL MEDICINE

## 2023-08-01 PROCEDURE — 3017F COLORECTAL CA SCREEN DOC REV: CPT | Performed by: INTERNAL MEDICINE

## 2023-08-01 PROCEDURE — 3079F DIAST BP 80-89 MM HG: CPT | Performed by: INTERNAL MEDICINE

## 2023-08-01 PROCEDURE — 1036F TOBACCO NON-USER: CPT | Performed by: INTERNAL MEDICINE

## 2023-08-01 PROCEDURE — 3051F HG A1C>EQUAL 7.0%<8.0%: CPT | Performed by: INTERNAL MEDICINE

## 2023-08-01 NOTE — PROGRESS NOTES
Seen as  Patient for DM    Interim:    On CGM  Low glucose occasionally    Has seen Dr. Denney Fam    Patient has a PMH of Type 2 DM, hypertension, hyperlipidemia, obesity , Spinal cord compression in cervical spine. Diagnosed with Diabetes Mellitus type 2 in 2004. Course has been variable . Microvascular complications: No known retinopathy (Last eye exam: 2016)   No known Nephropathy :  Has Peripheral neuropathy: Some numbness and tingling in feet. Home regimen:   Metformin 1000 mg BID  Jardiance   Humulin R U-500 80 units before breakfast , 50 units before lunch and dinner    CGM  Last 2 weeks  Average 146  82 % in target  1% low  16% high      Previous Meds  Lantus 50  units BID  humalog 25  25 30        A1c 12.2 % ---> 9.1 % ---> 10.7 % ---> 11.1 % ---> 9.7%---> 7.3%--->6.9%---> 6.6%---- >7.3%---> 7.6%---> 6.4%    Diet: Eats 3 meals/day   Has been non-compliant with diet  Nutrition education:Yes  Exercise:     Severe, uncontrolled     Hypertension:  He has essentail HTN for many yrs. On Lisinopril/Atenolol. No dizziness, SOB , chest pain. Hypertriglyceridemia : has h/o high TGD  Has  h/o pancreatitis many yrs back.     on fenofibrate 145 mg daily, fish oil 2 gram BID.     ROS: Scanned,reviewed    Past Medical History:   Diagnosis Date    Acute, but ill-defined, cerebrovascular disease 05/31/2013    CAD in native artery 12/30/2020    Cerebral artery occlusion with cerebral infarction [facility]) 2013     X2 PERSONALITY CHANGE, ANGER OUTBURSTS    Cerebrovascular disease     Diplopia 07/11/2013    ED (erectile dysfunction) 01/23/2014    Elbow pain, chronic 12/16/2015    Hyperlipidemia     Hypertension     Irritable bowel syndrome     Obstructive sleep apnea (adult) (pediatric) 07/01/2013    NO CPAP, HAD PROBLEMS WITH INSURANCE AND STOPPED USING    Occlusion and stenosis of carotid artery without mention of cerebral infarction 01/13/2014    MINOR    Pain in joint, upper arm 05/14/2015

## 2023-08-10 ENCOUNTER — HOSPITAL ENCOUNTER (OUTPATIENT)
Dept: CARDIOLOGY | Age: 63
Discharge: HOME | End: 2023-08-10
Payer: MEDICARE

## 2023-08-10 ENCOUNTER — TELEPHONE (OUTPATIENT)
Dept: CARDIOLOGY CLINIC | Age: 63
End: 2023-08-10

## 2023-08-10 DIAGNOSIS — I35.1 NONRHEUMATIC AORTIC VALVE INSUFFICIENCY: ICD-10-CM

## 2023-08-10 DIAGNOSIS — R01.1 MURMUR: ICD-10-CM

## 2023-08-10 DIAGNOSIS — I25.10 CAD IN NATIVE ARTERY: ICD-10-CM

## 2023-08-10 LAB
LV EF: 55 %
LVEF MODALITY: NORMAL

## 2023-08-10 PROCEDURE — 93306 TTE W/DOPPLER COMPLETE: CPT

## 2023-08-10 NOTE — TELEPHONE ENCOUNTER
----- Message from Sonali Peralta MD sent at 8/10/2023 10:25 AM EDT -----  Please notify patient that their echo looks ok  Heart fxn normal

## 2023-08-21 RX ORDER — ATENOLOL 25 MG/1
TABLET ORAL
Qty: 90 TABLET | Refills: 2 | Status: SHIPPED | OUTPATIENT
Start: 2023-08-21

## 2023-08-21 NOTE — TELEPHONE ENCOUNTER
6/2/2023 [facility]  12/5/2023 [facility] upcoming appt  5/31/2023 [facility]

## 2023-08-28 RX ORDER — ATORVASTATIN CALCIUM 80 MG/1
TABLET, FILM COATED ORAL
Qty: 90 TABLET | Refills: 3 | Status: SHIPPED | OUTPATIENT
Start: 2023-08-28

## 2023-09-06 ENCOUNTER — TELEPHONE (OUTPATIENT)
Dept: CARDIOLOGY CLINIC | Age: 63
End: 2023-09-06

## 2023-09-06 NOTE — TELEPHONE ENCOUNTER
Pt was DX with Covid yesterday. Pt has bad cough and wife is hoping [facility] can call something in for the cough, that will not effect his cardiac meds. Please advise.

## 2023-09-06 NOTE — TELEPHONE ENCOUNTER
Sorry to hear that hope he feels better soon. I would direct this to PCP for further advice. I see Dr. Phillips Six has left, interim care appt with their practice or one of her prior partners can hopefully help if message left at office.  TY

## 2023-09-18 RX ORDER — LISINOPRIL 20 MG/1
20 TABLET ORAL 2 TIMES DAILY
Qty: 180 TABLET | Refills: 3 | Status: SHIPPED | OUTPATIENT
Start: 2023-09-18

## 2023-09-18 RX ORDER — FENOFIBRATE 145 MG/1
TABLET, COATED ORAL
Qty: 90 TABLET | Refills: 3 | Status: SHIPPED | OUTPATIENT
Start: 2023-09-18

## 2023-09-18 NOTE — TELEPHONE ENCOUNTER
Medication:   Requested Prescriptions     Pending Prescriptions Disp Refills    fenofibrate (TRICOR) 145 MG tablet [Pharmacy Med Name: FENOFIBRATE 145MG TABS] 90 tablet 3     Sig: TAKE 1 TABLET BY MOUTH ONE TIME A DAY        Last Filled:      Patient Phone Number: [phone] (home)     Last appt: 8/1/2023   Next appt: 12/5/2023    Last OARRS:        No data to display

## 2023-09-19 ENCOUNTER — OFFICE VISIT (OUTPATIENT)
Dept: PRIMARY CARE CLINIC | Age: 63
End: 2023-09-19
Payer: MEDICARE

## 2023-09-19 VITALS
TEMPERATURE: 97.2 F | DIASTOLIC BLOOD PRESSURE: 70 MMHG | HEART RATE: 68 BPM | RESPIRATION RATE: 16 BRPM | SYSTOLIC BLOOD PRESSURE: 92 MMHG | HEIGHT: 70 IN | WEIGHT: 213 LBS | BODY MASS INDEX: 30.49 KG/M2 | OXYGEN SATURATION: 97 %

## 2023-09-19 DIAGNOSIS — Z76.89 ENCOUNTER TO ESTABLISH CARE: Primary | ICD-10-CM

## 2023-09-19 DIAGNOSIS — F34.1 DYSTHYMIA: ICD-10-CM

## 2023-09-19 DIAGNOSIS — E11.42 TYPE 2 DIABETES MELLITUS WITH DIABETIC POLYNEUROPATHY, WITH LONG-TERM CURRENT USE OF INSULIN (HCC): Chronic | ICD-10-CM

## 2023-09-19 DIAGNOSIS — G45.9 TIA DUE TO EMBOLISM (HCC): ICD-10-CM

## 2023-09-19 DIAGNOSIS — E78.5 HYPERLIPIDEMIA ASSOCIATED WITH TYPE 2 DIABETES MELLITUS (HCC): ICD-10-CM

## 2023-09-19 DIAGNOSIS — E11.42 DIABETIC POLYNEUROPATHY ASSOCIATED WITH TYPE 2 DIABETES MELLITUS (HCC): Chronic | ICD-10-CM

## 2023-09-19 DIAGNOSIS — Z95.5 H/O HEART ARTERY STENT: ICD-10-CM

## 2023-09-19 DIAGNOSIS — C44.629 SQUAMOUS CELL CARCINOMA OF MULTIPLE SITES OF SKIN OF LEFT UPPER ARM: ICD-10-CM

## 2023-09-19 DIAGNOSIS — I21.3 ST ELEVATION MYOCARDIAL INFARCTION (STEMI), UNSPECIFIED ARTERY (HCC): ICD-10-CM

## 2023-09-19 DIAGNOSIS — Z79.4 TYPE 2 DIABETES MELLITUS WITH DIABETIC POLYNEUROPATHY, WITH LONG-TERM CURRENT USE OF INSULIN (HCC): Chronic | ICD-10-CM

## 2023-09-19 DIAGNOSIS — E11.69 HYPERLIPIDEMIA ASSOCIATED WITH TYPE 2 DIABETES MELLITUS (HCC): ICD-10-CM

## 2023-09-19 DIAGNOSIS — I74.9 TIA DUE TO EMBOLISM (HCC): ICD-10-CM

## 2023-09-19 DIAGNOSIS — F33.1 MODERATE EPISODE OF RECURRENT MAJOR DEPRESSIVE DISORDER (HCC): ICD-10-CM

## 2023-09-19 LAB
CHOLEST SERPL-MCNC: 128 MG/DL (ref 0–199)
GLUCOSE SERPL-MCNC: 161 MG/DL (ref 70–99)
HDLC SERPL-MCNC: 38 MG/DL (ref 40–60)
LDLC SERPL CALC-MCNC: 60 MG/DL
TRIGL SERPL-MCNC: 152 MG/DL (ref 0–150)

## 2023-09-19 PROCEDURE — 3078F DIAST BP <80 MM HG: CPT | Performed by: STUDENT IN AN ORGANIZED HEALTH CARE EDUCATION/TRAINING PROGRAM

## 2023-09-19 PROCEDURE — 2022F DILAT RTA XM EVC RTNOPTHY: CPT | Performed by: STUDENT IN AN ORGANIZED HEALTH CARE EDUCATION/TRAINING PROGRAM

## 2023-09-19 PROCEDURE — 1036F TOBACCO NON-USER: CPT | Performed by: STUDENT IN AN ORGANIZED HEALTH CARE EDUCATION/TRAINING PROGRAM

## 2023-09-19 PROCEDURE — 3074F SYST BP LT 130 MM HG: CPT | Performed by: STUDENT IN AN ORGANIZED HEALTH CARE EDUCATION/TRAINING PROGRAM

## 2023-09-19 PROCEDURE — 99214 OFFICE O/P EST MOD 30 MIN: CPT | Performed by: STUDENT IN AN ORGANIZED HEALTH CARE EDUCATION/TRAINING PROGRAM

## 2023-09-19 PROCEDURE — 3017F COLORECTAL CA SCREEN DOC REV: CPT | Performed by: STUDENT IN AN ORGANIZED HEALTH CARE EDUCATION/TRAINING PROGRAM

## 2023-09-19 PROCEDURE — G8427 DOCREV CUR MEDS BY ELIG CLIN: HCPCS | Performed by: STUDENT IN AN ORGANIZED HEALTH CARE EDUCATION/TRAINING PROGRAM

## 2023-09-19 PROCEDURE — 3051F HG A1C>EQUAL 7.0%<8.0%: CPT | Performed by: STUDENT IN AN ORGANIZED HEALTH CARE EDUCATION/TRAINING PROGRAM

## 2023-09-19 PROCEDURE — G8417 CALC BMI ABV UP PARAM F/U: HCPCS | Performed by: STUDENT IN AN ORGANIZED HEALTH CARE EDUCATION/TRAINING PROGRAM

## 2023-09-19 RX ORDER — BUPROPION HYDROCHLORIDE 150 MG/1
150 TABLET ORAL EVERY MORNING
Qty: 90 TABLET | Refills: 3 | Status: SHIPPED | OUTPATIENT
Start: 2023-09-19

## 2023-09-19 RX ORDER — INSULIN HUMAN 500 [IU]/ML
INJECTION, SOLUTION SUBCUTANEOUS
Qty: 60 ML | Refills: 3 | Status: SHIPPED | OUTPATIENT
Start: 2023-09-19

## 2023-09-19 RX ORDER — GABAPENTIN 300 MG/1
300 CAPSULE ORAL NIGHTLY
Qty: 30 CAPSULE | Refills: 0 | Status: SHIPPED | OUTPATIENT
Start: 2023-09-19 | End: 2023-10-19

## 2023-09-19 RX ORDER — DESVENLAFAXINE SUCCINATE 50 MG/1
50 TABLET, EXTENDED RELEASE ORAL DAILY
Qty: 90 TABLET | Refills: 0 | Status: SHIPPED | OUTPATIENT
Start: 2023-09-19

## 2023-09-19 SDOH — HEALTH STABILITY: PHYSICAL HEALTH: ON AVERAGE, HOW MANY DAYS PER WEEK DO YOU ENGAGE IN MODERATE TO STRENUOUS EXERCISE (LIKE A BRISK WALK)?: 0 DAYS

## 2023-09-19 ASSESSMENT — PATIENT HEALTH QUESTIONNAIRE - PHQ9
5. POOR APPETITE OR OVEREATING: 0
2. FEELING DOWN, DEPRESSED OR HOPELESS: 0
4. FEELING TIRED OR HAVING LITTLE ENERGY: 0
6. FEELING BAD ABOUT YOURSELF - OR THAT YOU ARE A FAILURE OR HAVE LET YOURSELF OR YOUR FAMILY DOWN: 0
SUM OF ALL RESPONSES TO PHQ QUESTIONS 1-9: 0
SUM OF ALL RESPONSES TO PHQ9 QUESTIONS 1 & 2: 0
7. TROUBLE CONCENTRATING ON THINGS, SUCH AS READING THE NEWSPAPER OR WATCHING TELEVISION: 0
SUM OF ALL RESPONSES TO PHQ QUESTIONS 1-9: 0
9. THOUGHTS THAT YOU WOULD BE BETTER OFF DEAD, OR OF HURTING YOURSELF: 0
SUM OF ALL RESPONSES TO PHQ QUESTIONS 1-9: 0
8. MOVING OR SPEAKING SO SLOWLY THAT OTHER PEOPLE COULD HAVE NOTICED. OR THE OPPOSITE, BEING SO FIGETY OR RESTLESS THAT YOU HAVE BEEN MOVING AROUND A LOT MORE THAN USUAL: 0
10. IF YOU CHECKED OFF ANY PROBLEMS, HOW DIFFICULT HAVE THESE PROBLEMS MADE IT FOR YOU TO DO YOUR WORK, TAKE CARE OF THINGS AT HOME, OR GET ALONG WITH OTHER PEOPLE: 0
SUM OF ALL RESPONSES TO PHQ QUESTIONS 1-9: 0
1. LITTLE INTEREST OR PLEASURE IN DOING THINGS: 0
3. TROUBLE FALLING OR STAYING ASLEEP: 0

## 2023-09-19 ASSESSMENT — ANXIETY QUESTIONNAIRES
6. BECOMING EASILY ANNOYED OR IRRITABLE: 0
2. NOT BEING ABLE TO STOP OR CONTROL WORRYING: 0
7. FEELING AFRAID AS IF SOMETHING AWFUL MIGHT HAPPEN: 0
1. FEELING NERVOUS, ANXIOUS, OR ON EDGE: 0
5. BEING SO RESTLESS THAT IT IS HARD TO SIT STILL: 0
GAD7 TOTAL SCORE: 0
4. TROUBLE RELAXING: 0
3. WORRYING TOO MUCH ABOUT DIFFERENT THINGS: 0

## 2023-09-20 LAB
EST. AVERAGE GLUCOSE BLD GHB EST-MCNC: 159.9 MG/DL
HBA1C MFR BLD: 7.2 %

## 2023-10-15 DIAGNOSIS — E11.69 HYPERLIPIDEMIA ASSOCIATED WITH TYPE 2 DIABETES MELLITUS (HCC): ICD-10-CM

## 2023-10-15 DIAGNOSIS — E11.42 DIABETIC POLYNEUROPATHY ASSOCIATED WITH TYPE 2 DIABETES MELLITUS (HCC): Chronic | ICD-10-CM

## 2023-10-15 DIAGNOSIS — E78.5 HYPERLIPIDEMIA ASSOCIATED WITH TYPE 2 DIABETES MELLITUS (HCC): ICD-10-CM

## 2023-10-16 RX ORDER — OMEGA-3-ACID ETHYL ESTERS 1 G/1
CAPSULE, LIQUID FILLED ORAL
Qty: 120 CAPSULE | Refills: 0 | Status: SHIPPED | OUTPATIENT
Start: 2023-10-16

## 2023-10-16 RX ORDER — GABAPENTIN 300 MG/1
CAPSULE ORAL
Qty: 30 CAPSULE | Refills: 0 | Status: SHIPPED | OUTPATIENT
Start: 2023-10-16 | End: 2023-11-15

## 2023-10-16 NOTE — TELEPHONE ENCOUNTER
Refill Request     Last UDS: -    Medication Contract: -    Last Seen: Last Seen Department: 9/19/2023  Last Seen by PCP: 9/19/2023    Last Written: 09/19/23 qty 30    Next Appointment:   Future Appointments   Date Time Provider [address]  [facility]   12/5/2023  7:30 AM MD Candace Vogt [facility]   12/5/2023  2:20 PM MD Sandra Law [facility]   3/19/2024 10:00 AM Maria L Meeks MD [facility] AND RES [facility]       Future appointment scheduled      Requested Prescriptions     Pending Prescriptions Disp Refills    gabapentin (NEURONTIN) 300 MG capsule [Pharmacy Med Name: GABAPENTIN 300MG CAPS] 30 capsule 0     Sig: TAKE ONE CAPSULE BY MOUTH EVERY NIGHT    omega-3 acid ethyl esters (LOVAZA) 1 g capsule [Pharmacy Med Name: OMEGA-3-ACID ETHYL ESTERS 1GM CAPS] 120 capsule 0     Sig: TAKE TWO CAPSULES BY MOUTH TWICE A DAY

## 2023-10-23 ENCOUNTER — TELEPHONE (OUTPATIENT)
Dept: PHARMACY | Facility: CLINIC | Age: 63
End: 2023-10-23

## 2023-10-23 NOTE — TELEPHONE ENCOUNTER
As of 10/05/23 patient has used $506 of the maximum of $600 a year in waived co pays for specific medications and pharmacy-related supplies through the [employer] for the DM Program.    Patient currently enrolled in the DM Program and is nearing the maximum of $600 a year in waived co pays for specific medications and pharmacy-related supplies through the [employer]. Courtesy call placed to patient's spouse: Domi Weller who manages his medications at mobile number to advise them of the above information. Spoke to patient's spouse: Domi Weller and advised her of the above information. Domi Weller verified understanding.     Christiana Davenport   Department, toll free: [phone] Option #3    For Pharmacy Admin Tracking Only    Program: David in place:  No  Gap Closed?: Yes   Time Spent (min): 5

## 2023-11-06 DIAGNOSIS — E11.42 DIABETIC POLYNEUROPATHY ASSOCIATED WITH TYPE 2 DIABETES MELLITUS (HCC): Chronic | ICD-10-CM

## 2023-11-06 DIAGNOSIS — E78.5 HYPERLIPIDEMIA ASSOCIATED WITH TYPE 2 DIABETES MELLITUS (HCC): ICD-10-CM

## 2023-11-06 DIAGNOSIS — E11.69 HYPERLIPIDEMIA ASSOCIATED WITH TYPE 2 DIABETES MELLITUS (HCC): ICD-10-CM

## 2023-11-06 NOTE — TELEPHONE ENCOUNTER
Refill Request     CONFIRM preferred pharmacy with the patient. If Mail Order Rx - Pend for 90 day refill. Last Seen: Last Seen Department: 2023  Last Seen by PCP: 2023    Last Written: 10/16/2023    If no future appointment scheduled:  Review the last OV with PCP and review information for follow-up visit,  Route STAFF MESSAGE with patient name to the [facility] for scheduling with the following information:            -  Timing of next visit           -  Visit type ie Physical, OV, etc           -  Diagnoses/Reason ie. COPD, HTN - Do not use MEDICATION, Follow-up or CHECK UP - Give reason for visit      Next Appointment:   Future Appointments   Date Time Provider [address]   2023  7:30 AM MD Pari Smith [facility]   2023  2:20 PM MD Devorah [facility]   3/19/2024 10:00 AM Adrile Pelayo MD [facility]       Message sent to [address] [place] [address] to schedule appt with patient? NO      Requested Prescriptions     Pending Prescriptions Disp Refills    gabapentin (NEURONTIN) 300 MG capsule 30 capsule 0     Si capsule.     omega-3 acid ethyl esters (LOVAZA) 1 g capsule 120 capsule 0     Sig: TAKE TWO CAPSULES BY MOUTH TWICE A DAY

## 2023-11-07 DIAGNOSIS — E11.69 HYPERLIPIDEMIA ASSOCIATED WITH TYPE 2 DIABETES MELLITUS (HCC): ICD-10-CM

## 2023-11-07 DIAGNOSIS — E78.5 HYPERLIPIDEMIA ASSOCIATED WITH TYPE 2 DIABETES MELLITUS (HCC): ICD-10-CM

## 2023-11-07 DIAGNOSIS — E11.42 DIABETIC POLYNEUROPATHY ASSOCIATED WITH TYPE 2 DIABETES MELLITUS (HCC): Chronic | ICD-10-CM

## 2023-11-07 RX ORDER — OMEGA-3-ACID ETHYL ESTERS 1 G/1
CAPSULE, LIQUID FILLED ORAL
Qty: 120 CAPSULE | Refills: 0 | OUTPATIENT
Start: 2023-11-07

## 2023-11-07 RX ORDER — GABAPENTIN 300 MG/1
CAPSULE ORAL
Qty: 30 CAPSULE | Refills: 0 | OUTPATIENT
Start: 2023-11-07 | End: 2023-12-07

## 2023-11-07 RX ORDER — GABAPENTIN 300 MG/1
300 CAPSULE ORAL
Qty: 30 CAPSULE | Refills: 0 | OUTPATIENT
Start: 2023-11-07 | End: 2023-12-07

## 2023-12-04 NOTE — PROGRESS NOTES
[address] [facility]   Cardiac Consultation    Referring Provider:  Leslie Lopez MD     Chief Complaint   Patient presents with    1 Year Follow Up    Coronary Artery Disease    Hypertension    Hyperlipidemia       Gaby Peck   1960     History of Present Illness:   Gaby Peck is a 58 y.o. male who is here today for past medical history of coronary artery disease, abnormal EKG carotid artery disease, hypertension, hyperlipidemia. Patient has a family history of heart diease. He had an echocardiogram on 12/18/2020 which showed an EF of 60%, carotid dopplers less than 50% bilaterally. He had an abnormal stress test and underwent a left heart cath with Dr. El Camargo on 12/30/2020- Successful IVUS-guided PCI of proximal to mid-LAD with overlapping Xience Mariluz 3.0 x 28 mm and 3.5 x 23 mm MING. Patient was seen 7/2022 for preoperative cardiac risk assessment for neck surgery. Stress test ordered to risk stratify which showed no ischemia. Repeat carotid dopplers 11/2022 showed mild plaque. He underwent Left L4-L5 and L5-S1 laminectomy and diskectomy on 11/27/2022. Today he states he has been feeling well since his last visit. He is tolerating his medications and is taking them as prescribed. His blood pressure has been well controlled at home. He continues to have back pain despite his back surgery. Activity is limited by his back pain. Patient currently denies any weight gain, edema, palpitations, chest pain, shortness of breath, dizziness, and syncope.           Past Medical History:   has a past medical history of Acute, but ill-defined, cerebrovascular disease, CAD in native artery, Cerebral artery occlusion with cerebral infarction [facility]), Cerebrovascular disease, Diplopia, ED (erectile dysfunction), Elbow pain, chronic, Hyperlipidemia, Hypertension, Irritable bowel syndrome, Obstructive sleep apnea (adult) (pediatric), Occlusion and stenosis of carotid artery without mention of cerebral

## 2023-12-05 ENCOUNTER — OFFICE VISIT (OUTPATIENT)
Dept: CARDIOLOGY CLINIC | Age: 63
End: 2023-12-05
Payer: MEDICARE

## 2023-12-05 ENCOUNTER — OFFICE VISIT (OUTPATIENT)
Dept: ENDOCRINOLOGY | Age: 63
End: 2023-12-05
Payer: MEDICARE

## 2023-12-05 VITALS
HEART RATE: 58 BPM | DIASTOLIC BLOOD PRESSURE: 66 MMHG | HEIGHT: 70 IN | SYSTOLIC BLOOD PRESSURE: 132 MMHG | WEIGHT: 217 LBS | OXYGEN SATURATION: 97 % | BODY MASS INDEX: 31.07 KG/M2

## 2023-12-05 VITALS
WEIGHT: 219.6 LBS | TEMPERATURE: 97 F | BODY MASS INDEX: 31.44 KG/M2 | HEART RATE: 64 BPM | SYSTOLIC BLOOD PRESSURE: 135 MMHG | DIASTOLIC BLOOD PRESSURE: 73 MMHG | HEIGHT: 70 IN | OXYGEN SATURATION: 97 % | RESPIRATION RATE: 15 BRPM

## 2023-12-05 DIAGNOSIS — E11.69 HYPERLIPIDEMIA ASSOCIATED WITH TYPE 2 DIABETES MELLITUS (HCC): ICD-10-CM

## 2023-12-05 DIAGNOSIS — E78.5 HYPERLIPIDEMIA ASSOCIATED WITH TYPE 2 DIABETES MELLITUS (HCC): ICD-10-CM

## 2023-12-05 DIAGNOSIS — I25.10 CAD S/P PERCUTANEOUS CORONARY ANGIOPLASTY: Primary | ICD-10-CM

## 2023-12-05 DIAGNOSIS — Z98.61 CAD S/P PERCUTANEOUS CORONARY ANGIOPLASTY: Primary | ICD-10-CM

## 2023-12-05 DIAGNOSIS — I35.1 NONRHEUMATIC AORTIC VALVE INSUFFICIENCY: ICD-10-CM

## 2023-12-05 DIAGNOSIS — I25.10 CAD IN NATIVE ARTERY: ICD-10-CM

## 2023-12-05 DIAGNOSIS — I65.23 BILATERAL CAROTID ARTERY STENOSIS: ICD-10-CM

## 2023-12-05 LAB — HBA1C MFR BLD: 7.7 %

## 2023-12-05 PROCEDURE — 3017F COLORECTAL CA SCREEN DOC REV: CPT | Performed by: INTERNAL MEDICINE

## 2023-12-05 PROCEDURE — 3078F DIAST BP <80 MM HG: CPT | Performed by: INTERNAL MEDICINE

## 2023-12-05 PROCEDURE — 95251 CONT GLUC MNTR ANALYSIS I&R: CPT | Performed by: INTERNAL MEDICINE

## 2023-12-05 PROCEDURE — 1036F TOBACCO NON-USER: CPT | Performed by: INTERNAL MEDICINE

## 2023-12-05 PROCEDURE — 2022F DILAT RTA XM EVC RTNOPTHY: CPT | Performed by: INTERNAL MEDICINE

## 2023-12-05 PROCEDURE — 83036 HEMOGLOBIN GLYCOSYLATED A1C: CPT | Performed by: INTERNAL MEDICINE

## 2023-12-05 PROCEDURE — 3075F SYST BP GE 130 - 139MM HG: CPT | Performed by: INTERNAL MEDICINE

## 2023-12-05 PROCEDURE — G8427 DOCREV CUR MEDS BY ELIG CLIN: HCPCS | Performed by: INTERNAL MEDICINE

## 2023-12-05 PROCEDURE — G8484 FLU IMMUNIZE NO ADMIN: HCPCS | Performed by: INTERNAL MEDICINE

## 2023-12-05 PROCEDURE — G8417 CALC BMI ABV UP PARAM F/U: HCPCS | Performed by: INTERNAL MEDICINE

## 2023-12-05 PROCEDURE — 99214 OFFICE O/P EST MOD 30 MIN: CPT | Performed by: INTERNAL MEDICINE

## 2023-12-05 PROCEDURE — 3051F HG A1C>EQUAL 7.0%<8.0%: CPT | Performed by: INTERNAL MEDICINE

## 2023-12-05 RX ORDER — BLOOD-GLUCOSE SENSOR
EACH MISCELLANEOUS
Qty: 6 EACH | Refills: 3 | Status: SHIPPED | OUTPATIENT
Start: 2023-12-05

## 2023-12-05 RX ORDER — LISINOPRIL 20 MG/1
20 TABLET ORAL 2 TIMES DAILY
Qty: 180 TABLET | Refills: 3 | Status: SHIPPED | OUTPATIENT
Start: 2023-12-05

## 2023-12-05 RX ORDER — OMEGA-3-ACID ETHYL ESTERS 1 G/1
CAPSULE, LIQUID FILLED ORAL
Qty: 360 CAPSULE | Refills: 2 | Status: SHIPPED | OUTPATIENT
Start: 2023-12-05

## 2023-12-05 RX ORDER — CLOPIDOGREL BISULFATE 75 MG/1
75 TABLET ORAL DAILY
Qty: 90 TABLET | Refills: 3 | Status: SHIPPED | OUTPATIENT
Start: 2023-12-05

## 2023-12-05 RX ORDER — FENOFIBRATE 145 MG/1
145 TABLET, COATED ORAL DAILY
Qty: 90 TABLET | Refills: 3 | Status: SHIPPED | OUTPATIENT
Start: 2023-12-05

## 2023-12-05 RX ORDER — ATORVASTATIN CALCIUM 80 MG/1
80 TABLET, FILM COATED ORAL NIGHTLY
Qty: 90 TABLET | Refills: 3 | Status: CANCELLED | OUTPATIENT
Start: 2023-12-05

## 2023-12-05 RX ORDER — ATENOLOL 25 MG/1
25 TABLET ORAL DAILY
Qty: 90 TABLET | Refills: 3 | Status: SHIPPED | OUTPATIENT
Start: 2023-12-05

## 2023-12-05 NOTE — PROGRESS NOTES
Seen as  Patient for DM    Interim:    On CGM    Has seen Dr. Jamel Bernheim    Patient has a PMH of Type 2 DM, hypertension, hyperlipidemia, obesity , Spinal cord compression in cervical spine. Diagnosed with Diabetes Mellitus type 2 in 2004. Course has been variable . Microvascular complications: No known retinopathy (Last eye exam: 2016)   No known Nephropathy :  Has Peripheral neuropathy: Some numbness and tingling in feet. Home regimen:   Metformin 1000 mg BID  Jardiance   Humulin R U-500 80 units before breakfast , 45 units before lunch and 40 units  dinner    CGM  Last 2 weeks  Average 162  71 % in target  1% low  28% high      Previous Meds  Lantus 50  units BID  humalog 25  25 30        A1c 12.2 % ---> 9.1 % ---> 10.7 % ---> 11.1 % ---> 9.7%---> 7.3%--->6.9%---> 6.6%---- >7.3%---> 7.6%---> 6.4%----> 7.7%    Diet: Eats 3 meals/day   Has been non-compliant with diet  Nutrition education:Yes  Exercise:     Severe, uncontrolled     Hypertension:  He has essentail HTN for many yrs. On Lisinopril/Atenolol. No dizziness, SOB , chest pain. Hypertriglyceridemia : has h/o high TGD  Has  h/o pancreatitis many yrs back.     on fenofibrate 145 mg daily, fish oil 2 gram BID.     ROS: Scanned,reviewed    Past Medical History:   Diagnosis Date    Acute, but ill-defined, cerebrovascular disease 05/31/2013    CAD in native artery 12/30/2020    Cerebral artery occlusion with cerebral infarction [facility]) 2013     X2 PERSONALITY CHANGE, ANGER OUTBURSTS    Cerebrovascular disease     Diplopia 07/11/2013    ED (erectile dysfunction) 01/23/2014    Elbow pain, chronic 12/16/2015    Hyperlipidemia     Hypertension     Irritable bowel syndrome     Obstructive sleep apnea (adult) (pediatric) 07/01/2013    NO CPAP, HAD PROBLEMS WITH INSURANCE AND STOPPED USING    Occlusion and stenosis of carotid artery without mention of cerebral infarction 01/13/2014    MINOR    Pain in joint, upper arm 05/14/2015    Polyneuropathy in

## 2023-12-05 NOTE — PATIENT INSTRUCTIONS
Plan:  ~Repeat carotid dopplers in 11/2024  Cardiac medications reviewed including indications and pertinent side effects. Medication list updated at this visit. Check blood pressure and heart rate at home a few times per week- keep a log with dates and times and bring to office visit   Regular exercise and following a healthy diet encouraged   Follow up with me in 1 year   Medication refills have been provided for one year to your preferred pharmacy       Your provider has ordered testing for further evaluation. An order/prescription has been included in your paper work. To schedule outpatient testing, contact Central Scheduling by calling [address] [place] [address] ([phone]).

## 2023-12-11 DIAGNOSIS — F34.1 DYSTHYMIA: ICD-10-CM

## 2023-12-11 RX ORDER — DESVENLAFAXINE SUCCINATE 50 MG/1
50 TABLET, EXTENDED RELEASE ORAL DAILY
Qty: 90 TABLET | Refills: 0 | Status: SHIPPED | OUTPATIENT
Start: 2023-12-11

## 2023-12-11 NOTE — TELEPHONE ENCOUNTER
Refill Request       Last Seen: Last Seen Department: 9/19/2023  Last Seen by PCP: 9/19/2023    Last Written: 9/19/2023 90 with 0    Next Appointment:   Future Appointments   Date Time Provider [address]   3/19/2024 10:00 AM Eliecer Piña MD [facility]   3/26/2024  2:20 PM Cassondra Pump, MD Epifania Ormond Endo [facility]   12/10/2024  7:30 AM [facility]             Requested Prescriptions     Pending Prescriptions Disp Refills    desvenlafaxine succinate (PRISTIQ) 50 MG TB24 extended release tablet [Pharmacy Med Name: DESVENLAFAXINE SUCCINATE ER 50MG TB24] 90 tablet 0     Sig: TAKE ONE TABLET BY MOUTH ONCE A DAY

## 2024-01-08 ENCOUNTER — TELEPHONE (OUTPATIENT)
Dept: PHARMACY | Facility: CLINIC | Age: 64
End: 2024-01-08

## 2024-01-08 NOTE — TELEPHONE ENCOUNTER
**Patient is [facility]**    Called patient to schedule 2024 yearly pharmacist appointment to discuss medications for Diabetes Management Program.    No answer. Left VM on mobile TAD  Please call back at [phone] Option #3.    Terra Mishra CphT    [facility]    Clinical Pharmacy     Department, toll free: [phone] Option #3

## 2024-01-12 NOTE — TELEPHONE ENCOUNTER
Second attempt made to contact patient to schedule 2024 yearly pharmacist appointment to discuss medications for Diabetes Management Program.    No answer. Left VM on mobile TAD: Please call back at [phone] Option #3.    Aviate message sent to patient.    aRdha Hess [facility]    Clinical Pharmacy     Department, toll free: [phone] Option #3     For Pharmacy Admin Tracking Only    Program: [employer]  CPA in place:  No  Gap Closed?: No   Time Spent (min): 10

## 2024-01-12 NOTE — TELEPHONE ENCOUNTER
Patient called back and scheduled appointment for 1/23/24 at 11:30am.       Billy Barlow [facility]  Clinical Pharmacy   Phone: [phone]       For Pharmacy Admin Tracking Only    Program: [facility]  CPA in place:  No  Recommendation Provided To: Patient/Caregiver: 1 via Telephone  Intervention Detail: Scheduled Appointment  Intervention Accepted By: Patient/Caregiver: 1  Gap Closed?: Yes   Time Spent (min): 5

## 2024-01-23 ENCOUNTER — TELEPHONE (OUTPATIENT)
Dept: PHARMACY | Facility: CLINIC | Age: 64
End: 2024-01-23

## 2024-01-23 RX ORDER — ACYCLOVIR 400 MG/1
TABLET ORAL
Qty: 9 EACH | Refills: 3 | Status: SHIPPED | OUTPATIENT
Start: 2024-01-23

## 2024-01-23 NOTE — TELEPHONE ENCOUNTER
Singh Smith MD,  Your patient is currently enrolled in [facility] Employee Diabetes Program.   Please assist, orders pended for your signature/modification if you agree:  Formulary conversion: Dexcom G7 sensors, to replace Freestyle Libre3  Reviewed with patient and wife; has DexcomG 7 prior authorization in place through 12/31/24  Patient prefers to use phone, no reader rx at this time; has used Dexcom G6 in past     Thank you,  Princess Barber, PharmD, BCACP  Population Health Pharmacist  [facility] Clinical Pharmacy  Department, toll free: [phone], option 3

## 2024-01-23 NOTE — TELEPHONE ENCOUNTER
Noted Dexcom G7 sensor rx, thank you!    MyChart message sent to patient.    =======================================================   For Pharmacy Admin Tracking Only    Program: [employer]  CPA in place:  No  Recommendation Provided To: Provider: 1 via Note to Provider and Patient/Caregiver: 1 via Telephone  Intervention Detail: New Rx: 1, reason: Cost/Formulary Change  Intervention Accepted By: Provider: 1 and Patient/Caregiver: 1  Gap Closed?: Yes   Time Spent (min):  50     
Consideration(s):  Reduce tablet burden and copay reduction: Synjardy combination tablet to replace Jardiance and metformin individually  Reviewed option with patient - reviewed potentially waiting until next appt to review, in case any change/adjustment to Jardiance dose  Synjardy 5-1000mg BID would be with current doses; also available in 12.5-1000mg tablet  - DM program gaps identified:   Requirements recommended to be completed by 7/1: Provider Visit for DM (1st) and A1c (1st)   Requirements due by 12/31: Provider visit for DM (2nd), ACC/diabetes educator visit (if A1c over 8%), A1c (2nd), Lipid panel, Urine microalbumin, Influenza vaccination for 8202-8525, and Medication adherence over 70%; [id number] recommended   - Education to patient: Overview of Diabetes program- Benefits/Requirements and Benefit/indication for pneumonia vaccine in patients with diabetes     Patient confirms Tu nick for notification/s.    Princess Barber, PharmD, BCACP  Population Health Pharmacist  [facility] Clinical Pharmacy  Department, toll free: [phone], option 3

## 2024-02-06 DIAGNOSIS — Z79.4 TYPE 2 DIABETES MELLITUS WITH DIABETIC POLYNEUROPATHY, WITH LONG-TERM CURRENT USE OF INSULIN (HCC): ICD-10-CM

## 2024-02-06 DIAGNOSIS — E11.42 TYPE 2 DIABETES MELLITUS WITH DIABETIC POLYNEUROPATHY, WITH LONG-TERM CURRENT USE OF INSULIN (HCC): ICD-10-CM

## 2024-02-06 NOTE — TELEPHONE ENCOUNTER
Medication:   Requested Prescriptions     Pending Prescriptions Disp Refills    empagliflozin (JARDIANCE) 10 MG tablet 90 tablet 1     Sig: Take 1 tablet by mouth daily    metFORMIN (GLUCOPHAGE) 1000 MG tablet 180 tablet 1     Sig: Take 1 tablet by mouth 2 times daily       Last Filled:      Patient Phone Number: [phone] (home)     Last appt: 12/5/2023   Next appt: 3/26/2024    Last Labs DM:   Lab Results   Component Value Date/Time    LABA1C 7.7 12/05/2023 04:14 PM

## 2024-02-11 DIAGNOSIS — E11.42 DIABETIC POLYNEUROPATHY ASSOCIATED WITH TYPE 2 DIABETES MELLITUS (HCC): Chronic | ICD-10-CM

## 2024-02-12 RX ORDER — GABAPENTIN 300 MG/1
300 CAPSULE ORAL DAILY
Qty: 90 CAPSULE | Refills: 0 | Status: SHIPPED | OUTPATIENT
Start: 2024-02-12 | End: 2024-05-12

## 2024-02-12 NOTE — TELEPHONE ENCOUNTER
Refill Request - Controlled Substance      Last Seen Department: 9/19/2023  Last Seen by PCP: 9/19/2023    Last Written: 11/21/23 90 with 0    Last UDS: Unable to find     Med Agreement Signed On: Unable to find    Next Appointment:   Future Appointments   Date Time Provider Department Center   3/19/2024 10:00 AM Marianne Cabrera MD [facility] AND RES [facility]   3/26/2024  2:20 PM Singh Smith MD Ken[facility]   12/10/2024  7:30 AM Ruddy Hernandez MD [facility]       Requested Prescriptions     Pending Prescriptions Disp Refills    gabapentin (NEURONTIN) 300 MG capsule [Pharmacy Med Name: GABAPENTIN 300MG CAPS] 90 capsule 0     Sig: Take 1 capsule by mouth daily.

## 2024-02-19 ENCOUNTER — HOSPITAL ENCOUNTER (EMERGENCY)
Age: 64
Discharge: HOME | End: 2024-02-19
Payer: COMMERCIAL

## 2024-02-19 VITALS
TEMPERATURE: 98.1 F | OXYGEN SATURATION: 95 % | BODY MASS INDEX: 32.21 KG/M2 | HEART RATE: 86 BPM | WEIGHT: 225 LBS | DIASTOLIC BLOOD PRESSURE: 74 MMHG | SYSTOLIC BLOOD PRESSURE: 133 MMHG | HEIGHT: 70 IN | RESPIRATION RATE: 15 BRPM

## 2024-02-19 DIAGNOSIS — R04.0 EPISTAXIS: Primary | ICD-10-CM

## 2024-02-19 PROCEDURE — 99283 EMERGENCY DEPT VISIT LOW MDM: CPT

## 2024-02-19 PROCEDURE — 6370000000 HC RX 637 (ALT 250 FOR IP): Performed by: NURSE PRACTITIONER

## 2024-02-19 RX ORDER — OXYMETAZOLINE HYDROCHLORIDE 0.05 G/100ML
2 SPRAY NASAL ONCE
Status: COMPLETED | OUTPATIENT
Start: 2024-02-19 | End: 2024-02-19

## 2024-02-19 RX ADMIN — OXYMETAZOLINE HYDROCHLORIDE 2 SPRAY: 0.05 SPRAY NASAL at 22:24

## 2024-02-19 ASSESSMENT — LIFESTYLE VARIABLES
HOW OFTEN DO YOU HAVE A DRINK CONTAINING ALCOHOL: NEVER
HOW MANY STANDARD DRINKS CONTAINING ALCOHOL DO YOU HAVE ON A TYPICAL DAY: PATIENT DOES NOT DRINK

## 2024-02-19 ASSESSMENT — PAIN - FUNCTIONAL ASSESSMENT: PAIN_FUNCTIONAL_ASSESSMENT: NONE - DENIES PAIN

## 2024-02-23 NOTE — ED PROVIDER NOTES
[facility]  ED  EMERGENCY DEPARTMENT ENCOUNTER        Pt Name: José Miguel Miranda  MRN: [id number]  Birthdate 1960  Date of evaluation: 2/19/2024  Provider: MADISON Fuentes - CNP  PCP: Marianne Cabrera MD        TRINI. I have evaluated this patient.        CHIEF COMPLAINT       Chief Complaint   Patient presents with    Epistaxis     Nosebleed since 630 this evening. States \"took a big sniff in\" and nose started bleeding. Has had nosebleeds before, but states never this bad. Denies injury       HISTORY OF PRESENT ILLNESS: 1 or more Elements     History From: the patient  Limitations to history : None    José Miguel Miranda is a 64 y.o. male who presents to the emergency room today with complaints of nosebleed.  Patient states that he had nosebleed 7 about 630 this evening, when I enter the room he simply has gauze stuffed in his nares.  Denies any trauma, not anticoagulated.    Nursing Notes were all reviewed and agreed with or any disagreements were addressed in the HPI.    REVIEW OF SYSTEMS :      Review of Systems    Positives and Pertinent negatives as per HPI.     SURGICAL HISTORY     Past Surgical History:   Procedure Laterality Date    CARPAL TUNNEL RELEASE Left 2008    CERVICAL DISCECTOMY  08/02/2017    ANTERIOR CERVICAL DISCECTOMY AND FUSION C5-6, C6-7 WITH MEDTRONIC PLATES, SCREWS, ALLOGRAFT, WITH EVOKES PARTIAL C6 CORPECTOMY    CERVICAL FUSION  12/06/2017    CERVICAL LAMINECTOMY AND POSTERIOR FUSION C5-T1 WITH MEDTRONIC RODS AND SCREWS WITH EVOKES AND O-ARM    CERVICAL FUSION N/A 08/15/2022    C4-5  ANTERIOR CERVICAL DISCECTOMY AND FUSION performed by Tacos Garcia MD at [facility] OR    CHOLECYSTECTOMY  1995    COLONOSCOPY N/A 08/14/2018    COLONOSCOPY POLYPECTOMY SNARE/COLD BIOPSY performed by Balbir Coronado MD at [facility] ENDOSCOPY    CORONARY ANGIOPLASTY WITH STENT PLACEMENT      2 placed, Dec 2020    ELBOW SURGERY Left 2008    ULNAR NERVE TRANSPOSITION, AT SAME TIME AS

## 2024-03-19 ENCOUNTER — OFFICE VISIT (OUTPATIENT)
Dept: PRIMARY CARE CLINIC | Age: 64
End: 2024-03-19

## 2024-03-19 VITALS
SYSTOLIC BLOOD PRESSURE: 118 MMHG | WEIGHT: 226.8 LBS | RESPIRATION RATE: 16 BRPM | HEART RATE: 77 BPM | BODY MASS INDEX: 32.47 KG/M2 | OXYGEN SATURATION: 95 % | HEIGHT: 70 IN | TEMPERATURE: 97.9 F | DIASTOLIC BLOOD PRESSURE: 64 MMHG

## 2024-03-19 DIAGNOSIS — G45.9 TIA DUE TO EMBOLISM (HCC): ICD-10-CM

## 2024-03-19 DIAGNOSIS — M06.9 RHEUMATOID ARTHRITIS INVOLVING MULTIPLE SITES, UNSPECIFIED WHETHER RHEUMATOID FACTOR PRESENT (HCC): ICD-10-CM

## 2024-03-19 DIAGNOSIS — M54.16 LUMBAR RADICULOPATHY: ICD-10-CM

## 2024-03-19 DIAGNOSIS — L57.0 ACTINIC KERATOSIS: ICD-10-CM

## 2024-03-19 DIAGNOSIS — E11.69 HYPERLIPIDEMIA ASSOCIATED WITH TYPE 2 DIABETES MELLITUS (HCC): ICD-10-CM

## 2024-03-19 DIAGNOSIS — Z00.00 INITIAL MEDICARE ANNUAL WELLNESS VISIT: Primary | ICD-10-CM

## 2024-03-19 DIAGNOSIS — H91.93 DECREASED HEARING OF BOTH EARS: ICD-10-CM

## 2024-03-19 DIAGNOSIS — F33.2 SEVERE EPISODE OF RECURRENT MAJOR DEPRESSIVE DISORDER, WITHOUT PSYCHOTIC FEATURES (HCC): ICD-10-CM

## 2024-03-19 DIAGNOSIS — E78.5 HYPERLIPIDEMIA ASSOCIATED WITH TYPE 2 DIABETES MELLITUS (HCC): ICD-10-CM

## 2024-03-19 DIAGNOSIS — I74.9 TIA DUE TO EMBOLISM (HCC): ICD-10-CM

## 2024-03-19 DIAGNOSIS — L71.9 ROSACEA: ICD-10-CM

## 2024-03-19 RX ORDER — METRONIDAZOLE 10 MG/G
GEL TOPICAL
Qty: 60 G | Refills: 3 | Status: SHIPPED | OUTPATIENT
Start: 2024-03-19

## 2024-03-19 RX ORDER — DESVENLAFAXINE 25 MG/1
25 TABLET, EXTENDED RELEASE ORAL DAILY
Qty: 21 TABLET | Refills: 0 | Status: SHIPPED | OUTPATIENT
Start: 2024-03-19

## 2024-03-19 RX ORDER — FLUOXETINE HYDROCHLORIDE 20 MG/1
20 CAPSULE ORAL DAILY
Qty: 90 CAPSULE | Refills: 1 | Status: SHIPPED | OUTPATIENT
Start: 2024-03-19

## 2024-03-19 ASSESSMENT — PATIENT HEALTH QUESTIONNAIRE - PHQ9
SUM OF ALL RESPONSES TO PHQ QUESTIONS 1-9: 15
SUM OF ALL RESPONSES TO PHQ9 QUESTIONS 1 & 2: 0
5. POOR APPETITE OR OVEREATING: MORE THAN HALF THE DAYS
8. MOVING OR SPEAKING SO SLOWLY THAT OTHER PEOPLE COULD HAVE NOTICED. OR THE OPPOSITE, BEING SO FIGETY OR RESTLESS THAT YOU HAVE BEEN MOVING AROUND A LOT MORE THAN USUAL: MORE THAN HALF THE DAYS
3. TROUBLE FALLING OR STAYING ASLEEP: NEARLY EVERY DAY
9. THOUGHTS THAT YOU WOULD BE BETTER OFF DEAD, OR OF HURTING YOURSELF: NOT AT ALL
1. LITTLE INTEREST OR PLEASURE IN DOING THINGS: NOT AT ALL
SUM OF ALL RESPONSES TO PHQ QUESTIONS 1-9: 15
SUM OF ALL RESPONSES TO PHQ QUESTIONS 1-9: 15
4. FEELING TIRED OR HAVING LITTLE ENERGY: NEARLY EVERY DAY
10. IF YOU CHECKED OFF ANY PROBLEMS, HOW DIFFICULT HAVE THESE PROBLEMS MADE IT FOR YOU TO DO YOUR WORK, TAKE CARE OF THINGS AT HOME, OR GET ALONG WITH OTHER PEOPLE: VERY DIFFICULT
7. TROUBLE CONCENTRATING ON THINGS, SUCH AS READING THE NEWSPAPER OR WATCHING TELEVISION: NEARLY EVERY DAY
2. FEELING DOWN, DEPRESSED OR HOPELESS: NOT AT ALL
6. FEELING BAD ABOUT YOURSELF - OR THAT YOU ARE A FAILURE OR HAVE LET YOURSELF OR YOUR FAMILY DOWN: MORE THAN HALF THE DAYS
SUM OF ALL RESPONSES TO PHQ QUESTIONS 1-9: 15

## 2024-03-19 NOTE — PATIENT INSTRUCTIONS
condition or this instruction, always ask your healthcare professional. [employer], [facility] disclaims any warranty or liability for your use of this information.           Hearing Loss: Care Instructions  Overview     Hearing loss is a sudden or slow decrease in how well you hear. It can range from slight to profound. Permanent hearing loss can occur with aging. It also can happen when you are exposed long-term to loud noise. Examples include listening to loud music, riding motorcycles, or being around other loud machines.  Hearing loss can affect your work and home life. It can make you feel lonely or depressed. You may feel that you have lost your independence. But hearing aids and other devices can help you hear better and feel connected to others.  Follow-up care is a key part of your treatment and safety. Be sure to make and go to all appointments, and call your doctor if you are having problems. It's also a good idea to know your test results and keep a list of the medicines you take.  How can you care for yourself at home?  Avoid loud noises whenever possible. This helps keep your hearing from getting worse.  Always wear hearing protection around loud noises.  Wear a hearing aid as directed.  A professional can help you pick a hearing aid that will work best for you.  You can also get hearing aids over the counter for mild to moderate hearing loss.  Have hearing tests as your doctor suggests. They can show whether your hearing has changed. Your hearing aid may need to be adjusted.  Use other devices as needed. These may include:  Telephone amplifiers and hearing aids that can connect to a television, stereo, radio, or microphone.  Devices that use lights or vibrations. These alert you to the doorbell, a ringing telephone, or a baby monitor.  Television closed-captioning. This shows the words at the bottom of the screen. Most new TVs can do this.  TTY (text telephone). This lets you type messages back and

## 2024-03-19 NOTE — PROGRESS NOTES
Medicare Annual Wellness Visit    José Miguel Miranda is here for Medicare AWV (Patient states that he has a spot on his leg he would like Dr. Cabrera to look at it, would like to discuss derepression med's as well. )    Assessment & Plan   Initial Medicare annual wellness visit  - addressed all positives as seen below   Severe episode of recurrent major depressive disorder, without psychotic features (HCC)  -     FLUoxetine (PROZAC) 20 MG capsule; Take 1 capsule by mouth daily, Disp-90 capsule, R-1Normal  -     desvenlafaxine succinate (PRISTIQ) 25 MG TB24 extended release tablet; Take 1 tablet by mouth daily, Disp-21 tablet, R-0Normal  - has been on pristiq for many years, will wean and cross taper with fluoxetine for anger outbursts. Discussed risks and benefits of starting medication, patient agreeable.   Lumbar radiculopathy  -     [facility]  [facility] ([facility])-OSR  Rheumatoid arthritis involving multiple sites, unspecified whether rheumatoid factor present (HCC)  -Chronic, stable, continue current medication regimen   TIA due to embolism (HCC)  - Chronic, stable, continue current medication regimen   Hyperlipidemia associated with type 2 diabetes mellitus (HCC)  Rosacea  -     metroNIDAZOLE (METROGEL) 1 % gel; Apply to face daily., Disp-60 g, R-3, Normal  -Chronic, stable, continue current medication regimen   Actinic keratosis  -     Charan Flores MD, General Surgery, [facility]  - 1cm x1cm lesion on left forearm, unceration, concerning for progression to SCC. Discussed risk of lesion and placed referral for surgery to see if can be removed in office  Decreased hearing of both ears  -     Eleni Wakefield AU.D., Audiology, [facility]    Recommendations for Preventive Services Due: see orders and patient instructions/AVS.  Recommended screening schedule for the next 5-10 years is provided to the patient in written form: see Patient Instructions/AVS.     Return in about 5

## 2024-03-25 ENCOUNTER — HOSPITAL ENCOUNTER (OUTPATIENT)
Dept: PHYSICAL THERAPY | Age: 64
Setting detail: THERAPIES SERIES
Discharge: HOME | End: 2024-03-25
Payer: MEDICARE

## 2024-03-25 PROCEDURE — 97161 PT EVAL LOW COMPLEX 20 MIN: CPT | Performed by: PHYSICAL THERAPIST

## 2024-03-25 PROCEDURE — 97110 THERAPEUTIC EXERCISES: CPT | Performed by: PHYSICAL THERAPIST

## 2024-03-25 PROCEDURE — 97112 NEUROMUSCULAR REEDUCATION: CPT | Performed by: PHYSICAL THERAPIST

## 2024-03-25 NOTE — PLAN OF CARE
[facility]  [address], [place] [address] office: [phone] fax: [phone]     Physical Therapy Initial Evaluation Certification      Dear Marianne Cabrera MD,    We had the pleasure of evaluating the following patient for physical therapy services at [facility].  A summary of our findings can be found in the initial assessment below.  This includes our plan of care.  If you have any questions or concerns regarding these findings, please do not hesitate to contact me at the office phone number listed above.  Thank you for the referral.     Physician Signature:_______________________________Date:__________________  By signing above (or electronic signature), therapist’s plan is approved by physician       Physical Therapy: TREATMENT/PROGRESS NOTE   Patient: José Miguel Miranda (64 y.o. male)   Examination Date: 2024   :  1960 MRN: [id number]   Visit #: 1   Insurance Allowable Auth Needed   BMN 85/15 []Yes    [x]No    Insurance: Payor: MEDICARE / Plan: MEDICARE PART A AND B / Product Type: *No Product type* /   Insurance ID: [id number] - (Medicare)  Secondary Insurance (if applicable): UMR   Treatment Diagnosis: Lumbar pain M54.5      Medical Diagnosis:  Lumbar radiculopathy [M54.16]   Referring Physician: Marianne Cabrera MD  PCP: Marianne Cabrera MD       Plan of care signed (Y/N):     Date of Patient follow up with Physician:      Progress Report/POC: EVAL today  POC update due: (10 visits /OR AUTH LIMITS, whichever is less)  2024                                             Precautions/ Contra-indications:           Latex allergy:  NO  Pacemaker:    NO  Contraindications for Manipulation: recent surgical history (relative), unhealthy/ multiple comorbidities , and remote history of spinal fusion (relative)  Date of Surgery: 2023  Other:    Red Flags:  None    C-SSRS Triggered by Intake

## 2024-03-26 ENCOUNTER — OFFICE VISIT (OUTPATIENT)
Dept: ENDOCRINOLOGY | Age: 64
End: 2024-03-26
Payer: MEDICARE

## 2024-03-26 VITALS
DIASTOLIC BLOOD PRESSURE: 76 MMHG | WEIGHT: 223.6 LBS | OXYGEN SATURATION: 97 % | RESPIRATION RATE: 15 BRPM | TEMPERATURE: 98 F | HEIGHT: 70 IN | BODY MASS INDEX: 32.01 KG/M2 | SYSTOLIC BLOOD PRESSURE: 134 MMHG | HEART RATE: 77 BPM

## 2024-03-26 DIAGNOSIS — E11.65 UNCONTROLLED TYPE 2 DIABETES MELLITUS WITH HYPERGLYCEMIA (HCC): Primary | ICD-10-CM

## 2024-03-26 LAB — HBA1C MFR BLD: 8.2 %

## 2024-03-26 PROCEDURE — 3078F DIAST BP <80 MM HG: CPT | Performed by: INTERNAL MEDICINE

## 2024-03-26 PROCEDURE — G8482 FLU IMMUNIZE ORDER/ADMIN: HCPCS | Performed by: INTERNAL MEDICINE

## 2024-03-26 PROCEDURE — 3075F SYST BP GE 130 - 139MM HG: CPT | Performed by: INTERNAL MEDICINE

## 2024-03-26 PROCEDURE — 3046F HEMOGLOBIN A1C LEVEL >9.0%: CPT | Performed by: INTERNAL MEDICINE

## 2024-03-26 PROCEDURE — 2022F DILAT RTA XM EVC RTNOPTHY: CPT | Performed by: INTERNAL MEDICINE

## 2024-03-26 PROCEDURE — 1036F TOBACCO NON-USER: CPT | Performed by: INTERNAL MEDICINE

## 2024-03-26 PROCEDURE — 95251 CONT GLUC MNTR ANALYSIS I&R: CPT | Performed by: INTERNAL MEDICINE

## 2024-03-26 PROCEDURE — 3017F COLORECTAL CA SCREEN DOC REV: CPT | Performed by: INTERNAL MEDICINE

## 2024-03-26 PROCEDURE — G8427 DOCREV CUR MEDS BY ELIG CLIN: HCPCS | Performed by: INTERNAL MEDICINE

## 2024-03-26 PROCEDURE — G8417 CALC BMI ABV UP PARAM F/U: HCPCS | Performed by: INTERNAL MEDICINE

## 2024-03-26 PROCEDURE — 99214 OFFICE O/P EST MOD 30 MIN: CPT | Performed by: INTERNAL MEDICINE

## 2024-03-26 PROCEDURE — 83036 HEMOGLOBIN GLYCOSYLATED A1C: CPT | Performed by: INTERNAL MEDICINE

## 2024-03-26 PROCEDURE — 1123F ACP DISCUSS/DSCN MKR DOCD: CPT | Performed by: INTERNAL MEDICINE

## 2024-03-29 ENCOUNTER — PROCEDURE VISIT (OUTPATIENT)
Dept: AUDIOLOGY | Age: 64
End: 2024-03-29

## 2024-03-29 DIAGNOSIS — H90.3 SENSORINEURAL HEARING LOSS, BILATERAL: Primary | ICD-10-CM

## 2024-03-29 NOTE — PROGRESS NOTES
José Miguel Miranda   1960, 64 y.o. male   [id number]       Referring Provider: Marianne Cabrera MD   Referral Type: In an order in Epic    Reason for Visit: Evaluation of the cause of disorders of hearing, tinnitus, or balance.    ADULT AUDIOLOGIC EVALUATION      José Miguel Miranda is a 64 y.o. male seen today, 3/29/2024 , for an initial audiologic evaluation.  Patient was accompanied by spouse.    AUDIOLOGIC AND OTHER PERTINENT MEDICAL HISTORY:      José Miguel Miranda noted history of occupational noise exposure and family history of hearing loss.  Patient reports gradual decline understanding speech especially in the presence of background noise.  He noted a history of occupational noise exposure with intermittent use of hearing protection.  Patient has a family history of hearing loss occurring later in life.    José Miguel Miranda denied otalgia, aural fullness, tinnitus, dizziness, history of head trauma, and history of ear surgery.    Date: 3/29/2024     IMPRESSIONS:      Abnormal middle ear compliance with normal pressure in the left ear and normal pressure and compliance in the right ear.  Abnormal hearing sensitivity, bilaterally, which can affect every day listening needs.  Word understanding was excellent presented at elevated sensation level, bilaterally.  Follow medical recommendations of Marianne Cabrera MD .     ASSESSMENT AND FINDINGS:     Otoscopy revealed: Clear ear canals bilaterally    RIGHT EAR:  Hearing Sensitivity: Normal hearing sensitivity to a moderately severe sensorineural hearing loss from 250-8000 Hz  Speech Recognition Threshold: 25 dB HL  Word Recognition:Excellent (96%), based on NU-6 25-word list at 65 dBHL with 35 dBHL of masking using recorded speech stimuli.    Tympanometry: Normal peak pressure and compliance, Type A tympanogram, consistent with normal middle ear function.      LEFT EAR:  Hearing Sensitivity: Normal hearing sensitivity to a moderately severe sensorineural hearing loss

## 2024-04-01 ENCOUNTER — HOSPITAL ENCOUNTER (OUTPATIENT)
Dept: PHYSICAL THERAPY | Age: 64
Setting detail: THERAPIES SERIES
Discharge: HOME | End: 2024-04-01
Payer: MEDICARE

## 2024-04-01 PROCEDURE — 97112 NEUROMUSCULAR REEDUCATION: CPT | Performed by: PHYSICAL THERAPIST

## 2024-04-01 PROCEDURE — 97140 MANUAL THERAPY 1/> REGIONS: CPT | Performed by: PHYSICAL THERAPIST

## 2024-04-01 PROCEDURE — 97110 THERAPEUTIC EXERCISES: CPT | Performed by: PHYSICAL THERAPIST

## 2024-04-01 NOTE — FLOWSHEET NOTE
[facility]- Outpatient [facility] and Therapy  [address], [place] [address] office: [phone] fax: [phone]         Physical Therapy: TREATMENT/PROGRESS NOTE   Patient: José Miguel Miranda (64 y.o. male)   Examination Date: 2024   :  1960 MRN: [id number]   Visit #: 2   Insurance Allowable Auth Needed   BMN 85/15 []Yes    [x]No    Insurance: Payor: MEDICARE / Plan: MEDICARE PART A AND B / Product Type: *No Product type* /   Insurance ID: [id number] - (Medicare)  Secondary Insurance (if applicable): UMR   Treatment Diagnosis: Lumbar pain M54.5      Medical Diagnosis:  Lumbar radiculopathy [M54.16]   Referring Physician: Marianne Cabrera MD  PCP: Marianne Cabrera MD       Plan of care signed (Y/N):     Date of Patient follow up with Physician:      Progress Report/POC: NO  POC update due: (10 visits /OR AUTH LIMITS, whichever is less)  2024                                             Precautions/ Contra-indications:           Latex allergy:  NO  Pacemaker:    NO  Contraindications for Manipulation: recent surgical history (relative), unhealthy/ multiple comorbidities , and remote history of spinal fusion (relative)  Date of Surgery: 2023  Other:    Red Flags:  None    C-SSRS Triggered by Intake questionnaire:   [] No, Questionnaire did not trigger screening.   [x] Yes, Patient intake triggered further evaluation      [x] C-SSRS Screening completed  [] PCP notified via Plan of Care  [] Emergency services notified     Preferred Language for Healthcare:   [x] English       [] other:    SUBJECTIVE EXAMINATION     Patient stated complaint: pt. Reports that he is just a little sore, ok with ex. Was able to do some yard trimming without much pain afterward.       Test used Initial score  3/25/24 2024   Pain Summary VAS 2-5/10    Functional questionnaire Modified Oswestry 18=36%    Other:              OBJECTIVE EXAMINATION     3/25/24  ROM/Strength: (Blank

## 2024-04-02 ENCOUNTER — INITIAL CONSULT (OUTPATIENT)
Dept: SURGERY | Age: 64
End: 2024-04-02
Payer: COMMERCIAL

## 2024-04-02 VITALS
WEIGHT: 224.4 LBS | SYSTOLIC BLOOD PRESSURE: 140 MMHG | HEIGHT: 70 IN | BODY MASS INDEX: 32.13 KG/M2 | DIASTOLIC BLOOD PRESSURE: 80 MMHG

## 2024-04-02 DIAGNOSIS — L98.9 SKIN LESION OF LEFT LOWER EXTREMITY: Primary | ICD-10-CM

## 2024-04-02 PROCEDURE — 11104 PUNCH BX SKIN SINGLE LESION: CPT | Performed by: SURGERY

## 2024-04-02 PROCEDURE — 3079F DIAST BP 80-89 MM HG: CPT | Performed by: SURGERY

## 2024-04-02 PROCEDURE — G8427 DOCREV CUR MEDS BY ELIG CLIN: HCPCS | Performed by: SURGERY

## 2024-04-02 PROCEDURE — 3017F COLORECTAL CA SCREEN DOC REV: CPT | Performed by: SURGERY

## 2024-04-02 PROCEDURE — G8417 CALC BMI ABV UP PARAM F/U: HCPCS | Performed by: SURGERY

## 2024-04-02 PROCEDURE — 3077F SYST BP >= 140 MM HG: CPT | Performed by: SURGERY

## 2024-04-02 PROCEDURE — 99202 OFFICE O/P NEW SF 15 MIN: CPT | Performed by: SURGERY

## 2024-04-02 PROCEDURE — 1036F TOBACCO NON-USER: CPT | Performed by: SURGERY

## 2024-04-02 PROCEDURE — 1123F ACP DISCUSS/DSCN MKR DOCD: CPT | Performed by: SURGERY

## 2024-04-08 ENCOUNTER — APPOINTMENT (OUTPATIENT)
Dept: PHYSICAL THERAPY | Age: 64
End: 2024-04-08
Payer: COMMERCIAL

## 2024-04-09 ENCOUNTER — TELEPHONE (OUTPATIENT)
Dept: PRIMARY CARE CLINIC | Age: 64
End: 2024-04-09

## 2024-04-09 NOTE — TELEPHONE ENCOUNTER
----- Message from Marianne Cabrera MD sent at 4/8/2024  2:19 PM EDT -----    ----- Message -----  From: Rick Incoming Lab Results From Soft (Epic Adt)  Sent: 4/5/2024   2:15 PM EDT  To: Marianne Cabrera MD

## 2024-04-15 ENCOUNTER — HOSPITAL ENCOUNTER (OUTPATIENT)
Dept: PHYSICAL THERAPY | Age: 64
Setting detail: THERAPIES SERIES
Discharge: HOME | End: 2024-04-15
Payer: MEDICARE

## 2024-04-15 ENCOUNTER — PROCEDURE VISIT (OUTPATIENT)
Dept: AUDIOLOGY | Age: 64
End: 2024-04-15

## 2024-04-15 DIAGNOSIS — H90.3 SENSORINEURAL HEARING LOSS, BILATERAL: Primary | ICD-10-CM

## 2024-04-15 PROCEDURE — 97110 THERAPEUTIC EXERCISES: CPT | Performed by: PHYSICAL THERAPIST

## 2024-04-15 PROCEDURE — 97112 NEUROMUSCULAR REEDUCATION: CPT | Performed by: PHYSICAL THERAPIST

## 2024-04-15 NOTE — FLOWSHEET NOTE
[facility]- Outpatient Rehabilitation and Therapy  [address], [place] [address] office: [phone] fax: [phone]         Physical Therapy: TREATMENT/PROGRESS NOTE   Patient: José Miguel Miranda (64 y.o. male)   Examination Date: 04/15/2024   :  1960 MRN: [id number]   Visit #: 3   Insurance Allowable Auth Needed   BMN 85/15 []Yes    [x]No    Insurance: Payor: MEDICARE / Plan: MEDICARE PART A AND B / Product Type: *No Product type* /   Insurance ID: [id number] - (Medicare)  Secondary Insurance (if applicable): UMR   Treatment Diagnosis: Lumbar pain M54.5      Medical Diagnosis:  Lumbar radiculopathy [M54.16]   Referring Physician: Marianne Cabrera MD  PCP: Marianne Cabrera MD       Plan of care signed (Y/N):     Date of Patient follow up with Physician:      Progress Report/POC: NO  POC update due: (10 visits /OR AUTH LIMITS, whichever is less)  2024                                             Precautions/ Contra-indications:           Latex allergy:  NO  Pacemaker:    NO  Contraindications for Manipulation: recent surgical history (relative), unhealthy/ multiple comorbidities , and remote history of spinal fusion (relative)  Date of Surgery: 2023  Other:    Red Flags:  None    C-SSRS Triggered by Intake questionnaire:   [] No, Questionnaire did not trigger screening.   [x] Yes, Patient intake triggered further evaluation      [x] C-SSRS Screening completed  [] PCP notified via Plan of Care  [] Emergency services notified     Preferred Language for Healthcare:   [x] English       [] other:    SUBJECTIVE EXAMINATION     Patient stated complaint: pt. Reports that he is still sore, and is limited with standing and walking and has to cut grocery trip short to go sit down and recover from pain build up.       Test used Initial score  3/25/24 04/15/2024   Pain Summary VAS 2-5/10    Functional questionnaire Modified Oswestry 18=36%    Other:              OBJECTIVE

## 2024-04-15 NOTE — PROGRESS NOTES
hearing aids.    - Verbally discussed realistic expectations of hearing aid use     Information was shared verbally with patient during appointment.     RECOMMENDATIONS:      - Contact Audiology when a decision has been made to pursue hearing aids.      No charge for today's appointment.      Kylie Garcia  Audiologist

## 2024-04-22 ENCOUNTER — HOSPITAL ENCOUNTER (OUTPATIENT)
Dept: PHYSICAL THERAPY | Age: 64
Setting detail: THERAPIES SERIES
Discharge: HOME | End: 2024-04-22
Payer: MEDICARE

## 2024-04-22 PROCEDURE — 97110 THERAPEUTIC EXERCISES: CPT | Performed by: PHYSICAL THERAPIST

## 2024-04-22 PROCEDURE — 97112 NEUROMUSCULAR REEDUCATION: CPT | Performed by: PHYSICAL THERAPIST

## 2024-04-23 ENCOUNTER — OFFICE VISIT (OUTPATIENT)
Dept: PRIMARY CARE CLINIC | Age: 64
End: 2024-04-23

## 2024-04-23 ENCOUNTER — CLINICAL DOCUMENTATION (OUTPATIENT)
Dept: PHARMACY | Facility: CLINIC | Age: 64
End: 2024-04-23

## 2024-04-23 VITALS
OXYGEN SATURATION: 96 % | BODY MASS INDEX: 30.8 KG/M2 | WEIGHT: 220 LBS | HEIGHT: 71 IN | SYSTOLIC BLOOD PRESSURE: 118 MMHG | HEART RATE: 70 BPM | DIASTOLIC BLOOD PRESSURE: 60 MMHG

## 2024-04-23 DIAGNOSIS — F33.2 SEVERE EPISODE OF RECURRENT MAJOR DEPRESSIVE DISORDER, WITHOUT PSYCHOTIC FEATURES (HCC): Primary | ICD-10-CM

## 2024-04-23 RX ORDER — FLUOXETINE HYDROCHLORIDE 40 MG/1
40 CAPSULE ORAL DAILY
Qty: 90 CAPSULE | Refills: 3 | Status: SHIPPED | OUTPATIENT
Start: 2024-04-23

## 2024-04-23 NOTE — PROGRESS NOTES
tablet by mouth daily 90 tablet 1    metFORMIN (GLUCOPHAGE) 1000 MG tablet Take 1 tablet by mouth 2 times daily 180 tablet 1    Continuous Blood Gluc Sensor (DEXCOM G7 SENSOR) MISC Use as directed to monitor blood glucose, change sensor every 10 days 9 each 3    lisinopril (PRINIVIL;ZESTRIL) 20 MG tablet Take 1 tablet by mouth 2 times daily 180 tablet 3    fenofibrate (TRICOR) 145 MG tablet Take 1 tablet by mouth daily 90 tablet 3    atenolol (TENORMIN) 25 MG tablet Take 1 tablet by mouth daily 90 tablet 3    clopidogrel (PLAVIX) 75 MG tablet Take 1 tablet by mouth daily 90 tablet 3    omega-3 acid ethyl esters (LOVAZA) 1 g capsule TAKE TWO CAPSULES BY MOUTH TWICE A  capsule 2    insulin regular human (HUMULIN R U-500 KWIKPEN) 500 UNIT/ML SOPN concentrated injection pen INJECT 80 UNITS UNDER THE SKIN BEFORE BREAKFAST AND 45 UNITS UNDER THE SKIN BEFORE LUNCH AND 40 UNITS UNDER THE SKIN BEFORE DINNER (Patient taking differently: INJECT 80 UNITS UNDER THE SKIN BEFORE BREAKFAST AND 45 UNITS UNDER THE SKIN BEFORE LUNCH AND 45 UNITS UNDER THE SKIN BEFORE DINNER) 60 mL 3    buPROPion (WELLBUTRIN XL) 150 MG extended release tablet Take 1 tablet by mouth every morning 90 tablet 3    atorvastatin (LIPITOR) 80 MG tablet TAKE 1 TABLET BY MOUTH EVERY NIGHT AT BEDTIME 90 tablet 3    vitamin D (CHOLECALCIFEROL) 25 MCG (1000 UT) TABS tablet Take 1 tablet by mouth daily      aspirin 81 MG EC tablet Take 1 tablet by mouth daily      blood glucose test strips (PRODIGY NO CODING BLOOD GLUC) strip USE AS DIRECTED TO TEST UP TO 3-4 TIMES A  strip 3    Insulin Pen Needle (TRUEPLUS PEN NEEDLES) 31G X 5 MM MISC USE AS DIRECTED THREE TIMES A  each 6    Blood Glucose Monitoring Suppl (PRODIGY AUTOCODE BLOOD GLUCOSE) KYLAH Use to test BS as directed 1 each 0    nystatin (MYCOSTATIN) 902124 UNIT/GM ointment Apply topically 2 times daily to corners of mouth. 30 g 1    PRODIGY LANCETS 28G MISC Use as directed to test up to 3-4

## 2024-04-23 NOTE — PROGRESS NOTES
1st Quarterly Reminder sent to patient for the DM Program - See Mychart message or Letter for more information.      [facility]  Clinical Pharmacy   Phone: [phone], Option #3       For Pharmacy Admin Tracking Only    Program: [place]  CPA in place:  No  Gap Closed?: Yes   Time Spent (min): 5

## 2024-04-23 NOTE — PATIENT INSTRUCTIONS
[facility] (accepts caresource)      Psychology  Phone: [phone]    [facility] ([facility]) (multiple locations)   [phone]     [facility]   [phone]     [facility]   [phone]     [facility] ([facility])  [phone]     [facility]   [address], [address]   [phone]      Counseling Services    [facility]  [address]  [place], [place] [address]  [phone]    [facility]   [address] [place], [place] [address]  [phone]      Psychiatrists    Dr. Richard Brown Dr. Neil Dubin  [address]    [address] [place], [place] [address]   [place], [place] [address]  [phone]    Dr. Gerald Zambrano  [address]   [address] [place], [place] [address] [place] [address]  [phone]      Dr.Jerome DELL Denton  [address]   [address]  [place], [place] [address]   [place] [address]  [phone]    Dr. Juan Miguel Epps  [address]   [address]  [place], [place] [address]   [place] [address]  [phone]      Dr. Dean Taylor  [address]   [address] [place], [place] [address]   [place], [place] [address]  [phone]    Dr. Obdulia Santiago  [address]  [place], [place] [address]  [phone]    Community Mental Health Agencies    [facility]   [facility]  [address]    [address] [place], [place] [address]   [place], [place] [address] [phone]    [facility] [employer] [facility] ([facility]) [facility]  [address]    [address] [place] [address]    [place], [place]

## 2024-05-02 ENCOUNTER — HOSPITAL ENCOUNTER (OUTPATIENT)
Dept: PHYSICAL THERAPY | Age: 64
Setting detail: THERAPIES SERIES
Discharge: HOME | End: 2024-05-02
Payer: MEDICARE

## 2024-05-02 PROCEDURE — 97112 NEUROMUSCULAR REEDUCATION: CPT | Performed by: PHYSICAL THERAPIST

## 2024-05-02 PROCEDURE — 97110 THERAPEUTIC EXERCISES: CPT | Performed by: PHYSICAL THERAPIST

## 2024-05-02 NOTE — FLOWSHEET NOTE
increase core/lumbopelvic function to allow independence in all self-care activities.             Status: [] Progressing: [] Met: [] Not Met: [] Adjusted    TREATMENT PLAN     Frequency/Duration: 2x/week for 8-10 weeks for the following treatment interventions:    Interventions:  [x] Therapeutic exercise including: strength training, ROM, including postural re-education.   [x] NMR activation and proprioception, including postural re-education.    [x] Manual therapy as indicated to include: PROM, Gr I-IV mobilizations, STM, and Dry Needling/IASTM  [x] Modalities as needed that may include: Cryotherapy, Electrical Stimulation, and Thermal Agents  [x] Patient education on joint protection, postural re-education, activity modification, progression of HEP.        [] Aquatic Therapy    Plan: Cont POC- Continue emphasis/focus on exercise progression, improving proper muscle recruitment and activation/motor control patterns, increasing ROM, and improving soft tissue extensibility. Next visit plan to progress weights, progress reps, and add new exercises progress functional/closed chain ex as stacie.    Electronically Signed by Ryanne Gonzalez, PT, MSPT, OMT-C 9236    Date: 05/02/2024     Note: Portions of this note have been templated and/or copied from initial evaluation, reassessments and prior notes for documentation efficiency.

## 2024-05-05 DIAGNOSIS — E11.42 DIABETIC POLYNEUROPATHY ASSOCIATED WITH TYPE 2 DIABETES MELLITUS (HCC): Chronic | ICD-10-CM

## 2024-05-06 ENCOUNTER — HOSPITAL ENCOUNTER (OUTPATIENT)
Dept: PHYSICAL THERAPY | Age: 64
Setting detail: THERAPIES SERIES
Discharge: HOME | End: 2024-05-06
Payer: COMMERCIAL

## 2024-05-06 DIAGNOSIS — E11.42 TYPE 2 DIABETES MELLITUS WITH DIABETIC POLYNEUROPATHY, WITH LONG-TERM CURRENT USE OF INSULIN (HCC): ICD-10-CM

## 2024-05-06 DIAGNOSIS — Z79.4 TYPE 2 DIABETES MELLITUS WITH DIABETIC POLYNEUROPATHY, WITH LONG-TERM CURRENT USE OF INSULIN (HCC): ICD-10-CM

## 2024-05-06 PROCEDURE — 97110 THERAPEUTIC EXERCISES: CPT | Performed by: PHYSICAL THERAPIST

## 2024-05-06 PROCEDURE — 97112 NEUROMUSCULAR REEDUCATION: CPT | Performed by: PHYSICAL THERAPIST

## 2024-05-06 RX ORDER — PEN NEEDLE, DIABETIC 31 GX3/16"
NEEDLE, DISPOSABLE MISCELLANEOUS
Qty: 300 EACH | Refills: 6 | Status: SHIPPED | OUTPATIENT
Start: 2024-05-06

## 2024-05-06 RX ORDER — ATENOLOL 25 MG/1
25 TABLET ORAL DAILY
Qty: 90 TABLET | Refills: 2 | Status: SHIPPED | OUTPATIENT
Start: 2024-05-06

## 2024-05-06 RX ORDER — GABAPENTIN 300 MG/1
300 CAPSULE ORAL DAILY
Qty: 30 CAPSULE | Refills: 0 | Status: SHIPPED | OUTPATIENT
Start: 2024-05-06 | End: 2024-06-05

## 2024-05-06 NOTE — TELEPHONE ENCOUNTER
Refill Request     CONFIRM preferred pharmacy with the patient.    If Mail Order Rx - Pend for 90 day refill.      Last Seen: Last Seen Department: 4/23/2024  Last Seen by PCP: 3/19/2024    Last Written: 2/12/24    If no future appointment scheduled:  Review the last OV with PCP and review information for follow-up visit,  Route STAFF MESSAGE with patient name to the  Pool for scheduling with the following information:            -  Timing of next visit           -  Visit type ie Physical, OV, etc           -  Diagnoses/Reason ie. COPD, HTN - Do not use MEDICATION, Follow-up or CHECK UP - Give reason for visit      Next Appointment:   Future Appointments   Date Time Provider Department Center   5/6/2024  9:20 AM Ryanne Gonzalez, PT [facility]   5/31/2024 10:20 AM Pallavi Padgett DO [facility]   6/21/2024  9:30 AM Singh Smith MD Kenwo Endo [facility]   12/10/2024  7:30 AM Ruddy Hernandez MD Anderson Car [facility]       Message sent to  to schedule appt with patient?  N/A      Requested Prescriptions     Pending Prescriptions Disp Refills    gabapentin (NEURONTIN) 300 MG capsule [Pharmacy Med Name: GABAPENTIN 300MG CAPS] 90 capsule 0     Sig: Take 1 capsule by mouth daily.

## 2024-05-06 NOTE — TELEPHONE ENCOUNTER
Medication:   Requested Prescriptions     Pending Prescriptions Disp Refills    Insulin Pen Needle (TRUEPLUS PEN NEEDLES) 31G X 5 MM MISC 300 each 6     Sig: USE AS DIRECTED THREE TIMES A DAY       Last Filled:      Patient Phone Number: [phone] (home)     Last appt: 3/26/2024   Next appt: 6/21/2024    Last Labs DM:   Lab Results   Component Value Date/Time    LABA1C 8.2 03/26/2024 03:11 PM

## 2024-05-06 NOTE — FLOWSHEET NOTE
[facility]- Outpatient Rehabilitation and Therapy  [address], [place] [address] office: [phone] fax: [phone]         Physical Therapy: TREATMENT/PROGRESS NOTE   Patient: José Miguel Miranda (64 y.o. male)   Examination Date: 2024   :  1960 MRN: [id number]   Visit #: 6   Insurance Allowable Auth Needed   BMN 85/15 []Yes    [x]No    Insurance: Payor: MEDICARE / Plan: MEDICARE PART A AND B / Product Type: *No Product type* /   Insurance ID: [id number] - (Medicare)  Secondary Insurance (if applicable): UMR   Treatment Diagnosis: Lumbar pain M54.5      Medical Diagnosis:  Lumbar radiculopathy [M54.16]   Referring Physician: Marianne Cabrera MD  PCP: Marianne Cabrera MD       Plan of care signed (Y/N):     Date of Patient follow up with Physician:      Progress Report/POC: NO  POC update due: (10 visits /OR AUTH LIMITS, whichever is less)  24                                             Precautions/ Contra-indications:           Latex allergy:  NO  Pacemaker:    NO  Contraindications for Manipulation: recent surgical history (relative), unhealthy/ multiple comorbidities , and remote history of spinal fusion (relative)  Date of Surgery: 2023  Other:    Red Flags:  None    C-SSRS Triggered by Intake questionnaire:   [] No, Questionnaire did not trigger screening.   [x] Yes, Patient intake triggered further evaluation      [x] C-SSRS Screening completed  [] PCP notified via Plan of Care  [] Emergency services notified     Preferred Language for Healthcare:   [x] English       [] other:    SUBJECTIVE EXAMINATION     Patient stated complaint: pt. Reports that he is mostly still stiff.       Test used Initial score  3/25/24 2024   Pain Summary VAS 2-5/10 0-5/10 with prolonged standing or walking   Functional questionnaire Modified Oswestry 18=36% 14=28%   Other:              OBJECTIVE EXAMINATION     3/25/24  ROM/Strength: (Blank cells denote NT)      Mvmt

## 2024-05-16 ENCOUNTER — HOSPITAL ENCOUNTER (OUTPATIENT)
Dept: PHYSICAL THERAPY | Age: 64
Setting detail: THERAPIES SERIES
Discharge: HOME | End: 2024-05-16
Payer: MEDICARE

## 2024-05-16 PROCEDURE — 97110 THERAPEUTIC EXERCISES: CPT | Performed by: PHYSICAL THERAPIST

## 2024-05-16 PROCEDURE — 97112 NEUROMUSCULAR REEDUCATION: CPT | Performed by: PHYSICAL THERAPIST

## 2024-05-16 NOTE — FLOWSHEET NOTE
5/2/24            LUMBAR Flex (90) 20 35    Ext (25) 10 20    SB (25)   R restricted, L tight     Rotation (30) 25% 25% * pain radiates up to ribs L 50%, R restricted with pain on R              HIP Flex (120) 90 100 100 B    Abd (45)       ER (50) WFL WNL     IR (45) 10 WNL L 19    Ext (20)             KNEE Flex (140) WNL WNL WNL    Ext (0) WNL WNL WNL                MMT L MMT R 5/2/24       HIP  Flexion 3 3+ 3+-4     Abduction        ER        IR        Extension             KNEE  Flexion 4 4+ 4+ B     Extension 4 4+ 4+ B            ANKLE  DF 4+ 5      PF 4 5      Inversion 4+ 5      Eversion 4+ 5      Restricted L hip flexion and IR with pain in hip/groin with end range hip IR  Exercises/Interventions     Therapeutic Ex (04449)  resistance Sets/time Reps Notes/Cues/Progressions   True stretch HF, HS, TLJ rotation  1 10 ea B    Incline stretch L2  :30 4    Cupping      HL single KTC     Supine LTR     QL stretch R/L  Pinching L hip-held   Supine hip adduction iso     Supine SAQ to leg lift alt     HL clam Lime VL    BKFO with band Lime VL    SB bridges     SB rotation R/L     HL ring abd iso with TA     Standing SB flexion with cups in place,             HL \"100\"-feet on SB      Seated LAQ with neutral spine     Seated no $ in neutral spine red    True stretch hip flexion, HS stretch     LP B 100# 3 15    LP R/L only 80# 2 20 B    KE 35# 3 10    HSC 35# 2 10    MELVIN-CCTKE 45#    MELVIN abd 45# 2 10 B    Staggered stance with B UE Ext green 1 10 R/L back ea    Tandem balance R/L back  :15  3 ea    Gait with db in hand 9# B 40 m            Manual Intervention (90947)  TIME     IASTM B lumbar rashida in Sl on R      Cupping B lumbar rashida (3 ea B)       Supine HS stretch R/L       R SL lumbar roll gr II-IV              LLP L hip mob                     NMR re-education (96765) resistance Sets/time Reps CUES NEEDED          Pt.edu re findings, HEP, progression                              Modalities:    No modalities

## 2024-05-21 ENCOUNTER — HOSPITAL ENCOUNTER (OUTPATIENT)
Dept: PHYSICAL THERAPY | Age: 64
Setting detail: THERAPIES SERIES
Discharge: HOME | End: 2024-05-21
Payer: COMMERCIAL

## 2024-05-21 PROCEDURE — 97110 THERAPEUTIC EXERCISES: CPT | Performed by: PHYSICAL THERAPIST

## 2024-05-21 NOTE — FLOWSHEET NOTE
re-education (71314) resistance Sets/time Reps CUES NEEDED          Pt.edu re findings, HEP, progression              Ed for golf ball  for reaching to floor and trial to reaching to chair  Unable to control hip and pelvis      Edge of table hip ext R/L  3 10 B Cues for hip ext vs. Lumbar ext    knee to waist height  NV     Ed re correct body mechanics for lifting  5'              Modalities:    No modalities applied this session     Education/Home Exercise Program: Patient HEP program created electronically.  Refer to [employer] access code:      Access Code: [email or web address]  URL: [email or web address]  Date: 05/02/2024  Prepared by: Ryanne Gonzalez    Exercises  - Hooklying Single Knee to Chest Stretch  - 1-2 x daily - 7 x weekly - 1 sets - 10 reps  - Supine Lower Trunk Rotation  - 1-2 x daily - 7 x weekly - 1 sets - 10 reps  - Supine Hip Adduction Isometric with Ball  - 1-2 x daily - 7 x weekly - 1 sets - 10 reps - 5 hold  - Supine Knee Extension Strengthening  - 1-2 x daily - 7 x weekly - 1-2 sets - 10 reps  - Hooklying Clamshell with Resistance  - 1-2 x daily - 7 x weekly - 1-2 sets - 10 reps  - Hooklying Single Leg Bent Knee Fallouts with Resistance  - 1-2 x daily - 7 x weekly - 1 sets - 10 reps  - Standing Gastroc Stretch at Counter  - 1-2 x daily - 7 x weekly - 1 sets - 5 reps - 15 hold  - Split Stance Shoulder Row with Resistance  - 1-2 x daily - 7 x weekly - 2 sets - 10 reps  - Standing Hamstring Stretch on Chair  - 1-2 x daily - 7 x weekly - 1 sets - 5 reps - 15 hold  - Seated Hamstring Stretch  - 1-2 x daily - 7 x weekly - 1 sets - 5 reps - 15 hold    ASSESSMENT     Today's Assessment:  added hip ext R/L LE at end of table for support and most challenged on R.  Cues required to avoid lumbar ext during ext ex.  Pt. Has appt. For hip evaluation in a couple of weeks.  Modified ex. Today due to recovering from blood sugar drop overnight.      Medical Necessity Documentation:  I certify that

## 2024-05-30 ENCOUNTER — HOSPITAL ENCOUNTER (OUTPATIENT)
Dept: PHYSICAL THERAPY | Age: 64
Setting detail: THERAPIES SERIES
Discharge: HOME | End: 2024-05-30
Payer: COMMERCIAL

## 2024-05-30 PROCEDURE — 97110 THERAPEUTIC EXERCISES: CPT | Performed by: PHYSICAL THERAPIST

## 2024-05-30 NOTE — FLOWSHEET NOTE
[facility]- Outpatient Rehabilitation and Therapy  [address], [place] [address] office: [phone] fax: [phone]         Physical Therapy: TREATMENT/PROGRESS NOTE   Patient: José Miguel Miranda (64 y.o. male)   Examination Date: 2024   :  1960 MRN: [id number]   Visit #: 9   Insurance Allowable Auth Needed   BMN 85/15 []Yes    [x]No    Insurance: Payor: UMR / Plan: UMR / Product Type: *No Product type* /   Insurance ID: [id number] - (Commercial)  Secondary Insurance (if applicable): MEDICARE   Treatment Diagnosis: Lumbar pain M54.5      Medical Diagnosis:  Lumbar radiculopathy [M54.16]   Referring Physician: Marianne Cabrera MD  PCP: Marianne Cabrera MD       Plan of care signed (Y/N):     Date of Patient follow up with Physician:      Progress Report/POC: NO  POC update due: (10 visits /OR AUTH LIMITS, whichever is less)  24                                             Precautions/ Contra-indications:           Latex allergy:  NO  Pacemaker:    NO  Contraindications for Manipulation: recent surgical history (relative), unhealthy/ multiple comorbidities , and remote history of spinal fusion (relative)  Date of Surgery: 2023  Other:    Red Flags:  None    C-SSRS Triggered by Intake questionnaire:   [] No, Questionnaire did not trigger screening.   [x] Yes, Patient intake triggered further evaluation      [x] C-SSRS Screening completed  [] PCP notified via Plan of Care  [] Emergency services notified     Preferred Language for Healthcare:   [x] English       [] other:    SUBJECTIVE EXAMINATION     Patient stated complaint: pt. Reports that he had a bad night last night with blood sugar dropping while in bed, got it back under control but is not feeling great today.  Overall feels that he is getting stronger and moving better since start of PT.       Test used Initial score  3/25/24 2024   Pain Summary VAS 2-5/10 0-5/10 with prolonged standing or walking

## 2024-05-31 ENCOUNTER — OFFICE VISIT (OUTPATIENT)
Dept: PRIMARY CARE CLINIC | Age: 64
End: 2024-05-31

## 2024-05-31 ENCOUNTER — CARE COORDINATION (OUTPATIENT)
Dept: OTHER | Facility: CLINIC | Age: 64
End: 2024-05-31

## 2024-05-31 VITALS
BODY MASS INDEX: 30.66 KG/M2 | TEMPERATURE: 97.7 F | SYSTOLIC BLOOD PRESSURE: 130 MMHG | OXYGEN SATURATION: 98 % | DIASTOLIC BLOOD PRESSURE: 68 MMHG | HEART RATE: 58 BPM | WEIGHT: 219.8 LBS | RESPIRATION RATE: 15 BRPM

## 2024-05-31 DIAGNOSIS — F33.2 SEVERE EPISODE OF RECURRENT MAJOR DEPRESSIVE DISORDER, WITHOUT PSYCHOTIC FEATURES (HCC): Primary | ICD-10-CM

## 2024-05-31 SDOH — ECONOMIC STABILITY: INCOME INSECURITY: HOW HARD IS IT FOR YOU TO PAY FOR THE VERY BASICS LIKE FOOD, HOUSING, MEDICAL CARE, AND HEATING?: NOT VERY HARD

## 2024-05-31 SDOH — ECONOMIC STABILITY: FOOD INSECURITY: WITHIN THE PAST 12 MONTHS, YOU WORRIED THAT YOUR FOOD WOULD RUN OUT BEFORE YOU GOT MONEY TO BUY MORE.: NEVER TRUE

## 2024-05-31 SDOH — ECONOMIC STABILITY: FOOD INSECURITY: WITHIN THE PAST 12 MONTHS, THE FOOD YOU BOUGHT JUST DIDN'T LAST AND YOU DIDN'T HAVE MONEY TO GET MORE.: NEVER TRUE

## 2024-05-31 NOTE — PROGRESS NOTES
PROGRESS NOTE   [facility]                                  [address], [address], [facility] [address]         Phone: [phone]    Date of Service:  5/31/2024     Patient ID: .José Miguel Miranda is a 64 y.o. male      Subjective:     CC: mood/med check    HPI  José Miguel Miranda is a 64 y.o. male who presents for care of the above.  At last visit, prozac was increased to 40 mg daily to help address anger outbursts. Feels like increased dose has helped and he is doing pretty well. Wife has definitely noticed improvement. He has not seen a counselor since 2019.  At that time he did 4 visits and did feel like it was helping with his mood but then insurance cut him off.  Feels like a lot of his mood and anger issues stem from the fact that he does not have friends with which he thinks to be just internalizes a lot of his emotions.  He is open to doing counseling again but reports insurance won't cover anything.    ROS:  ROS negative except for pertinent positives noted in HPI.       Vitals:    05/31/24 1011   BP: 130/68   Site: Right Upper Arm   Position: Sitting   Cuff Size: Small Adult   Pulse: 58   Resp: 15   Temp: 97.7 °F (36.5 °C)   TempSrc: Oral   SpO2: 98%   Weight: 99.7 kg (219 lb 12.8 oz)         Allergies:  Patient has no known allergies.    Outpatient Medications Marked as Taking for the 5/31/24 encounter (Office Visit) with Pallavi Padgett, DO   Medication Sig Dispense Refill    atenolol (TENORMIN) 25 MG tablet TAKE ONE TABLET BY MOUTH ONCE A DAY 90 tablet 2    gabapentin (NEURONTIN) 300 MG capsule Take 1 capsule by mouth daily for 30 days. 30 capsule 0    Insulin Pen Needle (TRUEPLUS PEN NEEDLES) 31G X 5 MM MISC USE AS DIRECTED THREE TIMES A  each 6    FLUoxetine (PROZAC) 40 MG capsule Take 1 capsule by mouth daily 90 capsule 3    metroNIDAZOLE (METROGEL) 1 % gel Apply to face daily. 60 g 3    empagliflozin (JARDIANCE) 10 MG

## 2024-06-04 ENCOUNTER — HOSPITAL ENCOUNTER (OUTPATIENT)
Dept: PHYSICAL THERAPY | Age: 64
Setting detail: THERAPIES SERIES
Discharge: HOME | End: 2024-06-04
Payer: MEDICARE

## 2024-06-04 PROCEDURE — 97112 NEUROMUSCULAR REEDUCATION: CPT | Performed by: PHYSICAL THERAPIST

## 2024-06-04 PROCEDURE — 97110 THERAPEUTIC EXERCISES: CPT | Performed by: PHYSICAL THERAPIST

## 2024-06-04 NOTE — FLOWSHEET NOTE
[facility]- Outpatient Rehabilitation and Therapy  [address], [place] [address] office: [phone] fax: [phone]     Physical Therapy Re-Certification Plan of Care    Dear Marianne Cabrera MD  ,    We had the pleasure of treating the following patient for physical therapy services at [facility]. A summary of our findings can be found in the updated assessment below.  This includes our plan of care.  If you have any questions or concerns regarding these findings, please do not hesitate to contact me at the office phone number checked above.  Thank you for the referral.     Physician Signature:________________________________Date:__________________  By signing above (or electronic signature), therapist's plan is approved by physician      Functional Outcome: modified oswestry 10=20%  José iMguel Miranda 1960 continues to present with functional deficits in joint mobility and flexibility  limiting ability with walking on uneven ground, lifting items, heavy home activity, and prolonged standing  .  During therapy this date, patient required verbal cueing and modification of technique for exercise progression, increasing ROM, improving soft tissue extensibility, and strengthening . Patient will continue to benefit from ongoing evaluation and advanced clinical decision from a Physical Therapist to improve ROM, muscle strength, balance and proprioception, and functional mobility to safely return to PLOF and ADLs/IADLs without symptoms or restrictions.    Overall Response to Treatment:  Patient is responding well to treatment and improvement is noted with regards to goals    Total Visits: 10     Recommendation:    [x] Continue PT 1x / wk for 2-4 weeks.   [] Hold PT, pending MD visit   [] Discharge to [facility]. Follow up with PT or MD PRN.      Physical Therapy: TREATMENT/PROGRESS NOTE   Patient: José Miguel Miranda (64 y.o. male)   Examination Date: 06/04/2024

## 2024-06-11 ENCOUNTER — APPOINTMENT (OUTPATIENT)
Dept: PHYSICAL THERAPY | Age: 64
End: 2024-06-11
Payer: COMMERCIAL

## 2024-06-16 DIAGNOSIS — E11.42 DIABETIC POLYNEUROPATHY ASSOCIATED WITH TYPE 2 DIABETES MELLITUS (HCC): Chronic | ICD-10-CM

## 2024-06-17 RX ORDER — GABAPENTIN 300 MG/1
300 CAPSULE ORAL DAILY
Qty: 30 CAPSULE | Refills: 0 | Status: SHIPPED | OUTPATIENT
Start: 2024-06-17 | End: 2024-07-17

## 2024-06-17 NOTE — TELEPHONE ENCOUNTER
Refill Request     CONFIRM preferred pharmacy with the patient.    If Mail Order Rx - Pend for 90 day refill.      Last Seen: Last Seen Department: 5/31/2024  Last Seen by PCP: Visit date not found    Last Written: 5/6/24    If no future appointment scheduled:  Review the last OV with PCP and review information for follow-up visit,  Route STAFF MESSAGE with patient name to the  Pool for scheduling with the following information:            -  Timing of next visit           -  Visit type ie Physical, OV, etc           -  Diagnoses/Reason ie. COPD, HTN - Do not use MEDICATION, Follow-up or CHECK UP - Give reason for visit      Next Appointment:   Future Appointments   Date Time Provider Department Center   6/18/2024 10:00 AM Ryanne Gonzalez, PT [facility]   6/21/2024  9:30 AM Singh Smith MD [facility]   7/10/2024  9:00 AM [facility]   10/1/2024  1:30 PM Marianne Cabrera MD [facility]   12/10/2024  7:30 AM Ruddy Hernandez MD [facility]       Message sent to  to schedule appt with patient?  N/A      Requested Prescriptions     Pending Prescriptions Disp Refills    gabapentin (NEURONTIN) 300 MG capsule [Pharmacy Med Name: GABAPENTIN 300MG CAPS] 30 capsule 0     Sig: Take 1 capsule by mouth daily.

## 2024-06-18 ENCOUNTER — APPOINTMENT (OUTPATIENT)
Dept: PHYSICAL THERAPY | Age: 64
End: 2024-06-18
Payer: COMMERCIAL

## 2024-06-21 ENCOUNTER — OFFICE VISIT (OUTPATIENT)
Dept: ENDOCRINOLOGY | Age: 64
End: 2024-06-21
Payer: MEDICARE

## 2024-06-21 VITALS
DIASTOLIC BLOOD PRESSURE: 70 MMHG | HEIGHT: 71 IN | TEMPERATURE: 98 F | OXYGEN SATURATION: 98 % | BODY MASS INDEX: 30.24 KG/M2 | WEIGHT: 216 LBS | HEART RATE: 66 BPM | SYSTOLIC BLOOD PRESSURE: 119 MMHG

## 2024-06-21 DIAGNOSIS — E11.9 CONTROLLED TYPE 2 DIABETES MELLITUS WITHOUT COMPLICATION, WITH LONG-TERM CURRENT USE OF INSULIN (HCC): Primary | ICD-10-CM

## 2024-06-21 DIAGNOSIS — Z79.4 CONTROLLED TYPE 2 DIABETES MELLITUS WITHOUT COMPLICATION, WITH LONG-TERM CURRENT USE OF INSULIN (HCC): Primary | ICD-10-CM

## 2024-06-21 LAB — HBA1C MFR BLD: 7.4 %

## 2024-06-21 PROCEDURE — G8427 DOCREV CUR MEDS BY ELIG CLIN: HCPCS | Performed by: INTERNAL MEDICINE

## 2024-06-21 PROCEDURE — 1036F TOBACCO NON-USER: CPT | Performed by: INTERNAL MEDICINE

## 2024-06-21 PROCEDURE — 95251 CONT GLUC MNTR ANALYSIS I&R: CPT | Performed by: INTERNAL MEDICINE

## 2024-06-21 PROCEDURE — 99214 OFFICE O/P EST MOD 30 MIN: CPT | Performed by: INTERNAL MEDICINE

## 2024-06-21 PROCEDURE — 3052F HG A1C>EQUAL 8.0%<EQUAL 9.0%: CPT | Performed by: INTERNAL MEDICINE

## 2024-06-21 PROCEDURE — 1123F ACP DISCUSS/DSCN MKR DOCD: CPT | Performed by: INTERNAL MEDICINE

## 2024-06-21 PROCEDURE — G8417 CALC BMI ABV UP PARAM F/U: HCPCS | Performed by: INTERNAL MEDICINE

## 2024-06-21 PROCEDURE — 2022F DILAT RTA XM EVC RTNOPTHY: CPT | Performed by: INTERNAL MEDICINE

## 2024-06-21 PROCEDURE — 3078F DIAST BP <80 MM HG: CPT | Performed by: INTERNAL MEDICINE

## 2024-06-21 PROCEDURE — 3017F COLORECTAL CA SCREEN DOC REV: CPT | Performed by: INTERNAL MEDICINE

## 2024-06-21 PROCEDURE — 83036 HEMOGLOBIN GLYCOSYLATED A1C: CPT | Performed by: INTERNAL MEDICINE

## 2024-06-21 PROCEDURE — 3074F SYST BP LT 130 MM HG: CPT | Performed by: INTERNAL MEDICINE

## 2024-06-21 NOTE — PROGRESS NOTES
400 strip 3    Blood Glucose Monitoring Suppl (PRODIGY AUTOCODE BLOOD GLUCOSE) KYLAH Use to test BS as directed 1 each 0    nystatin (MYCOSTATIN) 236519 UNIT/GM ointment Apply topically 2 times daily to corners of mouth. 30 g 1    PRODIGY LANCETS 28G MISC Use as directed to test up to 3-4 times daily 400 each 3    Continuous Blood Gluc Sensor (DEXCOM G7 SENSOR) MISC Use as directed to monitor blood glucose, change sensor every 10 days (Patient not taking: Reported on 6/21/2024) 9 each 3     No current facility-administered medications for this visit.     Vitals:    06/21/24 0905   BP: 119/70   Pulse: 66   Temp: 98 °F (36.7 °C)   SpO2: 98%     Constitutional: Well-developed, appears stated age, cooperative, in no acute distress  H/E/N/M/T:atraumatic, normocephalic, external ears, nose, lips normal without lesions  Eyes: Lids, lashes, conjunctivae and sclerae normal, No proptosis, no redness  Neck: supple, symmetrical, no swelling  Skin: No obvious rashes or lesions present.  Skin and hair texture normal  Psychiatric: Judgement and Insight:  judgement and insight appear normal  Neuro: Normal without focal findings, speech is normal normal, speech is spontaneous  Chest: No labored breathing, no chest deformity, no stridor  Musculoskeletal: No joint deformity, swelling    12/23  Monofilament absent on forefoot    Lab Results   Component Value Date    LABA1C 8.2 03/26/2024             Assessment/Plan      1. Type 2 DM     José Miguel Miranda is a 64 y.o. male has Type 2 DM with obesity and insulin resistance.    uncontrolled DM.   Some overnight lows    Complicated by neuropathy.     - Change  Humulin R U-500 60 units before breakfast , 55 units before lunch and  50 units dinner   30 min before meals     SSI 5 for 50 > 150  - 20 < 90    jardiance  Metformin       recommend low carb diet ( 40-45 grams with each meal)    Referred to Cal[facility] Sanchez for dietary modification.     Avoid GLP-1 given high TGD and h/o pancreatitis.

## 2024-06-24 ENCOUNTER — CARE COORDINATION (OUTPATIENT)
Dept: OTHER | Facility: CLINIC | Age: 64
End: 2024-06-24

## 2024-06-24 NOTE — CARE COORDINATION
Ambulatory Care Coordination Note    ACM attempted 2nd outreach to patient for introduction to Associate Care Management related to Be Well With DM. HIPAA compliant message left requesting a return phone call at patient convenience.     Will continue to outreach.      Last A1C was 7.4. Will attempt patient once more to see if he is interested in DM nutritional resources.  Hemoglobin A1C   Date Value Ref Range Status   06/21/2024 7.4 % Final         Future Appointments   Date Time Provider Department Center   7/10/2024  9:00 AM [facility] MD [facility]   10/1/2024  1:30 PM Marianne Cabrera MD [facility]   10/28/2024 11:50 AM Singh Smith MD [facility]   12/10/2024  7:30 AM Ruddy Hernandez MD [facility]       Debora Jackson LPN- Care Coordinator  Associate Care Management  [address], [place]  [address]  Phone: [phone]  [email or web address]

## 2024-06-28 ENCOUNTER — TELEPHONE (OUTPATIENT)
Dept: CARDIOLOGY CLINIC | Age: 64
End: 2024-06-28

## 2024-06-28 DIAGNOSIS — I65.23 BILATERAL CAROTID ARTERY STENOSIS: Primary | ICD-10-CM

## 2024-06-28 NOTE — TELEPHONE ENCOUNTER
Pt wife states she called [employer] to schedule pts vascular duplex study and she was told by [employer] that order was . Please advise.

## 2024-07-07 DIAGNOSIS — E11.42 DIABETIC POLYNEUROPATHY ASSOCIATED WITH TYPE 2 DIABETES MELLITUS (HCC): Chronic | ICD-10-CM

## 2024-07-08 RX ORDER — GABAPENTIN 300 MG/1
300 CAPSULE ORAL DAILY
Qty: 90 CAPSULE | Refills: 0 | Status: SHIPPED | OUTPATIENT
Start: 2024-07-08 | End: 2024-10-06

## 2024-07-08 NOTE — TELEPHONE ENCOUNTER
Refill Request - Controlled Substance      Last Seen Department: 5/31/2024  Last Seen by PCP: 3/19/2024    Last Written: 6/17/24 30 with 0    Last UDS: -    Med Agreement Signed On: -    Next Appointment:   Future Appointments   Date Time Provider Department Center   7/10/2024  9:00 AM Renard Smith MD [facility]   10/1/2024  1:30 PM Marianne Cabrera MD [facility]   10/28/2024 11:50 AM Singh Smith MD [facility]   12/10/2024  7:30 AM Ruddy Hernandez MD [facility]         Requested Prescriptions     Pending Prescriptions Disp Refills    gabapentin (NEURONTIN) 300 MG capsule [Pharmacy Med Name: GABAPENTIN 300MG CAPS] 30 capsule 0     Sig: Take 1 capsule by mouth daily.

## 2024-07-09 NOTE — PROGRESS NOTES
Hypertension associated with type 1 diabetes mellitus (HCC)  2. CAD S/P percutaneous coronary angioplasty  3. Type 2 diabetes mellitus with diabetic polyneuropathy, with long-term current use of insulin (HCC)    According to the 2014 ACC/AHA pre-operative risk assessment guidelines José Miguel Miranda is a low risk for major cardiac complications during a low risk procedure and may continue as planned. These risks, including death, were explained in detail to patient, who verbalized understanding and opted to proceed with the surgery. Therefore, patient may proceed with the procedure at the discretion of the attending surgeon.      You may be asked to stop blood thinners such as Coumadin, Plavix, Fragmin, and Lovenox or Anti-inflammatories such as Aspirin, Ibuprofen, Advil, and Naproxen prior to your surgery. Per the pre-op form that patient brought in with them, they do not need to hold their anticoagulant therapy as they are undergoing topical/peribulbar anesthesia.     Preoperative medication instructions -- Diabetes medications should be adjusted preoperatively to prevent hypoglycemia or excessive hyperglycemia while fasting in preparation for surgery.    The adjustment of insulin doses and regimens in the transition from usual outpatient care to inpatient and perioperative care is highly individual. The major changes in eating, activity levels and the superimposed perioperative stress, potential use of glucocorticoids, intravenous (IV) fluids with dextrose, and numerous other factors may disrupt glucose management and affect the specific choice of insulin regimens and dosing. These factors require an individualized approach to insulin management in hospitalized patients. The approach that follows provides a guideline for adjustment but cannot substitute for careful evaluation of these factors for each patient in selecting dosing perioperatively.        Electronically signed by Renard Smith MD  Encounter date:

## 2024-07-10 ENCOUNTER — OFFICE VISIT (OUTPATIENT)
Dept: PRIMARY CARE CLINIC | Age: 64
End: 2024-07-10

## 2024-07-10 VITALS
OXYGEN SATURATION: 98 % | TEMPERATURE: 98.1 F | HEIGHT: 71 IN | DIASTOLIC BLOOD PRESSURE: 78 MMHG | SYSTOLIC BLOOD PRESSURE: 122 MMHG | WEIGHT: 218 LBS | HEART RATE: 68 BPM | BODY MASS INDEX: 30.52 KG/M2

## 2024-07-10 DIAGNOSIS — I15.2 HYPERTENSION ASSOCIATED WITH TYPE 1 DIABETES MELLITUS (HCC): Primary | ICD-10-CM

## 2024-07-10 DIAGNOSIS — E10.59 HYPERTENSION ASSOCIATED WITH TYPE 1 DIABETES MELLITUS (HCC): Primary | ICD-10-CM

## 2024-07-10 DIAGNOSIS — Z98.61 CAD S/P PERCUTANEOUS CORONARY ANGIOPLASTY: ICD-10-CM

## 2024-07-10 DIAGNOSIS — E11.42 TYPE 2 DIABETES MELLITUS WITH DIABETIC POLYNEUROPATHY, WITH LONG-TERM CURRENT USE OF INSULIN (HCC): Chronic | ICD-10-CM

## 2024-07-10 DIAGNOSIS — I25.10 CAD S/P PERCUTANEOUS CORONARY ANGIOPLASTY: ICD-10-CM

## 2024-07-10 DIAGNOSIS — Z79.4 TYPE 2 DIABETES MELLITUS WITH DIABETIC POLYNEUROPATHY, WITH LONG-TERM CURRENT USE OF INSULIN (HCC): Chronic | ICD-10-CM

## 2024-07-22 NOTE — PROGRESS NOTES
Chief Complaint   Patient presents with    Back Pain     low back pain going down left leg x2 weeks getting worse, no known injury       Mr. Evelin Mercedes is a 60 y.o male 1.5 years s/p cervical laminectomy and fusion returning to the office regarding his new low back and left leg pain. His pain began insidiously about 2 weeks ago without significant event. His back pain is equal to his leg pain. The leg pain radiates down the posterior aspect of his left leg to his calf. Pain is worse with walking and standing and better with sitting. He can walk about 5 minutes before having to sit down. He admits chronic numbness and tingling in his feet bilaterally but states his left foot has had increased numbness the past couple weeks. Denies bowel or bladder dysfunction. Denies upper extremity symptoms, new gait instability or progressive weakness. He has tried muscle relaxant without relief. Exam:  Vitals:    06/25/19 1354   BP: 128/80   Pulse: 78       General Exam:  Pt is alert and oriented x 3 and in no acute distress. Gait is heel toe with no limp or instability. Mood and affect are appropriate. Back:  No deformity. Negative for lumbar surgical scar. No rash. Positive for back pain on flexion. Positive pain on left on extension. reduced ROM overall. positive pain on palpation over left lumbar paraspinal muscles. Lower Extremities:   He has 5/5 strength of EHLs, FHLs, foot evertors, ankle dorsiflexors, plantarflexors, quadriceps, hamstrings, iliopsoas, abductors and adductors about the hips bilaterally. He has a negative straight leg raise, bilaterally. Achilles and quadriceps reflexes are 1+. Sensation is intact to light touch L3 to S1 bilaterally. He has no clonus. Hip range of motion painless. Reviewed AP and lateral x-ray images of his lumbar spine obtained in the office today.  They show no sign of fracture and multilevel lumbar degenerative disc changes primarily at PT requesting increased catheter amount do to increased cathing needs.  Order entered to reflect increased  incidents of cathing per day.      Marge Emmanuel RN   10:28 AM     L5-S1. Impression:  Lumbar HNP with radiculopathy  Lumbar DDD    A/P  Discussed treatment options with patient including observation, physical therapy, oral steroids, and additional imaging. He would like to proceed with oral steroids and home exercise program. If pain persists or increases to an unacceptable level, he will call the office for a new lumbar MRI without gadolinium. Amy Walker PA-C    Cc:  MADISON Oneil - CNP

## 2024-07-29 DIAGNOSIS — Z79.4 TYPE 2 DIABETES MELLITUS WITH DIABETIC POLYNEUROPATHY, WITH LONG-TERM CURRENT USE OF INSULIN (HCC): ICD-10-CM

## 2024-07-29 DIAGNOSIS — E11.42 TYPE 2 DIABETES MELLITUS WITH DIABETIC POLYNEUROPATHY, WITH LONG-TERM CURRENT USE OF INSULIN (HCC): ICD-10-CM

## 2024-07-29 NOTE — TELEPHONE ENCOUNTER
Medication:   Requested Prescriptions     Signed Prescriptions Disp Refills    empagliflozin (JARDIANCE) 10 MG tablet 90 tablet 1     Sig: Take 1 tablet by mouth daily     Authorizing Provider: JESSIE PALACIO     Ordering User: NALINI CEDEÑO       Last Filled:      Patient Phone Number: [phone] (home)     Last appt: 6/21/2024   Next appt: 10/28/2024    Last Labs DM:   Lab Results   Component Value Date/Time    LABA1C 7.4 06/21/2024 09:42 AM

## 2024-08-01 LAB
CHOLEST SERPL-MCNC: 123 MG/DL (ref 0–199)
GLUCOSE SERPL-MCNC: 139 MG/DL (ref 70–99)
HDLC SERPL-MCNC: 35 MG/DL (ref 40–60)
LDLC SERPL CALC-MCNC: 61 MG/DL
TRIGL SERPL-MCNC: 135 MG/DL (ref 0–150)

## 2024-08-05 ENCOUNTER — CLINICAL DOCUMENTATION (OUTPATIENT)
Dept: PHARMACY | Facility: CLINIC | Age: 64
End: 2024-08-05

## 2024-08-05 NOTE — PROGRESS NOTES
2nd Quarterly Reminder sent to patient for the DM Program - See Mychart message or Letter for more information.      [facility]   Phone: [phone], Option #3      For Pharmacy Admin Tracking Only    Program: [employer]  CPA in place:  No  Gap Closed?: Yes   Time Spent (min): 5

## 2024-08-18 DIAGNOSIS — E78.5 HYPERLIPIDEMIA ASSOCIATED WITH TYPE 2 DIABETES MELLITUS (HCC): Primary | ICD-10-CM

## 2024-08-18 DIAGNOSIS — E11.69 HYPERLIPIDEMIA ASSOCIATED WITH TYPE 2 DIABETES MELLITUS (HCC): Primary | ICD-10-CM

## 2024-08-19 ENCOUNTER — TELEPHONE (OUTPATIENT)
Dept: ENDOCRINOLOGY | Age: 64
End: 2024-08-19

## 2024-08-19 DIAGNOSIS — Z79.4 TYPE 2 DIABETES MELLITUS WITH DIABETIC POLYNEUROPATHY, WITH LONG-TERM CURRENT USE OF INSULIN (HCC): Primary | ICD-10-CM

## 2024-08-19 DIAGNOSIS — E11.42 TYPE 2 DIABETES MELLITUS WITH DIABETIC POLYNEUROPATHY, WITH LONG-TERM CURRENT USE OF INSULIN (HCC): Primary | ICD-10-CM

## 2024-08-19 RX ORDER — ATORVASTATIN CALCIUM 80 MG/1
80 TABLET, FILM COATED ORAL NIGHTLY
Qty: 90 TABLET | Refills: 3 | Status: SHIPPED | OUTPATIENT
Start: 2024-08-19

## 2024-08-19 NOTE — TELEPHONE ENCOUNTER
Pharmacy called asking for refill      metFORMIN (GLUCOPHAGE) 1000 MG tablet      [facility] [place] - [address] ([place], [place] [address] - P [phone] - F [phone]  [address] ([place], [place], [place] [address]  Phone: [phone]  Fax: [phone]

## 2024-08-19 NOTE — TELEPHONE ENCOUNTER
Medication:   Requested Prescriptions     Pending Prescriptions Disp Refills    metFORMIN (GLUCOPHAGE) 1000 MG tablet 180 tablet 1     Sig: Take 1 tablet by mouth 2 times daily       Last Filled:      Patient Phone Number: [phone] (home)     Last appt: 6/21/2024   Next appt: 10/28/2024    Last Labs DM:   Lab Results   Component Value Date/Time    LABA1C 7.4 06/21/2024 09:42 AM

## 2024-08-20 RX ORDER — ATENOLOL 25 MG/1
25 TABLET ORAL DAILY
Qty: 90 TABLET | Refills: 2 | OUTPATIENT
Start: 2024-08-20

## 2024-08-22 RX ORDER — ATENOLOL 25 MG/1
25 TABLET ORAL DAILY
Qty: 90 TABLET | Refills: 3 | Status: SHIPPED | OUTPATIENT
Start: 2024-08-22

## 2024-09-10 ENCOUNTER — TELEPHONE (OUTPATIENT)
Dept: ENDOCRINOLOGY | Age: 64
End: 2024-09-10

## 2024-09-10 DIAGNOSIS — E11.69 HYPERLIPIDEMIA ASSOCIATED WITH TYPE 2 DIABETES MELLITUS (HCC): ICD-10-CM

## 2024-09-10 DIAGNOSIS — E78.5 HYPERLIPIDEMIA ASSOCIATED WITH TYPE 2 DIABETES MELLITUS (HCC): ICD-10-CM

## 2024-09-10 RX ORDER — OMEGA-3-ACID ETHYL ESTERS 1 G/1
CAPSULE, LIQUID FILLED ORAL
Qty: 360 CAPSULE | Refills: 2 | Status: SHIPPED | OUTPATIENT
Start: 2024-09-10

## 2024-09-12 ENCOUNTER — TELEPHONE (OUTPATIENT)
Dept: ENDOCRINOLOGY | Age: 64
End: 2024-09-12

## 2024-09-16 DIAGNOSIS — Z79.4 CONTROLLED TYPE 2 DIABETES MELLITUS WITHOUT COMPLICATION, WITH LONG-TERM CURRENT USE OF INSULIN (HCC): Primary | ICD-10-CM

## 2024-09-16 DIAGNOSIS — E11.9 CONTROLLED TYPE 2 DIABETES MELLITUS WITHOUT COMPLICATION, WITH LONG-TERM CURRENT USE OF INSULIN (HCC): Primary | ICD-10-CM

## 2024-09-16 RX ORDER — BLOOD-GLUCOSE SENSOR
EACH MISCELLANEOUS
Qty: 6 EACH | Refills: 3 | Status: SHIPPED | OUTPATIENT
Start: 2024-09-16

## 2024-09-16 RX ORDER — KETOROLAC TROMETHAMINE 30 MG/ML
INJECTION, SOLUTION INTRAMUSCULAR; INTRAVENOUS
Qty: 1 EACH | Refills: 0 | Status: SHIPPED | OUTPATIENT
Start: 2024-09-16

## 2024-09-29 DIAGNOSIS — F34.1 DYSTHYMIA: ICD-10-CM

## 2024-09-30 RX ORDER — BUPROPION HYDROCHLORIDE 150 MG/1
150 TABLET ORAL EVERY MORNING
Qty: 90 TABLET | Refills: 3 | Status: SHIPPED | OUTPATIENT
Start: 2024-09-30

## 2024-09-30 NOTE — TELEPHONE ENCOUNTER
Refill Request       Last Seen: Last Seen Department: 7/10/2024  Last Seen by PCP: 3/19/2024    Last Written: 9/19/23 90 with 3    Next Appointment:   Future Appointments   Date Time Provider Department Center   10/1/2024  1:30 PM Marianne Cabrera MD [facility]   10/28/2024 11:50 AM Singh Smith MD [facility]   11/6/2024  8:00 AM [facility]   12/10/2024  7:30 AM Ruddy Hernandez MD [facility]       Future appointment scheduled      Requested Prescriptions     Pending Prescriptions Disp Refills    buPROPion (WELLBUTRIN XL) 150 MG extended release tablet [Pharmacy Med Name: BUPROPION HCL ER (XL) 150MG TB24] 90 tablet 3     Sig: TAKE ONE TABLET BY MOUTH EVERY MORNING

## 2024-10-01 ENCOUNTER — OFFICE VISIT (OUTPATIENT)
Dept: PRIMARY CARE CLINIC | Age: 64
End: 2024-10-01

## 2024-10-01 VITALS
WEIGHT: 215.6 LBS | SYSTOLIC BLOOD PRESSURE: 128 MMHG | OXYGEN SATURATION: 96 % | BODY MASS INDEX: 30.08 KG/M2 | DIASTOLIC BLOOD PRESSURE: 66 MMHG | TEMPERATURE: 98.1 F | HEART RATE: 69 BPM

## 2024-10-01 DIAGNOSIS — F33.41 RECURRENT MAJOR DEPRESSIVE DISORDER, IN PARTIAL REMISSION (HCC): Primary | ICD-10-CM

## 2024-10-01 DIAGNOSIS — Z79.4 TYPE 2 DIABETES MELLITUS WITH DIABETIC POLYNEUROPATHY, WITH LONG-TERM CURRENT USE OF INSULIN (HCC): Chronic | ICD-10-CM

## 2024-10-01 DIAGNOSIS — E78.2 MIXED HYPERLIPIDEMIA: ICD-10-CM

## 2024-10-01 DIAGNOSIS — E66.09 CLASS 1 OBESITY DUE TO EXCESS CALORIES WITH SERIOUS COMORBIDITY AND BODY MASS INDEX (BMI) OF 32.0 TO 32.9 IN ADULT: ICD-10-CM

## 2024-10-01 DIAGNOSIS — E66.811 CLASS 1 OBESITY DUE TO EXCESS CALORIES WITH SERIOUS COMORBIDITY AND BODY MASS INDEX (BMI) OF 32.0 TO 32.9 IN ADULT: ICD-10-CM

## 2024-10-01 DIAGNOSIS — I25.10 CAD S/P PERCUTANEOUS CORONARY ANGIOPLASTY: ICD-10-CM

## 2024-10-01 DIAGNOSIS — Z12.11 SCREENING FOR COLON CANCER: ICD-10-CM

## 2024-10-01 DIAGNOSIS — Z98.61 CAD S/P PERCUTANEOUS CORONARY ANGIOPLASTY: ICD-10-CM

## 2024-10-01 DIAGNOSIS — Z13.6 SCREENING FOR AAA (ABDOMINAL AORTIC ANEURYSM): ICD-10-CM

## 2024-10-01 DIAGNOSIS — I10 PRIMARY HYPERTENSION: ICD-10-CM

## 2024-10-01 DIAGNOSIS — E11.42 TYPE 2 DIABETES MELLITUS WITH DIABETIC POLYNEUROPATHY, WITH LONG-TERM CURRENT USE OF INSULIN (HCC): Chronic | ICD-10-CM

## 2024-10-01 DIAGNOSIS — Z87.891 FORMER SMOKER: ICD-10-CM

## 2024-10-01 DIAGNOSIS — Z12.2 SCREENING FOR LUNG CANCER: ICD-10-CM

## 2024-10-01 DIAGNOSIS — I25.2 HISTORY OF MYOCARDIAL INFARCTION: ICD-10-CM

## 2024-10-01 ASSESSMENT — PATIENT HEALTH QUESTIONNAIRE - PHQ9
SUM OF ALL RESPONSES TO PHQ9 QUESTIONS 1 & 2: 2
2. FEELING DOWN, DEPRESSED OR HOPELESS: SEVERAL DAYS
2. FEELING DOWN, DEPRESSED OR HOPELESS: SEVERAL DAYS
7. TROUBLE CONCENTRATING ON THINGS, SUCH AS READING THE NEWSPAPER OR WATCHING TELEVISION: SEVERAL DAYS
SUM OF ALL RESPONSES TO PHQ QUESTIONS 1-9: 2
6. FEELING BAD ABOUT YOURSELF - OR THAT YOU ARE A FAILURE OR HAVE LET YOURSELF OR YOUR FAMILY DOWN: NEARLY EVERY DAY
3. TROUBLE FALLING OR STAYING ASLEEP: NOT AT ALL
9. THOUGHTS THAT YOU WOULD BE BETTER OFF DEAD, OR OF HURTING YOURSELF: NOT AT ALL
SUM OF ALL RESPONSES TO PHQ QUESTIONS 1-9: 2
SUM OF ALL RESPONSES TO PHQ9 QUESTIONS 1 & 2: 2
SUM OF ALL RESPONSES TO PHQ QUESTIONS 1-9: 6
SUM OF ALL RESPONSES TO PHQ QUESTIONS 1-9: 2
1. LITTLE INTEREST OR PLEASURE IN DOING THINGS: SEVERAL DAYS
8. MOVING OR SPEAKING SO SLOWLY THAT OTHER PEOPLE COULD HAVE NOTICED. OR THE OPPOSITE, BEING SO FIGETY OR RESTLESS THAT YOU HAVE BEEN MOVING AROUND A LOT MORE THAN USUAL: NOT AT ALL
SUM OF ALL RESPONSES TO PHQ QUESTIONS 1-9: 2
1. LITTLE INTEREST OR PLEASURE IN DOING THINGS: SEVERAL DAYS
SUM OF ALL RESPONSES TO PHQ QUESTIONS 1-9: 6
4. FEELING TIRED OR HAVING LITTLE ENERGY: NOT AT ALL
5. POOR APPETITE OR OVEREATING: NOT AT ALL

## 2024-10-01 ASSESSMENT — ANXIETY QUESTIONNAIRES
GAD7 TOTAL SCORE: 9
4. TROUBLE RELAXING: SEVERAL DAYS
7. FEELING AFRAID AS IF SOMETHING AWFUL MIGHT HAPPEN: SEVERAL DAYS
2. NOT BEING ABLE TO STOP OR CONTROL WORRYING: MORE THAN HALF THE DAYS
3. WORRYING TOO MUCH ABOUT DIFFERENT THINGS: NEARLY EVERY DAY
1. FEELING NERVOUS, ANXIOUS, OR ON EDGE: MORE THAN HALF THE DAYS
5. BEING SO RESTLESS THAT IT IS HARD TO SIT STILL: NOT AT ALL
6. BECOMING EASILY ANNOYED OR IRRITABLE: NOT AT ALL

## 2024-10-01 NOTE — PROGRESS NOTES
PROGRESS NOTE   [facility]                                  [address], [address], [facility] [address]         Phone: [phone]    Date of Service:  10/1/2024     Patient ID: Rubia Miranda is a 64 y.o. male      Subjective:     CC: Chronic Care Follow Up    HPI  Patient is a 64-year-old male with past medical history of hypertension, CAD with history of MI with prior PCI, history of TIA, type 2 diabetes on long-term insulin usage and neuropathy, depression, DARRICK, history of tobacco use, carotid artery stenosis and vitamin D deficiency who presents in office for chronic care follow-up today.    Interval history:  Since last time patient was seen in office, he denies any acute complaints today.  We did review his current insulin regiment which she already follows up with endocrinology for.  Patient notes that he takes regular insulin 65 units in the morning, 55 units around lunch, and 55 units in the evening in addition to metformin and Jardiance.  Patient does continue to take Prozac and Wellbutrin however states he continues to suffer from social isolation which does worsen his depression at times.  Patient notes that his wife works most days, so he is frequently home alone.  Patient also presents to discuss routine healthcare maintenance which we reviewed today.      ROS:  Review of Systems   Constitutional:  Negative for chills, diaphoresis, fatigue and fever.   Respiratory:  Negative for cough, chest tightness, shortness of breath and wheezing.    Cardiovascular:  Negative for chest pain and palpitations.   Gastrointestinal:  Negative for abdominal pain, constipation, diarrhea, nausea and vomiting.   Musculoskeletal:  Negative for arthralgias, back pain and myalgias.   Neurological:  Negative for dizziness, tremors, seizures, syncope, weakness, light-headedness, numbness and headaches.   Psychiatric/Behavioral:  Positive for

## 2024-10-01 NOTE — PATIENT INSTRUCTIONS
[facility] (accepts caresource)  Phone: [phone]    [facility] (previously [facility]) (multiple locations)   [phone]     [facility]   [phone]     [facility]   [phone]     [facility] ([facility])  [phone]     [facility]   [address] [facility], [address]   [phone]      Counseling Services    [facility] Professional Service  [address] [facility]  [place], [place] [address]  [phone]    [facility] Service   [address] [place], [place] [address]  [phone]      Psychiatrists    Dr. Richard Brown Dr. Neil Dubin  [address] [facility]    [address] [place], [place] [address]   [place], [place] [address]  [phone]    Dr. Gerald Zambrano  [address] [facility]   [address] [place], [place] [address] [place] [address]  [phone]      Dr.Jerome DELL Denton  [address] [facility]   [address] [facility]  [place], [place] [address]   [place] [address]  [phone]    Dr. Juan Miguel Epps  [address] [facility]   [address] [facility].  [place], [place] [address]   [place] [address]  [phone]      Dr. Dean Taylor  [address] [facility]   [address] [place], [place] [address]   [place], [place] [address]  [phone]    Dr. Obdulia Santiago  [address] [facility]  [place], [place] [address]  [phone]    Community Mental Health Agencies    [facility]   [facility] Behavioral Health  [address]    [address] [place], [place] [address]   [place], [place] [address] [phone]    [facility]  [address] [facility]    [address] [place] [address]    [place], [place]

## 2024-10-03 DIAGNOSIS — Z79.4 TYPE 2 DIABETES MELLITUS WITH DIABETIC POLYNEUROPATHY, WITH LONG-TERM CURRENT USE OF INSULIN (HCC): Chronic | ICD-10-CM

## 2024-10-03 DIAGNOSIS — E11.42 TYPE 2 DIABETES MELLITUS WITH DIABETIC POLYNEUROPATHY, WITH LONG-TERM CURRENT USE OF INSULIN (HCC): Chronic | ICD-10-CM

## 2024-10-03 LAB
ALBUMIN SERPL-MCNC: 4.7 G/DL (ref 3.4–5)
ALBUMIN/GLOB SERPL: 1.9 {RATIO} (ref 1.1–2.2)
ALP SERPL-CCNC: 89 U/L (ref 40–129)
ALT SERPL-CCNC: 49 U/L (ref 10–40)
ANION GAP SERPL CALCULATED.3IONS-SCNC: 11 MMOL/L (ref 3–16)
AST SERPL-CCNC: 39 U/L (ref 15–37)
BILIRUB SERPL-MCNC: 0.3 MG/DL (ref 0–1)
BUN SERPL-MCNC: 32 MG/DL (ref 7–20)
CALCIUM SERPL-MCNC: 9.9 MG/DL (ref 8.3–10.6)
CHLORIDE SERPL-SCNC: 101 MMOL/L (ref 99–110)
CO2 SERPL-SCNC: 25 MMOL/L (ref 21–32)
CREAT SERPL-MCNC: 1.3 MG/DL (ref 0.8–1.3)
EST. AVERAGE GLUCOSE BLD GHB EST-MCNC: 159.9 MG/DL
GFR SERPLBLD CREATININE-BSD FMLA CKD-EPI: 61 ML/MIN/{1.73_M2}
GLUCOSE SERPL-MCNC: 239 MG/DL (ref 70–99)
HBA1C MFR BLD: 7.2 %
POTASSIUM SERPL-SCNC: 5.3 MMOL/L (ref 3.5–5.1)
PROT SERPL-MCNC: 7.2 G/DL (ref 6.4–8.2)
SODIUM SERPL-SCNC: 137 MMOL/L (ref 136–145)

## 2024-10-03 ASSESSMENT — ENCOUNTER SYMPTOMS
VOMITING: 0
CONSTIPATION: 0
BACK PAIN: 0
NAUSEA: 0
CHEST TIGHTNESS: 0
COUGH: 0
SHORTNESS OF BREATH: 0
ABDOMINAL PAIN: 0
DIARRHEA: 0
WHEEZING: 0

## 2024-10-13 DIAGNOSIS — E11.42 DIABETIC POLYNEUROPATHY ASSOCIATED WITH TYPE 2 DIABETES MELLITUS (HCC): Chronic | ICD-10-CM

## 2024-10-14 RX ORDER — GABAPENTIN 300 MG/1
300 CAPSULE ORAL DAILY
Qty: 90 CAPSULE | Refills: 0 | Status: SHIPPED | OUTPATIENT
Start: 2024-10-14 | End: 2025-01-12

## 2024-10-14 NOTE — TELEPHONE ENCOUNTER
Refill Request - Controlled Substance      Last Seen Department: 10/1/2024  Last Seen by PCP: 3/19/2024    Last Written: 7/8/24 90 with 0    Last UDS: -    Med Agreement Signed On: -    Next Appointment:   Future Appointments   Date Time Provider Department Center   10/28/2024 11:50 AM Singh Smith MD Kenwo Endo [facility]   11/6/2024  8:00 AM KATE [facility] LAB 1 MELVIN [facility] Rad   12/10/2024  7:30 AM Ruddy Hernandez MD Anderson [facility]   4/1/2025  1:30 PM Marianne Cabrera MD [facility] AND RES [facility]         Future appointment scheduled    Requested Prescriptions     Pending Prescriptions Disp Refills    gabapentin (NEURONTIN) 300 MG capsule [Pharmacy Med Name: GABAPENTIN 300MG CAPS] 90 capsule 0     Sig: Take 1 capsule by mouth daily.

## 2024-10-15 DIAGNOSIS — E87.5 HYPERKALEMIA: ICD-10-CM

## 2024-10-15 DIAGNOSIS — R74.01 TRANSAMINITIS: Primary | ICD-10-CM

## 2024-10-15 DIAGNOSIS — Z11.59 ENCOUNTER FOR SCREENING FOR OTHER VIRAL DISEASES: ICD-10-CM

## 2024-10-17 DIAGNOSIS — R74.01 TRANSAMINITIS: ICD-10-CM

## 2024-10-17 DIAGNOSIS — E87.5 HYPERKALEMIA: ICD-10-CM

## 2024-10-17 LAB
ALBUMIN SERPL-MCNC: 4.6 G/DL (ref 3.4–5)
ALBUMIN/GLOB SERPL: 2.2 {RATIO} (ref 1.1–2.2)
ALP SERPL-CCNC: 84 U/L (ref 40–129)
ALT SERPL-CCNC: 48 U/L (ref 10–40)
ANION GAP SERPL CALCULATED.3IONS-SCNC: 16 MMOL/L (ref 3–16)
AST SERPL-CCNC: 38 U/L (ref 15–37)
BILIRUB SERPL-MCNC: 0.4 MG/DL (ref 0–1)
BUN SERPL-MCNC: 16 MG/DL (ref 7–20)
CALCIUM SERPL-MCNC: 9.7 MG/DL (ref 8.3–10.6)
CHLORIDE SERPL-SCNC: 104 MMOL/L (ref 99–110)
CO2 SERPL-SCNC: 23 MMOL/L (ref 21–32)
CREAT SERPL-MCNC: 0.9 MG/DL (ref 0.8–1.3)
FERRITIN SERPL IA-MCNC: 104 NG/ML (ref 30–400)
GFR SERPLBLD CREATININE-BSD FMLA CKD-EPI: >90 ML/MIN/{1.73_M2}
GLUCOSE SERPL-MCNC: 94 MG/DL (ref 70–99)
HBV CORE IGM SERPL QL IA: NORMAL
HCV AB SERPL QL IA: NORMAL
IRON SATN MFR SERPL: 18 % (ref 20–50)
IRON SERPL-MCNC: 66 UG/DL (ref 59–158)
POTASSIUM SERPL-SCNC: 4.7 MMOL/L (ref 3.5–5.1)
PROT SERPL-MCNC: 6.7 G/DL (ref 6.4–8.2)
SODIUM SERPL-SCNC: 143 MMOL/L (ref 136–145)
TIBC SERPL-MCNC: 365 UG/DL (ref 260–445)

## 2024-10-23 ENCOUNTER — TELEPHONE (OUTPATIENT)
Dept: PRIMARY CARE CLINIC | Age: 64
End: 2024-10-23

## 2024-10-23 DIAGNOSIS — Z13.6 SCREENING FOR AAA (ABDOMINAL AORTIC ANEURYSM): ICD-10-CM

## 2024-10-23 DIAGNOSIS — Z87.891 FORMER SMOKER: Primary | ICD-10-CM

## 2024-10-23 NOTE — TELEPHONE ENCOUNTER
Central Scheduling is calling and stating that the 2 order that was put in for Low Dose Chest Ct Abnormal lung screen they are saying that needs to be CT Lung Screening it does not need to say low dose. And the order for Vascular AAA Screening if you are wanting medicare to pay for it, it needs to be US Abnormal Aorta. Please advise.

## 2024-10-28 ENCOUNTER — CLINICAL DOCUMENTATION (OUTPATIENT)
Dept: PHARMACY | Facility: CLINIC | Age: 64
End: 2024-10-28

## 2024-10-28 NOTE — PROGRESS NOTES
[facility] Employee Diabetes Program - Be Well With Diabetes    Quarterly Check-in  Quarterly Reminder sent to patient for the DM Program - See Mycarleis message or Letter for more information  Sent Tu العراقي [facility] Clinical   [facility] Clinical Pharmacy  Department, toll free: [phone], option 3    For Pharmacy Admin Tracking Only    Program: Mytopia  CPA in place:  No  Gap Closed?: No   Time Spent (min): 5    =======================================================    Mychart/Letter for participant:    Thanks so much for taking the first step towards better health.    Please review the information below for requirements that need to be completed for the remainder of the year.    To ensure participants are taking steps to control their condition ongoing requirements need to be completed. To receive the Be Well With Diabetes Program benefit for 2025, the following actions must be taken:     Requirements to be completed by 12/31/24  Urine Microalbumin (once yearly)    *Documentation of any requirements completed or reviewed outside of the [facility] electronic medical record will need to be faxed or emailed (fax/email info below).*    If the missing requirements(s) are not met by the date listed above, you will be disqualified from the program and will not receive program benefits in 2025. If any patient is dis-enrolled from the program then they must wait a full calendar year to re-enroll in the program.   Please reach out to our team ASAP if there are any questions or concerns.    [facility]   Phone: [phone] Option #3  Email: [email or web address]  Fax Number: [phone]

## 2024-10-31 ENCOUNTER — HOSPITAL ENCOUNTER (OUTPATIENT)
Dept: ULTRASOUND IMAGING | Age: 64
Discharge: HOME | End: 2024-10-31
Payer: COMMERCIAL

## 2024-10-31 ENCOUNTER — HOSPITAL ENCOUNTER (OUTPATIENT)
Dept: CT IMAGING | Age: 64
Discharge: HOME | End: 2024-10-31
Payer: COMMERCIAL

## 2024-10-31 DIAGNOSIS — Z87.891 FORMER SMOKER: ICD-10-CM

## 2024-10-31 DIAGNOSIS — Z13.6 SCREENING FOR AAA (ABDOMINAL AORTIC ANEURYSM): ICD-10-CM

## 2024-10-31 PROCEDURE — 71271 CT THORAX LUNG CANCER SCR C-: CPT

## 2024-10-31 PROCEDURE — 76706 US ABDL AORTA SCREEN AAA: CPT

## 2024-11-06 ENCOUNTER — TELEPHONE (OUTPATIENT)
Dept: CARDIOLOGY CLINIC | Age: 64
End: 2024-11-06

## 2024-11-06 NOTE — TELEPHONE ENCOUNTER
CARDIAC CLEARANCE REQUEST    What type of procedure are you having:  Endoscopy    Are you taking any blood thinners:    Type on anesthesia:Twiglight    When is your procedure scheduled for:11/13/2024    What physician is performing your procedure: Charan Pichardo    Phone Number:[phone]    Fax number to send the letter: [phone]

## 2024-11-06 NOTE — PROGRESS NOTES
José Miguel MCGILL Ruth    Age 64 y.o.    male    1960    JEREMIAS [id number]    11/13/2024  Arrival Time_____________  OR Time____________30 Min     Procedure(s):  COLONOSCOPY                      Monitor Anesthesia Care    Surgeon(s):  Charan Pichardo, MD       Phone [phone] (home) [phone] (work)    [facility]  Date  Info Source  Home  Cell         Work  _____________________________________________________________________  _____________________________________________________________________  _____________________________________________________________________  _____________________________________________________________________  _____________________________________________________________________    PCP _____________________________ Phone_________________     H&P  ________________  Bringing      Chart              Epic      DOS      Called________  EKG ________________   Bringing      Chart              Epic      DOS      Called________  LABS________________   Bringing     Chart              Epic      DOS      Called________  Cardiac Clearance ______ Bringing      Chart              Epic      DOS      Called________  Pulmonary Clearance____ Bringing      Chart              Epic      DOS      Called________    Cardiologist________________________ Phone___________________________  Pulmonologist_______________________Phone___________________________    ? Advance Directives   ? Shinto concerns / Waiver on Chart            PAT Communications________________  ? Pre-op Instructions Given /Understood          _________________________________  ? Directions to Surgery Center                          _________________________________  ? Transportation Home_______________      __________________________________  ? Crutches/Walker__________________        __________________________________    Orders: Hard copy/ EPIC                 Transcribed/ EPIC              _______Wt.    ________Pharmacy

## 2024-11-07 ENCOUNTER — HOSPITAL ENCOUNTER (OUTPATIENT)
Dept: VASCULAR LAB | Age: 64
Discharge: HOME | End: 2024-11-09
Attending: INTERNAL MEDICINE
Payer: MEDICARE

## 2024-11-07 DIAGNOSIS — I65.23 BILATERAL CAROTID ARTERY STENOSIS: ICD-10-CM

## 2024-11-07 PROCEDURE — 93880 EXTRACRANIAL BILAT STUDY: CPT

## 2024-11-07 NOTE — PROGRESS NOTES
Date and time of surgery : 11/13/24 at 0730             Arrival Time:  0630     Bring Picture ID and insurance card.  Please wear simple, loose fitting clothing to the hospital.   Do not bring valuables (money, credit cards, checkbooks, etc.)   on your fingers or toes.  DO NOT wear any jewelry or piercings on day of surgery.  All body piercing jewelry must be removed.  If you have dentures, they will be removed before going to the OR; we will provide you a container.  If you wear contact lenses or glasses, they will be removed; please bring a case for them.  Shower the evening before or morning of surgery with antibacterial soap.  Nothing to eat or drink after 230 am the morning of your procedure  You may brush your teeth and gargle the morning of surgery.  DO NOT SWALLOW WATER.   If you take Fluoxetine and Atenolol in the morning, take the morning of your procedure with a sip of water.  Hold Plavix for 5 days prior to procedure but continue Aspirin.  Hold Jardiance 3 days prior to procedure.  Aspirin, Ibuprofen, Advil, Naproxen, Vitamin E and other Anti-inflammatory products and supplements should be stopped for 5 -7days before surgery or as directed by your physician.  Do not smoke or drink any alcoholic beverages 24 hours prior to surgery.  This includes NA Beer. Refrain from the usage of any recreational drugs, including non-prescribed prescription drugs.   You MUST plan for a responsible adult to stay on site while you are here and take you home after your surgery. You will not be allowed to leave alone or drive yourself home. It is strongly suggested someone stay with you the first 24 hrs. Your surgery will be cancelled if you do not have a ride home.  To help prevent infection, change your sheets the night before surgery.   If you  have a Living Will and Durable Power of  for Healthcare, please bring in a copy.  Notify your Surgeon if you develop any illness between now and time of surgery. Cough,

## 2024-11-08 LAB
VAS LEFT ARM BP: 148 MMHG
VAS LEFT CCA DIST EDV: 18.9 CM/S
VAS LEFT CCA DIST PSV: 90.4 CM/S
VAS LEFT CCA MID EDV: 23.1 CM/S
VAS LEFT CCA MID PSV: 101 CM/S
VAS LEFT CCA PROX EDV: 19.6 CM/S
VAS LEFT CCA PROX PSV: 106 CM/S
VAS LEFT ECA EDV: 11.9 CM/S
VAS LEFT ECA PSV: 80.6 CM/S
VAS LEFT ICA DIST EDV: 27.3 CM/S
VAS LEFT ICA DIST PSV: 118 CM/S
VAS LEFT ICA MID EDV: 23.1 CM/S
VAS LEFT ICA MID PSV: 82.7 CM/S
VAS LEFT ICA PROX EDV: 19.6 CM/S
VAS LEFT ICA PROX PSV: 86.9 CM/S
VAS LEFT ICA/CCA PSV: 1.17
VAS LEFT SUBCLAVIAN PROX EDV: 0 CM/S
VAS LEFT SUBCLAVIAN PROX PSV: 212 CM/S
VAS LEFT VERTEBRAL EDV: 7.84 CM/S
VAS LEFT VERTEBRAL PSV: 42.3 CM/S
VAS RIGHT ARM BP: 145 MMHG
VAS RIGHT CCA DIST EDV: 14.9 CM/S
VAS RIGHT CCA DIST PSV: 70.2 CM/S
VAS RIGHT CCA MID EDV: 18.6 CM/S
VAS RIGHT CCA MID PSV: 89.5 CM/S
VAS RIGHT CCA PROX EDV: 14.9 CM/S
VAS RIGHT CCA PROX PSV: 86.4 CM/S
VAS RIGHT ECA EDV: 14 CM/S
VAS RIGHT ECA PSV: 113 CM/S
VAS RIGHT ICA DIST EDV: 25.6 CM/S
VAS RIGHT ICA DIST PSV: 91.5 CM/S
VAS RIGHT ICA MID EDV: 23.8 CM/S
VAS RIGHT ICA MID PSV: 112 CM/S
VAS RIGHT ICA PROX EDV: 19.6 CM/S
VAS RIGHT ICA PROX PSV: 71.5 CM/S
VAS RIGHT ICA/CCA PSV: 1.25
VAS RIGHT SUBCLAVIAN PROX EDV: 0 CM/S
VAS RIGHT SUBCLAVIAN PROX PSV: 168 CM/S
VAS RIGHT VERTEBRAL EDV: 10.8 CM/S
VAS RIGHT VERTEBRAL PSV: 44.2 CM/S

## 2024-11-12 ENCOUNTER — ANESTHESIA EVENT (OUTPATIENT)
Dept: ENDOSCOPY | Age: 64
End: 2024-11-12
Payer: MEDICARE

## 2024-11-13 ENCOUNTER — ANESTHESIA (OUTPATIENT)
Dept: ENDOSCOPY | Age: 64
End: 2024-11-13
Payer: MEDICARE

## 2024-11-13 ENCOUNTER — HOSPITAL ENCOUNTER (OUTPATIENT)
Age: 64
Setting detail: OUTPATIENT SURGERY
Discharge: HOME | End: 2024-11-13
Attending: INTERNAL MEDICINE | Admitting: INTERNAL MEDICINE
Payer: MEDICARE

## 2024-11-13 VITALS
WEIGHT: 215 LBS | SYSTOLIC BLOOD PRESSURE: 158 MMHG | RESPIRATION RATE: 18 BRPM | DIASTOLIC BLOOD PRESSURE: 87 MMHG | HEART RATE: 80 BPM | TEMPERATURE: 98 F | OXYGEN SATURATION: 90 % | BODY MASS INDEX: 30.78 KG/M2 | HEIGHT: 70 IN

## 2024-11-13 DIAGNOSIS — Z12.11 COLON CANCER SCREENING: ICD-10-CM

## 2024-11-13 LAB
GLUCOSE BLD-MCNC: 255 MG/DL (ref 70–99)
PERFORMED ON: ABNORMAL

## 2024-11-13 PROCEDURE — 3700000001 HC ADD 15 MINUTES (ANESTHESIA): Performed by: INTERNAL MEDICINE

## 2024-11-13 PROCEDURE — 3609010600 HC COLONOSCOPY POLYPECTOMY SNARE/COLD BIOPSY: Performed by: INTERNAL MEDICINE

## 2024-11-13 PROCEDURE — 7100000010 HC PHASE II RECOVERY - FIRST 15 MIN: Performed by: INTERNAL MEDICINE

## 2024-11-13 PROCEDURE — 2709999900 HC NON-CHARGEABLE SUPPLY: Performed by: INTERNAL MEDICINE

## 2024-11-13 PROCEDURE — 88305 TISSUE EXAM BY PATHOLOGIST: CPT

## 2024-11-13 PROCEDURE — 6360000002 HC RX W HCPCS: Performed by: NURSE ANESTHETIST, CERTIFIED REGISTERED

## 2024-11-13 PROCEDURE — 2580000003 HC RX 258: Performed by: ANESTHESIOLOGY

## 2024-11-13 PROCEDURE — 2500000003 HC RX 250 WO HCPCS: Performed by: NURSE ANESTHETIST, CERTIFIED REGISTERED

## 2024-11-13 PROCEDURE — 3700000000 HC ANESTHESIA ATTENDED CARE: Performed by: INTERNAL MEDICINE

## 2024-11-13 PROCEDURE — 7100000011 HC PHASE II RECOVERY - ADDTL 15 MIN: Performed by: INTERNAL MEDICINE

## 2024-11-13 RX ORDER — SODIUM CHLORIDE 0.9 % (FLUSH) 0.9 %
5-40 SYRINGE (ML) INJECTION PRN
Status: DISCONTINUED | OUTPATIENT
Start: 2024-11-13 | End: 2024-11-13 | Stop reason: HOSPADM

## 2024-11-13 RX ORDER — SODIUM CHLORIDE 9 MG/ML
INJECTION, SOLUTION INTRAVENOUS PRN
Status: DISCONTINUED | OUTPATIENT
Start: 2024-11-13 | End: 2024-11-13 | Stop reason: HOSPADM

## 2024-11-13 RX ORDER — LIDOCAINE HYDROCHLORIDE 20 MG/ML
INJECTION, SOLUTION EPIDURAL; INFILTRATION; INTRACAUDAL; PERINEURAL
Status: DISCONTINUED | OUTPATIENT
Start: 2024-11-13 | End: 2024-11-13 | Stop reason: SDUPTHER

## 2024-11-13 RX ORDER — MIDAZOLAM HYDROCHLORIDE 1 MG/ML
2 INJECTION, SOLUTION INTRAMUSCULAR; INTRAVENOUS
Status: DISCONTINUED | OUTPATIENT
Start: 2024-11-13 | End: 2024-11-13 | Stop reason: HOSPADM

## 2024-11-13 RX ORDER — PROPOFOL 10 MG/ML
INJECTION, EMULSION INTRAVENOUS
Status: DISCONTINUED | OUTPATIENT
Start: 2024-11-13 | End: 2024-11-13 | Stop reason: SDUPTHER

## 2024-11-13 RX ORDER — SODIUM CHLORIDE, SODIUM LACTATE, POTASSIUM CHLORIDE, CALCIUM CHLORIDE 600; 310; 30; 20 MG/100ML; MG/100ML; MG/100ML; MG/100ML
INJECTION, SOLUTION INTRAVENOUS CONTINUOUS
Status: DISCONTINUED | OUTPATIENT
Start: 2024-11-13 | End: 2024-11-13 | Stop reason: HOSPADM

## 2024-11-13 RX ORDER — SODIUM CHLORIDE 0.9 % (FLUSH) 0.9 %
5-40 SYRINGE (ML) INJECTION EVERY 12 HOURS SCHEDULED
Status: DISCONTINUED | OUTPATIENT
Start: 2024-11-13 | End: 2024-11-13 | Stop reason: HOSPADM

## 2024-11-13 RX ADMIN — LIDOCAINE HYDROCHLORIDE 100 MG: 20 INJECTION, SOLUTION EPIDURAL; INFILTRATION; INTRACAUDAL at 07:42

## 2024-11-13 RX ADMIN — PROPOFOL 100 MG: 10 INJECTION, EMULSION INTRAVENOUS at 07:42

## 2024-11-13 RX ADMIN — PROPOFOL 120 MCG/KG/MIN: 10 INJECTION, EMULSION INTRAVENOUS at 07:42

## 2024-11-13 RX ADMIN — SODIUM CHLORIDE: 9 INJECTION, SOLUTION INTRAVENOUS at 07:04

## 2024-11-13 ASSESSMENT — PAIN SCALES - GENERAL
PAINLEVEL_OUTOF10: 0
PAINLEVEL_OUTOF10: 0

## 2024-11-13 ASSESSMENT — PAIN - FUNCTIONAL ASSESSMENT
PAIN_FUNCTIONAL_ASSESSMENT: 0-10
PAIN_FUNCTIONAL_ASSESSMENT: ACTIVITIES ARE NOT PREVENTED

## 2024-11-13 ASSESSMENT — PAIN DESCRIPTION - DESCRIPTORS: DESCRIPTORS: CRAMPING

## 2024-11-13 NOTE — ANESTHESIA POSTPROCEDURE EVALUATION
Department of Anesthesiology  Postprocedure Note    Patient: José Miguel Miranda  MRN: [id number]  YOB: 1960  Date of evaluation: 11/13/2024    Procedure Summary       Date: 11/13/24 Room / Location: [place] [address] / [facility]    Anesthesia Start: 0737 Anesthesia Stop: 0807    Procedure: COLONOSCOPY POLYPECTOMY SNARE/BIOPSY Diagnosis:       Colon cancer screening      (Colon cancer screening [Z12.11])    Surgeons: Charan Pichardo MD Responsible Provider: Sarwat Nick DO    Anesthesia Type: general ASA Status: 3            Anesthesia Type: No value filed.    Christina Phase I: Christina Score: 10    Christina Phase II: Christina Score: 9    Anesthesia Post Evaluation    Patient location during evaluation: PACU  Patient participation: complete - patient participated  Level of consciousness: awake  Pain score: 0  Airway patency: patent  Nausea & Vomiting: no nausea and no vomiting  Cardiovascular status: blood pressure returned to baseline and hemodynamically stable  Respiratory status: acceptable and room air  Hydration status: stable  Comments: AWAKE, AWARE, ORIENTED  ANSWERED ALL QUESTIONS  PAIN AND VOLUME STATUS STABLE  VITAL SIGNS STABLE  Pain management: adequate    No notable events documented.

## 2024-11-13 NOTE — ANESTHESIA PRE PROCEDURE
Time of last solid consumption: 2200                        Date of last liquid consumption: 11/13/24                        Date of last solid food consumption: 11/11/24    BMI:   Wt Readings from Last 3 Encounters:   11/13/24 97.5 kg (215 lb)   10/01/24 97.8 kg (215 lb 9.6 oz)   07/10/24 98.9 kg (218 lb)     Body mass index is 30.85 kg/m².    CBC:   Lab Results   Component Value Date/Time    WBC 11.1 06/13/2023 05:30 AM    RBC 4.08 06/13/2023 05:30 AM    HGB 12.3 06/13/2023 05:30 AM    HCT 36.1 06/13/2023 05:30 AM    MCV 88.5 06/13/2023 05:30 AM    RDW 13.9 06/13/2023 05:30 AM     06/13/2023 05:30 AM       CMP:   Lab Results   Component Value Date/Time     10/17/2024 09:29 AM    K 4.7 10/17/2024 09:29 AM    K 4.3 12/31/2020 05:19 AM     10/17/2024 09:29 AM    CO2 23 10/17/2024 09:29 AM    BUN 16 10/17/2024 09:29 AM    CREATININE 0.9 10/17/2024 09:29 AM    GFRAA >60 08/24/2022 01:08 PM    GFRAA >60 04/18/2013 02:59 PM    AGRATIO 2.2 10/17/2024 09:29 AM    LABGLOM >90 10/17/2024 09:29 AM    LABGLOM >60 05/31/2023 07:38 AM    GLUCOSE 94 10/17/2024 09:29 AM    CALCIUM 9.7 10/17/2024 09:29 AM    BILITOT 0.4 10/17/2024 09:29 AM    ALKPHOS 84 10/17/2024 09:29 AM    AST 38 10/17/2024 09:29 AM    ALT 48 10/17/2024 09:29 AM       POC Tests: No results for input(s): \"POCGLU\", \"POCNA\", \"POCK\", \"POCCL\", \"POCBUN\", \"POCHEMO\", \"POCHCT\" in the last 72 hours.    Coags:   Lab Results   Component Value Date/Time    PROTIME 12.4 06/12/2023 06:30 AM    INR 0.92 06/12/2023 06:30 AM    APTT 28.4 06/12/2023 06:30 AM       HCG (If Applicable): No results found for: \"PREGTESTUR\", \"PREGSERUM\", \"HCG\", \"HCGQUANT\"     ABGs: No results found for: \"PHART\", \"PO2ART\", \"ULF5ELO\", \"JGH2DVA\", \"BEART\", \"U6PHGLLT\"     Type & Screen (If Applicable):  Lab Results   Component Value Date    ABORH O POS 06/12/2023    LABANTI NEG 06/12/2023       Drug/Infectious Status (If Applicable):  Lab Results   Component Value Date/Time    HIV

## 2024-11-13 NOTE — H&P
[facility]   Pre-operative History and Physical    Patient: José Miguel Miranda  : 1960  Acct#:     HISTORY OF PRESENT ILLNESS:    The patient is a 64 y.o. male who presents for colon cancer screening     Indications: colon cancer screening     Past Medical History:        Diagnosis Date    Acute, but ill-defined, cerebrovascular disease 2013    CAD in native artery 2020    Cerebral artery occlusion with cerebral infarction (HCC) 2013     X2 PERSONALITY CHANGE, ANGER OUTBURSTS    Cerebrovascular disease     Diplopia 2013    ED (erectile dysfunction) 2014    Elbow pain, chronic 2015    Hyperlipidemia     Hypertension     Irritable bowel syndrome     Obstructive sleep apnea (adult) (pediatric) 2013    NO CPAP, HAD PROBLEMS WITH INSURANCE AND STOPPED USING    Occlusion and stenosis of carotid artery without mention of cerebral infarction 2014    MINOR    Pain in joint, upper arm 2015    Polyneuropathy in diabetes(357.2) 2013    PONV (postoperative nausea and vomiting)     Poor balance     Skin abnormality     PRE-CANCEROUS LESIONS REMOVED FROM ARMS AND EARS    Type 2 diabetes mellitus with diabetic polyneuropathy, with long-term current use of insulin ([facility]) 2013    Type II or unspecified type diabetes mellitus without mention of complication, not stated as uncontrolled     Use of cane as ambulatory aid       Past Surgical History:        Procedure Laterality Date    CARPAL TUNNEL RELEASE Left 2008    CERVICAL DISCECTOMY  2017    ANTERIOR CERVICAL DISCECTOMY AND FUSION C5-6, C6-7 WITH MEDTRONIC PLATES, SCREWS, ALLOGRAFT, WITH EVOKES PARTIAL C6 CORPECTOMY    CERVICAL FUSION  2017    CERVICAL LAMINECTOMY AND POSTERIOR FUSION C5-T1 WITH MEDTRONIC RODS AND SCREWS WITH EVOKES AND O-ARM    CERVICAL FUSION N/A 08/15/2022    C4-5  ANTERIOR CERVICAL DISCECTOMY AND FUSION performed by Tacos Garcia MD at [facility] OR    CHOLECYSTECTOMY

## 2024-12-09 NOTE — PROGRESS NOTES
[facility]   Cardiac Follow up     Referring Provider:  Marianne Cabrera MD     Chief Complaint   Patient presents with    Follow-up    Hypertension    Hyperlipidemia    Coronary Artery Disease       José Miguel Miranda   1960     History of Present Illness:   José Miguel Miranda is a 64 y.o. male who is here today for past medical history of coronary artery disease, abnormal EKG carotid artery disease, hypertension, hyperlipidemia. Patient has a family history of heart diease. He had an echocardiogram on 12/18/2020 which showed an EF of 60%, carotid dopplers less than 50% bilaterally. He had an abnormal stress test and underwent a left heart cath with Dr. Velasquez on 12/30/2020- Successful IVUS-guided PCI of proximal to mid-LAD with overlapping Xience Mariluz 3.0 x 28 mm and 3.5 x 23 mm MING.   Repeat carotid dopplers 11/2024 showed <50% stenosis bilaterally. Today he states he has been feeling well since his last visit. He is tolerating his medications and is taking them as prescribed. Blood pressure at home is controlled. Patient currently denies any weight gain, edema, palpitations, chest pain, shortness of breath, dizziness, and syncope.          Past Medical History:   has a past medical history of Acute, but ill-defined, cerebrovascular disease, CAD in native artery, Cerebral artery occlusion with cerebral infarction (HCC), Cerebrovascular disease, Diplopia, ED (erectile dysfunction), Elbow pain, chronic, Hyperlipidemia, Hypertension, Irritable bowel syndrome, Obstructive sleep apnea (adult) (pediatric), Occlusion and stenosis of carotid artery without mention of cerebral infarction, Pain in joint, upper arm, Polyneuropathy in diabetes(357.2), PONV (postoperative nausea and vomiting), Poor balance, Skin abnormality, Type 2 diabetes mellitus with diabetic polyneuropathy, with long-term current use of insulin (HCC), Type II or unspecified type diabetes mellitus without mention of complication, not stated as

## 2024-12-10 ENCOUNTER — OFFICE VISIT (OUTPATIENT)
Dept: CARDIOLOGY CLINIC | Age: 64
End: 2024-12-10
Payer: MEDICARE

## 2024-12-10 VITALS
DIASTOLIC BLOOD PRESSURE: 66 MMHG | OXYGEN SATURATION: 97 % | SYSTOLIC BLOOD PRESSURE: 134 MMHG | BODY MASS INDEX: 30.06 KG/M2 | HEIGHT: 70 IN | HEART RATE: 63 BPM | WEIGHT: 210 LBS

## 2024-12-10 DIAGNOSIS — I25.10 CAD IN NATIVE ARTERY: Primary | ICD-10-CM

## 2024-12-10 DIAGNOSIS — I65.23 BILATERAL CAROTID ARTERY STENOSIS: ICD-10-CM

## 2024-12-10 PROCEDURE — 3017F COLORECTAL CA SCREEN DOC REV: CPT | Performed by: INTERNAL MEDICINE

## 2024-12-10 PROCEDURE — G8427 DOCREV CUR MEDS BY ELIG CLIN: HCPCS | Performed by: INTERNAL MEDICINE

## 2024-12-10 PROCEDURE — 1036F TOBACCO NON-USER: CPT | Performed by: INTERNAL MEDICINE

## 2024-12-10 PROCEDURE — G8417 CALC BMI ABV UP PARAM F/U: HCPCS | Performed by: INTERNAL MEDICINE

## 2024-12-10 PROCEDURE — G8482 FLU IMMUNIZE ORDER/ADMIN: HCPCS | Performed by: INTERNAL MEDICINE

## 2024-12-10 PROCEDURE — 3075F SYST BP GE 130 - 139MM HG: CPT | Performed by: INTERNAL MEDICINE

## 2024-12-10 PROCEDURE — 99214 OFFICE O/P EST MOD 30 MIN: CPT | Performed by: INTERNAL MEDICINE

## 2024-12-10 PROCEDURE — 3078F DIAST BP <80 MM HG: CPT | Performed by: INTERNAL MEDICINE

## 2024-12-10 PROCEDURE — 93000 ELECTROCARDIOGRAM COMPLETE: CPT | Performed by: INTERNAL MEDICINE

## 2024-12-10 PROCEDURE — 1123F ACP DISCUSS/DSCN MKR DOCD: CPT | Performed by: INTERNAL MEDICINE

## 2024-12-10 RX ORDER — FENOFIBRATE 145 MG/1
145 TABLET, COATED ORAL DAILY
Qty: 90 TABLET | Refills: 3 | Status: SHIPPED | OUTPATIENT
Start: 2024-12-10

## 2024-12-10 RX ORDER — FENOFIBRATE 145 MG/1
145 TABLET, COATED ORAL DAILY
Qty: 90 TABLET | Refills: 3 | OUTPATIENT
Start: 2024-12-10

## 2024-12-10 RX ORDER — CLOPIDOGREL BISULFATE 75 MG/1
75 TABLET ORAL DAILY
Qty: 90 TABLET | Refills: 3 | Status: SHIPPED | OUTPATIENT
Start: 2024-12-10

## 2024-12-10 NOTE — PATIENT INSTRUCTIONS
Plan:  ~Repeat carotid dopplers in 11/2026  ~Blood pressure goal is 120-130/70-80's, rarely over 140/90  Cardiac medications reviewed including indications and pertinent side effects. Medication list updated at this visit.   Patient verbalizes understanding of the need for treatment and education has been provided at today's visit. Additional education material will be provided in after visit summary.    Check blood pressure and heart rate at home a few times per week- keep a log with dates and times and bring to office visit   Regular exercise and following a healthy diet encouraged   Follow up with me in 1 year

## 2024-12-12 ENCOUNTER — TELEPHONE (OUTPATIENT)
Dept: PHARMACY | Facility: CLINIC | Age: 64
End: 2024-12-12

## 2024-12-12 NOTE — TELEPHONE ENCOUNTER
[facility] Employee Diabetes Program - Be Well With Diabetes    Quarterly Check-in  Quarterly Reminder sent to patient for the DM Program - See Mychart message or Letter for more information  Spoke to patients spouse: Aislinn who is also enrolled in the DM Program to discuss missing requirements  Aislinn - Verbalized understanding and will advise patient.  Aislinn will contact patients provider to have an order placed for the Urine Albumin.  Sent Mychart  [facility] Isha updated    Susanne العراقي [facility] Clinical   [facility] Clinical Pharmacy  Department, toll free: [phone], option 3    For Pharmacy Admin Tracking Only    Program: Identified  CPA in place:  No  Gap Closed?: No   Time Spent (min): 5    =======================================================    Mychart/Letter for participant:    Thanks so much for taking the first step towards better health.    Please review the information below for requirements that need to be completed for the remainder of the year.    To ensure participants are taking steps to control their condition ongoing requirements need to be completed. To receive the Be Well With Diabetes Program benefit for 2025, the following actions must be taken:     Requirements to be completed by 12/31/24  Urine Microalbumin (once yearly)    *Documentation of any requirements completed or reviewed outside of the [facility] electronic medical record will need to be faxed or emailed (fax/email info below).*    If the missing requirements(s) are not met by the date listed above, you will be disqualified from the program and will not receive program benefits in 2025. If any patient is dis-enrolled from the program then they must wait a full calendar year to re-enroll in the program.   Please reach out to our team ASAP if there are any questions or concerns.    [facility]   Phone: [phone]

## 2024-12-16 ENCOUNTER — TRANSCRIBE ORDERS (OUTPATIENT)
Dept: ADMINISTRATIVE | Age: 64
End: 2024-12-16

## 2024-12-16 DIAGNOSIS — M48.07 LUMBOSACRAL STENOSIS: Primary | ICD-10-CM

## 2024-12-23 ENCOUNTER — PATIENT MESSAGE (OUTPATIENT)
Dept: PRIMARY CARE CLINIC | Age: 64
End: 2024-12-23

## 2024-12-23 DIAGNOSIS — Z79.4 TYPE 2 DIABETES MELLITUS WITH DIABETIC POLYNEUROPATHY, WITH LONG-TERM CURRENT USE OF INSULIN (HCC): Primary | Chronic | ICD-10-CM

## 2024-12-23 DIAGNOSIS — E11.42 TYPE 2 DIABETES MELLITUS WITH DIABETIC POLYNEUROPATHY, WITH LONG-TERM CURRENT USE OF INSULIN (HCC): Primary | Chronic | ICD-10-CM

## 2024-12-23 RX ORDER — LISINOPRIL 20 MG/1
20 TABLET ORAL 2 TIMES DAILY
Qty: 180 TABLET | Refills: 3 | Status: SHIPPED | OUTPATIENT
Start: 2024-12-23

## 2024-12-23 NOTE — TELEPHONE ENCOUNTER
Last Office Visit: 12/10/2024 Provider: PRISCILLA  **Is provider OOT? No    Next Office Visit: 12/15/25 Provider: [facility]      Lab orders needed? no   Encounter provider correct? Yes If not, change provider  Script changes since last refill? no    LAST LABS:   CMP:   Lab Results   Component Value Date     10/17/2024    K 4.7 10/17/2024     10/17/2024    CO2 23 10/17/2024    BUN 16 10/17/2024    CREATININE 0.9 10/17/2024    GLUCOSE 94 10/17/2024    CALCIUM 9.7 10/17/2024    BILITOT 0.4 10/17/2024    ALKPHOS 84 10/17/2024    AST 38 (H) 10/17/2024    ALT 48 (H) 10/17/2024    LABGLOM >90 10/17/2024    GFRAA >60 08/24/2022    AGRATIO 2.2 10/17/2024    GLOB 3.5 06/15/2021

## 2024-12-27 DIAGNOSIS — Z79.4 TYPE 2 DIABETES MELLITUS WITH DIABETIC POLYNEUROPATHY, WITH LONG-TERM CURRENT USE OF INSULIN (HCC): Chronic | ICD-10-CM

## 2024-12-27 DIAGNOSIS — E11.42 TYPE 2 DIABETES MELLITUS WITH DIABETIC POLYNEUROPATHY, WITH LONG-TERM CURRENT USE OF INSULIN (HCC): Chronic | ICD-10-CM

## 2024-12-27 LAB
CREAT UR-MCNC: 85 MG/DL (ref 39–259)
MICROALBUMIN UR DL<=1MG/L-MCNC: 3.14 MG/DL
MICROALBUMIN/CREAT UR: 36.9 MG/G (ref 0–30)

## 2025-01-05 DIAGNOSIS — E11.42 DIABETIC POLYNEUROPATHY ASSOCIATED WITH TYPE 2 DIABETES MELLITUS (HCC): Chronic | ICD-10-CM

## 2025-01-06 RX ORDER — GABAPENTIN 300 MG/1
300 CAPSULE ORAL DAILY
Qty: 90 CAPSULE | Refills: 0 | Status: SHIPPED | OUTPATIENT
Start: 2025-01-06 | End: 2025-04-06

## 2025-01-06 NOTE — TELEPHONE ENCOUNTER
Refill Request - Controlled Substance      Last Seen Department: 10/1/2024  Last Seen by PCP: 3/19/2024    Last Written: 10/14/24 90 with 0    Last UDS: -    Med Agreement Signed On: -    Next Appointment:   Future Appointments   Date Time Provider Department Center   1/8/2025  5:00 PM [facility] MRI  2 [facility] MRI Fel[facility] Rad   2/4/2025  3:20 PM Singh Smith MD Kenwo Endo [facility]   4/1/2025  1:30 PM Marianne Cabrera MD [facility] AND RES [facility]   12/15/2025  8:30 AM Ruddy Hernandez MD [facility]         Future appointment scheduled    Requested Prescriptions     Pending Prescriptions Disp Refills    gabapentin (NEURONTIN) 300 MG capsule [Pharmacy Med Name: GABAPENTIN 300MG CAPS] 90 capsule 0     Sig: Take 1 capsule by mouth daily.

## 2025-01-08 ENCOUNTER — HOSPITAL ENCOUNTER (OUTPATIENT)
Dept: MRI IMAGING | Age: 65
Discharge: HOME | End: 2025-01-08
Payer: MEDICARE

## 2025-01-08 DIAGNOSIS — Z79.4 TYPE 2 DIABETES MELLITUS WITH DIABETIC POLYNEUROPATHY, WITH LONG-TERM CURRENT USE OF INSULIN (HCC): Chronic | ICD-10-CM

## 2025-01-08 DIAGNOSIS — M48.07 LUMBOSACRAL STENOSIS: ICD-10-CM

## 2025-01-08 DIAGNOSIS — E11.42 TYPE 2 DIABETES MELLITUS WITH DIABETIC POLYNEUROPATHY, WITH LONG-TERM CURRENT USE OF INSULIN (HCC): Chronic | ICD-10-CM

## 2025-01-08 PROCEDURE — 72148 MRI LUMBAR SPINE W/O DYE: CPT

## 2025-01-08 RX ORDER — INSULIN HUMAN 500 [IU]/ML
INJECTION, SOLUTION SUBCUTANEOUS
Qty: 60 ML | Refills: 3 | Status: SHIPPED | OUTPATIENT
Start: 2025-01-08

## 2025-01-08 NOTE — TELEPHONE ENCOUNTER
Refill Request       Last Seen: Last Seen Department: 10/1/2024  Last Seen by PCP: 3/19/2024    Last Written: 9/19/23 60 ml with 3    Next Appointment:   Future Appointments   Date Time Provider Department Center   1/8/2025  5:00 PM [facility] MRI RM 2 [facility] MRI Felipe Rad   2/4/2025  3:20 PM Singh Smith MD Kenwo Endo [facility]   4/1/2025  1:30 PM Marianne Cabrera MD [facility] AND RES [facility] DEP   12/15/2025  8:30 AM Ruddy Hernandez MD Anderson Car [facility]       Future appointment scheduled      Requested Prescriptions     Pending Prescriptions Disp Refills    HUMULIN R U-500 KWIKPEN 500 UNIT/ML SOPN concentrated injection pen [Pharmacy Med Name: HUMULIN R U-500 KWIKPEN 500 SOPN] 60 mL 3     Sig: INJECT 80 UNITS UNDER THE SKIN BEFORE BREAKFAST AND 45 UNITS UNDER THE SKIN BEFORE LUNCH AND 40 UNITS UNDER THE SKIN BEFORE DINNER

## 2025-01-22 ENCOUNTER — TELEPHONE (OUTPATIENT)
Dept: PHARMACY | Facility: CLINIC | Age: 65
End: 2025-01-22

## 2025-01-22 NOTE — TELEPHONE ENCOUNTER
**Patient is [facility] **  Called patient to schedule 2025 yearly pharmacist appointment to discuss medications for Diabetes Management Program.      No answer. Left VM on home TAD: Please call back at [phone] Option #3.    Spouse is also enrolled    Roma Olvera CPhT.   Population Health Clinical   [facility] Clinical Pharmacy  Toll free: [phone] Option 3

## 2025-01-27 NOTE — TELEPHONE ENCOUNTER
Patient and wife both scheduled for on 2/6/25 starting at 12 Noon    WifeAislinn will complete both        Roma Olvera [facility].   Population Health Clinical   [facility] Clinical Pharmacy  Toll free: [phone] Option 3     For Pharmacy Admin Tracking Only     Program: SCADA Access  CPA in place:  No  Recommendation Provided To: Patient/Caregiver: 1 via Telephone  Intervention Detail: Scheduled Appointment  Intervention Accepted By: Patient/Caregiver: 1  Gap Closed?: Yes   Time Spent (min): 5

## 2025-01-27 NOTE — TELEPHONE ENCOUNTER
Noted.    Susanne العراقي [facility]   Population Health Clinical   [facility] Clinical Pharmacy  Department, toll free: [phone], option 3

## 2025-01-28 RX ORDER — ATENOLOL 25 MG/1
25 TABLET ORAL DAILY
Qty: 90 TABLET | Refills: 2 | OUTPATIENT
Start: 2025-01-28

## 2025-01-29 NOTE — TELEPHONE ENCOUNTER
January 29, 2025      Rustam A Puquirre  [address] [facility] [address]    Estimado/eden [place],      ¡Queremos ayudarle a vivir [place]!  En asociación con el consultorio de gooden médico, [facility] ofrece un programa especial para ayudarle a mantenerse saludable. Adrianne programa, llamado Visita Anual Integral (CAV, por lou siglas en inglés), brindará atención personalizada por parte de nuestra enfermera practicante y se centrará en lou necesidades de ehsan y en lo que es más importante para usted.     Beneficios clave del programa Visita Anual Integral:       Comodidad: Nuestra(o) enfermera(o) especializada(o) puede realizar la visita para usted desde la comodidad de gooden hogar o virtualmente desde gooden teléfono inteligente o computadora. Si lo desea, los miembros de gooden hina pueden estar allí jg gooden visita.      Evaluación de ehsan: Nuestra(o) enfermera(o) especializada(o) le hará juan miguel visita a gooden casa o a través de juan miguel visita virtual.  Hará un examen de ehsan, verificará gooden presión arterial, sugerirá exámenes preventivos, ofrecerá vacunas, realizará un examen de los ojos, revisará lou medicamentos y le ayudará a establecer objetivos saludables.      Plática sobre afecciones crónicas: Incluso si usted ya realizó juan miguel consulta con gooden médico, nuestro/a enfermero(a) especializado(a) revisará gooden expediente médico y lou medicamentos, y hablará con usted acerca de cualquier inquietud o pregunta que tenga sobre gooden ehsan.    Mucho tiempo: La consulta dura 45 minutos, un tiempo suficiente para hablar sobre lou necesidades y dudas sobre la atención médica.     Costo: Gooden consulta está cubierta al 100 % por lou beneficios de Medicare. Es posible que se aplique un copago si se brindan servicios adicionales que no manfred parte de los servicios preventivos de Medicare, gato los análisis de laboratorio. Si tiene alguna pregunta, es importante que consulte gooden plan de ehsan de Medicare.     Asociación: Esta visita  pls call ERIN Brown [phone]  Regarding lab results   Merged with [facility] results 13.5, 40.1  [t had sx yesterday and needs clarification on discharge orders ayuda a respaldar lou necesidades médicas en curso y no es un reemplazo de lou visitas regulares al médico. Después de la consulta, compartiremos las notas correspondientes con gooden médico. Las notas de gooden visita se agregarán a gooden registro médico.         Próximos pasos:     ¡Aproveche humberto programa y reserve gooden lugar! Recibirá juan miguel llamada de [facility], en la que jennyfer de nuestros asistentes de ehsan le ayudará a programar o puede llamarnos para planificar gooden visita al [phone], de lunes a viernes, de 8 a. m. a 4 p. m. CST (Hora estándar del Centro).  También podemos responder cualquier pregunta que pueda tener sobre esta visita.    ¡Que tenga un buen día!  De parte del consultorio de gooden médico en colaboración con [facility].

## 2025-02-04 ENCOUNTER — OFFICE VISIT (OUTPATIENT)
Dept: ENDOCRINOLOGY | Age: 65
End: 2025-02-04
Payer: MEDICARE

## 2025-02-04 VITALS
HEART RATE: 71 BPM | OXYGEN SATURATION: 96 % | RESPIRATION RATE: 14 BRPM | SYSTOLIC BLOOD PRESSURE: 126 MMHG | BODY MASS INDEX: 29.99 KG/M2 | DIASTOLIC BLOOD PRESSURE: 75 MMHG | WEIGHT: 209 LBS

## 2025-02-04 DIAGNOSIS — I10 PRIMARY HYPERTENSION: Primary | ICD-10-CM

## 2025-02-04 DIAGNOSIS — E11.9 CONTROLLED TYPE 2 DIABETES MELLITUS WITHOUT COMPLICATION, WITH LONG-TERM CURRENT USE OF INSULIN (HCC): ICD-10-CM

## 2025-02-04 DIAGNOSIS — Z79.4 CONTROLLED TYPE 2 DIABETES MELLITUS WITHOUT COMPLICATION, WITH LONG-TERM CURRENT USE OF INSULIN (HCC): ICD-10-CM

## 2025-02-04 LAB — HBA1C MFR BLD: 6.3 %

## 2025-02-04 PROCEDURE — G8427 DOCREV CUR MEDS BY ELIG CLIN: HCPCS | Performed by: INTERNAL MEDICINE

## 2025-02-04 PROCEDURE — 2022F DILAT RTA XM EVC RTNOPTHY: CPT | Performed by: INTERNAL MEDICINE

## 2025-02-04 PROCEDURE — 99214 OFFICE O/P EST MOD 30 MIN: CPT | Performed by: INTERNAL MEDICINE

## 2025-02-04 PROCEDURE — G2211 COMPLEX E/M VISIT ADD ON: HCPCS | Performed by: INTERNAL MEDICINE

## 2025-02-04 PROCEDURE — 3078F DIAST BP <80 MM HG: CPT | Performed by: INTERNAL MEDICINE

## 2025-02-04 PROCEDURE — 83036 HEMOGLOBIN GLYCOSYLATED A1C: CPT | Performed by: INTERNAL MEDICINE

## 2025-02-04 PROCEDURE — 3017F COLORECTAL CA SCREEN DOC REV: CPT | Performed by: INTERNAL MEDICINE

## 2025-02-04 PROCEDURE — 1036F TOBACCO NON-USER: CPT | Performed by: INTERNAL MEDICINE

## 2025-02-04 PROCEDURE — 1123F ACP DISCUSS/DSCN MKR DOCD: CPT | Performed by: INTERNAL MEDICINE

## 2025-02-04 PROCEDURE — 95251 CONT GLUC MNTR ANALYSIS I&R: CPT | Performed by: INTERNAL MEDICINE

## 2025-02-04 PROCEDURE — 3046F HEMOGLOBIN A1C LEVEL >9.0%: CPT | Performed by: INTERNAL MEDICINE

## 2025-02-04 PROCEDURE — 3074F SYST BP LT 130 MM HG: CPT | Performed by: INTERNAL MEDICINE

## 2025-02-04 PROCEDURE — G8417 CALC BMI ABV UP PARAM F/U: HCPCS | Performed by: INTERNAL MEDICINE

## 2025-02-04 RX ORDER — BLOOD SUGAR DIAGNOSTIC
STRIP MISCELLANEOUS
Qty: 400 STRIP | Refills: 3 | Status: SHIPPED | OUTPATIENT
Start: 2025-02-04

## 2025-02-04 RX ORDER — BLOOD-GLUCOSE METER
EACH MISCELLANEOUS
Qty: 1 EACH | Refills: 0 | Status: SHIPPED | OUTPATIENT
Start: 2025-02-04

## 2025-02-04 NOTE — PROGRESS NOTES
Seen as  Patient for DM    Interim:    On CGM but now costly  Taking insulin  No recent glucose checks    Has seen Dr. Covington    Patient has a PMH of Type 2 DM, hypertension, hyperlipidemia, obesity , Spinal cord compression in cervical spine.    Diagnosed with Diabetes Mellitus type 2 in 2004.   Course has been variable .  Microvascular complications:  + retinopathy 1/24 will be having laser Tx   No known Nephropathy :  Has Peripheral neuropathy: Some numbness and tingling in feet.     Home regimen:   Metformin 1000 mg BID  Jardiance   Humulin R U-500 60 units before breakfast , 55 units before lunch and 55 units  dinner    But taking 65/50/50    CGM  Last 2 weeks  Average 143  83% in target  3% low  14% high      Previous Meds  Lantus 50  units BID  humalog 25  25 30        A1c 12.2 % ---> 9.1 % ---> 10.7 % ---> 11.1 % ---> 9.7%---> 7.3%--->6.9%---> 6.6%---- >7.3%---> 7.6%---> 6.4%----> 7.7%---> 8.2% --->7.4%    Diet: Eats 3 meals/day   Has been non-compliant with diet  Nutrition education:Yes  Exercise:     Severe, uncontrolled     Hypertension:  He has essentail HTN for many yrs.   On Lisinopril/Atenolol.     No dizziness, SOB , chest pain.     Hypertriglyceridemia : has h/o high TGD  Has  h/o pancreatitis many yrs back.    on fenofibrate 145 mg daily, fish oil 2 gram BID.     ROS: Scanned,reviewed    Past Medical History:   Diagnosis Date    Acute, but ill-defined, cerebrovascular disease 05/31/2013    CAD in native artery 12/30/2020    Cerebral artery occlusion with cerebral infarction (HCC) 2013     X2 PERSONALITY CHANGE, ANGER OUTBURSTS    Cerebrovascular disease     Diplopia 07/11/2013    ED (erectile dysfunction) 01/23/2014    Elbow pain, chronic 12/16/2015    Hyperlipidemia     Hypertension     Irritable bowel syndrome     Obstructive sleep apnea (adult) (pediatric) 07/01/2013    NO CPAP, HAD PROBLEMS WITH INSURANCE AND STOPPED USING    Occlusion and stenosis of carotid artery without mention of

## 2025-02-06 ENCOUNTER — TELEPHONE (OUTPATIENT)
Dept: PHARMACY | Facility: CLINIC | Age: 65
End: 2025-02-06

## 2025-02-06 NOTE — TELEPHONE ENCOUNTER
POPULATION HEALTH CLINICAL PHARMACY REVIEW - Be Well with Diabetes ([facility])    José Miguel Miranda is a 64 y.o. male enrolled in the [facility] Employee Diabetes Program. Patient provided writer with verbal consent to remain in the program for this year. Patient enrolled 2017.    Communication preferences (for >8% followup, urgent messages, etc)  Preferred methods: Phone and Mychart  Preferred days/time: anytime,call wife    Medications:  Current Outpatient Medications   Medication Instructions    aspirin 81 mg, Oral, DAILY    atenolol (TENORMIN) 25 mg, Oral, DAILY    atorvastatin (LIPITOR) 80 mg, Oral, NIGHTLY    Blood Glucose Monitoring Suppl (PRODIGY AUTOCODE BLOOD GLUCOSE) KYLAH Use to test BS as directed    blood glucose test strips (PRODIGY NO CODING BLOOD GLUC) strip USE AS DIRECTED TO TEST UP TO 3-4 TIMES A DAY    buPROPion (WELLBUTRIN XL) 150 mg, Oral, EVERY MORNING    clopidogrel (PLAVIX) 75 mg, Oral, DAILY    Continuous Blood Gluc Sensor (DEXCOM G7 SENSOR) MISC Use as directed to monitor blood glucose, change sensor every 10 days    Continuous Glucose  (FREESTYLE JOSEPH 3 READER) KYLAH Daily as needed    Continuous Glucose Sensor (FREESTYLE JOSEPH 3 SENSOR) MISC Every 2 weeks    empagliflozin (JARDIANCE) 10 mg, Oral, DAILY    fenofibrate (TRICOR) 145 mg, Oral, DAILY    FLUoxetine (PROZAC) 40 mg, Oral, DAILY    gabapentin (NEURONTIN) 300 mg, Oral, DAILY    Insulin Pen Needle (TRUEPLUS PEN NEEDLES) 31G X 5 MM MISC USE AS DIRECTED THREE TIMES A DAY    insulin regular human (HUMULIN R U-500 KWIKPEN) 500 UNIT/ML SOPN concentrated injection pen INJECT 80 UNITS UNDER THE SKIN BEFORE BREAKFAST AND 45 UNITS UNDER THE SKIN BEFORE LUNCH AND 45 UNITS UNDER THE SKIN BEFORE DINNER    lisinopril (PRINIVIL;ZESTRIL) 20 mg, Oral, 2 TIMES DAILY    metFORMIN (GLUCOPHAGE) 1,000 mg, Oral, 2 TIMES DAILY    metroNIDAZOLE (METROGEL) 1 % gel Apply to face daily.    omega-3 acid ethyl esters (LOVAZA) 1 g capsule TAKE

## 2025-02-24 ENCOUNTER — TELEPHONE (OUTPATIENT)
Dept: ENDOCRINOLOGY | Age: 65
End: 2025-02-24

## 2025-02-24 DIAGNOSIS — E11.42 TYPE 2 DIABETES MELLITUS WITH DIABETIC POLYNEUROPATHY, WITH LONG-TERM CURRENT USE OF INSULIN (HCC): ICD-10-CM

## 2025-02-24 DIAGNOSIS — Z79.4 TYPE 2 DIABETES MELLITUS WITH DIABETIC POLYNEUROPATHY, WITH LONG-TERM CURRENT USE OF INSULIN (HCC): ICD-10-CM

## 2025-02-24 RX ORDER — ATENOLOL 25 MG/1
25 TABLET ORAL DAILY
Qty: 90 TABLET | Refills: 2 | OUTPATIENT
Start: 2025-02-24

## 2025-02-24 NOTE — TELEPHONE ENCOUNTER
Pt returned call. Pt is taking   atenolol (TENORMIN) 25 MG tablet  QD. Pt sts he takes this at night.

## 2025-02-24 NOTE — TELEPHONE ENCOUNTER
Fidelina from [facility] called stating the prescription request is not a duplicate. She stated the original prescription submitted on 24 was never filled and now the order is  and a new one needs to be sent.     - Hyperkalemia, dialysis today, pt counselled on diet to avoid potassium rich foods  - Will need SW evaluation re: transportation issues to and from HD  rpt bmp later today as pt now on diamox

## 2025-02-24 NOTE — TELEPHONE ENCOUNTER
Last Office Visit: 12/10/24 Provider: PRISCILLA  **Is provider OOT? Yes    Next Office Visit: 12/15/25 Provider: PRISCILLA    LAST LABS:   CMP:   Lab Results   Component Value Date     10/17/2024    K 4.7 10/17/2024     10/17/2024    CO2 23 10/17/2024    BUN 16 10/17/2024    CREATININE 0.9 10/17/2024    GLUCOSE 94 10/17/2024    CALCIUM 9.7 10/17/2024    BILITOT 0.4 10/17/2024    ALKPHOS 84 10/17/2024    AST 38 (H) 10/17/2024    ALT 48 (H) 10/17/2024    LABGLOM >90 10/17/2024    GFRAA >60 08/24/2022    AGRATIO 2.2 10/17/2024    GLOB 3.5 06/15/2021

## 2025-02-24 NOTE — TELEPHONE ENCOUNTER
Pts wife calling requesting refill on Jardiance 10mg  She though [facility] was going to send over request    Pharmacy - [facility]      Mary Miranda (Spouse)  [phone] (Mobile)

## 2025-02-25 RX ORDER — ATENOLOL 25 MG/1
25 TABLET ORAL DAILY
Qty: 90 TABLET | Refills: 3 | Status: SHIPPED | OUTPATIENT
Start: 2025-02-25

## 2025-04-01 ENCOUNTER — OFFICE VISIT (OUTPATIENT)
Dept: PRIMARY CARE CLINIC | Age: 65
End: 2025-04-01

## 2025-04-01 VITALS
WEIGHT: 201.9 LBS | HEART RATE: 96 BPM | OXYGEN SATURATION: 98 % | DIASTOLIC BLOOD PRESSURE: 70 MMHG | HEIGHT: 70 IN | SYSTOLIC BLOOD PRESSURE: 124 MMHG | BODY MASS INDEX: 28.9 KG/M2

## 2025-04-01 DIAGNOSIS — Z23 IMMUNIZATION DUE: ICD-10-CM

## 2025-04-01 DIAGNOSIS — Z00.00 ROUTINE ADULT HEALTH MAINTENANCE: ICD-10-CM

## 2025-04-01 DIAGNOSIS — L71.9 ROSACEA: ICD-10-CM

## 2025-04-01 DIAGNOSIS — F33.2 SEVERE EPISODE OF RECURRENT MAJOR DEPRESSIVE DISORDER, WITHOUT PSYCHOTIC FEATURES (HCC): ICD-10-CM

## 2025-04-01 DIAGNOSIS — Z00.00 MEDICARE ANNUAL WELLNESS VISIT, SUBSEQUENT: Primary | ICD-10-CM

## 2025-04-01 DIAGNOSIS — R80.9 PROTEINURIA, UNSPECIFIED TYPE: ICD-10-CM

## 2025-04-01 DIAGNOSIS — R42 LIGHTHEADEDNESS: ICD-10-CM

## 2025-04-01 RX ORDER — FLUOXETINE HYDROCHLORIDE 40 MG/1
40 CAPSULE ORAL DAILY
Qty: 90 CAPSULE | Refills: 3 | Status: SHIPPED | OUTPATIENT
Start: 2025-04-01

## 2025-04-01 RX ORDER — LISINOPRIL 30 MG/1
30 TABLET ORAL DAILY
Qty: 90 TABLET | Refills: 1 | Status: SHIPPED | OUTPATIENT
Start: 2025-04-01 | End: 2025-04-04 | Stop reason: ALTCHOICE

## 2025-04-01 RX ORDER — LISINOPRIL 40 MG/1
40 TABLET ORAL DAILY
Qty: 90 TABLET | Refills: 1 | Status: CANCELLED | OUTPATIENT
Start: 2025-04-01

## 2025-04-01 RX ORDER — METRONIDAZOLE 10 MG/G
GEL TOPICAL
Qty: 60 G | Refills: 3 | Status: SHIPPED | OUTPATIENT
Start: 2025-04-01

## 2025-04-01 SDOH — ECONOMIC STABILITY: FOOD INSECURITY: WITHIN THE PAST 12 MONTHS, YOU WORRIED THAT YOUR FOOD WOULD RUN OUT BEFORE YOU GOT MONEY TO BUY MORE.: NEVER TRUE

## 2025-04-01 SDOH — ECONOMIC STABILITY: FOOD INSECURITY: WITHIN THE PAST 12 MONTHS, THE FOOD YOU BOUGHT JUST DIDN'T LAST AND YOU DIDN'T HAVE MONEY TO GET MORE.: NEVER TRUE

## 2025-04-01 ASSESSMENT — PATIENT HEALTH QUESTIONNAIRE - PHQ9
4. FEELING TIRED OR HAVING LITTLE ENERGY: NEARLY EVERY DAY
SUM OF ALL RESPONSES TO PHQ QUESTIONS 1-9: 10
3. TROUBLE FALLING OR STAYING ASLEEP: NOT AT ALL
SUM OF ALL RESPONSES TO PHQ QUESTIONS 1-9: 10
6. FEELING BAD ABOUT YOURSELF - OR THAT YOU ARE A FAILURE OR HAVE LET YOURSELF OR YOUR FAMILY DOWN: NOT AT ALL
SUM OF ALL RESPONSES TO PHQ QUESTIONS 1-9: 10
2. FEELING DOWN, DEPRESSED OR HOPELESS: NEARLY EVERY DAY
5. POOR APPETITE OR OVEREATING: NOT AT ALL
8. MOVING OR SPEAKING SO SLOWLY THAT OTHER PEOPLE COULD HAVE NOTICED. OR THE OPPOSITE, BEING SO FIGETY OR RESTLESS THAT YOU HAVE BEEN MOVING AROUND A LOT MORE THAN USUAL: NOT AT ALL
1. LITTLE INTEREST OR PLEASURE IN DOING THINGS: NEARLY EVERY DAY
10. IF YOU CHECKED OFF ANY PROBLEMS, HOW DIFFICULT HAVE THESE PROBLEMS MADE IT FOR YOU TO DO YOUR WORK, TAKE CARE OF THINGS AT HOME, OR GET ALONG WITH OTHER PEOPLE: NOT DIFFICULT AT ALL
SUM OF ALL RESPONSES TO PHQ QUESTIONS 1-9: 10
7. TROUBLE CONCENTRATING ON THINGS, SUCH AS READING THE NEWSPAPER OR WATCHING TELEVISION: SEVERAL DAYS
9. THOUGHTS THAT YOU WOULD BE BETTER OFF DEAD, OR OF HURTING YOURSELF: NOT AT ALL

## 2025-04-01 NOTE — PROGRESS NOTES
Medicare Annual Wellness Visit    José Miguel Miranda is here for No chief complaint on file.    Assessment & Plan     Diagnoses and all orders for this visit:    Medicare annual wellness visit, subsequent  - Discussed positive risk factors per below. No acute concerns today.   - Repeat Lipids  - HCM:   - f/u 4 weeks  Lightheadedness  - BP controlled today. Pt had a possible orthostatic syncope episode in 1/2025. Orthostatic BP negative today and no focal neuro deficits and neg romberg sign.  - No acute cardio-resp Sx's.    - f/u 4 weeks    Proteinuria, unspecified type  - Rec increase Lisinopril to 30mg, given recent Microalbumin results 12/2024  -     lisinopril (PRINIVIL;ZESTRIL) 30 MG tablet; Take 1 tablet by mouth daily    Severe episode of recurrent major depressive disorder, without psychotic features (HCC)  -Concern for ongoing stable depression.  Patient attributes this due to feeling of social isolation and loneliness.  Recommend community outreach with referral.  Patient not interested in counseling at this time.  - PHQ9: 10  -Continue Prozac 40 mg daily  - No active SI or HI  - f/u 4 weeks  -     [facility]  -     FLUoxetine (PROZAC) 40 MG capsule; Take 1 capsule by mouth daily    Rosacea  -     metroNIDAZOLE (METROGEL) 1 % gel; Apply to face daily.  Routine adult health maintenance  -     Lipid Panel; Future  Immunization due  - Due for Tdap and PCV20 (both out of supply). Rec get Shingles vaccine at his pharmacy.   Other orders  -     Cancel: Tdap, BOOSTRIX, (age 10 yrs+), IM  -     Cancel: Pneumococcal, PCV20, PREVNAR 20, (age 6w+), IM, PF    - follow up 4 weeks     Subjective       Patient's complete Health Risk Assessment and screening values have been reviewed and are found in Flowsheets. The following problems were reviewed today and where indicated follow up appointments were made and/or referrals ordered.        Positive Risk Factor Screenings with

## 2025-04-02 NOTE — PATIENT INSTRUCTIONS
drinks.  Limit medicines that can cause fatigue. These include medicines such as cold and allergy medicines.  When should you call for help?  Watch closely for changes in your health, and be sure to contact your doctor if:    You have new symptoms such as fever or a rash.     Your fatigue gets worse.     You have been feeling down, depressed, or hopeless. Or you may have lost interest in things that you usually enjoy.     You are not getting better as expected.   Where can you learn more?  Go to [email or web address] and enter W864 to learn more about \"Fatigue: Care Instructions.\"  Current as of: July 31, 2024  Content Version: 14.4  © 2024-2025 [employer].   Care instructions adapted under license by [employer]. If you have questions about a medical condition or this instruction, always ask your healthcare professional. [employer], disclaims any warranty or liability for your use of this information.         Learning About Emotional Support  When do you need emotional support?     You might find getting support from others helpful when you have a long-term health problem. Often people feel alone, confused, or scared when coping with an illness. But you aren't alone. Other people are going through the same thing you are and know how you feel.  Talking with others about your feelings can help you feel better.  Your family and friends can give you support. So can your doctor, a support group, or a [employer]. If you have a support network, you will not feel as alone. You will learn new ways to deal with your situation, and you may try harder to overcome it.  Where you can get support  Family and friends: They can help you cope by giving you comfort and encouragement.  Counseling: Professional counseling can help you cope with situations that interfere with your life and cause stress. Counseling can help you understand and deal with your illness.  Your doctor: Find a doctor you

## 2025-04-03 ENCOUNTER — TELEPHONE (OUTPATIENT)
Dept: PRIMARY CARE CLINIC | Age: 65
End: 2025-04-03

## 2025-04-03 DIAGNOSIS — R80.9 PROTEINURIA, UNSPECIFIED TYPE: ICD-10-CM

## 2025-04-04 RX ORDER — LISINOPRIL 30 MG/1
30 TABLET ORAL 2 TIMES DAILY
Qty: 60 TABLET | Refills: 2 | Status: CANCELLED | OUTPATIENT
Start: 2025-04-04

## 2025-04-04 NOTE — TELEPHONE ENCOUNTER
Called left voice mail for patient to call office back   
Given patient is already on max daily dosing lisinopril 40 mg, recommend he continue his current dose of 20 mg twice daily.        Please contact patient with this recommendation.  I am forwarding this message to Dr. Cabrera as well.    (Likely there was a miscommunication at last visit that he was on 20mg daily, but actually on 20mg BID).   
Have forwarded to PCP and provider who saw patient in office earlier this week for further recommendations.  
Patient called and confirmed he is taking 20mg two times daily   
Patient informed.   
Please advise     Patient called and confirmed he is taking 20mg two times daily   
Please confirm with patient which dose of Lisinopril he was taking before his last visit with us (20mg daily, or 20 mg twice daily). Thanks.   
Routing message to yellow team for appropriate follow up.  
[facility] is calling about medication that was sent on 4/1/2025 lisinopril (PRINIVIL;ZESTRIL) 30 MG tablet . Patient was on this medication 20 mg twice day and the pharmacy is asking why the decrease to dose so the pharmacy asking are you wanting to decrease the dose or the up the dose .    Please advise   [facility]   [phone]  
Yes

## 2025-04-07 ENCOUNTER — TELEPHONE (OUTPATIENT)
Dept: OTHER | Age: 65
End: 2025-04-07

## 2025-04-07 DIAGNOSIS — E11.42 DIABETIC POLYNEUROPATHY ASSOCIATED WITH TYPE 2 DIABETES MELLITUS: Chronic | ICD-10-CM

## 2025-04-07 RX ORDER — GABAPENTIN 300 MG/1
300 CAPSULE ORAL DAILY
Qty: 90 CAPSULE | Refills: 0 | Status: SHIPPED | OUTPATIENT
Start: 2025-04-07 | End: 2025-07-06

## 2025-04-07 NOTE — TELEPHONE ENCOUNTER
Refill Request       Last Seen: Last Seen Department: 4/1/2025  Last Seen by PCP: Visit date not found    Last Written: 1/6/25 90 0 refills    Next Appointment:   Future Appointments   Date Time Provider Department Center   6/11/2025 10:20 AM Singh Smith MD [facility]   12/15/2025  8:30 AM Ruddy Hernandez MD [facility]             Requested Prescriptions     Pending Prescriptions Disp Refills    gabapentin (NEURONTIN) 300 MG capsule [Pharmacy Med Name: GABAPENTIN 300MG CAPS] 90 capsule 0     Sig: Take 1 capsule by mouth daily.

## 2025-04-07 NOTE — TELEPHONE ENCOUNTER
[facility]--CHW    [facility] reached out to patient. Voicemail came on and message was left for patient to return the call. Patient will be deferred for 7 days.

## 2025-04-10 DIAGNOSIS — Z00.00 ROUTINE ADULT HEALTH MAINTENANCE: ICD-10-CM

## 2025-04-10 LAB
CHOLEST SERPL-MCNC: 104 MG/DL (ref 0–199)
HDLC SERPL-MCNC: 42 MG/DL (ref 40–60)
LDLC SERPL CALC-MCNC: 45 MG/DL
TRIGL SERPL-MCNC: 84 MG/DL (ref 0–150)
VLDLC SERPL CALC-MCNC: 17 MG/DL

## 2025-04-11 ENCOUNTER — RESULTS FOLLOW-UP (OUTPATIENT)
Dept: PRIMARY CARE CLINIC | Age: 65
End: 2025-04-11

## 2025-04-18 ENCOUNTER — TELEPHONE (OUTPATIENT)
Dept: OTHER | Age: 65
End: 2025-04-18

## 2025-04-18 NOTE — TELEPHONE ENCOUNTER
[facility]--CHW    10 day deferment. Letter of Intent mailed. Left vm for patient to return the call. If there is no response after 10 days, referral will be closed.

## 2025-04-21 DIAGNOSIS — E11.9 CONTROLLED TYPE 2 DIABETES MELLITUS WITHOUT COMPLICATION, WITH LONG-TERM CURRENT USE OF INSULIN (HCC): ICD-10-CM

## 2025-04-21 DIAGNOSIS — Z79.4 CONTROLLED TYPE 2 DIABETES MELLITUS WITHOUT COMPLICATION, WITH LONG-TERM CURRENT USE OF INSULIN (HCC): ICD-10-CM

## 2025-04-21 RX ORDER — ACYCLOVIR 800 MG/1
TABLET ORAL
Qty: 6 EACH | Refills: 3 | Status: SHIPPED | OUTPATIENT
Start: 2025-04-21

## 2025-06-11 ENCOUNTER — OFFICE VISIT (OUTPATIENT)
Dept: ENDOCRINOLOGY | Age: 65
End: 2025-06-11
Payer: MEDICARE

## 2025-06-11 VITALS
BODY MASS INDEX: 27.69 KG/M2 | SYSTOLIC BLOOD PRESSURE: 124 MMHG | DIASTOLIC BLOOD PRESSURE: 70 MMHG | WEIGHT: 193 LBS | HEART RATE: 64 BPM | OXYGEN SATURATION: 98 %

## 2025-06-11 DIAGNOSIS — I10 PRIMARY HYPERTENSION: Primary | ICD-10-CM

## 2025-06-11 DIAGNOSIS — E11.42 TYPE 2 DIABETES MELLITUS WITH DIABETIC POLYNEUROPATHY, WITH LONG-TERM CURRENT USE OF INSULIN (HCC): ICD-10-CM

## 2025-06-11 DIAGNOSIS — Z79.4 TYPE 2 DIABETES MELLITUS WITH DIABETIC POLYNEUROPATHY, WITH LONG-TERM CURRENT USE OF INSULIN (HCC): ICD-10-CM

## 2025-06-11 LAB — HBA1C MFR BLD: 6.3 %

## 2025-06-11 PROCEDURE — 3074F SYST BP LT 130 MM HG: CPT | Performed by: INTERNAL MEDICINE

## 2025-06-11 PROCEDURE — 99214 OFFICE O/P EST MOD 30 MIN: CPT | Performed by: INTERNAL MEDICINE

## 2025-06-11 PROCEDURE — 3078F DIAST BP <80 MM HG: CPT | Performed by: INTERNAL MEDICINE

## 2025-06-11 PROCEDURE — 83036 HEMOGLOBIN GLYCOSYLATED A1C: CPT | Performed by: INTERNAL MEDICINE

## 2025-06-11 PROCEDURE — 2022F DILAT RTA XM EVC RTNOPTHY: CPT | Performed by: INTERNAL MEDICINE

## 2025-06-11 PROCEDURE — 1123F ACP DISCUSS/DSCN MKR DOCD: CPT | Performed by: INTERNAL MEDICINE

## 2025-06-11 PROCEDURE — G8417 CALC BMI ABV UP PARAM F/U: HCPCS | Performed by: INTERNAL MEDICINE

## 2025-06-11 PROCEDURE — 3017F COLORECTAL CA SCREEN DOC REV: CPT | Performed by: INTERNAL MEDICINE

## 2025-06-11 PROCEDURE — 1159F MED LIST DOCD IN RCRD: CPT | Performed by: INTERNAL MEDICINE

## 2025-06-11 PROCEDURE — G2211 COMPLEX E/M VISIT ADD ON: HCPCS | Performed by: INTERNAL MEDICINE

## 2025-06-11 PROCEDURE — 3044F HG A1C LEVEL LT 7.0%: CPT | Performed by: INTERNAL MEDICINE

## 2025-06-11 PROCEDURE — G8427 DOCREV CUR MEDS BY ELIG CLIN: HCPCS | Performed by: INTERNAL MEDICINE

## 2025-06-11 PROCEDURE — 1036F TOBACCO NON-USER: CPT | Performed by: INTERNAL MEDICINE

## 2025-06-11 RX ORDER — HYDROCHLOROTHIAZIDE 12.5 MG/1
CAPSULE ORAL
COMMUNITY

## 2025-06-11 RX ORDER — INSULIN LISPRO 100 [IU]/ML
INJECTION, SOLUTION INTRAVENOUS; SUBCUTANEOUS
Qty: 5 ADJUSTABLE DOSE PRE-FILLED PEN SYRINGE | Refills: 4 | Status: SHIPPED | OUTPATIENT
Start: 2025-06-11

## 2025-06-11 RX ORDER — INSULIN GLARGINE 100 [IU]/ML
INJECTION, SOLUTION SUBCUTANEOUS
Qty: 5 ADJUSTABLE DOSE PRE-FILLED PEN SYRINGE | Refills: 4 | Status: SHIPPED | OUTPATIENT
Start: 2025-06-11

## 2025-06-11 RX ORDER — FENOFIBRATE 145 MG/1
145 TABLET, FILM COATED ORAL DAILY
Qty: 90 TABLET | Refills: 3 | Status: SHIPPED | OUTPATIENT
Start: 2025-06-11

## 2025-06-11 NOTE — PROGRESS NOTES
Seen as  Patient for DM    Interim:    On CGM   Taking insulin  Night low  Taking insulin one day  Not able to download CGM  Reviewed manually  Intentional weight loss    Has seen Dr. Covington    Patient has a PMH of Type 2 DM, hypertension, hyperlipidemia, obesity , Spinal cord compression in cervical spine.    Diagnosed with Diabetes Mellitus type 2 in 2004.   Course has been variable .  Microvascular complications:  + retinopathy 1/24 will be having laser Tx   No known Nephropathy :  Has Peripheral neuropathy: Some numbness and tingling in feet.     Home regimen:   Metformin 1000 mg BID  Jardiance   Humulin R U-500 60 units before breakfast , 50 units before lunch and 50 units  dinner    But taking 65/50/50    CGM  Last 2 weeks  82% in target  4% low      Previous Meds  Lantus 50  units BID  humalog 25  25 30        A1c 12.2 % ---> 9.1 % ---> 10.7 % ---> 11.1 % ---> 9.7%---> 7.3%--->6.9%---> 6.6%---- >7.3%---> 7.6%---> 6.4%----> 7.7%---> 8.2% --->7.4%---> 6.3%    Diet: Eats 3 meals/day   Has been non-compliant with diet  Nutrition education:Yes  Exercise:     Severe, uncontrolled     Hypertension:  He has essentail HTN for many yrs.   On Lisinopril/Atenolol.     No dizziness, SOB , chest pain.     Hypertriglyceridemia : has h/o high TGD  Has  h/o pancreatitis many yrs back.    on fenofibrate 145 mg daily, fish oil 2 gram BID.     ROS: Scanned,reviewed    Past Medical History:   Diagnosis Date    Acute, but ill-defined, cerebrovascular disease 05/31/2013    CAD in native artery 12/30/2020    Cerebral artery occlusion with cerebral infarction (HCC) 2013     X2 PERSONALITY CHANGE, ANGER OUTBURSTS    Cerebrovascular disease     Diplopia 07/11/2013    ED (erectile dysfunction) 01/23/2014    Elbow pain, chronic 12/16/2015    Hyperlipidemia     Hypertension     Irritable bowel syndrome     Obstructive sleep apnea (adult) (pediatric) 07/01/2013    NO CPAP, HAD PROBLEMS WITH INSURANCE AND STOPPED USING    Occlusion

## 2025-07-11 DIAGNOSIS — E11.42 DIABETIC POLYNEUROPATHY ASSOCIATED WITH TYPE 2 DIABETES MELLITUS (HCC): Chronic | ICD-10-CM

## 2025-07-11 RX ORDER — GABAPENTIN 300 MG/1
300 CAPSULE ORAL DAILY
Qty: 90 CAPSULE | Refills: 0 | Status: SHIPPED | OUTPATIENT
Start: 2025-07-11 | End: 2025-10-09

## 2025-07-11 NOTE — TELEPHONE ENCOUNTER
Medication:   Requested Prescriptions     Pending Prescriptions Disp Refills    gabapentin (NEURONTIN) 300 MG capsule [Pharmacy Med Name: GABAPENTIN 300MG CAPS] 90 capsule 0     Sig: Take 1 capsule by mouth daily.     Last Filled:  #90 on 4/7/25    Last appt: 4/1/2025   Next appt: Visit date not found    Last OARRS:        No data to display

## 2025-07-24 DIAGNOSIS — E78.5 HYPERLIPIDEMIA ASSOCIATED WITH TYPE 2 DIABETES MELLITUS (HCC): ICD-10-CM

## 2025-07-24 DIAGNOSIS — E11.69 HYPERLIPIDEMIA ASSOCIATED WITH TYPE 2 DIABETES MELLITUS (HCC): ICD-10-CM

## 2025-07-24 DIAGNOSIS — Z79.4 TYPE 2 DIABETES MELLITUS WITH DIABETIC POLYNEUROPATHY, WITH LONG-TERM CURRENT USE OF INSULIN (HCC): ICD-10-CM

## 2025-07-24 DIAGNOSIS — E11.42 TYPE 2 DIABETES MELLITUS WITH DIABETIC POLYNEUROPATHY, WITH LONG-TERM CURRENT USE OF INSULIN (HCC): ICD-10-CM

## 2025-07-24 RX ORDER — PEN NEEDLE, DIABETIC 31 GX3/16"
NEEDLE, DISPOSABLE MISCELLANEOUS
Qty: 300 EACH | Refills: 6 | Status: SHIPPED | OUTPATIENT
Start: 2025-07-24

## 2025-07-24 RX ORDER — OMEGA-3-ACID ETHYL ESTERS 1 G/1
CAPSULE, LIQUID FILLED ORAL
Qty: 360 CAPSULE | Refills: 2 | Status: SHIPPED | OUTPATIENT
Start: 2025-07-24

## 2025-07-24 NOTE — TELEPHONE ENCOUNTER
Call from [facility] w/ refill request for    -omega 3 acid ethyl esters caps   2 caps for 2 x daily    -pen needle 31G x 5mm 3/16  Use as directed 3x daily     [facility] - [facility] - [place], [place] - [address], [address] - p [phone]   Caller was Carly

## 2025-07-30 LAB
CHOLEST SERPL-MCNC: 118 MG/DL (ref 0–199)
GLUCOSE SERPL-MCNC: 118 MG/DL (ref 70–99)
HDLC SERPL-MCNC: 38 MG/DL (ref 40–60)
LDLC SERPL CALC-MCNC: 56 MG/DL
TRIGL SERPL-MCNC: 120 MG/DL (ref 0–150)

## 2025-08-04 DIAGNOSIS — E78.5 HYPERLIPIDEMIA ASSOCIATED WITH TYPE 2 DIABETES MELLITUS (HCC): ICD-10-CM

## 2025-08-04 DIAGNOSIS — E11.69 HYPERLIPIDEMIA ASSOCIATED WITH TYPE 2 DIABETES MELLITUS (HCC): ICD-10-CM

## 2025-08-04 RX ORDER — ATORVASTATIN CALCIUM 80 MG/1
80 TABLET, FILM COATED ORAL NIGHTLY
Qty: 90 TABLET | Refills: 3 | Status: SHIPPED | OUTPATIENT
Start: 2025-08-04

## (undated) DEVICE — AGENT HEMSTAT 3GM OXIDIZED REGENERATED CELOS ABSRB FOR CONT (ORDER MULTIPLES OF 5EA)

## (undated) DEVICE — LIQUIBAND RAPID ADHESIVE 36/CS 0.8ML: Brand: MEDLINE

## (undated) DEVICE — BLANKET WRM W29.9XL79.1IN UP BODY FORC AIR MISTRAL-AIR

## (undated) DEVICE — AIRLIFE™ NASAL OXYGEN CANNULA CURVED, FLARED TIP, WITH 7 FEET (2.1 M) CRUSH RESISTANT TUBING, OVER-THE-EAR STYLE: Brand: AIRLIFE™

## (undated) DEVICE — ZIMMER® STERILE DISPOSABLE TOURNIQUET CUFF WITH PLC, DUAL PORT, SINGLE BLADDER, 18 IN. (46 CM)

## (undated) DEVICE — NEURO SPONGES: Brand: DEROYAL

## (undated) DEVICE — STANDARD HYPODERMIC NEEDLE,POLYPROPYLENE HUB: Brand: MONOJECT

## (undated) DEVICE — PADDING CAST W4INXL4YD NONSTERILE COT RAYON MICROPLEATED

## (undated) DEVICE — SHEET, T, LAPAROTOMY, STERILE: Brand: MEDLINE

## (undated) DEVICE — SPONGE,NEURO,0.5"X3",XR,STRL,LF,10/PK: Brand: MEDLINE

## (undated) DEVICE — NEEDLE HYPO 25GA L1.5IN BLU POLYPR HUB S STL REG BVL STR

## (undated) DEVICE — PROTECTOR ULN NRV PUR FOAM HK LOOP STRP ANATOMICALLY

## (undated) DEVICE — TRAP SPEC POLYPR SGL CHMBR FN MESH SCRN

## (undated) DEVICE — SUTURE VCRL SZ 3-0 L36IN ABSRB UD L36MM CT-1 1/2 CIR J944H

## (undated) DEVICE — GLOVE,SURG,SENSICARE,ALOE,LF,PF,7: Brand: MEDLINE

## (undated) DEVICE — SOLUTION IV 1000ML 0.9% SOD CHL

## (undated) DEVICE — LAMINECTOMY: Brand: MEDLINE INDUSTRIES, INC.

## (undated) DEVICE — ELECTRODE PT RET AD L9FT HI MOIST COND ADH HYDRGEL CORDED

## (undated) DEVICE — GLOVE SURG SZ 75 L12IN FNGR THK94MIL STD WHT LTX FREE

## (undated) DEVICE — PAD,NON-ADHERENT,3X8,STERILE,LF,1/PK: Brand: MEDLINE

## (undated) DEVICE — SNARE ENDOSCP L240CM SHTH DIA24MM LOOP W10MM POLYP RND REINF

## (undated) DEVICE — PACK,UNIVERSAL,NO GOWNS: Brand: MEDLINE

## (undated) DEVICE — 3M™ TEGADERM™ TRANSPARENT FILM DRESSING FRAME STYLE, 1626W, 4 IN X 4-3/4 IN (10 CM X 12 CM), 50/CT 4CT/CASE: Brand: 3M™ TEGADERM™

## (undated) DEVICE — SYRINGE MED 10ML LUERLOCK TIP W/O SFTY DISP

## (undated) DEVICE — GOWN,SIRUS,NON REINFRCD,LARGE,SET IN SL: Brand: MEDLINE

## (undated) DEVICE — BLADE ES ELASTOMERIC COAT INSUL DURABLE BEND UPTO 90DEG

## (undated) DEVICE — BLADE OPHTH 180DEG CUT SURF BLU STR SHRP DBL BVL GRINDLESS

## (undated) DEVICE — UNDERGLOVE SURG SZ 8 BLU LTX FREE SYN POLYISOPRENE POLYMER

## (undated) DEVICE — Z DISCONTINUED USE 2276105 GOWN PROTCT UNIV CHST W28IN L49IN SL 24IN BLU SPUNBOND FLM

## (undated) DEVICE — SUTURE PDS II SZ 0 L60IN ABSRB VLT L48MM CTX 1/2 CIR Z990G

## (undated) DEVICE — GLOVE SURG SZ 6 L12IN FNGR THK75MIL WHT LTX POLYMER BEAD

## (undated) DEVICE — COVER LT HNDL CAM BLU DISP W/ SURG CTRL

## (undated) DEVICE — KIT POS PT CARE KT W/ GENTLE TCH WILSON +

## (undated) DEVICE — TOOL MR8-14MH30 MR8 14CM MATCH 3MM: Brand: MIDAS REX MR8

## (undated) DEVICE — GLOVE SURG SZ 65 L12IN FNGR THK79MIL GRN LTX FREE

## (undated) DEVICE — APPLIER CLP L9.375IN APER 2.1MM CLS L3.8MM 20 SM TI CLP

## (undated) DEVICE — Device

## (undated) DEVICE — SUTURE VCRL SZ 3-0 L27IN ABSRB UD L36MM CT-1 1/2 CIR J258H

## (undated) DEVICE — GLOVE SURG SZ 75 L12IN FNGR THK94MIL TRNSLUC YEL LTX

## (undated) DEVICE — TOOL 14MH30 LEGEND 14CM 3MM: Brand: MIDAS REX ™

## (undated) DEVICE — APPLIER LIG CLP M L11IN TI STR RNG HNDL FOR 20 CLP DISP

## (undated) DEVICE — SYRINGE, LUER LOCK, 10ML: Brand: MEDLINE

## (undated) DEVICE — SPONGE,PEANUT,XRAY,ST,SM,3/8",5/CARD: Brand: MEDLINE INDUSTRIES, INC.

## (undated) DEVICE — SUTURE VIC COAT BR VIO CP2 4-0 18IN J392H

## (undated) DEVICE — SUTURE MCRYL SZ 4-0 L27IN ABSRB UD L19MM PS-2 1/2 CIR PRIM Y426H

## (undated) DEVICE — SOLUTION IV IRRIG 500ML 0.9% SODIUM CHL 2F7123

## (undated) DEVICE — GLOVE SURG SZ 65 CRM LTX FREE POLYISOPRENE POLYMER BEAD ANTI

## (undated) DEVICE — CUFF RESTRN WRST OR ANK 45FT AD FOAM

## (undated) DEVICE — CORD ES L12FT BPLR FRCP

## (undated) DEVICE — SPONGE GZ W4XL8IN COT WVN 12 PLY

## (undated) DEVICE — GLOVE SURG SZ 75 L12IN FNGR THK87MIL DK GRN LTX FREE ISOLEX

## (undated) DEVICE — ORTHO PRE OP PACK: Brand: MEDLINE INDUSTRIES, INC.

## (undated) DEVICE — TOTAL TRAY, 16FR 10ML SIL FOLEY, URN: Brand: MEDLINE

## (undated) DEVICE — SUTURE PERMAHAND SZ 2-0 L12X18IN NONABSORBABLE BLK SILK A185H

## (undated) DEVICE — GLOVE SURG SZ 65 THK91MIL LTX FREE SYN POLYISOPRENE

## (undated) DEVICE — SUTURE VCRL UD BR CT 3-0 18IN CT1 J838D

## (undated) DEVICE — AGENT HEMSTAT 1GM CLLGN MICFIB ACT ABSRB FLOUR AVIT

## (undated) DEVICE — SUTURE VCRL SZ 0 L18IN ABSRB UD L36MM CT-1 1/2 CIR J840D

## (undated) DEVICE — SUTURE ETHLN SZ 3-0 L30IN NONABSORBABLE BLK L36MM FSLX 3/8 1673BH

## (undated) DEVICE — TOWEL,STOP FLAG GOLD N-W: Brand: MEDLINE

## (undated) DEVICE — METER ACCU-CHEK INFORM BASE GLUCOSE

## (undated) DEVICE — GLOVE SURG SZ 8 CRM LTX FREE POLYISOPRENE POLYMER BEAD ANTI

## (undated) DEVICE — SUTURE VCRL SZ 2-0 L18IN ABSRB UD CT-1 L36MM 1/2 CIR J839D

## (undated) DEVICE — SSC BONE WAX: Brand: SSC BONE WAX

## (undated) DEVICE — PIN, 9733235, 100MM, STERILE, PERC REF

## (undated) DEVICE — SLEEVE PROTCT FOR SCRDRVR AND AWL SYNFIX EVOLUTION SYS

## (undated) DEVICE — SYRINGE IRRIG 60ML SFT PLIABLE BLB EZ TO GRP 1 HND USE W/

## (undated) DEVICE — SET ADMIN PRIMING 7ML L30IN 7.35LB 20 GTT 2ND RLER CLMP

## (undated) DEVICE — POSITIONER HD REST FRME

## (undated) DEVICE — ADHESIVE SKIN CLSR 0.7ML TOP DERMBND ADV

## (undated) DEVICE — SURE SET-DOUBLE BASIN-LF: Brand: MEDLINE INDUSTRIES, INC.

## (undated) DEVICE — COUNTER NDL 40 COUNT HLD 70 NUM FOAM BLK SGL MAG W BLDE REMV

## (undated) DEVICE — CHLORAPREP 26ML ORANGE

## (undated) DEVICE — GLOVE SURG SZ 8 L12IN FNGR THK94MIL TRNSLUC YEL LTX HYDRGEL

## (undated) DEVICE — SUTURE ETHLN SZ 4-0 L18IN NONABSORBABLE BLK L19MM PS-2 3/8 1667H

## (undated) DEVICE — GLOVE SURG SZ 75 L12IN FNGR THK79MIL GRN LTX FREE

## (undated) DEVICE — DRAPE MICSCP W132XL406CM LENS DIA68MM W VARI LENS2 FOR LEICA

## (undated) DEVICE — TRAP SPEC 80ML MUCUS

## (undated) DEVICE — CANNULA NSL AD L7FT CLR VYN CRV PRNG NONFLARED TIP STD CONN

## (undated) DEVICE — FORCEPS BX L240CM DIA2.4MM L NDL RAD JAW 4 133340

## (undated) DEVICE — EYE PROTECTOR FOAM MEDICHOICE

## (undated) DEVICE — TIP SRGCL 2MM DIA STNLSS STEEL SHARP KRRSN SNGLE USE

## (undated) DEVICE — CATHETER IV 14GA L5.25IN PERIPH ORNG FEP POLYMER 3 BVL

## (undated) DEVICE — ELECTRODE ECG MONITR FOAM TEAR DROP ADLT RED

## (undated) DEVICE — DRAPE MICSCP W54XL150IN W/ 4 BINOC GLS LENS LEICA

## (undated) DEVICE — 3M™ TEGADERM™ TRANSPARENT FILM DRESSING FRAME STYLE, 1624W, 2-3/8 IN X 2-3/4 IN (6 CM X 7 CM), 100/CT 4CT/CASE: Brand: 3M™ TEGADERM™

## (undated) DEVICE — SYMMETRY SHARP KERRISON® RONGEUR TIPS, THIN FOOTPLATE, 1 MM TIP, SINGLE USE, (3/BX): Brand: SYMMETRY SHARP KERRISON®

## (undated) DEVICE — SUTURE PERMAHAND SZ 2-0 L30IN NONABSORBABLE BLK SILK W/O A305H

## (undated) DEVICE — NEEDLE 25GAX5.0MM INJ CARR LOCKE

## (undated) DEVICE — CRADLE ARM INDIV PT CARE KT COMP FOR FOR RADLUC WILSON FRME

## (undated) DEVICE — 3M™ IOBAN™ 2 ANTIMICROBIAL INCISE DRAPE 6648EZ: Brand: IOBAN™ 2

## (undated) DEVICE — TUBING, SUCTION, 1/4" X 12', STRAIGHT: Brand: MEDLINE

## (undated) DEVICE — SPONGE,LAP,18"X18",DLX,XR,ST,5/PK,40/PK: Brand: MEDLINE

## (undated) DEVICE — STAPLER SKIN LEG L3.9MM DIA0.53MM WIDE ROT HD FOR WND CLSR

## (undated) DEVICE — DRAPE 33X23IN INCISE ANTIMICROB IOBAN 2

## (undated) DEVICE — BLANKET WRM W40.2XL55.9IN IORT LO BODY + MISTRAL AIR

## (undated) DEVICE — SUTURE VCRL SZ 3-0 L18IN ABSRB UD L26MM SH 1/2 CIR J864D

## (undated) DEVICE — GLOVE SURG SZ 65 L12IN FNGR THK94MIL STD WHT LTX FREE

## (undated) DEVICE — SHEET,T,THYROID,STERILE: Brand: MEDLINE

## (undated) DEVICE — Device: Brand: DISPOSABLE ELECTROSURGICAL SNARE

## (undated) DEVICE — MEDC BAG ICE SM REFILLABLE W/TIES

## (undated) DEVICE — SOLUTION IV 1000ML LAC RINGERS PH 6.5 INJ USP VIAFLX PLAS

## (undated) DEVICE — ULTRA CMFRT CVR M

## (undated) DEVICE — SET GRAV VENT NVENT CK VLV 3 NDL FREE PRT 10 GTT

## (undated) DEVICE — ENDOSCOPIC KIT 2 12 FT OP4 DE2 GWN SYR

## (undated) DEVICE — CATHETER IV 20GA L1.25IN PNK FEP SFTY STR HUB RADPQ DISP